# Patient Record
Sex: FEMALE | Race: WHITE | NOT HISPANIC OR LATINO | Employment: UNEMPLOYED | URBAN - METROPOLITAN AREA
[De-identification: names, ages, dates, MRNs, and addresses within clinical notes are randomized per-mention and may not be internally consistent; named-entity substitution may affect disease eponyms.]

---

## 2017-08-06 ENCOUNTER — HOSPITAL ENCOUNTER (EMERGENCY)
Facility: HOSPITAL | Age: 37
Discharge: HOME | End: 2017-08-06
Attending: EMERGENCY MEDICINE | Admitting: EMERGENCY MEDICINE
Payer: COMMERCIAL

## 2017-08-06 VITALS
RESPIRATION RATE: 16 BRPM | HEIGHT: 64 IN | BODY MASS INDEX: 26.12 KG/M2 | WEIGHT: 153 LBS | DIASTOLIC BLOOD PRESSURE: 70 MMHG | TEMPERATURE: 98 F | OXYGEN SATURATION: 99 % | HEART RATE: 64 BPM | SYSTOLIC BLOOD PRESSURE: 145 MMHG

## 2017-08-06 DIAGNOSIS — L25.9 CONTACT DERMATITIS: Primary | ICD-10-CM

## 2017-08-06 PROCEDURE — 99283 EMERGENCY DEPT VISIT LOW MDM: CPT

## 2017-08-06 RX ORDER — DIPHENHYDRAMINE HCL 25 MG
50 TABLET ORAL ONCE
Status: COMPLETED | OUTPATIENT
Start: 2017-08-06 | End: 2017-08-06

## 2017-08-06 RX ORDER — ESOMEPRAZOLE MAGNESIUM 10 MG/1
10 GRANULE, FOR SUSPENSION, EXTENDED RELEASE ORAL
COMMUNITY
End: 2017-12-11 | Stop reason: CLARIF

## 2017-08-06 RX ADMIN — DIPHENHYDRAMINE HCL 50 MG: 25 TABLET ORAL at 22:21

## 2017-12-11 ENCOUNTER — HOSPITAL ENCOUNTER (EMERGENCY)
Facility: HOSPITAL | Age: 37
Discharge: HOME | End: 2017-12-11
Attending: EMERGENCY MEDICINE
Payer: COMMERCIAL

## 2017-12-11 VITALS
TEMPERATURE: 97.7 F | RESPIRATION RATE: 18 BRPM | HEART RATE: 65 BPM | BODY MASS INDEX: 26.95 KG/M2 | SYSTOLIC BLOOD PRESSURE: 142 MMHG | DIASTOLIC BLOOD PRESSURE: 65 MMHG | WEIGHT: 157 LBS | OXYGEN SATURATION: 100 %

## 2017-12-11 DIAGNOSIS — M54.9 BACK PAIN: ICD-10-CM

## 2017-12-11 DIAGNOSIS — M79.18 BUTTOCK PAIN: Primary | ICD-10-CM

## 2017-12-11 PROCEDURE — 96372 THER/PROPH/DIAG INJ SC/IM: CPT

## 2017-12-11 PROCEDURE — 99283 EMERGENCY DEPT VISIT LOW MDM: CPT

## 2017-12-11 RX ORDER — KETOROLAC TROMETHAMINE 30 MG/ML
15 INJECTION, SOLUTION INTRAMUSCULAR; INTRAVENOUS ONCE
Status: COMPLETED | OUTPATIENT
Start: 2017-12-11 | End: 2017-12-11

## 2017-12-11 RX ORDER — METHOCARBAMOL 500 MG/1
500 TABLET, FILM COATED ORAL 2 TIMES DAILY
Qty: 20 TABLET | Refills: 0 | Status: SHIPPED | OUTPATIENT
Start: 2017-12-11 | End: 2018-05-02

## 2017-12-11 RX ORDER — LIDOCAINE 50 MG/G
1 PATCH TOPICAL ONCE
Status: DISCONTINUED | OUTPATIENT
Start: 2017-12-11 | End: 2017-12-11 | Stop reason: HOSPADM

## 2017-12-11 RX ORDER — METHOCARBAMOL 500 MG/1
500 TABLET, FILM COATED ORAL ONCE
Status: COMPLETED | OUTPATIENT
Start: 2017-12-11 | End: 2017-12-11

## 2017-12-11 RX ORDER — ACETAMINOPHEN 325 MG/1
975 TABLET ORAL ONCE
Status: COMPLETED | OUTPATIENT
Start: 2017-12-11 | End: 2017-12-11

## 2017-12-11 RX ORDER — NAPROXEN 500 MG/1
500 TABLET ORAL 2 TIMES DAILY WITH MEALS
Qty: 14 TABLET | Refills: 0 | Status: SHIPPED | OUTPATIENT
Start: 2017-12-11 | End: 2018-05-02

## 2017-12-11 RX ADMIN — METHOCARBAMOL 500 MG: 500 TABLET ORAL at 17:42

## 2017-12-11 RX ADMIN — KETOROLAC TROMETHAMINE 15 MG: 30 INJECTION, SOLUTION INTRAMUSCULAR at 17:45

## 2017-12-11 RX ADMIN — ACETAMINOPHEN 975 MG: 325 TABLET, FILM COATED ORAL at 17:42

## 2017-12-11 RX ADMIN — LIDOCAINE 1 PATCH: 50 PATCH TOPICAL at 17:45

## 2017-12-11 NOTE — ED ATTENDING ATTESTATION
Adela Dupont DO, saw and evaluated the patient  I have discussed the patient with the resident/non-physician practitioner and agree with the resident's/non-physician practitioner's findings, Plan of Care, and MDM as documented in the resident's/non-physician practitioner's note, except where noted  All available labs and Radiology studies were reviewed  At this point I agree with the current assessment done in the Emergency Department  I have conducted an independent evaluation of this patient a history and physical is as follows:    R sided buttock pain radiating down lateral aspect of R leg started 2 days ago  Pain 8/10 constant, worse with ambulation  Denies bowel/bladder sxs  Able to stand on heels and toes  No midline TTP  Imp: sciatica likely due to piriformis syndrome  Plan: robaxin, NSAIDs        Critical Care Time  CritCare Time

## 2017-12-11 NOTE — DISCHARGE INSTRUCTIONS
Please return to the Emergency Department if your symptoms fail to get better or you develop new, concerning symptoms  If you develop fever, chills, worsening pain, trouble walking, trouble going to the bathroom seek care immediately  Otherwise follow up with your primary care provider in the next 2-3 days for further care  Piriformis Syndrome   WHAT YOU NEED TO KNOW:   Piriformis syndrome is sciatic nerve pain caused by an injured or overused piriformis muscle  This is a muscle inside your buttocks that helps you move your leg  The pain is caused when this muscle pinches your sciatic nerve  You may feel the pain in your hip or down your leg  DISCHARGE INSTRUCTIONS:   Medicines: You may need any of the following:  · Prescription medicines  may be used to relax your muscles and decrease pain and swelling  · NSAIDs  help decrease swelling and pain  This medicine is available with or without a doctor's order  NSAIDs can cause stomach bleeding or kidney problems in certain people  If you take blood thinner medicine, always ask your healthcare provider if NSAIDs are safe for you  Always read the medicine label and follow directions  · Acetaminophen  decreases pain  It is available without a doctor's order  Ask how much to take and how often to take it  Follow directions  Acetaminophen can cause liver damage if not taken correctly  · Take your medicine as directed  Contact your healthcare provider if you think your medicine is not helping or if you have side effects  Tell him or her if you are allergic to any medicine  Keep a list of the medicines, vitamins, and herbs you take  Include the amounts, and when and why you take them  Bring the list or the pill bottles to follow-up visits  Carry your medicine list with you in case of an emergency  Follow up with your healthcare provider as directed: You may need to return for more tests   You may also be referred to a physical therapist  Write down your questions so you remember to ask them during your visits  Manage your symptoms of piriformis syndrome:   · Rest as directed  Avoid activities that make your pain worse  · Apply ice to the buttock on your injured side  Use an ice pack, or put crushed ice in a plastic bag  Cover it with a towel  Leave the ice on for 15 to 20 minutes every hour, or as directed  Ice helps prevent tissue damage and decreases swelling and pain  · Apply heat to the buttock on your injured side  Use heating pads for 20 to 30 minutes every 2 hours for as many days as directed  Heat helps decrease pain and muscle spasms  · Stretch as directed  Lie on your back with your knees bent  Place the ankle of your injured leg on the knee of your other leg  Gently pull your bent leg toward your chest, until you feel a stretch in the buttock of your injured leg  A physical therapist may show you other exercises to stretch and strengthen your hip muscles  Contact your healthcare provider if:   · Your pain worsens or returns, even with treatment  · You have questions or concerns about your condition or care  Return to the emergency department if:   · You cannot move your leg or foot  © 2017 [address] [facility] Information is for End User's use only and may not be sold, redistributed or otherwise used for commercial purposes  All illustrations and images included in CareNotes® are the copyrighted property of [employer]  or [address]  The above information is an  only  It is not intended as medical advice for individual conditions or treatments  Talk to your doctor, nurse or pharmacist before following any medical regimen to see if it is safe and effective for you  Muscle Spasm   WHAT YOU NEED TO KNOW:   A muscle spasm is a sudden contraction of any muscle or group of muscles  A muscle cramp is a painful muscle spasm   Muscle cramps commonly occur after intense exercise or during pregnancy  They may also be caused by certain medications, dehydration, low calcium or magnesium levels, or another medical condition  DISCHARGE INSTRUCTIONS:   Medicines: You may need the following:  · NSAIDs  help decrease swelling and pain or fever  This medicine is available with or without a doctor's order  NSAIDs can cause stomach bleeding or kidney problems in certain people  If you take blood thinner medicine, always ask your healthcare provider if NSAIDs are safe for you  Always read the medicine label and follow directions  · Take your medicine as directed  Contact your healthcare provider if you think your medicine is not helping or if you have side effects  Tell him of her if you are allergic to any medicine  Keep a list of the medicines, vitamins, and herbs you take  Include the amounts, and when and why you take them  Bring the list or the pill bottles to follow-up visits  Carry your medicine list with you in case of an emergency  Follow up with your healthcare provider as directed: You may need other tests or treatment  You may also be referred to a physical therapist or other specialist  Write down your questions so you remember to ask them during your visits  Self-care:   · Stretch  your muscle to help relieve the cramp  It may be helpful to keep your muscle in the stretched position until the cramp is gone  · Apply heat  to help decrease pain and muscle spasms  Apply heat on the area for 20 to 30 minutes every 2 hours for as many days as directed  · Apply ice  to help decrease swelling and pain  Ice may also help prevent tissue damage  Use an ice pack, or put crushed ice in a plastic bag  Cover it with a towel and place it on your muscle for 15 to 20 minutes every hour or as directed  · Drink more liquids  to help prevent muscle cramps caused by dehydration  Sports drinks may help replace electrolytes you lose through sweat during exercise   Ask your healthcare provider how much liquid to drink each day and which liquids are best for you  · Eat healthy foods , such as fruits, vegetables, whole grains, low-fat dairy products, and lean proteins (meat, beans, and fish)  If you are pregnant, ask your healthcare provider about foods that are high in magnesium and sodium  They may help to relieve cramps during pregnancy  · Massage your muscle  to help relieve the cramp  · Take frequent deep breaths  until the cramp feels better  Lie down while you take the deep breaths so you do not get dizzy or lightheaded  Contact your healthcare provider if:   · You have signs of dehydration, such as a headache, dark yellow urine, dry eyes or mouth, or a fast heartbeat  · You have questions or concerns about your condition or care  Return to the emergency department if:   · You have warmth, swelling, or redness in the cramping muscle  · You have frequent or unrelieved muscle cramps in several different muscles  · You have muscle cramps with numbness, tingling, and burning in your hands and feet  © 2017 [address] [facility] Information is for End User's use only and may not be sold, redistributed or otherwise used for commercial purposes  All illustrations and images included in CareNotes® are the copyrighted property of [employer]  or [address]  The above information is an  only  It is not intended as medical advice for individual conditions or treatments  Talk to your doctor, nurse or pharmacist before following any medical regimen to see if it is safe and effective for you

## 2017-12-11 NOTE — ED PROVIDER NOTES
History  Chief Complaint   Patient presents with    Back Pain     Pt c/o right sided back pain that radiates through buttocks  Denies loss of bowel or bladder function     This is a 24-year-old female presents for evaluation of right buttock pain for the last 2 days  States that she noted a gradual onset sharp, shooting buttock pain that radiated into her right leg along the lateral thigh into her foot with most movement  She took Tylenol in Advil without relief however has not taken any pain medications since yesterday  She has had similar symptoms in the past however they were short lived in usually resolved within a day  She denies any trauma denies any redness or swelling denies any fevers or chills denies any trouble walking denies any saddle anesthesia, urinary or bowel incontinence or retention  Otherwise she does not take any medications  She is a  and is on her feet a lot but does not recall any inciting event prior to the pain starting            Prior to Admission Medications   Prescriptions Last Dose Informant Patient Reported? Taking?   esomeprazole (NexIUM) 20 mg capsule   Yes Yes   Sig: Take 20 mg by mouth every morning before breakfast      Facility-Administered Medications: None       History reviewed  No pertinent past medical history  Past Surgical History:   Procedure Laterality Date    TUBAL LIGATION         History reviewed  No pertinent family history  I have reviewed and agree with the history as documented  Social History   Substance Use Topics    Smoking status: Never Smoker    Smokeless tobacco: Never Used    Alcohol use Yes      Comment: social        Review of Systems   Constitutional: Negative for chills and fever  HENT: Negative for rhinorrhea and sore throat  Respiratory: Negative for cough  Cardiovascular: Negative for chest pain and palpitations  Gastrointestinal: Negative for abdominal pain, nausea and vomiting     Genitourinary: Negative for dysuria, frequency and urgency  Musculoskeletal:        Right buttock pain   Neurological: Negative for weakness, light-headedness and headaches  Physical Exam  ED Triage Vitals [12/11/17 1635]   Temperature Pulse Respirations Blood Pressure SpO2   97 7 °F (36 5 °C) 65 18 142/65 100 %      Temp Source Heart Rate Source Patient Position - Orthostatic VS BP Location FiO2 (%)   Oral Monitor Lying Left arm --      Pain Score       No Pain           Orthostatic Vital Signs  Vitals:    12/11/17 1635   BP: 142/65   Pulse: 65   Patient Position - Orthostatic VS: Lying       Physical Exam   Constitutional: She is oriented to person, place, and time  She appears well-developed and well-nourished  HENT:   Head: Normocephalic and atraumatic  Cardiovascular: Normal rate and regular rhythm  Exam reveals no gallop and no friction rub  No murmur heard  Pulmonary/Chest: Effort normal  She has no wheezes  She has no rales  She exhibits no tenderness  Abdominal: Soft  She exhibits no distension and no mass  There is no rebound and no guarding  Musculoskeletal:   Tenderness to palpation over right upper buttock, no erythema or swelling, distally extremity neurovascularly intact, muscle strength 5/5 bilateral lower extremities, normal gait  Neurological: She is alert and oriented to person, place, and time  Skin: Skin is warm and dry  Psychiatric: She has a normal mood and affect  Nursing note and vitals reviewed        ED Medications  Medications   lidocaine (LIDODERM) 5 % patch 1 patch (1 patch Transdermal Medication Applied 12/11/17 1745)   ketorolac (TORADOL) injection 15 mg (15 mg Intramuscular Given 12/11/17 1745)   methocarbamol (ROBAXIN) tablet 500 mg (500 mg Oral Given 12/11/17 1742)   acetaminophen (TYLENOL) tablet 975 mg (975 mg Oral Given 12/11/17 1742)       Diagnostic Studies  Results Reviewed     None                 No orders to display         Procedures  Procedures      Phone Consults  ED Phone Contact    ED Course  ED Course                                MDM  Number of Diagnoses or Management Options  Diagnosis management comments: 80-year-old female presents for evaluation of right buttock pain, likely musculoskeletal in origin, possibly piriformis syndrome  Will provide analgesics as well as muscle relaxant, will re-evaluate and patient will follow up with PCP for further care  Will provide [facility] follow-up as patient does not have any insurance or PCP listed    CritCare Time    Disposition  Final diagnoses:   Buttock pain   Back pain     Time reflects when diagnosis was documented in both MDM as applicable and the Disposition within this note     Time User Action Codes Description Comment    12/11/2017  5:50 PM Daryl Marie Add [M79 1] Buttock pain     12/11/2017  5:50 PM Daryl Yanezer Add [M54 9] Back pain       ED Disposition     ED Disposition Condition Comment    Discharge  [facility] discharge to home/self care      Condition at discharge: Stable        Follow-up Information     Follow up With Specialties Details Why [facility] Emergency Department Emergency Medicine  If symptoms worsen [address]  [phone]  ED, [address] [place] [address]  In 2 days  [address] [place] [address] [address] [facility]         Discharge Medication List as of 12/11/2017  5:51 PM      START taking these medications    Details   methocarbamol (ROBAXIN) 500 mg tablet Take 1 tablet by mouth 2 (two) times a day, Starting Mon 12/11/2017, Print      naproxen (NAPROSYN) 500 mg tablet Take 1 tablet by mouth 2 (two) times a day with meals, Starting Mon 12/11/2017, Print         CONTINUE these medications which have NOT CHANGED    Details   esomeprazole (NexIUM) 20 mg capsule Take 20 mg by mouth every morning before breakfast, Historical Med           No discharge procedures on file  ED Provider  Attending physically available and evaluated Dejan Torres  ROSEMARY managed the patient along with the ED Attending      Electronically Signed by         Alexy Danielson MD  Resident  12/11/17 9992

## 2018-04-04 ENCOUNTER — OFFICE VISIT (OUTPATIENT)
Dept: OBGYN CLINIC | Facility: CLINIC | Age: 38
End: 2018-04-04
Payer: COMMERCIAL

## 2018-04-04 VITALS
WEIGHT: 154.3 LBS | BODY MASS INDEX: 26.34 KG/M2 | DIASTOLIC BLOOD PRESSURE: 74 MMHG | SYSTOLIC BLOOD PRESSURE: 111 MMHG | HEART RATE: 77 BPM | HEIGHT: 64 IN

## 2018-04-04 DIAGNOSIS — S37.69XA ABRASION OF CERVIX, INITIAL ENCOUNTER: ICD-10-CM

## 2018-04-04 DIAGNOSIS — T19.2XXA RETAINED TAMPON, INITIAL ENCOUNTER: Primary | ICD-10-CM

## 2018-04-04 PROCEDURE — 99202 OFFICE O/P NEW SF 15 MIN: CPT | Performed by: NURSE PRACTITIONER

## 2018-04-04 RX ORDER — METRONIDAZOLE 500 MG/1
500 TABLET ORAL EVERY 12 HOURS SCHEDULED
Qty: 14 TABLET | Refills: 0 | Status: SHIPPED | OUTPATIENT
Start: 2018-04-04 | End: 2018-04-11

## 2018-04-04 RX ORDER — LAMOTRIGINE 100 MG/1
TABLET ORAL
COMMUNITY
Start: 2015-06-08 | End: 2018-05-02

## 2018-04-04 RX ORDER — DOXYCYCLINE HYCLATE 100 MG/1
100 CAPSULE ORAL EVERY 12 HOURS SCHEDULED
Qty: 28 CAPSULE | Refills: 0 | Status: SHIPPED | OUTPATIENT
Start: 2018-04-04 | End: 2018-04-18

## 2018-04-04 NOTE — PROGRESS NOTES
Assessment/Plan:       Diagnoses and all orders for this visit:    Retained tampon, initial encounter  -     doxycycline hyclate (VIBRAMYCIN) 100 mg capsule; Take 1 capsule (100 mg total) by mouth every 12 (twelve) hours for 14 days  -     metroNIDAZOLE (FLAGYL) 500 mg tablet; Take 1 tablet (500 mg total) by mouth every 12 (twelve) hours for 7 days    Abrasion of cervix, initial encounter  -     doxycycline hyclate (VIBRAMYCIN) 100 mg capsule; Take 1 capsule (100 mg total) by mouth every 12 (twelve) hours for 14 days  -     metroNIDAZOLE (FLAGYL) 500 mg tablet; Take 1 tablet (500 mg total) by mouth every 12 (twelve) hours for 7 days    Other orders  -     lamoTRIgine (LaMICtal) 100 mg tablet; Take by mouth      Reviewed TSS symptoms and to go to ER if she develops  RTO in 1 week for follow up exam    Subjective:      Patient ID: Alex Lamb is a 40 y o  female  HPI Pt is a 39 yo , 1 SAB, 2 VIP's, last child adopted out  Patient has had pelvic pain the past couple months has been off and on and also a sour smell  Has history of bilateral salpingectomy  His history of fibroid, history of ovarian cyst, and history of acute endometritis  She denies any vaginal discharge, Same partner x2 years  She denies any fever or chills  Pain is more midline and feels "bruisy", radiates to the back  She had sciatica and was treated for with NSAIDS and muscle relaxants and resolved and then developed this pain  Pain is a 2-6 on pain scale, takes Tylenol helps a little  She denies any dysuria, urgency of unusual frequency  Pain is constant, feels more when active or touching  Has pain with sex that has been increasing  LMP 3/15/2018, usually lasts for 1 week       The following portions of the patient's history were reviewed and updated as appropriate: allergies, current medications, past family history, past medical history, past social history, past surgical history and problem list     Review of Systems Respiratory: Negative  Cardiovascular: Negative  Gastrointestinal: Negative for abdominal pain, diarrhea and nausea  Genitourinary: Positive for dyspareunia and pelvic pain  Negative for difficulty urinating, genital sores, menstrual problem, urgency, vaginal bleeding and vaginal discharge  Musculoskeletal: Positive for back pain (lower back)  Negative for arthralgias and myalgias  Neurological: Negative for dizziness, seizures, weakness, light-headedness and headaches  Objective:      /74 (BP Location: Left arm, Patient Position: Sitting, Cuff Size: Adult)   Pulse 77   Ht 5' 4" (1 626 m)   Wt 70 kg (154 lb 4 8 oz)   LMP 03/15/2018   Breastfeeding? No   BMI 26 49 kg/m²          Physical Exam   Constitutional: She appears well-nourished  Neck: No thyromegaly present  Cardiovascular: Normal rate and regular rhythm  Pulmonary/Chest: Effort normal and breath sounds normal    Abdominal: Soft  There is no tenderness  Genitourinary:         External genitalia-no lesions  Vagina-retained tampon visualized and removed, yellow-white thin discharge  Cervix-no pus, some erythema with small scattered abrasions on the right side the cervix her tampon was    Negative CMT, not friable  Uterus-ANSSC, some tenderness with exam  Adnexa-nontender, no masses

## 2018-04-04 NOTE — PATIENT INSTRUCTIONS
Return to office in 1 week for follow-up and exam   Go to the ER if you develop the symptoms  A sudden high fever, low blood pressure, vomiting or diarrhea, a rash, confusion, muscle aches, redness of her eyes, mouth, throat, seizures  Or headaches  Please take antibiotics as prescribed

## 2018-04-11 ENCOUNTER — OFFICE VISIT (OUTPATIENT)
Dept: OBGYN CLINIC | Facility: CLINIC | Age: 38
End: 2018-04-11
Payer: COMMERCIAL

## 2018-04-11 VITALS
DIASTOLIC BLOOD PRESSURE: 76 MMHG | SYSTOLIC BLOOD PRESSURE: 114 MMHG | WEIGHT: 154 LBS | HEART RATE: 73 BPM | HEIGHT: 64 IN | BODY MASS INDEX: 26.29 KG/M2

## 2018-04-11 DIAGNOSIS — T19.2XXD RETAINED TAMPON, SUBSEQUENT ENCOUNTER: Primary | ICD-10-CM

## 2018-04-11 DIAGNOSIS — Z11.3 SCREENING FOR STDS (SEXUALLY TRANSMITTED DISEASES): ICD-10-CM

## 2018-04-11 DIAGNOSIS — S37.69XD: ICD-10-CM

## 2018-04-11 LAB
BV WHIFF TEST VAG QL: ABNORMAL
CLUE CELLS SPEC QL WET PREP: NEGATIVE
SL AMB POCT WET MOUNT: ABNORMAL
T VAGINALIS VAG QL WET PREP: ABNORMAL
YEAST VAG QL WET PREP: ABNORMAL

## 2018-04-11 PROCEDURE — 99213 OFFICE O/P EST LOW 20 MIN: CPT | Performed by: NURSE PRACTITIONER

## 2018-04-11 PROCEDURE — 87491 CHLMYD TRACH DNA AMP PROBE: CPT | Performed by: NURSE PRACTITIONER

## 2018-04-11 PROCEDURE — 87210 SMEAR WET MOUNT SALINE/INK: CPT | Performed by: NURSE PRACTITIONER

## 2018-04-11 PROCEDURE — 87591 N.GONORRHOEAE DNA AMP PROB: CPT | Performed by: NURSE PRACTITIONER

## 2018-04-11 NOTE — PROGRESS NOTES
Assessment/Plan:  Reviewed wet mount KOH findings with patient, culture for Chlamydia and gonorrhea sent, patient is due for a menses soon  Review to call or go to the ER with any symptoms, please see patient's instructions  Continue medication as prescribed until finished  Recommended to use pads with next menses  Return to office 2018 for annual gyn exam          Diagnoses and all orders for this visit:    Retained tampon, subsequent encounter  -     Chlamydia/GC amplified DNA by PCR  -     POCT wet mount    Abrasion of cervix, subsequent encounter  -     Chlamydia/GC amplified DNA by PCR    Screening for STDs (sexually transmitted diseases)  -     Chlamydia/GC amplified DNA by PCR          Subjective:      Patient ID: Nabila Cabral is a 40 y o  female  HPI patient is a 55-year-old  035 here for follow-up for a retained tampon which was removed 1 week ago  Patient was started on doxycycline 100 milligrams b i d  times 14 days and metronidazole 500 milligrams b i d  x1 week  She states her pelvic pain that she was feeling has resolved  She states she will get an intermittent pinching pain that lasts for seconds at left lower quadrant but has had this pain previously  Her LMP was 03/15/2018 she is due for menses soon  She denies any T SS symptoms  She denies any fever, chills, rashes, body aches or other unusual symptoms  She denies any unusual vaginal discharge, odor  She has not had sex since she was last seen  She does have an annual gyn exam scheduled for May 2, 2018    The following portions of the patient's history were reviewed and updated as appropriate: allergies, current medications, past family history, past medical history, past social history, past surgical history and problem list     Review of Systems   Respiratory: Negative  Cardiovascular: Negative  Gastrointestinal: Negative for diarrhea  Genitourinary: Positive for pelvic pain   Negative for difficulty urinating, vaginal bleeding and vaginal discharge  Musculoskeletal: Negative for arthralgias  Objective:      /76 (BP Location: Left arm, Patient Position: Sitting, Cuff Size: Adult)   Pulse 73   Ht 5' 4" (1 626 m)   Wt 69 9 kg (154 lb)   LMP 03/15/2018   BMI 26 43 kg/m²          Physical Exam   Constitutional: She appears well-nourished  Abdominal: Soft  There is no tenderness  external genitalia- no lesions  Vagina-no lesions, small amount yellow white discharge  Cervix-abrasions healed, 1 very small area of erythema on right side of cervix, negative CMT  Uterus-ANSSC, mild tenderness movement  Adnexa-no masses nontender    Wet mount ALEXANDRA-many PMNs, negative for yeast, negative for Bv, negative for trich, negative whiff

## 2018-04-11 NOTE — PATIENT INSTRUCTIONS
Call with any symptoms, fever, chills, aches and pains, or rashes    Will follow-up at your annual on 05/02/2018

## 2018-04-13 LAB
CHLAMYDIA DNA CVX QL NAA+PROBE: NORMAL
N GONORRHOEA DNA GENITAL QL NAA+PROBE: NORMAL

## 2018-05-02 ENCOUNTER — OFFICE VISIT (OUTPATIENT)
Dept: OBGYN CLINIC | Facility: CLINIC | Age: 38
End: 2018-05-02
Payer: COMMERCIAL

## 2018-05-02 VITALS
DIASTOLIC BLOOD PRESSURE: 83 MMHG | SYSTOLIC BLOOD PRESSURE: 116 MMHG | WEIGHT: 152.6 LBS | BODY MASS INDEX: 26.05 KG/M2 | HEIGHT: 64 IN | HEART RATE: 71 BPM

## 2018-05-02 DIAGNOSIS — Z01.419 ENCOUNTER FOR GYNECOLOGICAL EXAMINATION WITHOUT ABNORMAL FINDING: Primary | ICD-10-CM

## 2018-05-02 PROCEDURE — 99395 PREV VISIT EST AGE 18-39: CPT | Performed by: NURSE PRACTITIONER

## 2018-05-02 PROCEDURE — G0145 SCR C/V CYTO,THINLAYER,RESCR: HCPCS | Performed by: PATHOLOGY

## 2018-05-02 PROCEDURE — G0124 SCREEN C/V THIN LAYER BY MD: HCPCS | Performed by: PATHOLOGY

## 2018-05-02 PROCEDURE — 87624 HPV HI-RISK TYP POOLED RSLT: CPT | Performed by: NURSE PRACTITIONER

## 2018-05-02 NOTE — PROGRESS NOTES
Diagnoses and all orders for this visit:    Encounter for gynecological examination without abnormal finding  -     Liquid-based pap, screening            ASSESSMENT & PLAN: Shannan Favre is a 40 y o  [id number] with normal gynecologic exam     1   Routine well woman exam done today  2  Pap and HPV:  The patient's last pap 10/2014 and was negative  Pap and cotesting was done today  Current [facility]P Guidelines reviewed  Due in 5 years if negative  3   The following were reviewed in today's visit: breast self exam, adequate intake of calcium and vitamin D, exercise and healthy diet  Caution  tampon use, recent retained tampon  CC:  Annual Gynecologic Examination    HPI: Shannan Favre is a 40 y o  [id number] who presents for annual gynecologic examination  Patient was recently seen in April for retained tampon which was removed  Patient finished meds that were prescribed  She denies any symptoms, pain or rashes  She had a tubal ligation that was done 2015  She denies any problems with menses  She was recently engaged  She has been with the same partner for 2 years  Health Maintenance:    She wears her seatbelt routinely  She does not perform regular monthly self breast exams  She feels safe at home  Past Medical History:   Diagnosis Date    Heartburn        Past Surgical History:   Procedure Laterality Date    TUBAL LIGATION         Past OB/Gyn History:  OB History      Para Term  AB Living    8 5 5   2 5    SAB TAB Ectopic Multiple Live Births    1       5        Pt does not have menstrual issues  History of sexually transmitted infection: No   History of abnormal pap smears: No      Patient is currently sexually active  heterosexual   The current method of family planning is tubal ligation      Family History   Problem Relation Age of Onset    Arthritis Mother     Cervical cancer Mother     Mental illness Father     Cervical cancer Sister     Heart disease Maternal Grandmother        Social History:  Social History     Social History    Marital status: Legally      Spouse name: N/A    Number of children: N/A    Years of education: N/A     Occupational History    Not on file  Social History Main Topics    Smoking status: Former Smoker    Smokeless tobacco: Never Used    Alcohol use Yes      Comment: social    Drug use: No    Sexual activity: Yes     Partners: Male     Birth control/ protection: Female Sterilization     Other Topics Concern    Not on file     Social History Narrative    No narrative on file     Presently lives with family  Patient is   No Known Allergies      Current Outpatient Prescriptions:     esomeprazole (NexIUM) 20 mg capsule, Take 20 mg by mouth every morning before breakfast, Disp: , Rfl:       Review of Systems  Constitutional :no fever, feels well, no tiredness, no recent weight gain or loss  ENT: no ear ache, no loss of hearing, no nosebleeds or nasal discharge, no sore throat or hoarseness  Cardiovascular: no complaints of slow or fast heart beat, no chest pain, no palpitations, no leg claudication or lower extremity edema  Respiratory: no complaints of shortness of shortness of breath, no MANDUJANO  Breasts:no complaints of breast pain, breast lump, or nipple discharge  Gastrointestinal: no complaints of abdominal pain, constipation, nausea, vomiting, or diarrhea or bloody stools  Genitourinary : no complaints of dysuria, incontinence, pelvic pain, no dysmenorrhea, vaginal discharge or abnormal vaginal bleeding and as noted in HPI  Musculoskeletal: no complaints of arthralgia, no myalgia, no joint swelling or stiffness, no limb pain or swelling    Integumentary: no complaints of skin rash or lesion, itching or dry skin  Neurological: no complaints of headache, no confusion, no numbness or tingling, no dizziness or fainting    Objective      /83 (BP Location: Left arm, Patient Position: Sitting, Cuff Size: Standard)   Pulse 71   Ht 5' 3 5" (1 613 m)   Wt 69 2 kg (152 lb 9 6 oz)   LMP 04/19/2018 (Approximate)   BMI 26 61 kg/m²   General:   appears stated age, cooperative, alert normal mood and affect   Neck: normal, supple,trachea midline, no masses   Heart: regular rate and rhythm, S1, S2 normal, no murmur, click, rub or gallop   Lungs: clear to auscultation bilaterally   Breasts: normal appearance, no masses or tenderness   Abdomen: soft, non-tender, without masses or organomegaly   Vulva: normal female genitalia, Bartholin's, Urethra, Pettibone normal   Vagina: normal vagina, no discharge, exudate, lesion, or erythema   Urethra: normal   Cervix: Normal, no discharge  PAP done  no lesions   Uterus: normal size, contour, position, consistency, mobility, non-tender   Adnexa: normal adnexa   Lymphatic palpation of lymph nodes in neck, axilla, groin and/or other locations: no lymphadenopathy or masses noted   Skin normal skin turgor and no rashes     Psychiatric orientation to person, place, and time: normal  mood and affect: normal

## 2018-05-04 LAB — HPV RRNA GENITAL QL NAA+PROBE: ABNORMAL

## 2018-05-08 LAB
LAB AP GYN PRIMARY INTERPRETATION: NORMAL
Lab: NORMAL
PATH INTERP SPEC-IMP: NORMAL

## 2018-05-14 ENCOUNTER — TELEPHONE (OUTPATIENT)
Dept: OBGYN CLINIC | Facility: CLINIC | Age: 38
End: 2018-05-14

## 2018-05-14 NOTE — TELEPHONE ENCOUNTER
----- Message from Karen Cedeño, [address] sent at 5/9/2018 11:00 AM EDT -----  Please call patient to schedule colpo, ECC and possible endometrial biopsy

## 2018-05-15 ENCOUNTER — HOSPITAL ENCOUNTER (EMERGENCY)
Facility: HOSPITAL | Age: 38
Discharge: HOME | End: 2018-05-16
Attending: EMERGENCY MEDICINE | Admitting: EMERGENCY MEDICINE
Payer: COMMERCIAL

## 2018-05-15 DIAGNOSIS — M54.9 CHRONIC BACK PAIN: ICD-10-CM

## 2018-05-15 DIAGNOSIS — N83.209 OVARIAN CYST: Primary | ICD-10-CM

## 2018-05-15 DIAGNOSIS — G89.29 CHRONIC BACK PAIN: ICD-10-CM

## 2018-05-15 DIAGNOSIS — M25.512 LEFT SHOULDER PAIN: ICD-10-CM

## 2018-05-15 LAB
BILIRUB UR QL STRIP: NEGATIVE
CLARITY UR: CLEAR
CLARITY, POC: CLEAR
COLOR UR: YELLOW
COLOR, POC: YELLOW
EXT PREG TEST URINE: NEGATIVE
GLUCOSE UR STRIP-MCNC: NEGATIVE MG/DL
HGB UR QL STRIP.AUTO: NEGATIVE
KETONES UR STRIP-MCNC: NEGATIVE MG/DL
LEUKOCYTE ESTERASE UR QL STRIP: ABNORMAL
NITRITE UR QL STRIP: NEGATIVE
PH UR STRIP.AUTO: 6.5 [PH] (ref 4.5–8)
PROT UR STRIP-MCNC: NEGATIVE MG/DL
SP GR UR STRIP.AUTO: 1.01 (ref 1–1.03)
UROBILINOGEN UR QL STRIP.AUTO: 0.2 E.U./DL

## 2018-05-15 PROCEDURE — 85025 COMPLETE CBC W/AUTO DIFF WBC: CPT | Performed by: STUDENT IN AN ORGANIZED HEALTH CARE EDUCATION/TRAINING PROGRAM

## 2018-05-15 PROCEDURE — 80053 COMPREHEN METABOLIC PANEL: CPT | Performed by: STUDENT IN AN ORGANIZED HEALTH CARE EDUCATION/TRAINING PROGRAM

## 2018-05-15 PROCEDURE — 83690 ASSAY OF LIPASE: CPT | Performed by: STUDENT IN AN ORGANIZED HEALTH CARE EDUCATION/TRAINING PROGRAM

## 2018-05-15 PROCEDURE — 81025 URINE PREGNANCY TEST: CPT | Performed by: STUDENT IN AN ORGANIZED HEALTH CARE EDUCATION/TRAINING PROGRAM

## 2018-05-15 PROCEDURE — 36415 COLL VENOUS BLD VENIPUNCTURE: CPT | Performed by: STUDENT IN AN ORGANIZED HEALTH CARE EDUCATION/TRAINING PROGRAM

## 2018-05-15 PROCEDURE — 81002 URINALYSIS NONAUTO W/O SCOPE: CPT | Performed by: STUDENT IN AN ORGANIZED HEALTH CARE EDUCATION/TRAINING PROGRAM

## 2018-05-15 PROCEDURE — 81001 URINALYSIS AUTO W/SCOPE: CPT

## 2018-05-16 ENCOUNTER — APPOINTMENT (EMERGENCY)
Dept: RADIOLOGY | Facility: HOSPITAL | Age: 38
End: 2018-05-16
Payer: COMMERCIAL

## 2018-05-16 VITALS
DIASTOLIC BLOOD PRESSURE: 62 MMHG | TEMPERATURE: 98 F | HEART RATE: 78 BPM | OXYGEN SATURATION: 99 % | SYSTOLIC BLOOD PRESSURE: 128 MMHG | RESPIRATION RATE: 18 BRPM

## 2018-05-16 LAB
ALBUMIN SERPL BCP-MCNC: 3.8 G/DL (ref 3.5–5)
ALP SERPL-CCNC: 77 U/L (ref 46–116)
ALT SERPL W P-5'-P-CCNC: 26 U/L (ref 12–78)
ANION GAP SERPL CALCULATED.3IONS-SCNC: 3 MMOL/L (ref 4–13)
AST SERPL W P-5'-P-CCNC: 19 U/L (ref 5–45)
BACTERIA UR QL AUTO: ABNORMAL /HPF
BASOPHILS # BLD AUTO: 0.03 THOUSANDS/ΜL (ref 0–0.1)
BASOPHILS NFR BLD AUTO: 0 % (ref 0–1)
BILIRUB SERPL-MCNC: 0.31 MG/DL (ref 0.2–1)
BUN SERPL-MCNC: 14 MG/DL (ref 5–25)
CALCIUM SERPL-MCNC: 8.9 MG/DL (ref 8.3–10.1)
CHLORIDE SERPL-SCNC: 104 MMOL/L (ref 100–108)
CO2 SERPL-SCNC: 29 MMOL/L (ref 21–32)
CREAT SERPL-MCNC: 0.84 MG/DL (ref 0.6–1.3)
EOSINOPHIL # BLD AUTO: 0.22 THOUSAND/ΜL (ref 0–0.61)
EOSINOPHIL NFR BLD AUTO: 3 % (ref 0–6)
ERYTHROCYTE [DISTWIDTH] IN BLOOD BY AUTOMATED COUNT: 13 % (ref 11.6–15.1)
GFR SERPL CREATININE-BSD FRML MDRD: 89 ML/MIN/1.73SQ M
GLUCOSE SERPL-MCNC: 84 MG/DL (ref 65–140)
HCT VFR BLD AUTO: 35.8 % (ref 34.8–46.1)
HGB BLD-MCNC: 11.4 G/DL (ref 11.5–15.4)
HYALINE CASTS #/AREA URNS LPF: ABNORMAL /LPF
IMM GRANULOCYTES # BLD AUTO: 0.02 THOUSAND/UL (ref 0–0.2)
IMM GRANULOCYTES NFR BLD AUTO: 0 % (ref 0–2)
LIPASE SERPL-CCNC: 195 U/L (ref 73–393)
LYMPHOCYTES # BLD AUTO: 2.93 THOUSANDS/ΜL (ref 0.6–4.47)
LYMPHOCYTES NFR BLD AUTO: 38 % (ref 14–44)
MCH RBC QN AUTO: 26.3 PG (ref 26.8–34.3)
MCHC RBC AUTO-ENTMCNC: 31.8 G/DL (ref 31.4–37.4)
MCV RBC AUTO: 83 FL (ref 82–98)
MONOCYTES # BLD AUTO: 0.4 THOUSAND/ΜL (ref 0.17–1.22)
MONOCYTES NFR BLD AUTO: 5 % (ref 4–12)
NEUTROPHILS # BLD AUTO: 4.12 THOUSANDS/ΜL (ref 1.85–7.62)
NEUTS SEG NFR BLD AUTO: 53 % (ref 43–75)
NON-SQ EPI CELLS URNS QL MICRO: ABNORMAL /HPF
NRBC BLD AUTO-RTO: 0 /100 WBCS
PLATELET # BLD AUTO: 250 THOUSANDS/UL (ref 149–390)
PMV BLD AUTO: 10.8 FL (ref 8.9–12.7)
POTASSIUM SERPL-SCNC: 3.5 MMOL/L (ref 3.5–5.3)
PROT SERPL-MCNC: 8 G/DL (ref 6.4–8.2)
RBC # BLD AUTO: 4.33 MILLION/UL (ref 3.81–5.12)
RBC #/AREA URNS AUTO: ABNORMAL /HPF
SODIUM SERPL-SCNC: 136 MMOL/L (ref 136–145)
WBC # BLD AUTO: 7.72 THOUSAND/UL (ref 4.31–10.16)
WBC #/AREA URNS AUTO: ABNORMAL /HPF

## 2018-05-16 PROCEDURE — 74177 CT ABD & PELVIS W/CONTRAST: CPT

## 2018-05-16 PROCEDURE — 96374 THER/PROPH/DIAG INJ IV PUSH: CPT

## 2018-05-16 PROCEDURE — 99284 EMERGENCY DEPT VISIT MOD MDM: CPT

## 2018-05-16 PROCEDURE — 76856 US EXAM PELVIC COMPLETE: CPT

## 2018-05-16 PROCEDURE — 76830 TRANSVAGINAL US NON-OB: CPT

## 2018-05-16 RX ORDER — KETOROLAC TROMETHAMINE 30 MG/ML
15 INJECTION, SOLUTION INTRAMUSCULAR; INTRAVENOUS ONCE
Status: COMPLETED | OUTPATIENT
Start: 2018-05-16 | End: 2018-05-16

## 2018-05-16 RX ADMIN — IOHEXOL 100 ML: 350 INJECTION, SOLUTION INTRAVENOUS at 00:37

## 2018-05-16 RX ADMIN — KETOROLAC TROMETHAMINE 15 MG: 30 INJECTION, SOLUTION INTRAMUSCULAR at 01:32

## 2018-05-16 NOTE — ED NOTES
Rounded on pt  States her pain is a 1/10  Denies any issues at this time  Is aware that we are waiting for results from imaging       Sil Hidalgo RN  05/16/18 [id number]

## 2018-05-16 NOTE — ED ATTENDING ATTESTATION
Ronn Calzada DO, saw and evaluated the patient  I have discussed the patient with the resident/non-physician practitioner and agree with the resident's/non-physician practitioner's findings, Plan of Care, and MDM as documented in the resident's/non-physician practitioner's note, except where noted  All available labs and Radiology studies were reviewed  At this point I agree with the current assessment done in the Emergency Department  I have conducted an independent evaluation of this patient including a focused history and a physical exam     ED Note - Contreras CHIN 40 y o  female MRN: [id number]  Unit/Bed#: ED 01 Encounter: [id number]    History of Present Illness   HPI  Shay Gee is a 40 y o  female who presents for evaluation of   Low back and left shoulder pain as well as left lower quadrant pain  Patient does work as a  and does carry a tray with her left arm  Pain with movement of the left arm at the shoulder  Patient also states that lower back pain is exacerbated by flexion  Denies any saddle anesthesia or focal neurological deficits  No bowel or bladder abnormality ( incontinence or retention )  No fever chills  Patient also complaining of non-radiating  Sharp left lower quadrant pain rated 3 to 6/10 in severity which onset approximately 2 days ago has been intermittent in nature with out obvious alleviating or exacerbating factors  No urinary symptoms  No flank pain  No vaginal bleeding or abnormal discharge  Surgical history of bilateral tubal ligation  Tolerating PO intake  REVIEW OF SYSTEMS  See HPI for further details  12 systems reviewed and otherwise negative except as noted     Historical Information     PAST MEDICAL HISTORY  Past Medical History:   Diagnosis Date    Heartburn        FAMILY HISTORY  Family History   Problem Relation Age of Onset    Arthritis Mother     Cervical cancer Mother     Mental illness Father     Cervical cancer Sister     Heart disease Maternal Grandmother        SOCIAL HISTORY  Social History     Social History    Marital status: Legally      Spouse name: N/A    Number of children: N/A    Years of education: N/A     Social History Main Topics    Smoking status: Former Smoker    Smokeless tobacco: Never Used    Alcohol use Yes      Comment: social    Drug use: No    Sexual activity: Yes     Partners: Male     Birth control/ protection: Female Sterilization     Other Topics Concern    None     Social History Narrative    None       SURGICAL HISTORY  Past Surgical History:   Procedure Laterality Date    TUBAL LIGATION       Meds/Allergies     CURRENT MEDICATIONS    Current Facility-Administered Medications:     ketorolac (TORADOL) injection 15 mg, 15 mg, Intravenous, Once, Domitila Montejo MD    Current Outpatient Prescriptions:     esomeprazole (NexIUM) 20 mg capsule, Take 20 mg by mouth every morning before breakfast, Disp: , Rfl:     (Not in a hospital admission)    ALLERGIES  No Known Allergies  Objective     PHYSICAL EXAM    VITAL SIGNS: Blood pressure 126/77, pulse 64, temperature 98 °F (36 7 °C), temperature source Oral, resp  rate 18, last menstrual period 04/19/2018, SpO2 99 %, not currently breastfeeding  Constitutional:  Appears well developed and well nourished, no acute distress, non-toxic appearance   Eyes:  PERRL, EOMI, conjunctivae pink   HENT:  Normocephalic/Atraumatic, no rhinorrhea, mucous membranes moist  Neck: normal range of motion, no tenderness, supple   Respiratory:  No respiratory distress, normal breath sounds  Cardiovascular:  Normal rate, normal rhythm   GI:  Soft, non-tender, non-distended  :  No CVAT, no flank ecchymosis   Musculoskeletal:    Bilateral lumbar paraspinal muscular tenderness  No midline spinal tenderness  No SI joint tenderness  No sciatic notch tenderness  Left shoulder with tenderness over the acromion and bicipital groove  Neurovascular intact    Integument: Pink, warm, dry, Well hydrated, no rash, no erythema, no bullae   Lymphatic:  No cervical/ tonsillar/ submandibular lymphadenopathy noted   Neurologic:  Awake, Alert & oriented x 3, CN 2-12 intact, no focal neurological deficits  Psychiatric:  Speech and behavior appropriate       ED COURSE and MDM:    Assessment/Plan   Assessment:  Abisai Mak is a 40 y o  female presents for evaluation of LLQ pain and low back and left shoulder pain  Plan:  Labs, symptom management, CT a/p, Formal US, disposition as appropriate  Portions of the record may have been created with voice recognition software  Occasional wrong word or "sound a like" substitutions may have occurred due to the inherent limitations of voice recognition software       ED Provider  Electronically Signed by

## 2018-05-16 NOTE — DISCHARGE INSTRUCTIONS
Ovarian Cyst   WHAT YOU NEED TO KNOW:   An ovarian cyst is a sac that grows on an ovary  This sac usually contains fluid, but may sometimes have blood or tissue in it  Most ovarian cysts are harmless and go away without treatment in a few months  Some cysts can grow large, cause pain, or break open  DISCHARGE INSTRUCTIONS:   Call 911 for any of the following:   · You are too weak or dizzy to stand up  Return to the emergency department if:   · You have severe abdominal pain  The pain may be sharp and sudden  · You have a fever  Contact your healthcare provider if:   · Your periods are early, late, or more painful than usual     · You have bleeding from your vagina that is not your period  · You have abdominal pain all the time  · Your abdomen is swollen  · You have feelings of fullness, pressure, or discomfort in your abdomen  · You have trouble urinating or emptying your bladder completely  · You have pain during sex  · You are losing weight without trying  · You have questions or concerns about your condition or care  Medicines: You may need any of the following:  · NSAIDs , such as ibuprofen, help decrease swelling, pain, and fever  This medicine is available with or without a doctor's order  NSAIDs can cause stomach bleeding or kidney problems in certain people  If you take blood thinner medicine, always ask if NSAIDs are safe for you  Always read the medicine label and follow directions  Do not give these medicines to children under 10months of age without direction from your child's healthcare provider  · Birth control pills  may help to control your periods, prevent cysts, or cause them to shrink  · Take your medicine as directed  Contact your healthcare provider if you think your medicine is not helping or if you have side effects  Tell him or her if you are allergic to any medicine  Keep a list of the medicines, vitamins, and herbs you take   Include the amounts, and when and why you take them  Bring the list or the pill bottles to follow-up visits  Carry your medicine list with you in case of an emergency  Follow up with your healthcare provider as directed:  Write down your questions so you remember to ask them during your visits  Apply heat to decrease pain and cramping:  Sit in a warm bath, or place a heating pad (turned on low) or a hot water bottle on your abdomen  Do this for 15 to 20 minutes every hour for as many days as directed  © 2017 [employer] [employer] Information is for End User's use only and may not be sold, redistributed or otherwise used for commercial purposes  All illustrations and images included in CareNotes® are the copyrighted property of A D [employer] M [employer]  or Aditya Ayala  The above information is an  only  It is not intended as medical advice for individual conditions or treatments  Talk to your doctor, nurse or pharmacist before following any medical regimen to see if it is safe and effective for you

## 2018-05-16 NOTE — ED PROVIDER NOTES
History  Chief Complaint   Patient presents with    Back Pain     Pt c/o lower back pain, left sided flank pain, and left shoulder pain for several months that has progressively been getting worse  Pt states her back is stiff  This is a 60-year-old female with a past medical history of GERD who presents to the emergency department this evening with left shoulder pain, low back pain, and left lower quadrant abdominal pain  Patient states that the shoulder pain and back pain have been going on for months but recently pain has increased in severity and frequency over the past few days  Patient states that her shoulder and back are typically around a 3/10 but can shoot up to six or 7/10 when they are at their worst   She describes both pains as aching and Tylenol/NSAIDs do not help  Patient has not seen her primary care physician for this because she was mistakenly given a pediatrician as a primary care physician  Patient works as a  and hold the tray using her left arm  Patient states that the pain in her left arm begins when she raises her arm to approximately 90 degrees of abduction  Patient's lower back pain is made better with stretching but she states that the pain will eventually come back  Patient states that both her shoulder and her back are worse in the morning but improved throughout the day  She denies any caudal or radicular symptoms associated with the back pain  Patient's abdominal pain is located in left lower quadrant  Patient is unclear exactly when the pain started but believes it occurred 24-48 hours ago  The pain is sharp in nature, comes on acutely, and last for about 1-2 minutes before disappearing  Patient states that her last menstrual   Was 3-4 weeks ago  She denies any dysuria, hematuria, vaginal discharge, vaginal bleeding, diarrhea, or constipation  Patient has had a bilateral salpingectomy but denies any other abdominal surgeries    She denies any rash in the area of the left lower quadrant pain  Prior to Admission Medications   Prescriptions Last Dose Informant Patient Reported? Taking?   esomeprazole (NexIUM) 20 mg capsule 5/15/2018 at Unknown time  Yes Yes   Sig: Take 20 mg by mouth every morning before breakfast      Facility-Administered Medications: None       Past Medical History:   Diagnosis Date    Heartburn        Past Surgical History:   Procedure Laterality Date    TUBAL LIGATION         Family History   Problem Relation Age of Onset    Arthritis Mother     Cervical cancer Mother     Mental illness Father     Cervical cancer Sister     Heart disease Maternal Grandmother      I have reviewed and agree with the history as documented  Social History   Substance Use Topics    Smoking status: Former Smoker    Smokeless tobacco: Never Used    Alcohol use Yes      Comment: social        Review of Systems   Constitutional: Negative for chills, fatigue and fever  HENT: Negative for congestion, rhinorrhea, sinus pressure and sore throat  Eyes: Negative for visual disturbance  Respiratory: Negative for cough and shortness of breath  Cardiovascular: Negative for chest pain  Gastrointestinal: Positive for abdominal pain  Negative for constipation, diarrhea, nausea and vomiting  Genitourinary: Negative for difficulty urinating, dysuria, frequency, genital sores, hematuria, menstrual problem, urgency, vaginal bleeding and vaginal discharge  Musculoskeletal: Positive for arthralgias and back pain  Negative for myalgias  Skin: Negative for color change and rash  Neurological: Negative for dizziness, light-headedness and numbness         Physical Exam  ED Triage Vitals   Temperature Pulse Respirations Blood Pressure SpO2   05/15/18 2219 05/15/18 2219 05/15/18 2219 05/15/18 2219 05/15/18 2219   98 °F (36 7 °C) 62 18 148/65 99 %      Temp Source Heart Rate Source Patient Position - Orthostatic VS BP Location FiO2 (%)   05/15/18 2219 05/15/18 2219 05/15/18 2219 05/15/18 2219 --   Oral Monitor Sitting Left arm       Pain Score       05/15/18 2244       6           Orthostatic Vital Signs  Vitals:    05/16/18 0042 05/16/18 0135 05/16/18 0256 05/16/18 0345   BP: 126/77 125/55 132/61 128/62   Pulse: 64 56 60 78   Patient Position - Orthostatic VS: Sitting Lying Lying        Physical Exam   Constitutional: She is oriented to person, place, and time  She appears well-developed and well-nourished  No distress  HENT:   Head: Normocephalic and atraumatic  Eyes: Conjunctivae are normal  Pupils are equal, round, and reactive to light  Right eye exhibits no discharge  Left eye exhibits no discharge  Neck: Normal range of motion  Neck supple  Cardiovascular: Normal rate, regular rhythm and normal heart sounds  Exam reveals no gallop and no friction rub  No murmur heard  Pulmonary/Chest: Effort normal and breath sounds normal  No stridor  No respiratory distress  She has no wheezes  She has no rales  Abdominal: Soft  Bowel sounds are normal  She exhibits no distension  There is tenderness (Left lower quadrant only)  There is no guarding  Musculoskeletal: Normal range of motion  She exhibits no edema or deformity  Arms:  Neurological: She is alert and oriented to person, place, and time  Skin: Skin is warm and dry  She is not diaphoretic  Psychiatric: She has a normal mood and affect  Her behavior is normal    Nursing note and vitals reviewed        ED Medications  Medications   iohexol (OMNIPAQUE) 350 MG/ML injection (MULTI-DOSE) 100 mL (100 mL Intravenous Given 5/16/18 0037)   ketorolac (TORADOL) injection 15 mg (15 mg Intravenous Given 5/16/18 0132)       Diagnostic Studies  Results Reviewed     Procedure Component Value Units Date/Time    Comprehensive metabolic panel [[id number]]  (Abnormal) Collected:  05/15/18 0109    Lab Status:  Final result Specimen:  Blood from Arm, Left Updated:  05/16/18 0016     Sodium 136 mmol/L Potassium 3 5 mmol/L      Chloride 104 mmol/L      CO2 29 mmol/L      Anion Gap 3 (L) mmol/L      BUN 14 mg/dL      Creatinine 0 84 mg/dL      Glucose 84 mg/dL      Calcium 8 9 mg/dL      AST 19 U/L      ALT 26 U/L      Alkaline Phosphatase 77 U/L      Total Protein 8 0 g/dL      Albumin 3 8 g/dL      Total Bilirubin 0 31 mg/dL      eGFR 89 ml/min/1 73sq m     Narrative:         National Kidney Disease Education Program recommendations are as follows:  GFR calculation is accurate only with a steady state creatinine  Chronic Kidney disease less than 60 ml/min/1 73 sq  meters  Kidney failure less than 15 ml/min/1 73 sq  meters      Lipase [[id number]]  (Normal) Collected:  05/15/18 2349    Lab Status:  Final result Specimen:  Blood from Arm, Left Updated:  05/16/18 0016     Lipase 195 u/L     CBC and differential [[id number]]  (Abnormal) Collected:  05/15/18 2349    Lab Status:  Final result Specimen:  Blood from Arm, Left Updated:  05/16/18 0007     WBC 7 72 Thousand/uL      RBC 4 33 Million/uL      Hemoglobin 11 4 (L) g/dL      Hematocrit 35 8 %      MCV 83 fL      MCH 26 3 (L) pg      MCHC 31 8 g/dL      RDW 13 0 %      MPV 10 8 fL      Platelets 436 Thousands/uL      nRBC 0 /100 WBCs      Neutrophils Relative 53 %      Immat GRANS % 0 %      Lymphocytes Relative 38 %      Monocytes Relative 5 %      Eosinophils Relative 3 %      Basophils Relative 0 %      Neutrophils Absolute 4 12 Thousands/µL      Immature Grans Absolute 0 02 Thousand/uL      Lymphocytes Absolute 2 93 Thousands/µL      Monocytes Absolute 0 40 Thousand/µL      Eosinophils Absolute 0 22 Thousand/µL      Basophils Absolute 0 03 Thousands/µL     Urine Microscopic [[id number]]  (Abnormal) Collected:  05/15/18 2359    Lab Status:  Final result Specimen:  Urine from Urine, Clean Catch Updated:  05/16/18 0006     RBC, UA None Seen /hpf      WBC, UA 2-4 (A) /hpf      Epithelial Cells None Seen /hpf      Bacteria, UA None Seen /hpf      Hyaline Casts, UA None Seen /lpf     POCT urinalysis dipstick [[id number]]  (Normal) Resulted:  05/15/18 2355    Lab Status:  Final result Specimen:  Urine Updated:  05/15/18 2355     Color, UA yellow     Clarity, UA clear    POCT pregnancy, urine [[id number]]  (Normal) Resulted:  05/15/18 2355    Lab Status:  Final result Updated:  05/15/18 2355     EXT PREG TEST UR (Ref: Negative) negative    ED Urine Macroscopic [[id number]]  (Abnormal) Collected:  05/15/18 2359    Lab Status:  Final result Specimen:  Urine Updated:  05/15/18 2355     Color, UA Yellow     Clarity, UA Clear     pH, UA 6 5     Leukocytes, UA Moderate (A)     Nitrite, UA Negative     Protein, UA Negative mg/dl      Glucose, UA Negative mg/dl      Ketones, UA Negative mg/dl      Urobilinogen, UA 0 2 E U /dl      Bilirubin, UA Negative     Blood, UA Negative     Specific Gravity, UA 1 010    Narrative:       CLINITEK RESULT                 US pelvis complete w transvaginal   ED Interpretation by Benigno Calderon DO (05/16 0315)   PELVIC ULTRASOUND, COMPLETE       INDICATION:  The patient is 40years old  llq pain   LLQ pain, r/o torsion        COMPARISON: None       TECHNIQUE:   Transabdominal pelvic ultrasound was performed in sagittal and transverse planes with a curvilinear transducer  Additional transvaginal imaging was performed to better evaluate the endometrium and ovaries  Imaging included volumetric    sweeps as well as traditional still imaging technique        FINDINGS:       UTERUS:   The uterus is anteverted in position, measuring 12 x 5 4 x 8 6 cm  Contour and echotexture appear normal   There is a mid posterior uterine fibroid measuring 2 1 x 1 8 x 2 cm  Nabothian cysts are seen within the cervix        ENDOMETRIUM:     Normal caliber of 13 5 mm  Homogenous and normal in appearance        OVARIES/ADNEXA:   Right ovary:  3 1 x 1 6 x 1 8 cm  No suspicious right ovarian abnormality  Doppler flow within normal limits        Left ovary:  4 3 x 3 3 x 4 6 cm  There is a complex heterogeneous lesion in the left ovary measuring 4 1 x 3 5 x 4 1 cm  Doppler flow within normal limits        No suspicious adnexal mass or loculated collections  There is no free fluid        IMPRESSION:       Complex heterogeneous lesion in the left ovary which may represent a complex cyst   Recommend follow-up ultrasound in 6-8 weeks        Fibroid uterus      Final Result by Catarino Yanez DO (05/16 0564)       Complex heterogeneous lesion in the left ovary which may represent a complex cyst   Recommend follow-up ultrasound in 6-8 weeks  Fibroid uterus          The study was marked in EPIC for significant notification  Workstation performed: [id number]         CT abdomen pelvis with contrast   Final Result by Karly Morris MD (05/16 0043)         1  Left adnexal 3 7 cm cyst located in the upper pelvis  No surrounding inflammation or fluid  Small leiomyoma in the uterus measuring up to 2 cm  2   No evidence of bowel obstruction, colitis or diverticulitis  3   No pyelonephritis or obstructive uropathy  Workstation performed: [id number]               Procedures  Procedures      Phone Consults  ED Phone Contact    ED Course  ED Course as of May 16 1610   Wed May 16, 2018   0018 Leukocytes, UA: (!) Moderate   0018 WBC, UA: (!) 2-4                               MDM  Number of Diagnoses or Management Options  Chronic back pain:   Left shoulder pain:   Ovarian cyst:   Diagnosis management comments: Patient's CT scan showed a 3 7cm cyst on the patient's L ovary  A formal pelvic ultrasound further characterized the cyst as complex and they recommend a repeat ultrasound in 6-8 weeks  No evidence of ovarian torsion  Patient's shoulder and back pain likely chronic muscle strain and recommend follow up with her PCP for potential PT  Patient discharged with instructions for follow up and given return precautions should she develop any new or concerning symptoms  CritCare Time    Disposition  Final diagnoses:   Ovarian cyst   Left shoulder pain   Chronic back pain     Time reflects when diagnosis was documented in both MDM as applicable and the Disposition within this note     Time User Action Codes Description Comment    5/16/2018  3:18 AM Rashid Alvarado A Add [T92 177] Ovarian cyst     5/16/2018  3:39 AM Rashid Alvarado A Add [M25 512] Left shoulder pain     5/16/2018  3:39 AM Rashid Alvarado A Add [M54 9,  G89 29] Chronic back pain       ED Disposition     ED Disposition Condition Comment    Discharge  [address] [facility] discharge to home/self care  Condition at discharge: Stable        Follow-up Information     Follow up With Specialties Details Why [address]    [facility] [address] [address] [phone]          Please follow up with your OB/GYN  Based on your ultrasound today, it is recommended that you get repeat ultrasound in 6-8 weeks  Return to ED if you have any new/worsening symptoms        Discharge Medication List as of 5/16/2018  3:40 AM      CONTINUE these medications which have NOT CHANGED    Details   esomeprazole (NexIUM) 20 mg capsule Take 20 mg by mouth every morning before breakfast, Historical Med           No discharge procedures on file  ED Provider  Attending physically available and evaluated [facility]  I managed the patient along with the ED Attending      Electronically Signed by         Leeanna Lloyd MD  05/16/18 4556

## 2018-06-21 ENCOUNTER — PROCEDURE VISIT (OUTPATIENT)
Dept: OBGYN CLINIC | Facility: CLINIC | Age: 38
End: 2018-06-21
Payer: COMMERCIAL

## 2018-06-21 VITALS
WEIGHT: 154.6 LBS | HEART RATE: 69 BPM | BODY MASS INDEX: 26.4 KG/M2 | HEIGHT: 64 IN | SYSTOLIC BLOOD PRESSURE: 121 MMHG | DIASTOLIC BLOOD PRESSURE: 80 MMHG

## 2018-06-21 DIAGNOSIS — R87.610 ATYPICAL SQUAMOUS CELLS OF UNDETERMINED SIGNIFICANCE ON CYTOLOGIC SMEAR OF CERVIX (ASC-US): Primary | ICD-10-CM

## 2018-06-21 DIAGNOSIS — N83.209 CYST OF OVARY, UNSPECIFIED LATERALITY: ICD-10-CM

## 2018-06-21 DIAGNOSIS — R87.610 ASCUS WITH POSITIVE HIGH RISK HPV CERVICAL: ICD-10-CM

## 2018-06-21 DIAGNOSIS — R87.619 ATYPICAL GLANDULAR CELLS OF UNDETERMINED SIGNIFICANCE (AGUS) ON CERVICAL PAP SMEAR: ICD-10-CM

## 2018-06-21 DIAGNOSIS — R87.810 ASCUS WITH POSITIVE HIGH RISK HPV CERVICAL: ICD-10-CM

## 2018-06-21 LAB — SL AMB POCT URINE HCG: NORMAL

## 2018-06-21 PROCEDURE — 57454 BX/CURETT OF CERVIX W/SCOPE: CPT | Performed by: OBSTETRICS & GYNECOLOGY

## 2018-06-21 PROCEDURE — 88305 TISSUE EXAM BY PATHOLOGIST: CPT | Performed by: PATHOLOGY

## 2018-06-21 PROCEDURE — 81025 URINE PREGNANCY TEST: CPT | Performed by: OBSTETRICS & GYNECOLOGY

## 2018-06-21 NOTE — PROGRESS NOTES
Colposcopy  Date/Time: 6/21/2018 10:29 AM  Performed by: Jeannine Toney  Authorized by: Jeannine VALERIO     Consent:     Consent obtained:  Written    Consent given by:  Patient    Procedural risks discussed:  Bleeding, failure rate, infection, possible continued pain and repeat procedure    Patient questions answered: yes      Patient agrees, verbalizes understanding, and wants to proceed: yes      Instructions and paperwork completed: yes    Pre-procedure:     Prepped with: acetic acid    Indication:     Indications: ASCUS/AGC  Procedure:     Procedure: Colposcopy w/ endometrial biopsy      Under satisfactory analgesia the patient was prepped and draped in the dorsal lithotomy position: yes      Telferner speculum was placed in the vagina: yes      Under colposcopic examination the transition zone was seen in entirety: yes      Monsel's solution was applied: yes      Biopsy(s): yes      Location:  12 o'clock cervix, ECC, EMB    Specimen to pathology: yes    Post-procedure:     Impression: Low grade cervical dysplasia      Patient tolerance of procedure: Tolerated well, no immediate complications  Comments:      ASCUS/AGC, HPV positive pap  EMB, ECC, colpo with cervical biopsy performed today  Satisfactory colposcopy  Uterus sounded to 9 cm  12 o'clock cervical biopsy (likely nabothian cyst)      -Recently seen in ER for pain    Has ovarian cyst which needs follow up in 6-12 weeks

## 2018-07-05 ENCOUNTER — OFFICE VISIT (OUTPATIENT)
Dept: OBGYN CLINIC | Facility: CLINIC | Age: 38
End: 2018-07-05
Payer: COMMERCIAL

## 2018-07-05 VITALS
WEIGHT: 151.4 LBS | HEART RATE: 55 BPM | DIASTOLIC BLOOD PRESSURE: 84 MMHG | HEIGHT: 64 IN | SYSTOLIC BLOOD PRESSURE: 126 MMHG | BODY MASS INDEX: 25.85 KG/M2

## 2018-07-05 DIAGNOSIS — R87.619 ATYPICAL GLANDULAR CELLS OF UNDETERMINED SIGNIFICANCE (AGUS) ON CERVICAL PAP SMEAR: Primary | ICD-10-CM

## 2018-07-05 PROCEDURE — 99213 OFFICE O/P EST LOW 20 MIN: CPT | Performed by: OBSTETRICS & GYNECOLOGY

## 2018-07-05 NOTE — PROGRESS NOTES
Atypical glandular cells of undetermined significance (TERRI) on cervical Pap smear  - discussed Colposcopy's result with the patient  No malignancy  Will follow up annually for GYN exam       41 yo [id number] here to discuss colposcopy's result  Denies VB, discharge, abd pain, pelvic pressure  Denies constitutional symptoms and signs

## 2018-07-05 NOTE — ASSESSMENT & PLAN NOTE
- discussed Colposcopy's result with the patient  No malignancy    Will follow up annually for GYN exam

## 2018-07-06 ENCOUNTER — HOSPITAL ENCOUNTER (OUTPATIENT)
Dept: ULTRASOUND IMAGING | Facility: HOSPITAL | Age: 38
Discharge: HOME | End: 2018-07-06
Payer: COMMERCIAL

## 2018-07-06 DIAGNOSIS — N83.209 CYST OF OVARY, UNSPECIFIED LATERALITY: ICD-10-CM

## 2018-07-06 PROCEDURE — 76830 TRANSVAGINAL US NON-OB: CPT

## 2018-07-06 PROCEDURE — 76856 US EXAM PELVIC COMPLETE: CPT

## 2018-09-30 ENCOUNTER — HOSPITAL ENCOUNTER (EMERGENCY)
Facility: HOSPITAL | Age: 38
Discharge: HOME | End: 2018-09-30
Attending: EMERGENCY MEDICINE
Payer: COMMERCIAL

## 2018-09-30 VITALS
OXYGEN SATURATION: 100 % | SYSTOLIC BLOOD PRESSURE: 146 MMHG | RESPIRATION RATE: 18 BRPM | BODY MASS INDEX: 26.12 KG/M2 | DIASTOLIC BLOOD PRESSURE: 77 MMHG | HEIGHT: 64 IN | TEMPERATURE: 97.9 F | WEIGHT: 153 LBS | HEART RATE: 88 BPM

## 2018-09-30 DIAGNOSIS — H69.80 EUSTACHIAN TUBE DYSFUNCTION: Primary | ICD-10-CM

## 2018-09-30 PROCEDURE — 99282 EMERGENCY DEPT VISIT SF MDM: CPT

## 2018-09-30 RX ORDER — PSEUDOEPHEDRINE HCL 60 MG/1
60 TABLET ORAL ONCE
Status: COMPLETED | OUTPATIENT
Start: 2018-09-30 | End: 2018-09-30

## 2018-09-30 RX ORDER — PSEUDOEPHEDRINE HCL 60 MG/1
60 TABLET ORAL EVERY 6 HOURS PRN
Qty: 20 TABLET | Refills: 0 | Status: SHIPPED | OUTPATIENT
Start: 2018-09-30 | End: 2019-12-20

## 2018-09-30 RX ADMIN — PSEUDOEPHEDRINE HYDROCHLORIDE 60 MG: 60 TABLET, FILM COATED ORAL at 21:06

## 2018-10-01 NOTE — DISCHARGE INSTRUCTIONS
Eustachian Tube Dysfunction   WHAT YOU NEED TO KNOW:   What is eustachian tube dysfunction? Eustachian tube dysfunction (ETD) is a condition that prevents your eustachian tubes from opening properly  It can also cause them to become blocked  Eustachian tubes connect your middle ear to the back of your nose and throat  These tubes open and allow air to flow in and out when you sneeze, swallow, or yawn  What causes or increases my risk of ETD? ETD may be caused by swelling or buildup of mucus in your eustachian tubes  Allergies, a cold, or sinus infection can increase your risk for ETD  Smoking also increases your risk for ETD  What are the signs and symptoms of ETD? · Fullness or pressure in your ears    · Muffled hearing    · Pain in one or both ears    · Ringing in your ears    · Popping or clicking feeling in your ears    · Trouble keeping your balance  How is ETD diagnosed? Your healthcare provider will ask about your symptoms  He will examine your ears, your nose, and the back of your throat  He may also do a hearing test    How is ETD treated? Your ETD may get better on its own without any treatment  You may need any of the following:  · Exercises  such as swallowing, yawning, or chewing gum may help to open your eustachian tubes  Your healthcare provider may also recommend that you take a deep breath and then blow with your mouth shut and your nostrils pinched closed  · Air pressure devices  push air into your nose and eustachian tubes to help relieve air pressure in your ear  · Treatment for allergies  such as decongestants, antihistamines, and nasal steroids may improve ETD  They may help decrease swelling of the eustachian tubes  · Ear tubes  may help to keep your middle ear open  During this procedure, your healthcare provider will cut a small hole in your eardrum  · A myringotomy  is procedure to make a small cut in your eardrum and suction out fluid from your middle ear  · Tuboplasty  is a procedure to widen your eustachian tubes  When should I contact my healthcare provider? · Your symptoms do not improve or get worse  · You have a fever  · You have any hearing loss  · You have questions or concerns about your condition or care  CARE AGREEMENT:   You have the right to help plan your care  Learn about your health condition and how it may be treated  Discuss treatment options with your caregivers to decide what care you want to receive  You always have the right to refuse treatment  The above information is an  only  It is not intended as medical advice for individual conditions or treatments  Talk to your doctor, nurse or pharmacist before following any medical regimen to see if it is safe and effective for you  © 2017 [employer]  Information is for End User's use only and may not be sold, redistributed or otherwise used for commercial purposes  All illustrations and images included in CareNotes® are the copyrighted property of [employer]  or [employer]ity[employer] Ayala

## 2018-10-01 NOTE — ED PROVIDER NOTES
History  Chief Complaint   Patient presents with    Earache     earache, bilat, for the past 3 days     Bilateral ear discomfort x 3 days  Feels like they are constantly popping  No recent plane rides; not recently swimming  No discharge coming from the ears  No rhinorrhea; no cough; states today felt tingling in her throat no other symptoms  Full range of motion of the neck; no lymphadenopathy  No tenderness on the external portion of the ear  Has not taken anything for the symptoms  Vital signs stable  Prior to Admission Medications   Prescriptions Last Dose Informant Patient Reported? Taking?   esomeprazole (NexIUM) 20 mg capsule  Self Yes Yes   Sig: Take 20 mg by mouth daily at bedtime        Facility-Administered Medications: None       Past Medical History:   Diagnosis Date    Heartburn        Past Surgical History:   Procedure Laterality Date    TUBAL LIGATION         Family History   Problem Relation Age of Onset    Arthritis Mother     Cervical cancer Mother     Mental illness Father     Cervical cancer Sister     Heart disease Maternal Grandmother      I have reviewed and agree with the history as documented  Social History   Substance Use Topics    Smoking status: Former Smoker    Smokeless tobacco: Never Used    Alcohol use Yes      Comment: social        Review of Systems   Constitutional: Negative for activity change and appetite change  HENT: Positive for ear pain and sore throat  Negative for congestion, dental problem, ear discharge, postnasal drip, rhinorrhea and trouble swallowing  Eyes: Negative for discharge and redness  Respiratory: Negative for shortness of breath  Cardiovascular: Negative for chest pain  Gastrointestinal: Negative for abdominal pain, diarrhea, nausea and vomiting  Skin: Negative for color change, pallor, rash and wound  Neurological: Negative for weakness and headaches         Physical Exam  ED Triage Vitals [09/30/18 2040] Temperature Pulse Respirations Blood Pressure SpO2   97 9 °F (36 6 °C) 88 18 146/77 100 %      Temp Source Heart Rate Source Patient Position - Orthostatic VS BP Location FiO2 (%)   Oral Monitor Sitting Left arm --      Pain Score       4           Orthostatic Vital Signs  Vitals:    09/30/18 2040   BP: 146/77   Pulse: 88   Patient Position - Orthostatic VS: Sitting       Physical Exam   Constitutional: She appears well-developed and well-nourished  No distress  HENT:   Head: Normocephalic and atraumatic  Bilateral cone of lytes and tympanic membranes  No lymphadenopathy  No external ear tenderness  No erythema in bilateral canals   Neck: Normal range of motion  No tracheal deviation present  No thyromegaly present  No lymphadenopathy   Skin: She is not diaphoretic  ED Medications  Medications   pseudoephedrine (SUDAFED) tablet 60 mg (60 mg Oral Given 9/30/18 2106)       Diagnostic Studies  Results Reviewed     None                 No orders to display         Procedures  Procedures      Phone Consults  ED Phone Contact    ED Course                               MDM  Number of Diagnoses or Management Options  Eustachian tube dysfunction:   Diagnosis management comments: Bilateral tympanic membranes and canals appear unremarkable  Developed sore throat today may be starting viral syndrome  Suspect eustachian tube dysfunction  Trial of Sudafed  Given information for ear nose and throat if continues  CritCare Time    Disposition  Final diagnoses:   Eustachian tube dysfunction     Time reflects when diagnosis was documented in both MDM as applicable and the Disposition within this note     Time User Action Codes Description Comment    9/30/2018  8:59 PM Roya Adam Add [T77 55] Eustachian tube dysfunction       ED Disposition     ED Disposition Condition Comment    Discharge  [address] discharge to home/self care      Condition at discharge: Good        Follow-up Information     Follow up With Specialties Details Why Contact Raimundo Lim MD Otolaryngology Schedule an appointment as soon as possible for a visit As needed [address] [facility]  [phone]            Discharge Medication List as of 9/30/2018  9:05 PM      START taking these medications    Details   pseudoephedrine (SUDAFED) 60 mg tablet Take 1 tablet (60 mg total) by mouth every 6 (six) hours as needed for congestion for up to 5 days, Starting Sun 9/30/2018, Until Fri 10/5/2018, Print         CONTINUE these medications which have NOT CHANGED    Details   esomeprazole (NexIUM) 20 mg capsule Take 20 mg by mouth daily at bedtime  , Historical Med           No discharge procedures on file  ED Provider  Attending physically available and evaluated Georgina Obrien I managed the patient along with the ED Attending      Electronically Signed by         Harris Briggs DO  09/30/18 5302

## 2018-10-01 NOTE — ED ATTENDING ATTESTATION
Sean Cabot, MD, saw and evaluated the patient  I have discussed the patient with the resident/non-physician practitioner and agree with the resident's/non-physician practitioner's findings, Plan of Care, and MDM as documented in the resident's/non-physician practitioner's note, except where noted  All available labs and Radiology studies were reviewed  At this point I agree with the current assessment done in the Emergency Department  I have conducted an independent evaluation of this patient including a focused history and a physical exam     22-year-old female presenting to the emergency department for evaluation of a 3 day history of bilateral ear pressure and feeling like she has decreased hearing from the ears  Patient has attempted to pop her ears but has been unsuccessful in clearing the feeling  No reported fever  On examination normal external ear exam   Normal canal bilaterally  Normal TMs bilaterally  Likely eustachian tube dysfunction  Decongestant and follow up with ENT as needed

## 2019-04-22 ENCOUNTER — HOSPITAL ENCOUNTER (EMERGENCY)
Facility: HOSPITAL | Age: 39
Discharge: HOME | End: 2019-04-22
Attending: EMERGENCY MEDICINE

## 2019-04-22 VITALS
RESPIRATION RATE: 16 BRPM | SYSTOLIC BLOOD PRESSURE: 155 MMHG | WEIGHT: 153 LBS | DIASTOLIC BLOOD PRESSURE: 68 MMHG | BODY MASS INDEX: 26.26 KG/M2 | TEMPERATURE: 97.9 F | OXYGEN SATURATION: 100 % | HEART RATE: 54 BPM

## 2019-04-22 DIAGNOSIS — K29.70 GASTRITIS: Primary | ICD-10-CM

## 2019-04-22 DIAGNOSIS — R10.9 ABDOMINAL PAIN: ICD-10-CM

## 2019-04-22 LAB
ALBUMIN SERPL BCP-MCNC: 3.6 G/DL (ref 3.5–5)
ALP SERPL-CCNC: 74 U/L (ref 46–116)
ALT SERPL W P-5'-P-CCNC: 23 U/L (ref 12–78)
ANION GAP SERPL CALCULATED.3IONS-SCNC: 4 MMOL/L (ref 4–13)
AST SERPL W P-5'-P-CCNC: 19 U/L (ref 5–45)
BASOPHILS # BLD AUTO: 0.03 THOUSANDS/ΜL (ref 0–0.1)
BASOPHILS NFR BLD AUTO: 1 % (ref 0–1)
BILIRUB SERPL-MCNC: 0.2 MG/DL (ref 0.2–1)
BILIRUB UR QL STRIP: NEGATIVE
BUN SERPL-MCNC: 24 MG/DL (ref 5–25)
CALCIUM SERPL-MCNC: 8.4 MG/DL (ref 8.3–10.1)
CHLORIDE SERPL-SCNC: 105 MMOL/L (ref 100–108)
CLARITY UR: CLEAR
CO2 SERPL-SCNC: 27 MMOL/L (ref 21–32)
COLOR UR: YELLOW
COLOR, POC: YELLOW
CREAT SERPL-MCNC: 1.06 MG/DL (ref 0.6–1.3)
EOSINOPHIL # BLD AUTO: 0.34 THOUSAND/ΜL (ref 0–0.61)
EOSINOPHIL NFR BLD AUTO: 6 % (ref 0–6)
ERYTHROCYTE [DISTWIDTH] IN BLOOD BY AUTOMATED COUNT: 13.9 % (ref 11.6–15.1)
EXT PREG TEST URINE: NEGATIVE
GFR SERPL CREATININE-BSD FRML MDRD: 67 ML/MIN/1.73SQ M
GLUCOSE SERPL-MCNC: 86 MG/DL (ref 65–140)
GLUCOSE UR STRIP-MCNC: NEGATIVE MG/DL
HCT VFR BLD AUTO: 32.5 % (ref 34.8–46.1)
HGB BLD-MCNC: 9.9 G/DL (ref 11.5–15.4)
HGB UR QL STRIP.AUTO: NEGATIVE
IMM GRANULOCYTES # BLD AUTO: 0.01 THOUSAND/UL (ref 0–0.2)
IMM GRANULOCYTES NFR BLD AUTO: 0 % (ref 0–2)
KETONES UR STRIP-MCNC: NEGATIVE MG/DL
LEUKOCYTE ESTERASE UR QL STRIP: NEGATIVE
LIPASE SERPL-CCNC: 163 U/L (ref 73–393)
LYMPHOCYTES # BLD AUTO: 2.29 THOUSANDS/ΜL (ref 0.6–4.47)
LYMPHOCYTES NFR BLD AUTO: 41 % (ref 14–44)
MCH RBC QN AUTO: 24.1 PG (ref 26.8–34.3)
MCHC RBC AUTO-ENTMCNC: 30.5 G/DL (ref 31.4–37.4)
MCV RBC AUTO: 79 FL (ref 82–98)
MONOCYTES # BLD AUTO: 0.36 THOUSAND/ΜL (ref 0.17–1.22)
MONOCYTES NFR BLD AUTO: 7 % (ref 4–12)
NEUTROPHILS # BLD AUTO: 2.5 THOUSANDS/ΜL (ref 1.85–7.62)
NEUTS SEG NFR BLD AUTO: 45 % (ref 43–75)
NITRITE UR QL STRIP: NEGATIVE
NRBC BLD AUTO-RTO: 0 /100 WBCS
PH UR STRIP.AUTO: 6 [PH] (ref 4.5–8)
PLATELET # BLD AUTO: 255 THOUSANDS/UL (ref 149–390)
PMV BLD AUTO: 11.2 FL (ref 8.9–12.7)
POTASSIUM SERPL-SCNC: 3.7 MMOL/L (ref 3.5–5.3)
PROT SERPL-MCNC: 7.5 G/DL (ref 6.4–8.2)
PROT UR STRIP-MCNC: NEGATIVE MG/DL
RBC # BLD AUTO: 4.11 MILLION/UL (ref 3.81–5.12)
SODIUM SERPL-SCNC: 136 MMOL/L (ref 136–145)
SP GR UR STRIP.AUTO: 1.01 (ref 1–1.03)
UROBILINOGEN UR QL STRIP.AUTO: 0.2 E.U./DL
WBC # BLD AUTO: 5.53 THOUSAND/UL (ref 4.31–10.16)

## 2019-04-22 PROCEDURE — 99283 EMERGENCY DEPT VISIT LOW MDM: CPT | Performed by: EMERGENCY MEDICINE

## 2019-04-22 PROCEDURE — 80053 COMPREHEN METABOLIC PANEL: CPT | Performed by: EMERGENCY MEDICINE

## 2019-04-22 PROCEDURE — 83690 ASSAY OF LIPASE: CPT | Performed by: EMERGENCY MEDICINE

## 2019-04-22 PROCEDURE — 81003 URINALYSIS AUTO W/O SCOPE: CPT

## 2019-04-22 PROCEDURE — 85025 COMPLETE CBC W/AUTO DIFF WBC: CPT | Performed by: EMERGENCY MEDICINE

## 2019-04-22 PROCEDURE — 81025 URINE PREGNANCY TEST: CPT | Performed by: EMERGENCY MEDICINE

## 2019-04-22 PROCEDURE — 99284 EMERGENCY DEPT VISIT MOD MDM: CPT

## 2019-04-22 PROCEDURE — 36415 COLL VENOUS BLD VENIPUNCTURE: CPT | Performed by: EMERGENCY MEDICINE

## 2019-04-22 RX ORDER — DICYCLOMINE HCL 20 MG
20 TABLET ORAL 2 TIMES DAILY
Qty: 20 TABLET | Refills: 0 | Status: SHIPPED | OUTPATIENT
Start: 2019-04-22 | End: 2019-12-20

## 2019-04-22 RX ORDER — DICYCLOMINE HCL 20 MG
20 TABLET ORAL ONCE
Status: COMPLETED | OUTPATIENT
Start: 2019-04-22 | End: 2019-04-22

## 2019-04-22 RX ORDER — MAGNESIUM HYDROXIDE/ALUMINUM HYDROXICE/SIMETHICONE 120; 1200; 1200 MG/30ML; MG/30ML; MG/30ML
30 SUSPENSION ORAL ONCE
Status: COMPLETED | OUTPATIENT
Start: 2019-04-22 | End: 2019-04-22

## 2019-04-22 RX ORDER — RANITIDINE 150 MG/1
150 CAPSULE ORAL DAILY
Qty: 30 CAPSULE | Refills: 0 | Status: SHIPPED | OUTPATIENT
Start: 2019-04-22 | End: 2019-12-20

## 2019-04-22 RX ADMIN — DICYCLOMINE HYDROCHLORIDE 20 MG: 20 TABLET ORAL at 21:26

## 2019-04-22 RX ADMIN — ALUMINUM HYDROXIDE, MAGNESIUM HYDROXIDE, AND SIMETHICONE 30 ML: 200; 200; 20 SUSPENSION ORAL at 21:08

## 2019-04-22 RX ADMIN — LIDOCAINE HYDROCHLORIDE 10 ML: 20 SOLUTION ORAL; TOPICAL at 21:08

## 2019-07-15 ENCOUNTER — APPOINTMENT (OUTPATIENT)
Dept: LAB | Facility: MEDICAL CENTER | Age: 39
End: 2019-07-15
Attending: INTERNAL MEDICINE

## 2019-07-15 ENCOUNTER — OFFICE VISIT (OUTPATIENT)
Dept: GASTROENTEROLOGY | Facility: MEDICAL CENTER | Age: 39
End: 2019-07-15

## 2019-07-15 VITALS
BODY MASS INDEX: 25.27 KG/M2 | TEMPERATURE: 96.6 F | SYSTOLIC BLOOD PRESSURE: 114 MMHG | HEIGHT: 64 IN | HEART RATE: 61 BPM | DIASTOLIC BLOOD PRESSURE: 76 MMHG | WEIGHT: 148 LBS

## 2019-07-15 DIAGNOSIS — R10.11 RIGHT UPPER QUADRANT ABDOMINAL PAIN: ICD-10-CM

## 2019-07-15 DIAGNOSIS — D50.9 MICROCYTIC ANEMIA: ICD-10-CM

## 2019-07-15 DIAGNOSIS — K21.9 GASTROESOPHAGEAL REFLUX DISEASE WITHOUT ESOPHAGITIS: Primary | ICD-10-CM

## 2019-07-15 LAB
BASOPHILS # BLD AUTO: 0.04 THOUSANDS/ΜL (ref 0–0.1)
BASOPHILS NFR BLD AUTO: 1 % (ref 0–1)
EOSINOPHIL # BLD AUTO: 0.33 THOUSAND/ΜL (ref 0–0.61)
EOSINOPHIL NFR BLD AUTO: 8 % (ref 0–6)
ERYTHROCYTE [DISTWIDTH] IN BLOOD BY AUTOMATED COUNT: 14.4 % (ref 11.6–15.1)
FERRITIN SERPL-MCNC: 6 NG/ML (ref 8–388)
HCT VFR BLD AUTO: 35.8 % (ref 34.8–46.1)
HGB BLD-MCNC: 10.9 G/DL (ref 11.5–15.4)
IMM GRANULOCYTES # BLD AUTO: 0.01 THOUSAND/UL (ref 0–0.2)
IMM GRANULOCYTES NFR BLD AUTO: 0 % (ref 0–2)
IRON SATN MFR SERPL: 4 %
IRON SERPL-MCNC: 19 UG/DL (ref 50–170)
LYMPHOCYTES # BLD AUTO: 1.66 THOUSANDS/ΜL (ref 0.6–4.47)
LYMPHOCYTES NFR BLD AUTO: 40 % (ref 14–44)
MCH RBC QN AUTO: 24.2 PG (ref 26.8–34.3)
MCHC RBC AUTO-ENTMCNC: 30.4 G/DL (ref 31.4–37.4)
MCV RBC AUTO: 79 FL (ref 82–98)
MONOCYTES # BLD AUTO: 0.35 THOUSAND/ΜL (ref 0.17–1.22)
MONOCYTES NFR BLD AUTO: 8 % (ref 4–12)
NEUTROPHILS # BLD AUTO: 1.81 THOUSANDS/ΜL (ref 1.85–7.62)
NEUTS SEG NFR BLD AUTO: 43 % (ref 43–75)
NRBC BLD AUTO-RTO: 0 /100 WBCS
PLATELET # BLD AUTO: 286 THOUSANDS/UL (ref 149–390)
PMV BLD AUTO: 11.3 FL (ref 8.9–12.7)
RBC # BLD AUTO: 4.51 MILLION/UL (ref 3.81–5.12)
TIBC SERPL-MCNC: 470 UG/DL (ref 250–450)
TSH SERPL DL<=0.05 MIU/L-ACNC: 1.34 UIU/ML (ref 0.36–3.74)
WBC # BLD AUTO: 4.2 THOUSAND/UL (ref 4.31–10.16)

## 2019-07-15 PROCEDURE — 82784 ASSAY IGA/IGD/IGG/IGM EACH: CPT

## 2019-07-15 PROCEDURE — 84443 ASSAY THYROID STIM HORMONE: CPT

## 2019-07-15 PROCEDURE — 86255 FLUORESCENT ANTIBODY SCREEN: CPT

## 2019-07-15 PROCEDURE — 85025 COMPLETE CBC W/AUTO DIFF WBC: CPT

## 2019-07-15 PROCEDURE — 36415 COLL VENOUS BLD VENIPUNCTURE: CPT

## 2019-07-15 PROCEDURE — 83550 IRON BINDING TEST: CPT

## 2019-07-15 PROCEDURE — 99244 OFF/OP CNSLTJ NEW/EST MOD 40: CPT | Performed by: INTERNAL MEDICINE

## 2019-07-15 PROCEDURE — 83516 IMMUNOASSAY NONANTIBODY: CPT

## 2019-07-15 PROCEDURE — 82728 ASSAY OF FERRITIN: CPT

## 2019-07-15 PROCEDURE — 83540 ASSAY OF IRON: CPT

## 2019-07-15 RX ORDER — OMEPRAZOLE 40 MG/1
40 CAPSULE, DELAYED RELEASE ORAL DAILY
Qty: 30 CAPSULE | Refills: 3 | Status: SHIPPED | OUTPATIENT
Start: 2019-07-15 | End: 2020-08-14 | Stop reason: SDUPTHER

## 2019-07-15 RX ORDER — ZINC OXIDE 13 %
1 CREAM (GRAM) TOPICAL DAILY
Qty: 30 CAPSULE | Refills: 0 | Status: SHIPPED | OUTPATIENT
Start: 2019-07-15 | End: 2019-08-14

## 2019-07-15 NOTE — PATIENT INSTRUCTIONS
1  Omeprazole 40 mg once in the morning  2  Zantac at night before bed and stop after 2 weeks  3  Let me know if your symptoms worsen  4  If you do get insurance then let us know to schedule your endoscopy  5  Reflux diet  6  Wedge pillow  7  Blood work  8  Ultrasound  9  Bentyl as needed but can take up to 4 times daily (every 6 hours)  10  Probiotic  Gastroesophageal Reflux Disease   AMBULATORY CARE:   Gastroesophageal reflux  reflux occurs when acid and food in the stomach back up into the esophagus  Gastroesophageal reflux disease (GERD) is reflux that occurs more than twice a week for a few weeks  It usually causes heartburn and other symptoms  GERD can cause other health problems over time if it is not treated  Common symptoms include:  Heartburn is the most common symptom of GERD  You may feel burning pain in your chest or below the breast bone  This usually occurs after meals and spreads to your neck, jaw, or shoulder  The pain gets better when you change positions  You may also have any of the following:  · Bitter or acid taste in your mouth    · Dry cough    · Trouble swallowing or pain with swallowing    · Hoarseness or sore throat    · Frequent burping or hiccups    · Feeling of fullness soon after you start eating  Seek care immediately if:  · You feel full and cannot burp or vomit  · You have severe chest pain and sudden trouble breathing  · Your bowel movements are black, bloody, or tarry-looking  · Your vomit looks like coffee grounds or has blood in it  Contact your healthcare provider if:   · You vomit large amounts, or you vomit often  · You have trouble breathing after you vomit  · You have trouble swallowing, or pain with swallowing  · You are losing weight without trying  · Your symptoms get worse or do not improve with treatment  · You have questions or concerns about your condition or care    Treatment for GERD:  Your healthcare provider may prescribe medicine to decrease stomach acid  He may also prescribe medicine that help your esophagus and stomach move food and liquid to your intestines  Surgery may be done if other treatments do not work  You may need surgery to wrap the upper part of the stomach around the esophageal sphincter  This will strengthen the sphincter and prevent reflux  Manage GERD:   · Do not have foods or drinks that may increase heartburn  These include chocolate, peppermint, fried or fatty foods, drinks that contain caffeine, or carbonated drinks (soda)  Other foods include spicy foods, onions, tomatoes, and tomato-based foods  Do not have foods or drinks that can irritate your esophagus, such as citrus fruits, juices, and alcohol  · Do not eat large meals  When you eat a lot of food at one time, your stomach needs more acid to digest it  Eat 6 small meals each day instead of 3 large ones, and eat slowly  Do not eat meals 2 to 3 hours before bedtime  · Elevate the head of your bed  Place 6-inch blocks under the head of your bed frame  You may also use more than one pillow under your head and shoulders while you sleep  · Maintain a healthy weight  If you are overweight, weight loss may help relieve symptoms of GERD  · Do not smoke  Smoking weakens the lower esophageal sphincter and increases the risk of GERD  Ask your healthcare provider for information if you currently smoke and need help to quit  E-cigarettes or smokeless tobacco still contain nicotine  Talk to your healthcare provider before you use these products  · Do not wear clothing that is tight around your waist   Tight clothing can put pressure on your stomach and cause or worsen GERD symptoms  Follow up with your healthcare provider as directed:  Write down your questions so you remember to ask them during your visits    © 2017 [employer] Information is for End User's use only and may not be sold, redistributed or otherwise used for commercial purposes  All illustrations and images included in CareNotes® are the copyrighted property of A D A M , Inc  or Aditya Ayala  The above information is an  only  It is not intended as medical advice for individual conditions or treatments  Talk to your doctor, nurse or pharmacist before following any medical regimen to see if it is safe and effective for you

## 2019-07-15 NOTE — PROGRESS NOTES
Outpatient Consultation  KAELYN VALERIO  Ph : [phone]  Fax : [phone]  Mobile : [phone]  Email : [email or web address]  Also available on [employer]      Tana Curiel 45 y o  female MRN: [id number]    PCP: No primary care provider on file  Referring: Referral Self  [facility]  [address] [facility]    Tana Curiel was seen in consultation  My recommendations are included  Please do not hesitate to contact me with any questions you may have  ASSESSMENT AND PLAN:      Gastroesophageal reflux disease without esophagitis  Symptoms suggestive of gastroesophageal reflux disease  Discussed the following :  1  Omeprazole 40 mg once in the morning  2  Zantac at night before bed and stop after 2 weeks  3  Let me know if your symptoms worsen  4  If you do get insurance then let us know to schedule your endoscopy  5  Reflux diet  6  Wedge pillow  7  Blood work  8  Ultrasound  9  Bentyl as needed but can take up to 4 times daily (every 6 hours)  10  Probiotic  Microcytic anemia  Check for celiac disease  Right upper quadrant abdominal pain  Recommend ultrasound of the abdomen  Consider Bentyl  We will prescribe this and she will let us know if this helps  Diagnoses and all orders for this visit:    Gastroesophageal reflux disease without esophagitis  -     omeprazole (PriLOSEC) 40 MG capsule; Take 1 capsule (40 mg total) by mouth daily for 30 days  -     US right upper quadrant; Future    Microcytic anemia  -     Celiac Disease Antibody Profile; Future  -     Iron Saturation %; Future  -     Ferritin; Future  -     TSH, 3rd generation; Future  -     CBC and differential; Future    Right upper quadrant abdominal pain  -     Probiotic Product (PROBIOTIC DAILY) CAPS;  Take 1 capsule by mouth daily for 30 days May use any over the counter brand - align, equate ([employer]), culturelle, whitt or other store brands  ______________________________________________________________________    HPI:  26-year-old lady presents for to us for evaluation of abdominal pain and symptoms of reflux  16 - 17 years ago had abdominal pain and received something from the hospital  Worked for a bit and took tums as well  Started 2 years with worsening pain  Epigastric pain and heartburn  Started Nexium and worked for 2 years  Recently had abdominal pain with cramping  Pain was located in the right upper quadrant and epigastric region  The pain was moderate in nature  Only relieving factors were using PPI  Took zantac and nexium for a month and helped  Once she stopped these medications her symptoms came back  No weight loss or weight gain  Her appetite is good  Has not seen GI  No diarrhea  She has had h/o constipation  Cough every morning  No dysphagia  She has anemia  No melena or hematochezia  PRIOR ENDOSCOPIC EVALUATION :     Endoscopy : no    Colonoscopy : no      REVIEW OF SYSTEMS:    CONSTITUTIONAL: Denies any fever, chills, rigors, and weight loss  HEENT: No earache or tinnitus  Denies hearing loss or visual disturbances  CARDIOVASCULAR: No chest pain or palpitations  RESPIRATORY: Denies any cough, hemoptysis, shortness of breath or dyspnea on exertion  GASTROINTESTINAL: As noted in the History of Present Illness  GENITOURINARY: No problems with urination  Denies any hematuria or dysuria  NEUROLOGIC: No dizziness or vertigo, denies headaches  MUSCULOSKELETAL: Denies any muscle or joint pain  SKIN: Denies skin rashes or itching  ENDOCRINE: Denies excessive thirst  Denies intolerance to heat or cold  PSYCHOSOCIAL: Denies depression or anxiety  Denies any recent memory loss         Historical Information   Past Medical History:   Diagnosis Date    Heartburn      Past Surgical History:   Procedure Laterality Date    TUBAL LIGATION Social History   Social History     Substance and Sexual Activity   Alcohol Use Yes    Comment: social     Social History     Substance and Sexual Activity   Drug Use No     Social History     Tobacco Use   Smoking Status Former Smoker   Smokeless Tobacco Never Used     Family History   Problem Relation Age of Onset    Arthritis Mother     Cervical cancer Mother     Mental illness Father     Cervical cancer Sister     Heart disease Maternal Grandmother        Meds/Allergies       Current Outpatient Medications:     ranitidine (ZANTAC) 150 MG capsule    dicyclomine (BENTYL) 20 mg tablet    omeprazole (PriLOSEC) 40 MG capsule    Probiotic Product (PROBIOTIC DAILY) CAPS    pseudoephedrine (SUDAFED) 60 mg tablet    No Known Allergies        Objective     Blood pressure 114/76, pulse 61, temperature (!) 96 6 °F (35 9 °C), temperature source Tympanic, height 5' 4" (1 626 m), weight 67 1 kg (148 lb), not currently breastfeeding  Body mass index is 25 4 kg/m²  PHYSICAL EXAM:      Physical Exam        Lab Results:     Lab Results   Component Value Date    WBC 4 20 (L) 07/15/2019    HGB 10 9 (L) 07/15/2019    HCT 35 8 07/15/2019    MCV 79 (L) 07/15/2019     07/15/2019       Lab Results   Component Value Date    K 3 7 04/22/2019     04/22/2019    CO2 27 04/22/2019    BUN 24 04/22/2019    CREATININE 1 06 04/22/2019    CALCIUM 8 4 04/22/2019    AST 19 04/22/2019    ALT 23 04/22/2019    ALKPHOS 74 04/22/2019    EGFR 67 04/22/2019       No results found for: INR, PROTIME      Radiology Results:   No results found

## 2019-07-16 LAB
ENDOMYSIUM IGA SER QL: NEGATIVE
GLIADIN PEPTIDE IGA SER-ACNC: 6 UNITS (ref 0–19)
GLIADIN PEPTIDE IGG SER-ACNC: 7 UNITS (ref 0–19)
IGA SERPL-MCNC: 309 MG/DL (ref 87–352)
TTG IGA SER-ACNC: <2 U/ML (ref 0–3)
TTG IGG SER-ACNC: 20 U/ML (ref 0–5)

## 2019-07-19 ENCOUNTER — TELEPHONE (OUTPATIENT)
Dept: GASTROENTEROLOGY | Facility: AMBULARY SURGERY CENTER | Age: 39
End: 2019-07-19

## 2019-07-19 NOTE — TELEPHONE ENCOUNTER
Hi Dr Natalio Garay - I'm happy to call pt and let her know lab results (iron levels are low, microcytic anemia appears per her recent baseline, TSH normal, celiac panel normal, and start her on oral iron therapy)  I just wanted to check in with you first before I call to get the clinical context  Thank you!

## 2019-07-22 ENCOUNTER — HOSPITAL ENCOUNTER (OUTPATIENT)
Dept: ULTRASOUND IMAGING | Facility: HOSPITAL | Age: 39
Discharge: HOME | End: 2019-07-22
Attending: INTERNAL MEDICINE

## 2019-07-22 DIAGNOSIS — K21.9 GASTROESOPHAGEAL REFLUX DISEASE WITHOUT ESOPHAGITIS: ICD-10-CM

## 2019-07-22 PROCEDURE — 76705 ECHO EXAM OF ABDOMEN: CPT

## 2019-07-22 NOTE — ASSESSMENT & PLAN NOTE
Symptoms suggestive of gastroesophageal reflux disease  Discussed the following :  1  Omeprazole 40 mg once in the morning  2  Zantac at night before bed and stop after 2 weeks  3  Let me know if your symptoms worsen  4  If you do get insurance then let us know to schedule your endoscopy  5  Reflux diet  6  Wedge pillow  7  Blood work  8  Ultrasound  9  Bentyl as needed but can take up to 4 times daily (every 6 hours)  10  Probiotic

## 2019-07-22 NOTE — ASSESSMENT & PLAN NOTE
Recommend ultrasound of the abdomen  Consider Bentyl  We will prescribe this and she will let us know if this helps

## 2019-07-24 ENCOUNTER — PREP FOR PROCEDURE (OUTPATIENT)
Dept: GASTROENTEROLOGY | Facility: MEDICAL CENTER | Age: 39
End: 2019-07-24

## 2019-07-24 DIAGNOSIS — D50.9 MICROCYTIC ANEMIA: Primary | ICD-10-CM

## 2019-08-02 ENCOUNTER — TELEPHONE (OUTPATIENT)
Dept: GASTROENTEROLOGY | Facility: CLINIC | Age: 39
End: 2019-08-02

## 2019-08-02 NOTE — TELEPHONE ENCOUNTER
Called and spoke to patient, she states that she did not receive a phone call to schedule her EGD yet  I informed her that I would send a message to our surgery coordinators and that they would give her a call as soon as they are able  She demonstrated understanding

## 2019-08-02 NOTE — TELEPHONE ENCOUNTER
----- Message from Saint Clair Ciriaco sent at 8/2/2019 12:50 PM EDT -----  Regarding: RE: Your Recent Visit  Contact: [phone]  Hi  I have not received a phone call with an appointment for the stomach test  I wanted to let you know that the only day of the week I can do that test is a Monday     ----- Message -----  From: Danya Ramey MD  Sent: 7/23/19 1:22 AM  To: Ray Matute  Subject: Your Recent Visit    Dax Roy,    Thank you for letting us care for you during your recent visit on 7/22/2019  Don't forget that you can review your After Visit Summary at any time by navigating to the [email or web address] [email or web address]] page  Please take a few minutes to fill out the attached questionnaire  If you have any questions about the care you received, please don't hesitate to reply to this message

## 2019-08-06 NOTE — TELEPHONE ENCOUNTER
EGD scheduled with Dr Bonilla at [facility] on 10/15/2019  I gave pt verbal instructions/mailed

## 2019-08-16 ENCOUNTER — DOCUMENTATION (OUTPATIENT)
Dept: GASTROENTEROLOGY | Facility: MEDICAL CENTER | Age: 39
End: 2019-08-16

## 2019-08-16 DIAGNOSIS — D50.9 MICROCYTIC ANEMIA: Primary | ICD-10-CM

## 2019-08-26 ENCOUNTER — TRANSCRIBE ORDERS (OUTPATIENT)
Dept: GASTROENTEROLOGY | Facility: MEDICAL CENTER | Age: 39
End: 2019-08-26

## 2019-10-14 RX ORDER — SODIUM CHLORIDE 9 MG/ML
125 INJECTION, SOLUTION INTRAVENOUS CONTINUOUS
Status: CANCELLED | OUTPATIENT
Start: 2019-10-14

## 2019-10-15 ENCOUNTER — HOSPITAL ENCOUNTER (OUTPATIENT)
Dept: GASTROENTEROLOGY | Facility: MEDICAL CENTER | Age: 39
Setting detail: OUTPATIENT SURGERY
Discharge: HOME | End: 2019-10-15
Attending: INTERNAL MEDICINE

## 2019-12-09 ENCOUNTER — OFFICE VISIT (OUTPATIENT)
Dept: OBGYN CLINIC | Facility: CLINIC | Age: 39
End: 2019-12-09

## 2019-12-09 VITALS
HEART RATE: 69 BPM | BODY MASS INDEX: 25.78 KG/M2 | SYSTOLIC BLOOD PRESSURE: 134 MMHG | HEIGHT: 64 IN | DIASTOLIC BLOOD PRESSURE: 83 MMHG | WEIGHT: 151 LBS

## 2019-12-09 DIAGNOSIS — N89.8 VAGINAL IRRITATION: ICD-10-CM

## 2019-12-09 DIAGNOSIS — N90.89 LABIAL LESION: Primary | ICD-10-CM

## 2019-12-09 LAB
BV WHIFF TEST VAG QL: ABNORMAL
CLUE CELLS SPEC QL WET PREP: NEGATIVE
PH SMN: 4.5 [PH]
SL AMB POCT WET MOUNT: ABNORMAL
T VAGINALIS VAG QL WET PREP: ABNORMAL
YEAST VAG QL WET PREP: ABNORMAL

## 2019-12-09 PROCEDURE — 87255 GENET VIRUS ISOLATE HSV: CPT | Performed by: NURSE PRACTITIONER

## 2019-12-09 PROCEDURE — 87210 SMEAR WET MOUNT SALINE/INK: CPT | Performed by: NURSE PRACTITIONER

## 2019-12-09 PROCEDURE — 99213 OFFICE O/P EST LOW 20 MIN: CPT | Performed by: NURSE PRACTITIONER

## 2019-12-09 RX ORDER — FLUCONAZOLE 150 MG/1
150 TABLET ORAL ONCE
Qty: 1 TABLET | Refills: 1 | Status: SHIPPED | OUTPATIENT
Start: 2019-12-09 | End: 2019-12-09

## 2019-12-09 NOTE — PROGRESS NOTES
Assessment/Plan:     patient needs annual gyn exam, Pap and Co test     Diagnoses and all orders for this visit:    Labial lesion  -     Herpes simplex virus culture   will call results to patient  Vaginal irritation  -     fluconazole (DIFLUCAN) 150 mg tablet; Take 1 tablet (150 mg total) by mouth once for 1 dose and repeat in 3 days  -     POCT wet mount   wet mount- decreased cells on slide for both,   small amount yeast buds present,  Negative BV negative trich  Subjective:      Patient ID: Dontrell Tovar is a 44 y o  female  HPI  70-year-old  025 here with complaints of vaginal itching and soreness  She has had symptoms for 1 month, she used Monistat,  Which helped decrease discharge but still has some itchiness  She has open sores for 2 weeks,  And they are painful  LMP was today  And declines testing for chlamydia gonorrhea  Monogamous relationship, she is   She denies history of herpes and either herself or her partner  She denies fever chills or prodrome  Recently diagnosed with celiac disease,  Has been avoiding gluten  has history of tubal ligation  Has the same partner x4 years     patient with history of colposcopy done 2018  Her colpo showed cervical biopsy no dysplasia, suggestive of HPV, ECC was benign endocervical glands  Her endometrial biopsy showed early secretory, no hyperplasia or malignancy Her last Pap was 2018 and was ASCUS, TERRI-  Endocervical in origin  Her HPV was positive, HPV 16 18 with negative  last annual gyn exam 2018- patient is due for Pap and Co test    The following portions of the patient's history were reviewed and updated as appropriate: allergies, current medications, past family history, past medical history, past social history, past surgical history and problem list     Review of Systems   Constitutional: Negative for chills and fever  Respiratory: Negative  Cardiovascular: Negative      Genitourinary: Positive for genital sores  Negative for dysuria, frequency, pelvic pain, urgency and vaginal discharge  Positive vaginal itching and irritation   Neurological: Negative  Objective:      /83 (BP Location: Left arm, Patient Position: Sitting, Cuff Size: Adult)   Pulse 69   Ht 5' 4" (1 626 m)   Wt 68 5 kg (151 lb)   LMP 12/09/2019   BMI 25 92 kg/m²          Physical Exam   Constitutional: She appears well-nourished  Cardiovascular: Normal rate, regular rhythm and normal heart sounds  Pulmonary/Chest: Effort normal and breath sounds normal    Abdominal: Soft  There is no tenderness  Skin: Skin is warm and dry  Psychiatric: She has a normal mood and affect   Her behavior is normal       External genitalia- the positive erythema of labia with lesions that are very superficial,  appears to be a yeast type infection, No exudate noted on lesions   vagina- small amount of blood from menses,  No white discharge   cervix- negative CMT no lesions   uterus- anteverted,  Some tenderness with exam   adnexa- no masses,   Some tenderness with exam     wet mount KOH- some yeast buds,  Negative hyphae,  Negative clue,  negative trich,  Negative OS

## 2019-12-12 ENCOUNTER — TELEPHONE (OUTPATIENT)
Dept: OBGYN CLINIC | Facility: CLINIC | Age: 39
End: 2019-12-12

## 2019-12-12 NOTE — TELEPHONE ENCOUNTER
Phone call to pt, she reports that her symptoms are greatly improved with Fluconazole  Reviewed that HSV culture not completed yet but will call result

## 2019-12-13 LAB — HSV SPEC CULT: NORMAL

## 2019-12-18 ENCOUNTER — TELEPHONE (OUTPATIENT)
Dept: OBGYN CLINIC | Facility: CLINIC | Age: 39
End: 2019-12-18

## 2019-12-18 NOTE — TELEPHONE ENCOUNTER
----- Message from Dorinda Ybarra, [address] sent at 12/18/2019  7:37 AM EST -----  Please call patient to inform of negative herpes culture     thanks

## 2019-12-18 NOTE — TELEPHONE ENCOUNTER
----- Message from Wade Qureshi sent at 12/18/2019  7:37 AM EST -----  Please call patient to inform of negative herpes culture     thanks

## 2019-12-20 ENCOUNTER — ANNUAL EXAM (OUTPATIENT)
Dept: OBGYN CLINIC | Facility: CLINIC | Age: 39
End: 2019-12-20

## 2019-12-20 VITALS
HEART RATE: 63 BPM | DIASTOLIC BLOOD PRESSURE: 88 MMHG | SYSTOLIC BLOOD PRESSURE: 124 MMHG | HEIGHT: 64 IN | WEIGHT: 151 LBS | BODY MASS INDEX: 25.78 KG/M2

## 2019-12-20 DIAGNOSIS — R87.610 ASCUS WITH POSITIVE HIGH RISK HPV CERVICAL: ICD-10-CM

## 2019-12-20 DIAGNOSIS — R87.619 ATYPICAL GLANDULAR CELLS OF UNDETERMINED SIGNIFICANCE (AGUS) ON CERVICAL PAP SMEAR: ICD-10-CM

## 2019-12-20 DIAGNOSIS — Z12.31 ENCOUNTER FOR SCREENING MAMMOGRAM FOR MALIGNANT NEOPLASM OF BREAST: ICD-10-CM

## 2019-12-20 DIAGNOSIS — Z01.419 ENCOUNTER FOR ANNUAL ROUTINE GYNECOLOGICAL EXAMINATION: Primary | ICD-10-CM

## 2019-12-20 DIAGNOSIS — R87.810 ASCUS WITH POSITIVE HIGH RISK HPV CERVICAL: ICD-10-CM

## 2019-12-20 PROCEDURE — 99395 PREV VISIT EST AGE 18-39: CPT | Performed by: NURSE PRACTITIONER

## 2019-12-20 PROCEDURE — G0145 SCR C/V CYTO,THINLAYER,RESCR: HCPCS | Performed by: NURSE PRACTITIONER

## 2019-12-20 PROCEDURE — 87624 HPV HI-RISK TYP POOLED RSLT: CPT | Performed by: NURSE PRACTITIONER

## 2019-12-23 LAB
HPV HR 12 DNA CVX QL NAA+PROBE: NEGATIVE
HPV16 DNA CVX QL NAA+PROBE: NEGATIVE
HPV18 DNA CVX QL NAA+PROBE: NEGATIVE

## 2019-12-31 LAB
LAB AP GYN PRIMARY INTERPRETATION: NORMAL
Lab: NORMAL

## 2020-01-02 ENCOUNTER — TELEPHONE (OUTPATIENT)
Dept: OBGYN CLINIC | Facility: CLINIC | Age: 40
End: 2020-01-02

## 2020-01-23 ENCOUNTER — TELEPHONE (OUTPATIENT)
Dept: GASTROENTEROLOGY | Facility: MEDICAL CENTER | Age: 40
End: 2020-01-23

## 2020-02-03 ENCOUNTER — OFFICE VISIT (OUTPATIENT)
Dept: GASTROENTEROLOGY | Facility: MEDICAL CENTER | Age: 40
End: 2020-02-03
Payer: COMMERCIAL

## 2020-02-03 ENCOUNTER — APPOINTMENT (OUTPATIENT)
Dept: LAB | Facility: MEDICAL CENTER | Age: 40
End: 2020-02-03
Payer: COMMERCIAL

## 2020-02-03 VITALS
HEART RATE: 80 BPM | HEIGHT: 64 IN | BODY MASS INDEX: 26.12 KG/M2 | TEMPERATURE: 98.1 F | SYSTOLIC BLOOD PRESSURE: 118 MMHG | DIASTOLIC BLOOD PRESSURE: 72 MMHG | WEIGHT: 153 LBS

## 2020-02-03 DIAGNOSIS — K21.9 GASTROESOPHAGEAL REFLUX DISEASE WITHOUT ESOPHAGITIS: Primary | ICD-10-CM

## 2020-02-03 DIAGNOSIS — M25.50 ARTHRALGIA, UNSPECIFIED JOINT: ICD-10-CM

## 2020-02-03 DIAGNOSIS — D50.9 MICROCYTIC ANEMIA: ICD-10-CM

## 2020-02-03 DIAGNOSIS — R10.10 PAIN OF UPPER ABDOMEN: ICD-10-CM

## 2020-02-03 LAB
BASOPHILS # BLD AUTO: 0.05 THOUSANDS/ΜL (ref 0–0.1)
BASOPHILS NFR BLD AUTO: 1 % (ref 0–1)
EOSINOPHIL # BLD AUTO: 0.26 THOUSAND/ΜL (ref 0–0.61)
EOSINOPHIL NFR BLD AUTO: 6 % (ref 0–6)
ERYTHROCYTE [DISTWIDTH] IN BLOOD BY AUTOMATED COUNT: 18.6 % (ref 11.6–15.1)
FERRITIN SERPL-MCNC: 11 NG/ML (ref 8–388)
HCT VFR BLD AUTO: 35.9 % (ref 34.8–46.1)
HGB BLD-MCNC: 10.5 G/DL (ref 11.5–15.4)
IMM GRANULOCYTES # BLD AUTO: 0.01 THOUSAND/UL (ref 0–0.2)
IMM GRANULOCYTES NFR BLD AUTO: 0 % (ref 0–2)
IRON SATN MFR SERPL: 10 %
IRON SERPL-MCNC: 50 UG/DL (ref 50–170)
LYMPHOCYTES # BLD AUTO: 1.54 THOUSANDS/ΜL (ref 0.6–4.47)
LYMPHOCYTES NFR BLD AUTO: 36 % (ref 14–44)
MCH RBC QN AUTO: 22.1 PG (ref 26.8–34.3)
MCHC RBC AUTO-ENTMCNC: 29.2 G/DL (ref 31.4–37.4)
MCV RBC AUTO: 75 FL (ref 82–98)
MONOCYTES # BLD AUTO: 0.38 THOUSAND/ΜL (ref 0.17–1.22)
MONOCYTES NFR BLD AUTO: 9 % (ref 4–12)
NEUTROPHILS # BLD AUTO: 2.07 THOUSANDS/ΜL (ref 1.85–7.62)
NEUTS SEG NFR BLD AUTO: 48 % (ref 43–75)
NRBC BLD AUTO-RTO: 0 /100 WBCS
PLATELET # BLD AUTO: 291 THOUSANDS/UL (ref 149–390)
PMV BLD AUTO: 10.9 FL (ref 8.9–12.7)
RBC # BLD AUTO: 4.76 MILLION/UL (ref 3.81–5.12)
TIBC SERPL-MCNC: 481 UG/DL (ref 250–450)
WBC # BLD AUTO: 4.31 THOUSAND/UL (ref 4.31–10.16)

## 2020-02-03 PROCEDURE — 83540 ASSAY OF IRON: CPT

## 2020-02-03 PROCEDURE — 85025 COMPLETE CBC W/AUTO DIFF WBC: CPT

## 2020-02-03 PROCEDURE — 82728 ASSAY OF FERRITIN: CPT

## 2020-02-03 PROCEDURE — 83550 IRON BINDING TEST: CPT

## 2020-02-03 PROCEDURE — 99214 OFFICE O/P EST MOD 30 MIN: CPT | Performed by: PHYSICIAN ASSISTANT

## 2020-02-03 PROCEDURE — 36415 COLL VENOUS BLD VENIPUNCTURE: CPT

## 2020-02-03 NOTE — PROGRESS NOTES
Assessment/Plan:     Diagnoses and all orders for this visit:    Gastroesophageal reflux disease without esophagitis  Pain of upper abdomen  Patient was previously seen for GERD as well as upper abdominal pain that seem to improve with omeprazole  She also was checked for celiac and was told that this was positive however I do not believe that it is as her IgG is elevated and not the IgA  She likely has more of a component of irritable bowel syndrome as she has states that pressure/bloating sensation improved after stopping gluten  She recently noticed a relapse in her symptoms and his since cut out lactose with good improvement  I did tell her that she can continue to be gluten free if it helps with symptoms however she does not have to be  Microcytic anemia  Patient was found to have iron deficiency anemia, last checked in July  Hemoglobin at that time was 10 9, iron was decreased as well  She is not on any supplementation  This is likely secondary to her menstruation  Unfortunately she is unable to undergo endoscopic evaluation as she has no insurance  Would recommend checking CBC and iron panel and potentially starting on iron if she continues to be deficient  -     CBC and differential; Future  -     Iron Panel (Includes Ferritin, Iron Sat%, Iron, and TIBC); Future    Arthralgia, unspecified joint  She is complaining of multiple joint pains and states she was previously told this could be associated with celiac  Again as mentioned above I do not believe she has celiac however would recommend referral to rheumatology to rule out other potential causes and for treatment recommendations  -     Ambulatory referral to Rheumatology; Future    I recommended she sees back in the clinic until she is able to obtain noted sure it is  Subjective:      Patient ID: Yvon Hurd is a 44 y o  female  HPI     This is a follow-up for abdominal pain, GERD and anemia     She describes a burning and pressure sensation in her epigastrium  She was started on omeprazole and states this has resolved  She had a ultrasound that was within normal limits  She was checked for celiac and her tissue transglutaminase IgG was elevated and she states she was told that she had celiac disease and has been gluten free since  She states this completely took away her abdominal pain  However over the last few weeks she has noticed that the pain returned  She states that seems that this was associated with lactose which she has since discontinued as well  She was found to have a decreased hemoglobin, at its lowest 9 9 which was in April  This was rechecked in July and was 10 9  Her iron levels were also low  She states she is not taking any iron supplements  She does continue to menstruate and states she has a heavy day 1 or 2 days of her cycle  She is also complaining of multiple arthralgias including her knees, wrists and back  She does not have any insurance and has never had an endoscopy or colonoscopy  Patient Active Problem List   Diagnosis    ASCUS with positive high risk HPV cervical    Atypical glandular cells of undetermined significance (TERRI) on cervical Pap smear    Gastroesophageal reflux disease without esophagitis    Microcytic anemia    Pain of upper abdomen    Encounter for screening mammogram for malignant neoplasm of breast    Joint pain     Allergies   Allergen Reactions    Gluten Meal Abdominal Pain     Current Outpatient Medications on File Prior to Visit   Medication Sig    omeprazole (PriLOSEC) 40 MG capsule Take 1 capsule (40 mg total) by mouth daily for 30 days     No current facility-administered medications on file prior to visit        Family History   Problem Relation Age of Onset    Arthritis Mother     Cervical cancer Mother     Fibromyalgia Mother     Mental illness Father     Stroke Father     Heart disease Father     Cervical cancer Sister     Heart disease Maternal Grandmother      Past Medical History:   Diagnosis Date    Abnormal Pap smear of cervix     Celiac disease     Heartburn      Social History     Socioeconomic History    Marital status: /Civil Union     Spouse name: None    Number of children: None    Years of education: None    Highest education level: None   Occupational History    None   Social Needs    Financial resource strain: None    Food insecurity:     Worry: None     Inability: None    Transportation needs:     Medical: None     Non-medical: None   Tobacco Use    Smoking status: Former Smoker    Smokeless tobacco: Never Used   Substance and Sexual Activity    Alcohol use: Yes     Comment: social    Drug use: No    Sexual activity: Yes     Partners: Male     Birth control/protection: Female Sterilization   Lifestyle    Physical activity:     Days per week: None     Minutes per session: None    Stress: None   Relationships    Social connections:     Talks on phone: None     Gets together: None     Attends Synagogue service: None     Active member of club or organization: None     Attends meetings of clubs or organizations: None     Relationship status: None    Intimate partner violence:     Fear of current or ex partner: None     Emotionally abused: None     Physically abused: None     Forced sexual activity: None   Other Topics Concern    None   Social History Narrative    None     Past Surgical History:   Procedure Laterality Date    TUBAL LIGATION           Review of Systems   All other systems reviewed and are negative  Objective:      /72   Pulse 80   Temp 98 1 °F (36 7 °C) (Tympanic)   Ht 5' 4" (1 626 m)   Wt 69 4 kg (153 lb)   BMI 26 26 kg/m²          Physical Exam   Constitutional: She is oriented to person, place, and time  She appears well-developed and well-nourished  HENT:   Head: Normocephalic and atraumatic  Eyes: Conjunctivae and EOM are normal    Neck: Normal range of motion  Cardiovascular: Normal rate and regular rhythm  Pulmonary/Chest: Effort normal and breath sounds normal    Abdominal: Soft  Bowel sounds are normal  She exhibits no distension  There is no tenderness  Musculoskeletal: Normal range of motion  Neurological: She is alert and oriented to person, place, and time  Skin: Skin is warm and dry  Psychiatric: She has a normal mood and affect   Her behavior is normal

## 2020-03-06 ENCOUNTER — TELEPHONE (OUTPATIENT)
Dept: RHEUMATOLOGY | Facility: CLINIC | Age: 40
End: 2020-03-06

## 2020-03-06 NOTE — TELEPHONE ENCOUNTER
Patient spoke to [facility] who stated she does not have coverage and SL can call them to confirm even though it is coming up e-verified  She would like her March appt back    Please call her [phone]  Thank you

## 2020-03-06 NOTE — TELEPHONE ENCOUNTER
Called patient to let her know she cannot be seen in [place], we will be happy to see her at the [address] [place] [address] when she calls back we can schedule her

## 2020-06-02 ENCOUNTER — TELEPHONE (OUTPATIENT)
Dept: GASTROENTEROLOGY | Facility: MEDICAL CENTER | Age: 40
End: 2020-06-02

## 2020-06-02 ENCOUNTER — PREP FOR PROCEDURE (OUTPATIENT)
Dept: GASTROENTEROLOGY | Facility: MEDICAL CENTER | Age: 40
End: 2020-06-02

## 2020-06-02 DIAGNOSIS — Z20.822 ENCOUNTER FOR LABORATORY TESTING FOR COVID-19 VIRUS: ICD-10-CM

## 2020-06-02 DIAGNOSIS — R10.10 PAIN OF UPPER ABDOMEN: ICD-10-CM

## 2020-06-02 DIAGNOSIS — K21.9 GASTROESOPHAGEAL REFLUX DISEASE WITHOUT ESOPHAGITIS: ICD-10-CM

## 2020-06-02 DIAGNOSIS — D50.9 MICROCYTIC ANEMIA: Primary | ICD-10-CM

## 2020-06-15 ENCOUNTER — TELEPHONE (OUTPATIENT)
Dept: RHEUMATOLOGY | Facility: CLINIC | Age: 40
End: 2020-06-15

## 2020-07-17 ENCOUNTER — APPOINTMENT (OUTPATIENT)
Dept: LAB | Facility: CLINIC | Age: 40
End: 2020-07-17
Payer: COMMERCIAL

## 2020-07-17 ENCOUNTER — TRANSCRIBE ORDERS (OUTPATIENT)
Dept: LAB | Facility: CLINIC | Age: 40
End: 2020-07-17

## 2020-07-17 DIAGNOSIS — Z11.59 NEED FOR HEPATITIS B SCREENING TEST: ICD-10-CM

## 2020-07-17 DIAGNOSIS — Z11.59 NEED FOR HEPATITIS C SCREENING TEST: ICD-10-CM

## 2020-07-17 DIAGNOSIS — E55.9 VITAMIN D DEFICIENCY: ICD-10-CM

## 2020-07-17 DIAGNOSIS — M25.50 POLYARTHRALGIA: ICD-10-CM

## 2020-07-17 DIAGNOSIS — M25.50 POLYARTHRALGIA: Primary | ICD-10-CM

## 2020-07-17 LAB
25(OH)D3 SERPL-MCNC: 28.1 NG/ML (ref 30–100)
ALBUMIN SERPL BCP-MCNC: 4 G/DL (ref 3.5–5)
ALP SERPL-CCNC: 67 U/L (ref 46–116)
ALT SERPL W P-5'-P-CCNC: 21 U/L (ref 12–78)
ANION GAP SERPL CALCULATED.3IONS-SCNC: 6 MMOL/L (ref 4–13)
AST SERPL W P-5'-P-CCNC: 15 U/L (ref 5–45)
BACTERIA UR QL AUTO: ABNORMAL /HPF
BASOPHILS # BLD AUTO: 0.03 THOUSANDS/ΜL (ref 0–0.1)
BASOPHILS NFR BLD AUTO: 1 % (ref 0–1)
BILIRUB SERPL-MCNC: 0.4 MG/DL (ref 0.2–1)
BILIRUB UR QL STRIP: NEGATIVE
BUN SERPL-MCNC: 15 MG/DL (ref 5–25)
CALCIUM SERPL-MCNC: 8.6 MG/DL (ref 8.3–10.1)
CHLORIDE SERPL-SCNC: 101 MMOL/L (ref 100–108)
CLARITY UR: CLEAR
CO2 SERPL-SCNC: 28 MMOL/L (ref 21–32)
COLOR UR: ABNORMAL
CREAT SERPL-MCNC: 0.94 MG/DL (ref 0.6–1.3)
CRP SERPL QL: <3 MG/L
EOSINOPHIL # BLD AUTO: 0.29 THOUSAND/ΜL (ref 0–0.61)
EOSINOPHIL NFR BLD AUTO: 5 % (ref 0–6)
ERYTHROCYTE [DISTWIDTH] IN BLOOD BY AUTOMATED COUNT: 11.8 % (ref 11.6–15.1)
ERYTHROCYTE [SEDIMENTATION RATE] IN BLOOD: 12 MM/HOUR (ref 0–20)
GFR SERPL CREATININE-BSD FRML MDRD: 77 ML/MIN/1.73SQ M
GLUCOSE SERPL-MCNC: 91 MG/DL (ref 65–140)
GLUCOSE UR STRIP-MCNC: NEGATIVE MG/DL
HBV CORE AB SER QL: NORMAL
HBV SURFACE AB SER-ACNC: <3.1 MIU/ML
HBV SURFACE AG SER QL: NORMAL
HCT VFR BLD AUTO: 41.6 % (ref 34.8–46.1)
HCV AB SER QL: NORMAL
HGB BLD-MCNC: 13.7 G/DL (ref 11.5–15.4)
HGB UR QL STRIP.AUTO: NEGATIVE
IMM GRANULOCYTES # BLD AUTO: 0.01 THOUSAND/UL (ref 0–0.2)
IMM GRANULOCYTES NFR BLD AUTO: 0 % (ref 0–2)
KETONES UR STRIP-MCNC: NEGATIVE MG/DL
LEUKOCYTE ESTERASE UR QL STRIP: NEGATIVE
LYMPHOCYTES # BLD AUTO: 1.77 THOUSANDS/ΜL (ref 0.6–4.47)
LYMPHOCYTES NFR BLD AUTO: 32 % (ref 14–44)
MCH RBC QN AUTO: 30.2 PG (ref 26.8–34.3)
MCHC RBC AUTO-ENTMCNC: 32.9 G/DL (ref 31.4–37.4)
MCV RBC AUTO: 92 FL (ref 82–98)
MONOCYTES # BLD AUTO: 0.32 THOUSAND/ΜL (ref 0.17–1.22)
MONOCYTES NFR BLD AUTO: 6 % (ref 4–12)
NEUTROPHILS # BLD AUTO: 3.16 THOUSANDS/ΜL (ref 1.85–7.62)
NEUTS SEG NFR BLD AUTO: 56 % (ref 43–75)
NITRITE UR QL STRIP: NEGATIVE
NON-SQ EPI CELLS URNS QL MICRO: ABNORMAL /HPF
NRBC BLD AUTO-RTO: 0 /100 WBCS
PH UR STRIP.AUTO: 5.5 [PH]
PLATELET # BLD AUTO: 259 THOUSANDS/UL (ref 149–390)
PMV BLD AUTO: 10.4 FL (ref 8.9–12.7)
POTASSIUM SERPL-SCNC: 4.4 MMOL/L (ref 3.5–5.3)
PROT SERPL-MCNC: 8 G/DL (ref 6.4–8.2)
PROT UR STRIP-MCNC: NEGATIVE MG/DL
RBC # BLD AUTO: 4.54 MILLION/UL (ref 3.81–5.12)
RBC #/AREA URNS AUTO: ABNORMAL /HPF
RHEUMATOID FACT SER QL LA: NEGATIVE
SODIUM SERPL-SCNC: 135 MMOL/L (ref 136–145)
SP GR UR STRIP.AUTO: 1.01 (ref 1–1.03)
TSH SERPL DL<=0.05 MIU/L-ACNC: 1.4 UIU/ML (ref 0.36–3.74)
UROBILINOGEN UR QL STRIP.AUTO: 0.2 E.U./DL
WBC # BLD AUTO: 5.58 THOUSAND/UL (ref 4.31–10.16)
WBC #/AREA URNS AUTO: ABNORMAL /HPF

## 2020-07-17 PROCEDURE — 86200 CCP ANTIBODY: CPT

## 2020-07-17 PROCEDURE — 86704 HEP B CORE ANTIBODY TOTAL: CPT

## 2020-07-17 PROCEDURE — 86706 HEP B SURFACE ANTIBODY: CPT

## 2020-07-17 PROCEDURE — 81001 URINALYSIS AUTO W/SCOPE: CPT | Performed by: INTERNAL MEDICINE

## 2020-07-17 PROCEDURE — 84443 ASSAY THYROID STIM HORMONE: CPT

## 2020-07-17 PROCEDURE — 85652 RBC SED RATE AUTOMATED: CPT

## 2020-07-17 PROCEDURE — 80053 COMPREHEN METABOLIC PANEL: CPT

## 2020-07-17 PROCEDURE — 86430 RHEUMATOID FACTOR TEST QUAL: CPT

## 2020-07-17 PROCEDURE — 82306 VITAMIN D 25 HYDROXY: CPT

## 2020-07-17 PROCEDURE — 36415 COLL VENOUS BLD VENIPUNCTURE: CPT

## 2020-07-17 PROCEDURE — 86140 C-REACTIVE PROTEIN: CPT

## 2020-07-17 PROCEDURE — 87340 HEPATITIS B SURFACE AG IA: CPT

## 2020-07-17 PROCEDURE — 86038 ANTINUCLEAR ANTIBODIES: CPT

## 2020-07-17 PROCEDURE — 85025 COMPLETE CBC W/AUTO DIFF WBC: CPT

## 2020-07-17 PROCEDURE — 86803 HEPATITIS C AB TEST: CPT

## 2020-07-19 LAB — CCP IGA+IGG SERPL IA-ACNC: 5 UNITS (ref 0–19)

## 2020-07-20 LAB — RYE IGE QN: NEGATIVE

## 2020-07-31 ENCOUNTER — HOSPITAL ENCOUNTER (OUTPATIENT)
Dept: MAMMOGRAPHY | Facility: CLINIC | Age: 40
Discharge: HOME | End: 2020-07-31
Payer: COMMERCIAL

## 2020-07-31 VITALS — WEIGHT: 153 LBS | HEIGHT: 64 IN | BODY MASS INDEX: 26.12 KG/M2

## 2020-07-31 DIAGNOSIS — Z12.31 ENCOUNTER FOR SCREENING MAMMOGRAM FOR MALIGNANT NEOPLASM OF BREAST: ICD-10-CM

## 2020-07-31 PROCEDURE — 77067 SCR MAMMO BI INCL CAD: CPT

## 2020-07-31 PROCEDURE — 77063 BREAST TOMOSYNTHESIS BI: CPT

## 2020-08-04 ENCOUNTER — TELEPHONE (OUTPATIENT)
Dept: OBGYN CLINIC | Facility: CLINIC | Age: 40
End: 2020-08-04

## 2020-08-04 NOTE — TELEPHONE ENCOUNTER
----- Message from Saira Patel, [address] sent at 8/4/2020  4:10 PM EDT -----  Please call pt to inform that her MMG was negative

## 2020-08-05 ENCOUNTER — ANESTHESIA EVENT (OUTPATIENT)
Dept: GASTROENTEROLOGY | Facility: MEDICAL CENTER | Age: 40
End: 2020-08-05

## 2020-08-05 ENCOUNTER — APPOINTMENT (EMERGENCY)
Dept: RADIOLOGY | Facility: HOSPITAL | Age: 40
End: 2020-08-05
Payer: COMMERCIAL

## 2020-08-05 ENCOUNTER — HOSPITAL ENCOUNTER (EMERGENCY)
Facility: HOSPITAL | Age: 40
Discharge: HOME | End: 2020-08-05
Attending: EMERGENCY MEDICINE | Admitting: EMERGENCY MEDICINE
Payer: COMMERCIAL

## 2020-08-05 VITALS
SYSTOLIC BLOOD PRESSURE: 145 MMHG | DIASTOLIC BLOOD PRESSURE: 75 MMHG | OXYGEN SATURATION: 98 % | TEMPERATURE: 98.1 F | HEART RATE: 94 BPM | BODY MASS INDEX: 26.78 KG/M2 | RESPIRATION RATE: 18 BRPM | WEIGHT: 156 LBS

## 2020-08-05 DIAGNOSIS — M54.2 NECK PAIN: ICD-10-CM
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
## 2020-08-05 DIAGNOSIS — M62.838 MUSCLE SPASMS OF NECK: ICD-10-CM

## 2020-08-05 DIAGNOSIS — M62.830 SPASM OF BACK MUSCLES: ICD-10-CM

## 2020-08-05 PROCEDURE — G1004 CDSM NDSC: HCPCS

## 2020-08-05 PROCEDURE — 99284 EMERGENCY DEPT VISIT MOD MDM: CPT | Performed by: PHYSICIAN ASSISTANT

## 2020-08-05 PROCEDURE — 96372 THER/PROPH/DIAG INJ SC/IM: CPT

## 2020-08-05 PROCEDURE — 99284 EMERGENCY DEPT VISIT MOD MDM: CPT

## 2020-08-05 PROCEDURE — 72125 CT NECK SPINE W/O DYE: CPT

## 2020-08-05 RX ORDER — METHOCARBAMOL 500 MG/1
500 TABLET, FILM COATED ORAL ONCE
Status: COMPLETED | OUTPATIENT
Start: 2020-08-05 | End: 2020-08-05

## 2020-08-05 RX ORDER — LIDOCAINE 50 MG/G
3 PATCH TOPICAL DAILY
Qty: 3 PATCH | Refills: 0 | Status: SHIPPED | OUTPATIENT
Start: 2020-08-05 | End: 2020-08-14 | Stop reason: SDUPTHER

## 2020-08-05 RX ORDER — NAPROXEN 500 MG/1
500 TABLET ORAL 2 TIMES DAILY WITH MEALS
Qty: 30 TABLET | Refills: 0 | Status: SHIPPED | OUTPATIENT
Start: 2020-08-05 | End: 2020-08-14 | Stop reason: SDUPTHER

## 2020-08-05 RX ORDER — KETOROLAC TROMETHAMINE 30 MG/ML
15 INJECTION, SOLUTION INTRAMUSCULAR; INTRAVENOUS ONCE
Status: COMPLETED | OUTPATIENT
Start: 2020-08-05 | End: 2020-08-05

## 2020-08-05 RX ORDER — METHOCARBAMOL 500 MG/1
500 TABLET, FILM COATED ORAL 4 TIMES DAILY PRN
Qty: 20 TABLET | Refills: 0 | Status: SHIPPED | OUTPATIENT
Start: 2020-08-05 | End: 2020-08-14 | Stop reason: SDUPTHER

## 2020-08-05 RX ORDER — LIDOCAINE 50 MG/G
2 PATCH TOPICAL ONCE
Status: DISCONTINUED | OUTPATIENT
Start: 2020-08-05 | End: 2020-08-05 | Stop reason: HOSPADM

## 2020-08-05 RX ADMIN — METHOCARBAMOL 500 MG: 500 TABLET, FILM COATED ORAL at 10:27

## 2020-08-05 RX ADMIN — KETOROLAC TROMETHAMINE 15 MG: 30 INJECTION, SOLUTION INTRAMUSCULAR at 10:27

## 2020-08-05 RX ADMIN — LIDOCAINE 2 PATCH: 50 PATCH CUTANEOUS at 10:27
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
## 2020-08-05 NOTE — Clinical Note
Kandy Singletary accompanied Fidel Hopkins to the emergency department on 8/5/2020  They may return to work on 08/06/2020  If you have any questions or concerns, please don't hesitate to call        Moiz Yeung PA-C

## 2020-08-05 NOTE — DISCHARGE INSTRUCTIONS
CT cervical spine: No cervical spine fracture or traumatic malalignment  Take naproxen twice daily as needed for pain  Take methocarbamol up to 4 times daily for muscle spasms  Do not drive while taking methocarbamol  Apply Lidoderm patch as needed for pain  Remove after 12 hours  Follow-up with PCP in 1 week  Return to the emergency department if you develop worsening symptoms such as vision changes, severe headache, numbness, shortness of breath or chest pain

## 2020-08-07 RX ORDER — ONDANSETRON 2 MG/ML
4 INJECTION INTRAMUSCULAR; INTRAVENOUS EVERY 6 HOURS PRN
Status: CANCELLED | OUTPATIENT
Start: 2020-08-07

## 2020-08-10 ENCOUNTER — HOSPITAL ENCOUNTER (OUTPATIENT)
Dept: GASTROENTEROLOGY | Facility: MEDICAL CENTER | Age: 40
Setting detail: OUTPATIENT SURGERY
Discharge: HOME | End: 2020-08-10
Attending: INTERNAL MEDICINE
Payer: COMMERCIAL

## 2020-08-10 ENCOUNTER — ANESTHESIA (OUTPATIENT)
Dept: GASTROENTEROLOGY | Facility: MEDICAL CENTER | Age: 40
End: 2020-08-10

## 2020-08-10 VITALS
RESPIRATION RATE: 16 BRPM | BODY MASS INDEX: 26.29 KG/M2 | HEART RATE: 51 BPM | SYSTOLIC BLOOD PRESSURE: 139 MMHG | HEIGHT: 64 IN | DIASTOLIC BLOOD PRESSURE: 72 MMHG | TEMPERATURE: 97.7 F | OXYGEN SATURATION: 96 % | WEIGHT: 154 LBS

## 2020-08-10 DIAGNOSIS — D50.9 MICROCYTIC ANEMIA: ICD-10-CM

## 2020-08-10 DIAGNOSIS — K21.9 GASTROESOPHAGEAL REFLUX DISEASE WITHOUT ESOPHAGITIS: ICD-10-CM

## 2020-08-10 DIAGNOSIS — K44.9 HIATAL HERNIA: Primary | ICD-10-CM

## 2020-08-10 DIAGNOSIS — R10.10 PAIN OF UPPER ABDOMEN: ICD-10-CM

## 2020-08-10 LAB
EXT PREGNANCY TEST URINE: NEGATIVE
EXT. CONTROL: NORMAL

## 2020-08-10 PROCEDURE — 43239 EGD BIOPSY SINGLE/MULTIPLE: CPT | Performed by: INTERNAL MEDICINE

## 2020-08-10 PROCEDURE — 88305 TISSUE EXAM BY PATHOLOGIST: CPT | Performed by: PATHOLOGY

## 2020-08-10 PROCEDURE — 81025 URINE PREGNANCY TEST: CPT | Performed by: ANESTHESIOLOGY

## 2020-08-10 RX ORDER — SODIUM CHLORIDE 9 MG/ML
125 INJECTION, SOLUTION INTRAVENOUS CONTINUOUS
Status: DISCONTINUED | OUTPATIENT
Start: 2020-08-10 | End: 2020-08-14 | Stop reason: HOSPADM

## 2020-08-10 RX ORDER — PROPOFOL 10 MG/ML
INJECTION, EMULSION INTRAVENOUS AS NEEDED
Status: DISCONTINUED | OUTPATIENT
Start: 2020-08-10 | End: 2020-08-10

## 2020-08-10 RX ADMIN — PROPOFOL 120 MG: 10 INJECTION, EMULSION INTRAVENOUS at 14:04

## 2020-08-10 RX ADMIN — SODIUM CHLORIDE 125 ML/HR: 0.9 INJECTION, SOLUTION INTRAVENOUS at 13:20

## 2020-08-10 NOTE — DISCHARGE INSTRUCTIONS
Upper Endoscopy   WHAT YOU NEED TO KNOW:   An upper endoscopy is also called an upper gastrointestinal (GI) endoscopy, or an esophagogastroduodenoscopy (EGD)  You may feel bloated, gassy, or have some abdominal discomfort after your procedure  Your throat may be sore for 24 to 36 hours  You may burp or pass gas from air that is still inside your body  DISCHARGE INSTRUCTIONS:   Call 911 if:   · You have sudden chest pain or trouble breathing  Seek care immediately if:   · You feel dizzy or faint  · You have trouble swallowing  · You have severe throat pain  · Your bowel movements are very dark or black  · Your abdomen is hard and firm and you have severe pain  · You vomit blood  Contact your healthcare provider if:   · You feel full or bloated and cannot burp or pass gas  · You have not had a bowel movement for 3 days after your procedure  · You have neck pain  · You have a fever or chills  · You have nausea or are vomiting  · You have a rash or hives  · You have questions or concerns about your endoscopy  Relieve a sore throat:  Suck on throat lozenges or crushed ice  Gargle with a small amount of warm salt water  Mix 1 teaspoon of salt and 1 cup of warm water to make salt water  Relieve gas and discomfort from bloating:  Lie on your right side with a heating pad on your abdomen  Take short walks to help pass gas  Eat small meals until bloating is relieved  Rest after your procedure:  Do not drive or make important decisions until the day after your procedure  Return to your normal activity as directed  You can usually return to work the day after your procedure  Follow up with your healthcare provider as directed:  Write down your questions so you remember to ask them during your visits  © 2017 [employer]  Information is for End User's use only and may not be sold, redistributed or otherwise used for commercial purposes   All illustrations and images included in [employer] are the copyrighted property of [employer] [place] [employer] , [employer]  or Aditya Ayala  The above information is an  only  It is not intended as medical advice for individual conditions or treatments  Talk to your doctor, nurse or pharmacist before following any medical regimen to see if it is safe and effective for you

## 2020-08-10 NOTE — ANESTHESIA POSTPROCEDURE EVALUATION
Post-Op Assessment Note    CV Status:  Stable    Pain management: adequate     Mental Status:  Alert and awake   Hydration Status:  Euvolemic   PONV Controlled:  Controlled   Airway Patency:  Patent      Post Op Vitals Reviewed: Yes            No complications documented      BP      Temp      Pulse     Resp      SpO2

## 2020-08-10 NOTE — H&P
History and Physical -  [facility]  Katerina Mitchell 36 y o  female MRN: [id number]                  HPI: Katerina Mitchell is a 36y o  year old female who presents for EGD for evaluation of GERD and abdominal pain  She denies any dysphagia  Her symptoms have significantly improved with PPI and minimizing gluten in her diet  She had a tTG IgG level that was elevated  She continues to be on a PPI daily and avoiding gluten  REVIEW OF SYSTEMS: Per the HPI, and otherwise unremarkable  Historical Information   Past Medical History:   Diagnosis Date    Abnormal Pap smear of cervix     Celiac disease     Heartburn      Past Surgical History:   Procedure Laterality Date    TUBAL LIGATION       Social History   Social History     Substance and Sexual Activity   Alcohol Use Yes    Comment: social     Social History     Substance and Sexual Activity   Drug Use No     Social History     Tobacco Use   Smoking Status Former Smoker   Smokeless Tobacco Never Used     Family History   Problem Relation Age of Onset    Arthritis Mother     Cervical cancer Mother     Fibromyalgia Mother     Mental illness Father     Stroke Father     Heart disease Father     Cervical cancer Sister     Heart disease Maternal Grandmother     No Known Problems Daughter     No Known Problems Maternal Grandfather     No Known Problems Paternal Grandmother     No Known Problems Paternal Grandfather        Meds/Allergies     (Not in a hospital admission)      Allergies   Allergen Reactions    Gluten Meal Abdominal Pain       Objective     Blood pressure 160/74, pulse 63, temperature 97 7 °F (36 5 °C), resp  rate 18, height 5' 4" (1 626 m), weight 69 9 kg (154 lb), SpO2 99 %, not currently breastfeeding        PHYSICAL EXAM    Gen: NAD  CV: RRR  CHEST: Clear  ABD: soft, NT/ND  EXT: no edema      ASSESSMENT/PLAN:  This is a 36y o  year old female here for EGD for evaluation of GERD symptoms and abdominal pain with possible celiac disease  Patient is stable and optimized for the procedure      PLAN:   Procedure:  EGD

## 2020-08-10 NOTE — ANESTHESIA PREPROCEDURE EVALUATION
Procedure:  EGD    Relevant Problems   GI/HEPATIC   (+) Gastroesophageal reflux disease without esophagitis      HEMATOLOGY   (+) Microcytic anemia        Physical Exam    Airway    Mallampati score: II  TM Distance: >3 FB  Neck ROM: full     Dental   No notable dental hx     Cardiovascular  Cardiovascular exam normal    Pulmonary  Pulmonary exam normal     Other Findings        Anesthesia Plan  ASA Score- 2     Anesthesia Type- IV sedation with anesthesia with ASA Monitors  Additional Monitors:   Airway Plan:           Plan Factors-Exercise tolerance (METS): >4 METS  Chart reviewed  Induction-     Postoperative Plan-     Informed Consent- Anesthetic plan and risks discussed with patient

## 2020-08-14 ENCOUNTER — OFFICE VISIT (OUTPATIENT)
Dept: FAMILY MEDICINE CLINIC | Facility: CLINIC | Age: 40
End: 2020-08-14
Payer: COMMERCIAL

## 2020-08-14 VITALS
BODY MASS INDEX: 26.63 KG/M2 | DIASTOLIC BLOOD PRESSURE: 80 MMHG | HEART RATE: 60 BPM | HEIGHT: 64 IN | TEMPERATURE: 97.5 F | SYSTOLIC BLOOD PRESSURE: 122 MMHG | RESPIRATION RATE: 16 BRPM | WEIGHT: 156 LBS

## 2020-08-14 DIAGNOSIS — K21.9 GASTROESOPHAGEAL REFLUX DISEASE WITHOUT ESOPHAGITIS: ICD-10-CM

## 2020-08-14 DIAGNOSIS — M62.838 MUSCLE SPASMS OF NECK: ICD-10-CM

## 2020-08-14 DIAGNOSIS — M62.830 SPASM OF BACK MUSCLES: Primary | ICD-10-CM

## 2020-08-14 PROBLEM — M54.2 NECK PAIN: Status: RESOLVED | Noted: 2020-08-14 | Resolved: 2020-08-14

## 2020-08-14 PROBLEM — M54.2 NECK PAIN: Status: ACTIVE | Noted: 2020-08-14

## 2020-08-14 PROCEDURE — 99213 OFFICE O/P EST LOW 20 MIN: CPT | Performed by: FAMILY MEDICINE

## 2020-08-14 PROCEDURE — 3008F BODY MASS INDEX DOCD: CPT | Performed by: FAMILY MEDICINE

## 2020-08-14 PROCEDURE — 1036F TOBACCO NON-USER: CPT | Performed by: FAMILY MEDICINE

## 2020-08-14 RX ORDER — METHOCARBAMOL 500 MG/1
500 TABLET, FILM COATED ORAL 3 TIMES DAILY
Qty: 42 TABLET | Refills: 0 | Status: SHIPPED | OUTPATIENT
Start: 2020-08-14 | End: 2020-08-28 | Stop reason: SDUPTHER

## 2020-08-14 RX ORDER — OMEPRAZOLE 40 MG/1
40 CAPSULE, DELAYED RELEASE ORAL DAILY
Qty: 30 CAPSULE | Refills: 0 | Status: SHIPPED | OUTPATIENT
Start: 2020-08-14 | End: 2020-08-14

## 2020-08-14 RX ORDER — LIDOCAINE 50 MG/G
3 PATCH TOPICAL DAILY
Qty: 3 PATCH | Refills: 0 | Status: SHIPPED | OUTPATIENT
Start: 2020-08-14 | End: 2020-08-28 | Stop reason: SDUPTHER

## 2020-08-14 RX ORDER — NAPROXEN 500 MG/1
500 TABLET ORAL 2 TIMES DAILY WITH MEALS
Qty: 30 TABLET | Refills: 0 | Status: SHIPPED | OUTPATIENT
Start: 2020-08-14 | End: 2020-09-01

## 2020-08-14 RX ORDER — OMEPRAZOLE 40 MG/1
40 CAPSULE, DELAYED RELEASE ORAL DAILY
Qty: 30 CAPSULE | Refills: 0 | Status: SHIPPED | OUTPATIENT
Start: 2020-08-14 | End: 2020-09-09 | Stop reason: SDUPTHER

## 2020-08-14 NOTE — ASSESSMENT & PLAN NOTE
History of GERD with known small hiatal hernia       - Refill Prilosec  - Patient to follow up with GI for surgical evaluation    RTC in 2 weeks for OMT and Follow up
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
## 2020-08-27 DIAGNOSIS — M62.830 SPASM OF BACK MUSCLES: ICD-10-CM

## 2020-08-27 DIAGNOSIS — M62.838 MUSCLE SPASMS OF NECK: ICD-10-CM

## 2020-08-28 ENCOUNTER — OFFICE VISIT (OUTPATIENT)
Dept: FAMILY MEDICINE CLINIC | Facility: CLINIC | Age: 40
End: 2020-08-28
Payer: COMMERCIAL

## 2020-08-28 VITALS
HEIGHT: 64 IN | WEIGHT: 161 LBS | OXYGEN SATURATION: 99 % | TEMPERATURE: 98.3 F | DIASTOLIC BLOOD PRESSURE: 84 MMHG | HEART RATE: 66 BPM | BODY MASS INDEX: 27.49 KG/M2 | SYSTOLIC BLOOD PRESSURE: 122 MMHG

## 2020-08-28 DIAGNOSIS — M62.830 SPASM OF BACK MUSCLES: Primary | ICD-10-CM

## 2020-08-28 DIAGNOSIS — M62.838 MUSCLE SPASMS OF NECK: ICD-10-CM

## 2020-08-28 PROCEDURE — 3008F BODY MASS INDEX DOCD: CPT | Performed by: FAMILY MEDICINE

## 2020-08-28 PROCEDURE — 1036F TOBACCO NON-USER: CPT | Performed by: FAMILY MEDICINE

## 2020-08-28 PROCEDURE — 99213 OFFICE O/P EST LOW 20 MIN: CPT | Performed by: FAMILY MEDICINE

## 2020-08-28 RX ORDER — METHOCARBAMOL 500 MG/1
500 TABLET, FILM COATED ORAL 3 TIMES DAILY
Qty: 9 TABLET | Refills: 0 | Status: SHIPPED | OUTPATIENT
Start: 2020-08-28 | End: 2021-07-08

## 2020-08-28 RX ORDER — LIDOCAINE 50 MG/G
3 PATCH TOPICAL DAILY
Qty: 3 PATCH | Refills: 0 | Status: SHIPPED | OUTPATIENT
Start: 2020-08-28 | End: 2021-08-23 | Stop reason: ALTCHOICE

## 2020-08-28 NOTE — ASSESSMENT & PLAN NOTE
Pain in right hip recently began  Notes pain when laying on her right side      - Continue current therapy as above    RTC in 3 months or PRN
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
## 2020-09-01 RX ORDER — NAPROXEN 500 MG/1
TABLET ORAL
Qty: 30 TABLET | Refills: 0 | Status: SHIPPED | OUTPATIENT
Start: 2020-09-01 | End: 2021-07-08

## 2020-09-09 DIAGNOSIS — K21.9 GASTROESOPHAGEAL REFLUX DISEASE WITHOUT ESOPHAGITIS: ICD-10-CM

## 2020-09-09 RX ORDER — OMEPRAZOLE 40 MG/1
40 CAPSULE, DELAYED RELEASE ORAL DAILY
Qty: 30 CAPSULE | Refills: 1 | Status: SHIPPED | OUTPATIENT
Start: 2020-09-09 | End: 2020-11-10

## 2020-09-10 DIAGNOSIS — K21.9 GASTROESOPHAGEAL REFLUX DISEASE WITHOUT ESOPHAGITIS: ICD-10-CM

## 2020-09-10 RX ORDER — OMEPRAZOLE 40 MG/1
CAPSULE, DELAYED RELEASE ORAL
Qty: 30 CAPSULE | Refills: 1 | OUTPATIENT
Start: 2020-09-10

## 2020-09-15 DIAGNOSIS — M62.838 MUSCLE SPASMS OF NECK: ICD-10-CM

## 2020-09-15 DIAGNOSIS — M62.830 SPASM OF BACK MUSCLES: ICD-10-CM

## 2020-09-18 DIAGNOSIS — M62.830 SPASM OF BACK MUSCLES: ICD-10-CM

## 2020-09-18 DIAGNOSIS — M62.838 MUSCLE SPASMS OF NECK: ICD-10-CM

## 2020-09-18 RX ORDER — NAPROXEN 500 MG/1
500 TABLET ORAL 2 TIMES DAILY WITH MEALS
Qty: 30 TABLET | Refills: 0 | Status: CANCELLED | OUTPATIENT
Start: 2020-09-18

## 2020-09-21 RX ORDER — NAPROXEN 500 MG/1
TABLET ORAL
Qty: 30 TABLET | Refills: 0 | OUTPATIENT
Start: 2020-09-21

## 2020-10-14 ENCOUNTER — TRANSCRIBE ORDERS (OUTPATIENT)
Dept: ADMINISTRATIVE | Facility: HOSPITAL | Age: 40
End: 2020-10-14

## 2020-10-14 DIAGNOSIS — S33.8XXA SPRAIN OF OTHER PARTS OF LUMBAR SPINE AND PELVIS, INITIAL ENCOUNTER: Primary | ICD-10-CM

## 2020-10-14 DIAGNOSIS — S33.5XXA SPRAIN OF LIGAMENTS OF LUMBAR SPINE, INITIAL ENCOUNTER: ICD-10-CM

## 2020-10-26 ENCOUNTER — HOSPITAL ENCOUNTER (OUTPATIENT)
Dept: RADIOLOGY | Age: 40
Discharge: HOME | End: 2020-10-26
Payer: COMMERCIAL

## 2020-10-26 DIAGNOSIS — S33.8XXA SPRAIN OF OTHER PARTS OF LUMBAR SPINE AND PELVIS, INITIAL ENCOUNTER: ICD-10-CM

## 2020-10-26 DIAGNOSIS — S33.5XXA SPRAIN OF LIGAMENTS OF LUMBAR SPINE, INITIAL ENCOUNTER: ICD-10-CM

## 2020-10-26 PROCEDURE — 72148 MRI LUMBAR SPINE W/O DYE: CPT

## 2020-10-26 PROCEDURE — G1004 CDSM NDSC: HCPCS

## 2020-11-06 DIAGNOSIS — K21.9 GASTROESOPHAGEAL REFLUX DISEASE WITHOUT ESOPHAGITIS: ICD-10-CM

## 2020-11-10 RX ORDER — OMEPRAZOLE 40 MG/1
CAPSULE, DELAYED RELEASE ORAL
Qty: 30 CAPSULE | Refills: 1 | Status: SHIPPED | OUTPATIENT
Start: 2020-11-10 | End: 2021-01-06 | Stop reason: SDUPTHER

## 2020-11-17 ENCOUNTER — TELEPHONE (OUTPATIENT)
Dept: CARDIAC SURGERY | Facility: CLINIC | Age: 40
End: 2020-11-17

## 2020-11-17 ENCOUNTER — TELEPHONE (OUTPATIENT)
Dept: SURGICAL ONCOLOGY | Facility: CLINIC | Age: 40
End: 2020-11-17

## 2020-11-24 ENCOUNTER — TRANSCRIBE ORDERS (OUTPATIENT)
Dept: ADMINISTRATIVE | Facility: HOSPITAL | Age: 40
End: 2020-11-24

## 2020-11-24 DIAGNOSIS — M54.9 MID BACK PAIN: Primary | ICD-10-CM

## 2020-12-07 ENCOUNTER — HOSPITAL ENCOUNTER (OUTPATIENT)
Dept: RADIOLOGY | Facility: HOSPITAL | Age: 40
Discharge: HOME | End: 2020-12-07
Payer: COMMERCIAL

## 2020-12-07 DIAGNOSIS — M54.9 MID BACK PAIN: ICD-10-CM

## 2020-12-07 PROCEDURE — 72146 MRI CHEST SPINE W/O DYE: CPT

## 2020-12-07 PROCEDURE — G1004 CDSM NDSC: HCPCS

## 2020-12-08 ENCOUNTER — OFFICE VISIT (OUTPATIENT)
Dept: FAMILY MEDICINE CLINIC | Facility: CLINIC | Age: 40
End: 2020-12-08
Payer: COMMERCIAL

## 2020-12-08 VITALS
HEIGHT: 64 IN | BODY MASS INDEX: 28.41 KG/M2 | TEMPERATURE: 97.6 F | OXYGEN SATURATION: 100 % | DIASTOLIC BLOOD PRESSURE: 84 MMHG | WEIGHT: 166.38 LBS | RESPIRATION RATE: 16 BRPM | HEART RATE: 80 BPM | SYSTOLIC BLOOD PRESSURE: 134 MMHG

## 2020-12-08 DIAGNOSIS — M62.830 SPASM OF BACK MUSCLES: Primary | ICD-10-CM

## 2020-12-08 PROCEDURE — 99213 OFFICE O/P EST LOW 20 MIN: CPT | Performed by: FAMILY MEDICINE

## 2020-12-08 RX ORDER — CELECOXIB 200 MG/1
CAPSULE ORAL
COMMUNITY
Start: 2020-10-13 | End: 2021-10-19 | Stop reason: ALTCHOICE

## 2021-01-06 DIAGNOSIS — K21.9 GASTROESOPHAGEAL REFLUX DISEASE WITHOUT ESOPHAGITIS: ICD-10-CM

## 2021-01-06 RX ORDER — OMEPRAZOLE 40 MG/1
40 CAPSULE, DELAYED RELEASE ORAL DAILY
Qty: 30 CAPSULE | Refills: 1 | Status: SHIPPED | OUTPATIENT
Start: 2021-01-06 | End: 2021-03-08 | Stop reason: SDUPTHER

## 2021-01-23 ENCOUNTER — ANESTHESIA EVENT (OUTPATIENT)
Dept: PERIOP | Facility: HOSPITAL | Age: 41
End: 2021-01-23
Payer: COMMERCIAL

## 2021-01-23 ENCOUNTER — HOSPITAL ENCOUNTER (OUTPATIENT)
Facility: HOSPITAL | Age: 41
Setting detail: OBSERVATION
Discharge: HOME | End: 2021-01-24
Attending: EMERGENCY MEDICINE | Admitting: SURGERY
Payer: COMMERCIAL

## 2021-01-23 ENCOUNTER — APPOINTMENT (EMERGENCY)
Dept: RADIOLOGY | Facility: HOSPITAL | Age: 41
End: 2021-01-23
Payer: COMMERCIAL

## 2021-01-23 ENCOUNTER — ANESTHESIA (OUTPATIENT)
Dept: PERIOP | Facility: HOSPITAL | Age: 41
End: 2021-01-23
Payer: COMMERCIAL

## 2021-01-23 VITALS — HEART RATE: 109 BPM

## 2021-01-23 DIAGNOSIS — K35.80 ACUTE APPENDICITIS: Primary | ICD-10-CM

## 2021-01-23 LAB
ALBUMIN SERPL BCP-MCNC: 4.1 G/DL (ref 3.5–5)
ALP SERPL-CCNC: 75 U/L (ref 46–116)
ALT SERPL W P-5'-P-CCNC: 26 U/L (ref 12–78)
ANION GAP SERPL CALCULATED.3IONS-SCNC: 7 MMOL/L (ref 4–13)
AST SERPL W P-5'-P-CCNC: 15 U/L (ref 5–45)
BASOPHILS # BLD AUTO: 0.04 THOUSANDS/ΜL (ref 0–0.1)
BASOPHILS NFR BLD AUTO: 0 % (ref 0–1)
BILIRUB SERPL-MCNC: 0.69 MG/DL (ref 0.2–1)
BILIRUB UR QL STRIP: NEGATIVE
BILIRUB UR QL STRIP: NEGATIVE
BUN SERPL-MCNC: 15 MG/DL (ref 5–25)
CALCIUM SERPL-MCNC: 9.1 MG/DL (ref 8.3–10.1)
CHLORIDE SERPL-SCNC: 105 MMOL/L (ref 100–108)
CLARITY UR: CLEAR
CLARITY UR: CLEAR
CO2 SERPL-SCNC: 24 MMOL/L (ref 21–32)
COLOR UR: YELLOW
COLOR UR: YELLOW
CREAT SERPL-MCNC: 0.95 MG/DL (ref 0.6–1.3)
EOSINOPHIL # BLD AUTO: 0.03 THOUSAND/ΜL (ref 0–0.61)
EOSINOPHIL NFR BLD AUTO: 0 % (ref 0–6)
ERYTHROCYTE [DISTWIDTH] IN BLOOD BY AUTOMATED COUNT: 11.8 % (ref 11.6–15.1)
EXT PREG TEST URINE: NEGATIVE
EXT. CONTROL ED NAV: NORMAL
GFR SERPL CREATININE-BSD FRML MDRD: 75 ML/MIN/1.73SQ M
GLUCOSE SERPL-MCNC: 116 MG/DL (ref 65–140)
GLUCOSE UR STRIP-MCNC: NEGATIVE MG/DL
GLUCOSE UR STRIP-MCNC: NEGATIVE MG/DL
HCT VFR BLD AUTO: 42.3 % (ref 34.8–46.1)
HGB BLD-MCNC: 14.3 G/DL (ref 11.5–15.4)
HGB UR QL STRIP.AUTO: NEGATIVE
HGB UR QL STRIP.AUTO: NEGATIVE
IMM GRANULOCYTES # BLD AUTO: 0.08 THOUSAND/UL (ref 0–0.2)
IMM GRANULOCYTES NFR BLD AUTO: 1 % (ref 0–2)
KETONES UR STRIP-MCNC: ABNORMAL MG/DL
KETONES UR STRIP-MCNC: ABNORMAL MG/DL
LEUKOCYTE ESTERASE UR QL STRIP: NEGATIVE
LEUKOCYTE ESTERASE UR QL STRIP: NEGATIVE
LIPASE SERPL-CCNC: 120 U/L (ref 73–393)
LYMPHOCYTES # BLD AUTO: 1.06 THOUSANDS/ΜL (ref 0.6–4.47)
LYMPHOCYTES NFR BLD AUTO: 7 % (ref 14–44)
MCH RBC QN AUTO: 29.1 PG (ref 26.8–34.3)
MCHC RBC AUTO-ENTMCNC: 33.8 G/DL (ref 31.4–37.4)
MCV RBC AUTO: 86 FL (ref 82–98)
MONOCYTES # BLD AUTO: 0.7 THOUSAND/ΜL (ref 0.17–1.22)
MONOCYTES NFR BLD AUTO: 5 % (ref 4–12)
NEUTROPHILS # BLD AUTO: 13.29 THOUSANDS/ΜL (ref 1.85–7.62)
NEUTS SEG NFR BLD AUTO: 87 % (ref 43–75)
NITRITE UR QL STRIP: NEGATIVE
NITRITE UR QL STRIP: NEGATIVE
NRBC BLD AUTO-RTO: 0 /100 WBCS
PH UR STRIP.AUTO: 8 [PH]
PH UR STRIP.AUTO: 8 [PH] (ref 4.5–8)
PLATELET # BLD AUTO: 244 THOUSANDS/UL (ref 149–390)
PMV BLD AUTO: 10.4 FL (ref 8.9–12.7)
POTASSIUM SERPL-SCNC: 3.9 MMOL/L (ref 3.5–5.3)
PROT SERPL-MCNC: 8.4 G/DL (ref 6.4–8.2)
PROT UR STRIP-MCNC: NEGATIVE MG/DL
PROT UR STRIP-MCNC: NEGATIVE MG/DL
RBC # BLD AUTO: 4.92 MILLION/UL (ref 3.81–5.12)
SODIUM SERPL-SCNC: 136 MMOL/L (ref 136–145)
SP GR UR STRIP.AUTO: 1.02 (ref 1–1.03)
SP GR UR STRIP.AUTO: 1.02 (ref 1–1.03)
UROBILINOGEN UR QL STRIP.AUTO: 0.2 E.U./DL
UROBILINOGEN UR QL STRIP.AUTO: 0.2 E.U./DL
WBC # BLD AUTO: 15.2 THOUSAND/UL (ref 4.31–10.16)

## 2021-01-23 PROCEDURE — G1004 CDSM NDSC: HCPCS

## 2021-01-23 PROCEDURE — 44970 LAPAROSCOPY APPENDECTOMY: CPT | Performed by: SURGERY

## 2021-01-23 PROCEDURE — 96375 TX/PRO/DX INJ NEW DRUG ADDON: CPT

## 2021-01-23 PROCEDURE — 99285 EMERGENCY DEPT VISIT HI MDM: CPT | Performed by: EMERGENCY MEDICINE

## 2021-01-23 PROCEDURE — 36415 COLL VENOUS BLD VENIPUNCTURE: CPT | Performed by: EMERGENCY MEDICINE

## 2021-01-23 PROCEDURE — 81003 URINALYSIS AUTO W/O SCOPE: CPT | Performed by: EMERGENCY MEDICINE

## 2021-01-23 PROCEDURE — 88304 TISSUE EXAM BY PATHOLOGIST: CPT | Performed by: PATHOLOGY

## 2021-01-23 PROCEDURE — 85025 COMPLETE CBC W/AUTO DIFF WBC: CPT | Performed by: EMERGENCY MEDICINE

## 2021-01-23 PROCEDURE — 96374 THER/PROPH/DIAG INJ IV PUSH: CPT

## 2021-01-23 PROCEDURE — 81003 URINALYSIS AUTO W/O SCOPE: CPT

## 2021-01-23 PROCEDURE — 83690 ASSAY OF LIPASE: CPT | Performed by: EMERGENCY MEDICINE

## 2021-01-23 PROCEDURE — 99285 EMERGENCY DEPT VISIT HI MDM: CPT

## 2021-01-23 PROCEDURE — 74177 CT ABD & PELVIS W/CONTRAST: CPT

## 2021-01-23 PROCEDURE — 81025 URINE PREGNANCY TEST: CPT | Performed by: EMERGENCY MEDICINE

## 2021-01-23 PROCEDURE — 96361 HYDRATE IV INFUSION ADD-ON: CPT

## 2021-01-23 PROCEDURE — 80053 COMPREHEN METABOLIC PANEL: CPT | Performed by: EMERGENCY MEDICINE

## 2021-01-23 PROCEDURE — 99220 PR INITIAL OBSERVATION CARE/DAY 70 MINUTES: CPT | Performed by: SURGERY

## 2021-01-23 RX ORDER — HEPARIN SODIUM 5000 [USP'U]/ML
5000 INJECTION, SOLUTION INTRAVENOUS; SUBCUTANEOUS EVERY 8 HOURS SCHEDULED
Status: DISCONTINUED | OUTPATIENT
Start: 2021-01-23 | End: 2021-01-23

## 2021-01-23 RX ORDER — ONDANSETRON 2 MG/ML
4 INJECTION INTRAMUSCULAR; INTRAVENOUS EVERY 6 HOURS PRN
Status: DISCONTINUED | OUTPATIENT
Start: 2021-01-23 | End: 2021-01-23

## 2021-01-23 RX ORDER — ONDANSETRON 2 MG/ML
4 INJECTION INTRAMUSCULAR; INTRAVENOUS ONCE AS NEEDED
Status: DISCONTINUED | OUTPATIENT
Start: 2021-01-23 | End: 2021-01-23 | Stop reason: HOSPADM

## 2021-01-23 RX ORDER — NEOSTIGMINE METHYLSULFATE 1 MG/ML
INJECTION INTRAVENOUS AS NEEDED
Status: DISCONTINUED | OUTPATIENT
Start: 2021-01-23 | End: 2021-01-23

## 2021-01-23 RX ORDER — HYDROMORPHONE HCL/PF 1 MG/ML
1 SYRINGE (ML) INJECTION ONCE
Status: COMPLETED | OUTPATIENT
Start: 2021-01-23 | End: 2021-01-23

## 2021-01-23 RX ORDER — PROPOFOL 10 MG/ML
INJECTION, EMULSION INTRAVENOUS AS NEEDED
Status: DISCONTINUED | OUTPATIENT
Start: 2021-01-23 | End: 2021-01-23

## 2021-01-23 RX ORDER — ACETAMINOPHEN 325 MG/1
650 TABLET ORAL EVERY 6 HOURS PRN
Status: DISCONTINUED | OUTPATIENT
Start: 2021-01-23 | End: 2021-01-24 | Stop reason: HOSPADM

## 2021-01-23 RX ORDER — FENTANYL CITRATE 50 UG/ML
INJECTION, SOLUTION INTRAMUSCULAR; INTRAVENOUS AS NEEDED
Status: DISCONTINUED | OUTPATIENT
Start: 2021-01-23 | End: 2021-01-23

## 2021-01-23 RX ORDER — SUCCINYLCHOLINE/SOD CL,ISO/PF 100 MG/5ML
SYRINGE (ML) INTRAVENOUS AS NEEDED
Status: DISCONTINUED | OUTPATIENT
Start: 2021-01-23 | End: 2021-01-23

## 2021-01-23 RX ORDER — GLYCOPYRROLATE 0.2 MG/ML
INJECTION INTRAMUSCULAR; INTRAVENOUS AS NEEDED
Status: DISCONTINUED | OUTPATIENT
Start: 2021-01-23 | End: 2021-01-23

## 2021-01-23 RX ORDER — ONDANSETRON 2 MG/ML
4 INJECTION INTRAMUSCULAR; INTRAVENOUS EVERY 4 HOURS PRN
Status: DISCONTINUED | OUTPATIENT
Start: 2021-01-23 | End: 2021-01-24 | Stop reason: HOSPADM

## 2021-01-23 RX ORDER — BUPIVACAINE HYDROCHLORIDE 5 MG/ML
INJECTION, SOLUTION PERINEURAL AS NEEDED
Status: DISCONTINUED | OUTPATIENT
Start: 2021-01-23 | End: 2021-01-23 | Stop reason: HOSPADM

## 2021-01-23 RX ORDER — METHOCARBAMOL 500 MG/1
500 TABLET, FILM COATED ORAL 3 TIMES DAILY
Status: DISCONTINUED | OUTPATIENT
Start: 2021-01-23 | End: 2021-01-24 | Stop reason: HOSPADM

## 2021-01-23 RX ORDER — HEPARIN SODIUM 5000 [USP'U]/ML
5000 INJECTION, SOLUTION INTRAVENOUS; SUBCUTANEOUS EVERY 8 HOURS SCHEDULED
Status: DISCONTINUED | OUTPATIENT
Start: 2021-01-23 | End: 2021-01-24 | Stop reason: HOSPADM

## 2021-01-23 RX ORDER — KETOROLAC TROMETHAMINE 30 MG/ML
15 INJECTION, SOLUTION INTRAMUSCULAR; INTRAVENOUS ONCE
Status: COMPLETED | OUTPATIENT
Start: 2021-01-23 | End: 2021-01-23

## 2021-01-23 RX ORDER — HYDROMORPHONE HCL/PF 1 MG/ML
SYRINGE (ML) INJECTION AS NEEDED
Status: DISCONTINUED | OUTPATIENT
Start: 2021-01-23 | End: 2021-01-23

## 2021-01-23 RX ORDER — MAGNESIUM HYDROXIDE 1200 MG/15ML
LIQUID ORAL AS NEEDED
Status: DISCONTINUED | OUTPATIENT
Start: 2021-01-23 | End: 2021-01-23 | Stop reason: HOSPADM

## 2021-01-23 RX ORDER — LIDOCAINE HYDROCHLORIDE 10 MG/ML
INJECTION, SOLUTION EPIDURAL; INFILTRATION; INTRACAUDAL; PERINEURAL AS NEEDED
Status: DISCONTINUED | OUTPATIENT
Start: 2021-01-23 | End: 2021-01-23

## 2021-01-23 RX ORDER — DEXAMETHASONE SODIUM PHOSPHATE 10 MG/ML
INJECTION, SOLUTION INTRAMUSCULAR; INTRAVENOUS AS NEEDED
Status: DISCONTINUED | OUTPATIENT
Start: 2021-01-23 | End: 2021-01-23

## 2021-01-23 RX ORDER — OXYCODONE HYDROCHLORIDE 5 MG/1
5 TABLET ORAL EVERY 6 HOURS PRN
Status: DISCONTINUED | OUTPATIENT
Start: 2021-01-23 | End: 2021-01-24 | Stop reason: HOSPADM

## 2021-01-23 RX ORDER — HYDROMORPHONE HCL/PF 1 MG/ML
0.2 SYRINGE (ML) INJECTION
Status: DISCONTINUED | OUTPATIENT
Start: 2021-01-23 | End: 2021-01-23 | Stop reason: HOSPADM

## 2021-01-23 RX ORDER — ONDANSETRON 2 MG/ML
INJECTION INTRAMUSCULAR; INTRAVENOUS AS NEEDED
Status: DISCONTINUED | OUTPATIENT
Start: 2021-01-23 | End: 2021-01-23

## 2021-01-23 RX ORDER — FENTANYL CITRATE/PF 50 MCG/ML
25 SYRINGE (ML) INJECTION
Status: DISCONTINUED | OUTPATIENT
Start: 2021-01-23 | End: 2021-01-23 | Stop reason: HOSPADM

## 2021-01-23 RX ORDER — MIDAZOLAM HYDROCHLORIDE 2 MG/2ML
INJECTION, SOLUTION INTRAMUSCULAR; INTRAVENOUS AS NEEDED
Status: DISCONTINUED | OUTPATIENT
Start: 2021-01-23 | End: 2021-01-23

## 2021-01-23 RX ORDER — PANTOPRAZOLE SODIUM 40 MG/1
40 TABLET, DELAYED RELEASE ORAL
Status: DISCONTINUED | OUTPATIENT
Start: 2021-01-24 | End: 2021-01-24 | Stop reason: HOSPADM

## 2021-01-23 RX ORDER — ROCURONIUM BROMIDE 10 MG/ML
INJECTION, SOLUTION INTRAVENOUS AS NEEDED
Status: DISCONTINUED | OUTPATIENT
Start: 2021-01-23 | End: 2021-01-23

## 2021-01-23 RX ORDER — SODIUM CHLORIDE, SODIUM LACTATE, POTASSIUM CHLORIDE, CALCIUM CHLORIDE 600; 310; 30; 20 MG/100ML; MG/100ML; MG/100ML; MG/100ML
125 INJECTION, SOLUTION INTRAVENOUS CONTINUOUS
Status: DISCONTINUED | OUTPATIENT
Start: 2021-01-23 | End: 2021-01-23

## 2021-01-23 RX ORDER — ONDANSETRON 2 MG/ML
4 INJECTION INTRAMUSCULAR; INTRAVENOUS ONCE
Status: COMPLETED | OUTPATIENT
Start: 2021-01-23 | End: 2021-01-23

## 2021-01-23 RX ORDER — CEFAZOLIN SODIUM 2 G/50ML
2000 SOLUTION INTRAVENOUS
Status: COMPLETED | OUTPATIENT
Start: 2021-01-23 | End: 2021-01-23

## 2021-01-23 RX ADMIN — ONDANSETRON 4 MG: 2 INJECTION INTRAMUSCULAR; INTRAVENOUS at 16:38

## 2021-01-23 RX ADMIN — LIDOCAINE HYDROCHLORIDE 50 MG: 10 INJECTION, SOLUTION EPIDURAL; INFILTRATION; INTRACAUDAL; PERINEURAL at 16:38

## 2021-01-23 RX ADMIN — ROCURONIUM BROMIDE 20 MG: 50 INJECTION, SOLUTION INTRAVENOUS at 17:14

## 2021-01-23 RX ADMIN — SODIUM CHLORIDE, SODIUM LACTATE, POTASSIUM CHLORIDE, AND CALCIUM CHLORIDE: .6; .31; .03; .02 INJECTION, SOLUTION INTRAVENOUS at 16:35

## 2021-01-23 RX ADMIN — HYDROMORPHONE HYDROCHLORIDE 1 MG: 1 INJECTION, SOLUTION INTRAMUSCULAR; INTRAVENOUS; SUBCUTANEOUS at 13:32

## 2021-01-23 RX ADMIN — ONDANSETRON 4 MG: 2 INJECTION INTRAMUSCULAR; INTRAVENOUS at 11:23

## 2021-01-23 RX ADMIN — GLYCOPYRROLATE 0.4 MG: 0.2 INJECTION, SOLUTION INTRAMUSCULAR; INTRAVENOUS at 17:56

## 2021-01-23 RX ADMIN — HEPARIN SODIUM 5000 UNITS: 5000 INJECTION INTRAVENOUS; SUBCUTANEOUS at 21:11

## 2021-01-23 RX ADMIN — NEOSTIGMINE METHYLSULFATE 4 MG: 1 INJECTION INTRAVENOUS at 17:56

## 2021-01-23 RX ADMIN — ROCURONIUM BROMIDE 30 MG: 50 INJECTION, SOLUTION INTRAVENOUS at 16:44

## 2021-01-23 RX ADMIN — METRONIDAZOLE 500 MG: 500 INJECTION, SOLUTION INTRAVENOUS at 16:43

## 2021-01-23 RX ADMIN — OXYCODONE HYDROCHLORIDE 5 MG: 5 TABLET ORAL at 19:39

## 2021-01-23 RX ADMIN — DEXAMETHASONE SODIUM PHOSPHATE 10 MG: 10 INJECTION, SOLUTION INTRAMUSCULAR; INTRAVENOUS at 16:42

## 2021-01-23 RX ADMIN — KETOROLAC TROMETHAMINE 15 MG: 30 INJECTION, SOLUTION INTRAMUSCULAR; INTRAVENOUS at 11:23

## 2021-01-23 RX ADMIN — ONDANSETRON 4 MG: 2 INJECTION INTRAMUSCULAR; INTRAVENOUS at 17:57

## 2021-01-23 RX ADMIN — METHOCARBAMOL 500 MG: 500 TABLET, FILM COATED ORAL at 21:10

## 2021-01-23 RX ADMIN — PROPOFOL 200 MG: 10 INJECTION, EMULSION INTRAVENOUS at 16:38

## 2021-01-23 RX ADMIN — SODIUM CHLORIDE, SODIUM LACTATE, POTASSIUM CHLORIDE, AND CALCIUM CHLORIDE: .6; .31; .03; .02 INJECTION, SOLUTION INTRAVENOUS at 16:24

## 2021-01-23 RX ADMIN — Medication 25 MCG: at 18:25

## 2021-01-23 RX ADMIN — IOHEXOL 100 ML: 350 INJECTION, SOLUTION INTRAVENOUS at 12:37

## 2021-01-23 RX ADMIN — ONDANSETRON 4 MG: 2 INJECTION INTRAMUSCULAR; INTRAVENOUS at 19:39

## 2021-01-23 RX ADMIN — SODIUM CHLORIDE 1000 ML: 0.9 INJECTION, SOLUTION INTRAVENOUS at 11:24

## 2021-01-23 RX ADMIN — HYDROMORPHONE HYDROCHLORIDE 0.25 MG: 1 INJECTION, SOLUTION INTRAMUSCULAR; INTRAVENOUS; SUBCUTANEOUS at 17:21

## 2021-01-23 RX ADMIN — FENTANYL CITRATE 25 MCG: 50 INJECTION INTRAMUSCULAR; INTRAVENOUS at 16:38

## 2021-01-23 RX ADMIN — HYDROMORPHONE HYDROCHLORIDE 0.25 MG: 1 INJECTION, SOLUTION INTRAMUSCULAR; INTRAVENOUS; SUBCUTANEOUS at 17:23

## 2021-01-23 RX ADMIN — Medication 100 MG: at 16:38

## 2021-01-23 RX ADMIN — CEFAZOLIN SODIUM 2000 MG: 2 SOLUTION INTRAVENOUS at 16:41

## 2021-01-23 RX ADMIN — MIDAZOLAM 2 MG: 1 INJECTION INTRAMUSCULAR; INTRAVENOUS at 16:36

## 2021-01-23 RX ADMIN — FENTANYL CITRATE 25 MCG: 50 INJECTION INTRAMUSCULAR; INTRAVENOUS at 16:48

## 2021-01-23 RX ADMIN — Medication 25 MCG: at 18:33

## 2021-01-23 RX ADMIN — FENTANYL CITRATE 50 MCG: 50 INJECTION INTRAMUSCULAR; INTRAVENOUS at 16:53

## 2021-01-23 NOTE — LETTER
[address] [address]  Dept: [phone]    January 24, 2021     Patient: Avery Torres   YOB: 1980   Date of Visit: 1/23/2021       To Whom it May Concern:    Lashanda Dupree is under my professional care  She was seen in the hospital from 1/23/2021   to 01/24/21  Her  accompanied her to the hospital on 1/23/2021, and had to pick her up for discharge on 1/24/2021  Please excuse him for his absence at work those two days  If you have any questions or concerns, please don't hesitate to call           Sincerely,          Azam Herrera, DO

## 2021-01-23 NOTE — ANESTHESIA POSTPROCEDURE EVALUATION
Post-Op Assessment Note    CV Status:  Stable  Pain Score: 0    Pain management: adequate     Mental Status:  Alert and awake   Hydration Status:  Euvolemic   PONV Controlled:  Controlled   Airway Patency:  Patent      Post Op Vitals Reviewed: Yes      Staff: CRNA   Comments: arousable, following commands and verbally responsive  Denies any discomfort at this time  RRR, Nonlabored, VSS  No complications documented      BP   140/77   Temp      Pulse 110   Resp   18   SpO2   100

## 2021-01-23 NOTE — ANESTHESIA PREPROCEDURE EVALUATION
Procedure:  APPENDECTOMY LAPAROSCOPIC (N/A Abdomen)    Relevant Problems   ANESTHESIA (within normal limits)      GI/HEPATIC  nausea this am, resolved in hospital   (+) Gastroesophageal reflux disease without esophagitis      GYN   (-) Currently pregnant      PULMONARY  acute appendicitis      Other   (+) Celiac disease      Physical Exam    Airway  Comment: Small mouth opening  Mallampati score: II  TM Distance: >3 FB  Neck ROM: full     Dental   No notable dental hx     Cardiovascular      Pulmonary      Other Findings       Lab Results   Component Value Date    WBC 15 20 (H) 01/23/2021    HGB 14 3 01/23/2021     01/23/2021     Lab Results   Component Value Date    SODIUM 136 01/23/2021    K 3 9 01/23/2021    BUN 15 01/23/2021    CREATININE 0 95 01/23/2021    EGFR 75 01/23/2021     CT A/P IMPRESSION:     Findings compatible with mild acute appendicitis without perforation or abscess      Nonspecific finding of fatty infiltration in the wall of most of the colon and a portion of the small bowel  This can be seen in patients with chronic inflammatory bowel disease although this should be correlated with patient history  Anesthesia Plan  ASA Score- 1 Emergent    Anesthesia Type- general with ASA Monitors  Additional Monitors:   Airway Plan: ETT  Plan Factors-Exercise tolerance (METS): >4 METS  Chart reviewed  Existing labs reviewed  Patient summary reviewed  Patient is not a current smoker  Induction- intravenous  Postoperative Plan-     Informed Consent- Anesthetic plan and risks discussed with patient and spouse  I personally reviewed this patient with the CRNA  Discussed and agreed on the Anesthesia Plan with the NAREN Gunter Console

## 2021-01-23 NOTE — OP NOTE
OPERATIVE REPORT  PATIENT NAME: Sherin Danielle    :  1980  MRN: [id number]  Pt Location:  OR ROOM 07    SURGERY DATE: 2021    Surgeon(s) and Role:     * Meghan Renee DO - Primary     * Galilea Vann MD - Assisting    Preop Diagnosis:  Acute appendicitis [K35 80]    Post-Op Diagnosis Codes:     * Acute appendicitis [K35 80]    Procedure(s) (LRB):  APPENDECTOMY LAPAROSCOPIC (N/A)    Specimen(s):  ID Type Source Tests Collected by Time Destination   1 : Appendix Tissue Appendix TISSUE EXAM Meghan YoanapapaDO 2021 [id number]        Estimated Blood Loss:   Minimal    Drains:  [REMOVED] Urethral Catheter Latex 16 Fr  (Removed)   Number of days: 0       Anesthesia Type:   General    Operative Indications:  Acute appendicitis [K35 80]      Operative Findings:  Acute a situs without perforation, abscess, or gangrene    Complications:   None    Procedure and Technique:  The patient was identified in the preoperative holding area, consent for procedure was reviewed  The patient was taken to the operating room and positioned supine on the operating table, and then placed under general anesthesia  The abdomen was prepped and draped in the usual sterile fashion  A time-out checklist was satisfactorily completed prior to the onset of the procedure  The abdomen was entered via an infraumbilical Coles cutdown, and a 10mm port was inserted  Pneumoperitoneum was established, and the abdomen was investigated laparoscopically  Two additional 5 mm working ports were placed respectively in the suprapubic area and the left lower quadrant under direct visualization  The appendix was identified, grasped and swept free of the surrounding omentum  A window between the appendix and the mesoappendix was created with blunt dissection using Maryland forceps  The mesoappendix was taken down using the Harmonic  The base of the appendix was ligated with Endoloops of PDS    The appendix was then divided above the ligation point with the Harmonic and placed in a specimen bag  The abdomen was inspected, and fluid was dryly  aspirated from the right lower quadrant and pelvis  Ports were removed under direct visualization, and the specimen bag was removed intact  The infraumbilical incision was closed at the level of the fascia with a figure-of-eight suture of 2-0 Vicryl  Skin at all port sites was closed using 4-0 Monocryl  Skin sites were sealed with Exofin  The patient was extubated and awakened on the operating table without immediate complication, and was transferred to the PACU in stable condition  Dr Gretchen Frances was present for the entire procedure      Patient Disposition:  PACU , hemodynamically stable and extubated and stable    SIGNATURE: Gopal Perrin MD  DATE: January 23, 2021  TIME: 6:14 PM

## 2021-01-23 NOTE — H&P
H&P Exam - General Surgery   Belinda Mendenhall 36 y o  female MRN: [id number]  Unit/Bed#: [facility] Encounter: [id number]    Assessment/Plan     Assessment:  36 F p/w RLQ abdominal pain of less than 24 hours duration; physical examination and laboratory/radiographic findings consistent with acute appendicitis  VSS, afebrile    Right lower quadrant tenderness, no peritonitis, negative Rovsing sign, positive psoas sign, negative obturator sign    WBC [id number]    CT scan showing evidence of dilated and inflamed appendix, no perforation or abscess, appendicular of present  Plan:  -NPO  -to operating room for laparoscopic appendectomy  -consent for case obtain  -antibiotics on-call to the OR, Ancef/Flagyl  -case request placed  -postoperative care to be dictated based upon operative findings    History of Present Illness   History, ROS and PFSH unobtainable from any source due to none  HPI:  Belinda Mendenhall is a 36 y o  female who presents with sharp lower right-sided abdominal pain which began at 3:00 a m  This morning  She last had a normal meal at dinner last night, and since onset of her pain is had no appetite, has had nausea, has not vomited, and has a multiple episodes of watery diarrhea  She has a past medical history significant for GERD and celiac disease, as well as a surgical history of laparoscopic tubal ligation done 6 years ago  She denies fever, chills, chest pain and shortness of breath  Her abdominal pain is modified by medication but is otherwise persistent  She denies any drug allergies, and takes Protonix and vitamins at home  Presently she complains only of abdominal pain which she localizes to the right lower quadrant  Review of Systems   Constitutional: Positive for activity change  Negative for chills and fever  Gastrointestinal: Positive for abdominal pain, diarrhea and nausea  Negative for vomiting  All other systems reviewed and are negative        Historical Information   Past Medical History:   Diagnosis Date    Abnormal Pap smear of cervix     Celiac disease     GERD without esophagitis     Heartburn     Hiatal hernia     Vitamin D deficiency      Past Surgical History:   Procedure Laterality Date    TUBAL LIGATION       Social History   Social History     Substance and Sexual Activity   Alcohol Use Yes    Frequency: 2-4 times a month    Comment: social     Social History     Substance and Sexual Activity   Drug Use No     Social History     Tobacco Use   Smoking Status Former Smoker   Smokeless Tobacco Never Used     E-Cigarette/Vaping    E-Cigarette Use Never User      E-Cigarette/Vaping Substances    Nicotine No     THC No     CBD No     Flavoring No     Other No     Unknown No      Family History:   Family History   Problem Relation Age of Onset    Arthritis Mother     Cervical cancer Mother     Fibromyalgia Mother     Mental illness Father     Stroke Father     Heart disease Father     Cervical cancer Sister     Heart disease Maternal Grandmother     No Known Problems Daughter     No Known Problems Maternal Grandfather     No Known Problems Paternal Grandmother     No Known Problems Paternal Grandfather        Meds/Allergies   PTA meds:   Prior to Admission Medications   Prescriptions Last Dose Informant Patient Reported? Taking?    Nutritional Supplements (VITAMIN D BOOSTER PO)   Yes Yes   Sig: Take by mouth   celecoxib (CeleBREX) 200 mg capsule 1/22/2021 at Unknown time  Yes Yes   cyanocobalamin (VITAMIN B-12) 500 MCG tablet   Yes Yes   Sig: Take 500 mcg by mouth daily   lidocaine (LIDODERM) 5 % Not Taking at Unknown time  No No   Sig: Apply 3 patches topically daily Remove & Discard patch within 12 hours or as directed by MD   Patient not taking: Reported on 12/8/2020   methocarbamol (ROBAXIN) 500 mg tablet   No No   Sig: Take 1 tablet (500 mg total) by mouth 3 (three) times a day for 3 days   naproxen (NAPROSYN) 500 mg tablet Not Taking at Unknown time No No   Sig: TAKE 1 TABLET BY MOUTH TWICE DAILY WITH MEALS   Patient not taking: Reported on 12/8/2020   omeprazole (PriLOSEC) 40 MG capsule   No Yes   Sig: Take 1 capsule (40 mg total) by mouth daily      Facility-Administered Medications: None     Allergies   Allergen Reactions    Gluten Meal Abdominal Pain       Objective   First Vitals:   Blood Pressure: 151/78 (01/23/21 0904)  Pulse: 67 (01/23/21 0904)  Temperature: 98 °F (36 7 °C) (01/23/21 0904)  Temp Source: Oral (01/23/21 0904)  Respirations: 18 (01/23/21 0904)  Height: 5' 4" (162 6 cm) (01/23/21 0904)  Weight - Scale: 74 8 kg (165 lb) (01/23/21 0904)  SpO2: 99 % (01/23/21 0904)    Current Vitals:   Blood Pressure: 141/80 (01/23/21 1334)  Pulse: 76 (01/23/21 1334)  Temperature: 98 °F (36 7 °C) (01/23/21 0904)  Temp Source: Oral (01/23/21 0904)  Respirations: 20 (01/23/21 1334)  Height: 5' 4" (162 6 cm) (01/23/21 1050)  Weight - Scale: 74 4 kg (164 lb) (01/23/21 1050)  SpO2: 96 % (01/23/21 1334)    No intake or output data in the 24 hours ending 01/23/21 1443    Invasive Devices     Peripheral Intravenous Line            Peripheral IV 01/23/21 Left Antecubital less than 1 day                Physical Exam  Vitals signs and nursing note reviewed  Constitutional:       General: She is not in acute distress  HENT:      Head: Normocephalic and atraumatic  Mouth/Throat:      Mouth: Mucous membranes are moist    Eyes:      General: No scleral icterus  Pupils: Pupils are equal, round, and reactive to light  Neck:      Musculoskeletal: Normal range of motion  No neck rigidity or muscular tenderness  Cardiovascular:      Rate and Rhythm: Normal rate and regular rhythm  Pulses: Normal pulses  Heart sounds: No murmur  Pulmonary:      Effort: Pulmonary effort is normal  No respiratory distress  Abdominal:      General: There is no distension  Palpations: Abdomen is soft  Tenderness:  There is abdominal tenderness in the right lower quadrant  There is no guarding or rebound  Positive signs include McBurney's sign and psoas sign  Negative signs include Rovsing's sign and obturator sign  Musculoskeletal:         General: No swelling, tenderness or deformity  Skin:     Capillary Refill: Capillary refill takes less than 2 seconds  Neurological:      General: No focal deficit present  Mental Status: She is alert and oriented to person, place, and time  Mental status is at baseline  Psychiatric:         Mood and Affect: Mood normal          Behavior: Behavior normal           Lab Results:   I have personally reviewed pertinent lab results  , CBC:   Lab Results   Component Value Date    WBC 15 20 (H) 01/23/2021    HGB 14 3 01/23/2021    HCT 42 3 01/23/2021    MCV 86 01/23/2021     01/23/2021    MCH 29 1 01/23/2021    MCHC 33 8 01/23/2021    RDW 11 8 01/23/2021    MPV 10 4 01/23/2021    NRBC 0 01/23/2021   , CMP:   Lab Results   Component Value Date    SODIUM 136 01/23/2021    K 3 9 01/23/2021     01/23/2021    CO2 24 01/23/2021    BUN 15 01/23/2021    CREATININE 0 95 01/23/2021    CALCIUM 9 1 01/23/2021    AST 15 01/23/2021    ALT 26 01/23/2021    ALKPHOS 75 01/23/2021    EGFR 75 01/23/2021     Imaging: I have personally reviewed pertinent reports  EKG, Pathology, and Other Studies: I have personally reviewed pertinent reports  and I have personally reviewed pertinent films in PACS   Ct Abdomen Pelvis With Contrast    Result Date: 1/23/2021  Impression: Findings compatible with mild acute appendicitis without perforation or abscess  Nonspecific finding of fatty infiltration in the wall of most of the colon and a portion of the small bowel  This can be seen in patients with chronic inflammatory bowel disease although this should be correlated with patient history    I personally discussed this study with Domi Oakley on 1/23/2021 at 1:09 PM  Workstation performed: [id number]       Code Status: No Order  Advance Directive and Living Will:      Power of :    POLST:

## 2021-01-23 NOTE — ED ATTENDING ATTESTATION
1/23/2021  IMomo MD, saw and evaluated the patient  I have discussed the patient with the resident/non-physician practitioner and agree with the resident's/non-physician practitioner's findings, Plan of Care, and MDM as documented in the resident's/non-physician practitioner's note, except where noted  All available labs and Radiology studies were reviewed  I was present for key portions of any procedure(s) performed by the resident/non-physician practitioner and I was immediately available to provide assistance  At this point I agree with the current assessment done in the Emergency Department  I have conducted an independent evaluation of this patient a history and physical is as follows:    ED Course         Critical Care Time  Procedures    35 yo female with hx of hpv, celiac, c/o abdominal pain started at 3 am, periumbilical, migrating to rlq  No fever, no n/v  No urinary symptoms  Vss, afebrile, lungs cta, rrr, abdomen soft tender rlq, no rebound, no guarding    Labs, ct a/p, urine preg, pain meds,

## 2021-01-23 NOTE — ED PROVIDER NOTES
History  Chief Complaint   Patient presents with    Abdominal Pain     PT "I think I have appendecitits; my whole belly is bloated and I have pain for the last 7 hours " Pt c/o diarrhea and nausea  Pt denies CP and SOB     Patient is a 49-year-old female with past medical history of celiac disease and GERD who presents for evaluation of abdominal pain  Patient states that abdominal pain started suddenly at 3:00 this morning  Pain started around the umbilicus, then developed in the right lower quadrant  Patient has associated nausea and diarrhea, no vomiting  Diarrhea is nonbloody  Also endorses mild abdominal bloating  The patient denies any recent illness, denies any fevers  Patient does endorse chills currently  Patient states only abdominal surgery was a tubal ligation  Patient denies any dysuria or hematuria  Denies any back pain other than her chronic pain  Patient did state that she has had ovarian cysts in the past, states that this pain is different  Denies any chest pain, cough, or shortness of breath this time  History provided by:  Patient   used: No    Abdominal Pain  Pain location:  Periumbilical and RLQ  Pain severity:  Moderate  Onset quality:  Sudden  Duration:  8 hours  Timing:  Constant  Progression:  Worsening  Chronicity:  New  Context: not recent illness    Associated symptoms: anorexia, chills, diarrhea and nausea    Associated symptoms: no chest pain, no cough, no dysuria, no fever, no hematemesis, no hematuria, no shortness of breath and no vomiting        Prior to Admission Medications   Prescriptions Last Dose Informant Patient Reported? Taking?    Nutritional Supplements (VITAMIN D BOOSTER PO)   Yes Yes   Sig: Take by mouth   celecoxib (CeleBREX) 200 mg capsule 1/22/2021 at Unknown time  Yes Yes   cyanocobalamin (VITAMIN B-12) 500 MCG tablet   Yes Yes   Sig: Take 500 mcg by mouth daily   lidocaine (LIDODERM) 5 % Not Taking at Unknown time  No No   Sig: Apply 3 patches topically daily Remove & Discard patch within 12 hours or as directed by MD   Patient not taking: Reported on 12/8/2020   methocarbamol (ROBAXIN) 500 mg tablet   No No   Sig: Take 1 tablet (500 mg total) by mouth 3 (three) times a day for 3 days   naproxen (NAPROSYN) 500 mg tablet Not Taking at Unknown time  No No   Sig: TAKE 1 TABLET BY MOUTH TWICE DAILY WITH MEALS   Patient not taking: Reported on 12/8/2020   omeprazole (PriLOSEC) 40 MG capsule   No Yes   Sig: Take 1 capsule (40 mg total) by mouth daily      Facility-Administered Medications: None       Past Medical History:   Diagnosis Date    Abnormal Pap smear of cervix     Celiac disease     GERD without esophagitis     Heartburn     Hiatal hernia     Vitamin D deficiency        Past Surgical History:   Procedure Laterality Date    TUBAL LIGATION         Family History   Problem Relation Age of Onset    Arthritis Mother     Cervical cancer Mother     Fibromyalgia Mother     Mental illness Father     Stroke Father     Heart disease Father     Cervical cancer Sister     Heart disease Maternal Grandmother     No Known Problems Daughter     No Known Problems Maternal Grandfather     No Known Problems Paternal Grandmother     No Known Problems Paternal Grandfather      I have reviewed and agree with the history as documented  E-Cigarette/Vaping    E-Cigarette Use Never User      E-Cigarette/Vaping Substances    Nicotine No     THC No     CBD No     Flavoring No     Other No     Unknown No      Social History     Tobacco Use    Smoking status: Former Smoker    Smokeless tobacco: Never Used   Substance Use Topics    Alcohol use: Yes     Frequency: 2-4 times a month     Comment: social    Drug use: No        Review of Systems   Constitutional: Positive for chills  Negative for fever  HENT: Negative for congestion  Eyes: Negative for visual disturbance  Respiratory: Negative for cough and shortness of breath  Cardiovascular: Negative for chest pain  Gastrointestinal: Positive for abdominal distention, abdominal pain, anorexia, diarrhea and nausea  Negative for blood in stool, hematemesis and vomiting  Genitourinary: Negative for dysuria and hematuria  Musculoskeletal: Positive for back pain (chronic)  Skin: Negative for wound  Neurological: Negative for dizziness  All other systems reviewed and are negative  Physical Exam  ED Triage Vitals [01/23/21 0904]   Temperature Pulse Respirations Blood Pressure SpO2   98 °F (36 7 °C) 67 18 151/78 99 %      Temp Source Heart Rate Source Patient Position - Orthostatic VS BP Location FiO2 (%)   Oral Monitor Sitting Left arm --      Pain Score       8             Orthostatic Vital Signs  Vitals:    01/23/21 0904 01/23/21 1050 01/23/21 1334   BP: 151/78 145/70 141/80   Pulse: 67 84 76   Patient Position - Orthostatic VS: Sitting Sitting Lying       Physical Exam  Vitals signs and nursing note reviewed  Constitutional:       Comments: Patient appears uncomfortable, unable to lift her own legs onto the bed  Eyes:      General: Lids are normal  Vision grossly intact  Cardiovascular:      Rate and Rhythm: Normal rate and regular rhythm  Heart sounds: S1 normal and S2 normal    Pulmonary:      Effort: Pulmonary effort is normal  No respiratory distress  Breath sounds: Normal breath sounds  No wheezing, rhonchi or rales  Abdominal:      Palpations: Abdomen is soft  Tenderness: There is abdominal tenderness in the right lower quadrant and periumbilical area  There is no rebound  Positive signs include McBurney's sign  Negative signs include Rovsing's sign  Comments: Patient unable to swing her legs onto the bed because of reproduction of abdominal pain  Patient has reproduction of pain when bed was being elevated  Skin:     General: Skin is warm and dry  Neurological:      General: No focal deficit present        Mental Status: She is alert and oriented to person, place, and time           ED Medications  Medications   lactated ringers infusion (has no administration in time range)   ondansetron (ZOFRAN) injection 4 mg (has no administration in time range)   heparin (porcine) subcutaneous injection 5,000 Units (has no administration in time range)   ceFAZolin (ANCEF) IVPB (premix in dextrose) 2,000 mg 50 mL (has no administration in time range)   metroNIDAZOLE (FLAGYL) IVPB (premix) 500 mg 100 mL (has no administration in time range)   ketorolac (TORADOL) injection 15 mg (15 mg Intravenous Given 1/23/21 1123)   sodium chloride 0 9 % bolus 1,000 mL (0 mL Intravenous Stopped 1/23/21 1450)   ondansetron (ZOFRAN) injection 4 mg (4 mg Intravenous Given 1/23/21 1123)   iohexol (OMNIPAQUE) 350 MG/ML injection (MULTI-DOSE) 100 mL (100 mL Intravenous Given 1/23/21 1237)   HYDROmorphone (DILAUDID) injection 1 mg (1 mg Intravenous Given 1/23/21 1332)       Diagnostic Studies  Results Reviewed     Procedure Component Value Units Date/Time    Platelet count [[id number]]     Lab Status: No result Specimen: Blood     UA w Reflex to Microscopic w Reflex to Culture [[id number]]  (Abnormal) Collected: 01/23/21 1148    Lab Status: Final result Specimen: Urine, Clean Catch Updated: 01/23/21 1214     Color, UA Yellow     Clarity, UA Clear     Specific Gravity, UA 1 023     pH, UA 8 0     Leukocytes, UA Negative     Nitrite, UA Negative     Protein, UA Negative mg/dl      Glucose, UA Negative mg/dl      Ketones, UA 40 (2+) mg/dl      Urobilinogen, UA 0 2 E U /dl      Bilirubin, UA Negative     Blood, UA Negative    Lipase [[id number]]  (Normal) Collected: 01/23/21 1100    Lab Status: Final result Specimen: Blood from Arm, Left Updated: 01/23/21 1147     Lipase 120 u/L     Comprehensive metabolic panel [[id number]]  (Abnormal) Collected: 01/23/21 1100    Lab Status: Final result Specimen: Blood from Arm, Left Updated: 01/23/21 1147     Sodium 136 mmol/L      Potassium 3 9 mmol/L Chloride 105 mmol/L      CO2 24 mmol/L      ANION GAP 7 mmol/L      BUN 15 mg/dL      Creatinine 0 95 mg/dL      Glucose 116 mg/dL      Calcium 9 1 mg/dL      AST 15 U/L      ALT 26 U/L      Alkaline Phosphatase 75 U/L      Total Protein 8 4 g/dL      Albumin 4 1 g/dL      Total Bilirubin 0 69 mg/dL      eGFR 75 ml/min/1 73sq m     Narrative:      National Kid[employer] Disease Foundation guidelines for Chronic Kidney Disease (CKD):     Stage 1 with normal or high GFR (GFR > 90 mL/min/1 73 square meters)    Stage 2 Mild CKD (GFR = 60-89 mL/min/1 73 square meters)    Stage 3A Moderate CKD (GFR = 45-59 mL/min/1 73 square meters)    Stage 3B Moderate CKD (GFR = 30-44 mL/min/1 73 square meters)    Stage 4 Severe CKD (GFR = 15-29 mL/min/1 73 square meters)    Stage 5 End Stage CKD (GFR <15 mL/min/1 73 square meters)  Note: GFR calculation is accurate only with a steady state creatinine    POCT pregnancy, urine [[id number]]  (Normal) Resulted: 01/23/21 1145    Lab Status: Final result Specimen: Urine Updated: 01/23/21 1145     EXT PREG TEST UR (Ref: Negative) negative     Control valid    Urine Macroscopic, POC [[id number]]  (Abnormal) Collected: 01/23/21 1143    Lab Status: Final result Specimen: Urine Updated: 01/23/21 1145     Color, UA Yellow     Clarity, UA Clear     pH, UA 8 0     Leukocytes, UA Negative     Nitrite, UA Negative     Protein, UA Negative mg/dl      Glucose, UA Negative mg/dl      Ketones, UA 40 (2+) mg/dl      Urobilinogen, UA 0 2 E U /dl      Bilirubin, UA Negative     Blood, UA Negative     Specific Gravity, UA 1 020    Narrative:      CLINITEK RESULT    CBC and differential [[id number]]  (Abnormal) Collected: 01/23/21 1100    Lab Status: Final result Specimen: Blood from Arm, Left Updated: 01/23/21 1109     WBC 15 20 Thousand/uL      RBC 4 92 Million/uL      Hemoglobin 14 3 g/dL      Hematocrit 42 3 %      MCV 86 fL      MCH 29 1 pg      MCHC 33 8 g/dL      RDW 11 8 %      MPV 10 4 fL Platelets 914 Thousands/uL      nRBC 0 /100 WBCs      Neutrophils Relative 87 %      Immat GRANS % 1 %      Lymphocytes Relative 7 %      Monocytes Relative 5 %      Eosinophils Relative 0 %      Basophils Relative 0 %      Neutrophils Absolute 13 29 Thousands/µL      Immature Grans Absolute 0 08 Thousand/uL      Lymphocytes Absolute 1 06 Thousands/µL      Monocytes Absolute 0 70 Thousand/µL      Eosinophils Absolute 0 03 Thousand/µL      Basophils Absolute 0 04 Thousands/µL                  CT abdomen pelvis with contrast   Final Result by Julia Wahl MD (01/23 1309)      Findings compatible with mild acute appendicitis without perforation or abscess  Nonspecific finding of fatty infiltration in the wall of most of the colon and a portion of the small bowel  This can be seen in patients with chronic inflammatory bowel disease although this should be correlated with patient history  I personally discussed this study with Lamar Ba on 1/23/2021 at 1:09 PM                      Workstation performed: [id number]               Procedures  Procedures      ED Course  ED Course as of Jan 23 1454   Sat Jan 23, 2021   1111 WBC(!): 15 20                                       MDM  Number of Diagnoses or Management Options  Acute appendicitis: new and requires workup  Diagnosis management comments: 49-year-old female presents for sudden onset abdominal pain that began approximately 8 hours ago  On exam, patient is currently afebrile, but does appear uncomfortable  Patient was unable to swing her legs into the bed, and did have pain when the bed was being raised  Patient has reproducible abdominal tenderness in the periumbilical region and right lower quadrant over McBurney's point  No rebound tenderness elicited  Will evaluate patient with abdominal labs, UA, and CT abdomen pelvis to rule out appendicitis  Patient given toradol for pain relief      Patient's labs reveal a [place] blood [place] [id number], otherwise within normal limits  CT scan revealed acute appendicitis without perforation or abscess  Patient asking for medication for breakthrough pain, given dilaudid  Spoke with General surgery, who agreed to evaluate the patient  Patient was admitted to general surgery service for treatment of appendicitis  Patient admitted in stable condition  Amount and/or Complexity of Data Reviewed  Clinical lab tests: ordered and reviewed  Tests in the radiology section of CPT®: ordered and reviewed  Discuss the patient with other providers: yes    Patient Progress  Patient progress: stable      Disposition  Final diagnoses:   Acute appendicitis     Time reflects when diagnosis was documented in both MDM as applicable and the Disposition within this note     Time User Action Codes Description Comment    1/23/2021  1:32 PM Joselin Stringer Add [K35 80] Acute appendicitis       ED Disposition     ED Disposition Condition Date/Time Comment    Admit Stable Sat Jan 23, 2021  2:52 PM Case was discussed with General Surgery and the patient's admission status was agreed to be Admission Status: observation status to the service of Dr Shakira Medley   Follow-up Information    None         Patient's Medications   Discharge Prescriptions    No medications on file     No discharge procedures on file  PDMP Review     None           ED Provider  Attending physically available and evaluated Milans Ángel RILEY managed the patient along with the ED Attending      Electronically Signed by         Kanchan Taylor DO  01/23/21 7461

## 2021-01-23 NOTE — Clinical Note
Case was discussed with General Surgery and the patient's admission status was agreed to be Admission Status: observation status to the service of Dr Lotus Najjar

## 2021-01-24 VITALS
RESPIRATION RATE: 16 BRPM | OXYGEN SATURATION: 95 % | HEIGHT: 64 IN | DIASTOLIC BLOOD PRESSURE: 62 MMHG | HEART RATE: 77 BPM | WEIGHT: 164 LBS | BODY MASS INDEX: 28 KG/M2 | SYSTOLIC BLOOD PRESSURE: 115 MMHG | TEMPERATURE: 98.4 F

## 2021-01-24 PROCEDURE — NC001 PR NO CHARGE: Performed by: SURGERY

## 2021-01-24 RX ADMIN — OXYCODONE HYDROCHLORIDE 5 MG: 5 TABLET ORAL at 04:58

## 2021-01-24 RX ADMIN — METHOCARBAMOL 500 MG: 500 TABLET, FILM COATED ORAL at 08:32

## 2021-01-24 RX ADMIN — PANTOPRAZOLE SODIUM 40 MG: 40 TABLET, DELAYED RELEASE ORAL at 05:00

## 2021-01-24 RX ADMIN — ACETAMINOPHEN 650 MG: 325 TABLET, FILM COATED ORAL at 08:36

## 2021-01-24 RX ADMIN — HEPARIN SODIUM 5000 UNITS: 5000 INJECTION INTRAVENOUS; SUBCUTANEOUS at 05:00

## 2021-01-24 NOTE — NURSING NOTE
Pt discharged home as independent with mobility  Discharge instructions reviewed and given to the patient  No new medications  Pt continent of bowel and bladder  Aware of how to take care of her incisions  No questions or concerns at this time

## 2021-01-24 NOTE — QUICK NOTE
Postoperative check  Patient seen following laparoscopic appendectomy for acute non perforated appendicitis  She reports intermittent right lower quadrant abdominal pain varying by position and movement, improved with medication  She reports intermittent nausea, but has taken some clear liquids and solid food orally without vomiting  She continues to feel tired as she recovers from anesthesia, has not yet been out of bed, but does endorse the urge to get up and urinate      VSS, afebrile, hemodynamically normal, 97% saturation on room air    Exam  General:  No acute distress  HEENT:  Normocephalic, atraumatic  Cardiovascular:  Regular rate and rhythm, no murmurs  Respiratory:  No distress, nonlabored respirations, comfortable on room air  Abdomen:  Soft, nondistended, appropriate right lower quadrant and incisional tenderness, incisions C/D/I  Extremities:  No deformity, clubbing, cyanosis, or edema  Neuro:  AAO x3, moving all extremities  Skin:  Warm, dry    Assessment  51-year-old female recovering from laparoscopic appendectomy for acute non perforated appendicitis    Plan  -continue regular diet  -antibiotics discontinued after case  -pain control  -nausea control  -ambulation out of bed as tolerated, pulmonary toilet  -DVT prophylaxis  -intake and output recordings  -plan for discharge to home 1/24 if progressing appropriately

## 2021-01-24 NOTE — NURSING NOTE
Pt began c/o of a sharp shooting pain to her right lower abdomen, stating it felt different from before  All VS are WNL, pt is on 2 L O2 NC as ordered  Contacted her attending, then [facility] surgery with pt's complaints  [facility] verbalized he believes it is some residual pain from her operation, and to offer PRN oxy for now  He also stated he examined the pt just 20 mins prior and found her abdomen to be soft with no concerns or abnormal findings  Gave the okay to offer ice packs to area as needed  Will continue to monitor and f/u as necessary  Update: After 5mg of oxy given, pt reported the sharp pain subsided and reported a less severe tender type of pain  Ice pack as also given to pt

## 2021-01-24 NOTE — PROGRESS NOTES
Progress Note - General Surgery   Renelda Claude 36 y o  female MRN: [id number]  Unit/Bed#: -01 Encounter: [id number]    Assessment:  36 F now Day #1 post laparoscopic appendectomy for acute non-perforated appendicitis  VSS, afebrile, comfortable on room air    1 7L UOP    Abdomen is soft, nondistended, minimal right lower quadrant tenderness to palpation    Incisions C/D/I     Plan:  -continue regular diet  -ambulation out of bed  -pain control  -DVT prophylaxis  -home Protonix resume  -plan for discharge to home today with appropriate clinical progress    Subjective/Objective     Subjective: The patient reports intermittent right lower quadrant pain which varies with movement position, but is well controlled by medication  She reports having an appetite this morning though she was tired after her surgery and did not eat much overnight  She denies nausea and vomiting, is ambulating well, and is urinating  Objective:    Blood pressure 109/62, pulse 78, temperature 98 3 °F (36 8 °C), temperature source Oral, resp  rate 18, height 5' 4" (1 626 m), weight 74 4 kg (164 lb), SpO2 98 %, not currently breastfeeding  ,Body mass index is 28 15 kg/m²  Intake/Output Summary (Last 24 hours) at 1/24/2021 0517  Last data filed at 1/24/2021 0301  Gross per 24 hour   Intake 1200 ml   Output 1708 ml   Net -508 ml       Invasive Devices     Peripheral Intravenous Line            Peripheral IV 01/23/21 Left Antecubital less than 1 day                Physical Exam:   General:  No acute distress  HEENT:  Normocephalic, atraumatic  Cardiovascular:  Regular rate and rhythm  Respiratory:  Nondistressed, nonlabored respirations  Abdomen:  Soft, nondistended, appropriate right lower quadrant tenderness, incisions C/D/I  Extremities:  No clubbing, cyanosis, or deformity  Neuro:  AAO x3, moving all extremities  Skin:  Warm, dry    Lab, Imaging and other studies:  No labs this a m    VTE Pharmacologic Prophylaxis: Heparin  VTE Mechanical Prophylaxis: sequential compression device

## 2021-01-24 NOTE — DISCHARGE INSTRUCTIONS
Acute Care Surgery Discharge Instructions    Please follow-up as instructed  Schedule a follow up visit in the office in 2 weeks    Activity:  - No lifting greater than 20 pounds or strenuous physical activity or exercise for at least 2 weeks  - Walking and normal light activities are encouraged  - Normal daily activities including climbing steps are okay  - No driving until no longer using pain medications  Diet:    - You may resume your normal diet  Wound Care:  - Your incisions were closed with absorbable suture and covered with a special surgical glue  - Do NOT pick or peel the glue  It will come off on its own when the incision under it has healed in 1-2 weeks  - You may shower and let soapy water run over your incisions, then gently dab dry  Do NOT scrub  No tub baths or swimming until cleared by your surgeon   - Wash incision gently with soap and water and pat dry  - Do not apply any creams or ointments unless instructed to do so by your surgeon   - Georafael Garzaangela may apply ice as needed (no longer than 20 minutes an hour) for the first 48 hours  - Bruising is not unusual and will go away with a little time  You may apply a warm, moist compress that may help the bruising resolve quicker  - You may remove the dressings the day after surgery (unless otherwise instructed)  Leave any skin tapes (steri-strips) on the incision(s) in place until they fall off on their own  Any new dressings are optional     Medications:    - You should continue your current medication regimen after discharge unless otherwise instructed  Please refer to your discharge medication list for further details  - Please take the pain medications as directed  - You may become constipated, especially if taking pain medications  You may take any over the counter stool softeners or laxatives as needed  Examples: Milk of Magnesia, Colace, Senna      Additional Instructions:  - If you have any questions or concerns after discharge please call the office   - Call office or return to ER if fever greater than 101, chills, persistent nausea/vomiting, worsening/uncontrollable pain, and/or increasing redness or purulent/foul smelling drainage from incision(s)

## 2021-01-24 NOTE — PLAN OF CARE
Problem: PAIN - ADULT  Goal: Verbalizes/displays adequate comfort level or baseline comfort level  Description: Interventions:  - Encourage patient to monitor pain and request assistance  - Assess pain using appropriate pain scale  - Administer analgesics based on type and severity of pain and evaluate response  - Implement non-pharmacological measures as appropriate and evaluate response  - Consider cultural and social influences on pain and pain management  - Notify physician/advanced practitioner if interventions unsuccessful or patient reports new pain  Outcome: Progressing     Problem: INFECTION - ADULT  Goal: Absence or prevention of progression during hospitalization  Description: INTERVENTIONS:  - Assess and monitor for signs and symptoms of infection  - Monitor lab/diagnostic results  - Monitor all insertion sites, i e  indwelling lines, tubes, and drains  - Monitor endotracheal if appropriate and nasal secretions for changes in amount and color  - [place] appropriate cooling/warming therapies per order  - Administer medications as ordered  - Instruct and encourage patient and family to use good hand hygiene technique  - Identify and instruct in appropriate isolation precautions for identified infection/condition  Outcome: Progressing     Problem: SAFETY ADULT  Goal: Patient will remain free of falls  Description: INTERVENTIONS:  - Assess patient frequently for physical needs  -  Identify cognitive and physical deficits and behaviors that affect risk of falls    -  [place] fall precautions as indicated by assessment   - Educate patient/family on patient safety including physical limitations  - Instruct patient to call for assistance with activity based on assessment  - Modify environment to reduce risk of injury  - Consider OT/PT consult to assist with strengthening/mobility  Outcome: Progressing  Goal: Maintain or return to baseline ADL function  Description: INTERVENTIONS:  -  Assess patient's ability to carry out ADLs; assess patient's baseline for ADL function and identify physical deficits which impact ability to perform ADLs (bathing, care of mouth/teeth, toileting, grooming, dressing, etc )  - Assess/evaluate cause of self-care deficits   - Assess range of motion  - Assess patient's mobility; develop plan if impaired  - Assess patient's need for assistive devices and provide as appropriate  - Encourage maximum independence but intervene and supervise when necessary  - Involve family in performance of ADLs  - Assess for home care needs following discharge   - Consider OT consult to assist with ADL evaluation and planning for discharge  - Provide patient education as appropriate  Outcome: Progressing  Goal: Maintain or return mobility status to optimal level  Description: INTERVENTIONS:  - Assess patient's baseline mobility status (ambulation, transfers, stairs, etc )    - Identify cognitive and physical deficits and behaviors that affect mobility  - Identify mobility aids required to assist with transfers and/or ambulation (gait belt, sit-to-stand, lift, walker, cane, etc )  - [place] fall precautions as indicated by assessment  - Record patient progress and toleration of activity level on Mobility SBAR; progress patient to next Phase/Stage  - Instruct patient to call for assistance with activity based on assessment  - Consider rehabilitation consult to assist with strengthening/weightbearing, etc   Outcome: Progressing     Problem: DISCHARGE PLANNING  Goal: Discharge to home or other facility with appropriate resources  Description: INTERVENTIONS:  - Identify barriers to discharge w/patient and caregiver  - Arrange for needed discharge resources and transportation as appropriate  - Identify discharge learning needs (meds, wound care, etc )  - Arrange for interpretive services to assist at discharge as needed  - Refer to Case Management Department for coordinating discharge planning if the patient needs post-hospital services based on physician/advanced practitioner order or complex needs related to functional status, cognitive ability, or social support system  Outcome: Progressing     Problem: Knowledge Deficit  Goal: Patient/family/caregiver demonstrates understanding of disease process, treatment plan, medications, and discharge instructions  Description: Complete learning assessment and assess knowledge base    Interventions:  - Provide teaching at level of understanding  - Provide teaching via preferred learning methods  Outcome: Progressing

## 2021-01-24 NOTE — SOCIAL WORK
Spoke with patient in regards to her d/c plan  Patient lives with her  who can help with care and transportation as needed  Patient is independent prior to admission and expects no needs when d/c s/p appendectomy  Patient is aware that she is observation   Form signed  Patient  Has no DME and patient works and drives  Patient's  to provide transportation to home    Cm to follow

## 2021-01-24 NOTE — DISCHARGE SUMMARY
Discharge Summary    Dank Smith 36 y o  female MRN: [id number]    Unit/Bed#: -01 Encounter: [id number]    Admission Date: 1/23/2021     Discharge Date:1/24/2021/1/24/2021    Attending: Dr Chay Vogt    Consultants: none    Admitting Diagnosis: Abdominal pain [R10 9]  Acute appendicitis [K35 80]    Principle Diagnosis: acute appendicits    Secondary Diagnosis:  Past Medical History:   Diagnosis Date    Abnormal Pap smear of cervix     Celiac disease     GERD without esophagitis     Heartburn     Hiatal hernia     Vitamin D deficiency      Past Surgical History:   Procedure Laterality Date    TUBAL LIGATION          Procedures Performed: Procedure(s):  APPENDECTOMY LAPAROSCOPIC    Imaging:Procedure: Ct Abdomen Pelvis With Contrast    Result Date: 1/23/2021  Narrative: CT ABDOMEN AND PELVIS WITH IV CONTRAST INDICATION:   RLQ abdominal pain, appendicitis suspected (Age => 14y) RLQ pain, r/o appendicitis  COMPARISON:  None  TECHNIQUE:  CT examination of the abdomen and pelvis was performed  Axial, sagittal, and coronal 2D reformatted images were created from the source data and submitted for interpretation  Radiation dose length product (DLP) for this visit:  418 26 mGy-cm   This examination, like all CT scans performed in the [facility], was performed utilizing techniques to minimize radiation dose exposure, including the use of iterative  reconstruction and automated exposure control  IV Contrast:  100 mL of iohexol (OMNIPAQUE) Enteric Contrast:  Enteric contrast was not administered  FINDINGS: ABDOMEN LOWER CHEST:  No clinically significant abnormality identified in the visualized lower chest  LIVER/BILIARY TREE:  Unremarkable  GALLBLADDER:  No calcified gallstones  No pericholecystic inflammatory change  SPLEEN:  Unremarkable  PANCREAS:  Unremarkable  ADRENAL GLANDS:  Unremarkable  KIDNEYS/URETERS:  Unremarkable  No hydronephrosis   STOMACH AND BOWEL:  There is no bowel obstruction or perforation  There does not seem to be any significant bowel wall thickening  There appears to be some fatty infiltration in the wall of the colon, diffusely, and to a lesser extent in some of the small bowel  This is of uncertain significance  APPENDIX:  There are findings suggestive of mild acute appendicitis  There is an appendicolith seen at the appendiceal orifice most evident on coronal image 44 series 601 and another area of probable appendicolith in the lumen hardware along the appendix on image 46 series 601  The appendix is minimally dilated and fluid-filled beyond this point and there is slight periappendiceal fat stranding  Appendiceal diameter is maximum of 1 cm  There is no abscess or perforation  ABDOMINOPELVIC CAVITY:  No ascites  No pneumoperitoneum  No lymphadenopathy  VESSELS:  Unremarkable for patient's age  PELVIS REPRODUCTIVE ORGANS:  Uterus appears somewhat bulky although no discrete mass lesions are seen  URINARY BLADDER:  Unremarkable  ABDOMINAL WALL/INGUINAL REGIONS:  Unremarkable  OSSEOUS STRUCTURES:  Sclerosis at the superior aspect of T12 vertebral body is probably related to some chronic disc disease  No fractures or destructive bone lesions are seen  Impression: Findings compatible with mild acute appendicitis without perforation or abscess  Nonspecific finding of fatty infiltration in the wall of most of the colon and a portion of the small bowel  This can be seen in patients with chronic inflammatory bowel disease although this should be correlated with patient history  I personally discussed this study with Trip Vazquez on 1/23/2021 at 1:09 PM  Workstation performed: [id number]       Discharge Medications:  See after visit summary for reconciled discharge medications provided to patient and family  Brief HPI (per H/P): Rafat Gibson is a 36 y o  female who presents with sharp lower right-sided abdominal pain which began at 3:00 a m   This morning  She last had a normal meal at dinner last night, and since onset of her pain is had no appetite, has had nausea, has not vomited, and has a multiple episodes of watery diarrhea  She has a past medical history significant for GERD and celiac disease, as well as a surgical history of laparoscopic tubal ligation done 6 years ago  She denies fever, chills, chest pain and shortness of breath  Her abdominal pain is modified by medication but is otherwise persistent  She denies any drug allergies, and takes Protonix and vitamins at home  Presently she complains only of abdominal pain which she localizes to the right lower quadrant  Hospital Course: Angus Louis is a 36 y o  female who presented 1/23/2021 for above procedure  Intraoperative findings included the following from operative reportAcute appendicitis without perforation, abscess, or gangrene     The patient hospital course was uncomplicated  Patient did well and was discharged on HD/POD1    Patient was discharged on HD1/POD1  On the day of discharge, the patient was voiding spontaneously, ambulating at baseline, and pain was well controlled  She understood all instructions for discharge  She was also given the names and numbers of the providers as well as instructions for follow up appointments  Condition at Discharge: good     Provisions for Follow-Up Care:  See after visit summary for information related to follow-up care and any pertinent home health orders  Disposition: See After Visit Summary for discharge disposition information  Discharge instructions/Information to patient and family:   See after visit summary for information provided to patient and family  Planned Readmission: No    Discharge Statement   I spent 30 minutes discharging the patient  This time was spent on the day of discharge  I had direct contact with the patient on the day of discharge   Additional documentation is required if more than 30 minutes were spent on discharge

## 2021-01-26 ENCOUNTER — TRANSITIONAL CARE MANAGEMENT (OUTPATIENT)
Dept: FAMILY MEDICINE CLINIC | Facility: CLINIC | Age: 41
End: 2021-01-26

## 2021-02-03 NOTE — TELEPHONE ENCOUNTER
Per request from Mega Hernandez, I called pt to relay results of her Pap Smear  Explained to pt that it's recommeded that she get a colposcopy  Pt has had one before and does not need the procedure explained to her  I advised pt to come to her appt with a full bladder so we can do pregnancy test on her, and to take OTC tylenol or ibuprofen prior to the procedure  Pt is aware that she cannot be on her period when she gets her colpo done  Pt will call to reschedule if she is on her period  Scheduled pt for 6/21/18  Schedule at: [facility]  Surgeon: Misa Monroy MD  Co-Surgeon: No  Procedure left L3-4 minimally invasive microdiscectomy  Diagnosis: lumbar herniated disc, left leg weakness  Admit type: Day Surgery  Bed Type: Med/Surg  Blood: Type/Screen PRBC 0 Units  Films/PACS: MRI  Current Imaging in PACS  Yes  Additional Imaging Prior To Surgery: No  Previous Instrumentation No  Surgical Asst:  No  Anesthesia type:  General  Estimated length of case:  60 min  Additional imaging needed prior to surgery No  Equipment:Spine equipment: Microscope, C-Arm, Maulik Table and prone position  Allergies:   ALLERGIES:   Allergen Reactions   • Penicillins HIVES and RASH      Patient Considerations:NA  Preop Clearance by PCP Jesus Davis MD   Additional Instructions: Not Applicable

## 2021-02-10 ENCOUNTER — OFFICE VISIT (OUTPATIENT)
Dept: SURGERY | Facility: CLINIC | Age: 41
End: 2021-02-10

## 2021-02-10 VITALS — WEIGHT: 169 LBS | HEIGHT: 64 IN | BODY MASS INDEX: 28.85 KG/M2 | TEMPERATURE: 97.8 F

## 2021-02-10 DIAGNOSIS — Z90.49 S/P LAPAROSCOPIC APPENDECTOMY: Primary | ICD-10-CM

## 2021-02-10 PROBLEM — K35.80 ACUTE APPENDICITIS: Status: RESOLVED | Noted: 2021-01-23 | Resolved: 2021-02-10

## 2021-02-10 PROCEDURE — 99024 POSTOP FOLLOW-UP VISIT: CPT | Performed by: SURGERY

## 2021-02-10 NOTE — PROGRESS NOTES
Office Visit - General Surgery  Jeffrey Wolfe MRN: [id number]  Encounter: [id number]    Assessment and Plan    Problem List Items Addressed This Visit        Other    S/P laparoscopic appendectomy - Primary     Doing well  Allow activity as tolerated  Discussed benign path report  See back if needed  Chief Complaint:  Jeffrey Wolfe is a 36 y o  female who presents for Post-op (p/o appendectomy  Appetite, bowel and bladder normal)    Subjective  36year old female s/p lap appendectomy  Doing well with no major complaints or problems at this time    Past Medical History  Past Medical History:   Diagnosis Date    Abnormal Pap smear of cervix     Celiac disease     GERD without esophagitis     Heartburn     Hiatal hernia     Vitamin D deficiency        Past Surgical History  Past Surgical History:   Procedure Laterality Date    ME LAP,APPENDECTOMY N/A 1/23/2021    Procedure: APPENDECTOMY LAPAROSCOPIC;  Surgeon: David Cooper DO;  Location: [facility];  Service: General    TUBAL LIGATION         Family History  Family History   Problem Relation Age of Onset    Arthritis Mother     Cervical cancer Mother     Fibromyalgia Mother     Mental illness Father     Stroke Father     Heart disease Father     Cervical cancer Sister     Heart disease Maternal Grandmother     No Known Problems Daughter     No Known Problems Maternal Grandfather     No Known Problems Paternal Grandmother     No Known Problems Paternal Grandfather        Social History  Social History     Socioeconomic History    Marital status: /Civil Union     Spouse name: None    Number of children: None    Years of education: None    Highest education level: None   Occupational History    None   Social Needs    Financial resource strain: None    Food insecurity     Worry: None     Inability: None    Transportation needs     Medical: None     Non-medical: None   Tobacco Use    Smoking status: Former Smoker    Smokeless tobacco: Never Used   Substance and Sexual Activity    Alcohol use: Yes     Frequency: 2-4 times a month     Comment: social    Drug use: No    Sexual activity: Yes     Partners: Male     Birth control/protection: Female Sterilization   Lifestyle    Physical activity     Days per week: None     Minutes per session: None    Stress: None   Relationships    Social connections     Talks on phone: None     Gets together: None     Attends Scientologist service: None     Active member of club or organization: None     Attends meetings of clubs or organizations: None     Relationship status: None    Intimate partner violence     Fear of current or ex partner: None     Emotionally abused: None     Physically abused: None     Forced sexual activity: None   Other Topics Concern    None   Social History Narrative    None        Medications  Current Outpatient Medications on File Prior to Visit   Medication Sig Dispense Refill    celecoxib (CeleBREX) 200 mg capsule       cyanocobalamin (VITAMIN B-12) 500 MCG tablet Take 500 mcg by mouth daily      naproxen (NAPROSYN) 500 mg tablet TAKE 1 TABLET BY MOUTH TWICE DAILY WITH MEALS 30 tablet 0    Nutritional Supplements (VITAMIN D BOOSTER PO) Take by mouth      omeprazole (PriLOSEC) 40 MG capsule Take 1 capsule (40 mg total) by mouth daily 30 capsule 1    lidocaine (LIDODERM) 5 % Apply 3 patches topically daily Remove & Discard patch within 12 hours or as directed by MD (Patient not taking: Reported on 12/8/2020) 3 patch 0    methocarbamol (ROBAXIN) 500 mg tablet Take 1 tablet (500 mg total) by mouth 3 (three) times a day for 3 days 9 tablet 0     No current facility-administered medications on file prior to visit          Allergies  Allergies   Allergen Reactions    Gluten Meal Abdominal Pain       Review of Systems    Objective  Vitals:    02/10/21 1022   Temp: 97 8 °F (36 6 °C)       Physical Exam   Abdomen: incisions healing well, soft, non tender

## 2021-02-16 ENCOUNTER — OFFICE VISIT (OUTPATIENT)
Dept: FAMILY MEDICINE CLINIC | Facility: CLINIC | Age: 41
End: 2021-02-16
Payer: COMMERCIAL

## 2021-02-16 VITALS
DIASTOLIC BLOOD PRESSURE: 86 MMHG | BODY MASS INDEX: 28.85 KG/M2 | HEIGHT: 64 IN | TEMPERATURE: 97.7 F | WEIGHT: 169 LBS | HEART RATE: 66 BPM | SYSTOLIC BLOOD PRESSURE: 140 MMHG | OXYGEN SATURATION: 100 %

## 2021-02-16 DIAGNOSIS — M62.830 SPASM OF BACK MUSCLES: ICD-10-CM

## 2021-02-16 DIAGNOSIS — H53.9 VISION CHANGES: Primary | ICD-10-CM

## 2021-02-16 PROCEDURE — 3725F SCREEN DEPRESSION PERFORMED: CPT | Performed by: FAMILY MEDICINE

## 2021-02-16 PROCEDURE — 99213 OFFICE O/P EST LOW 20 MIN: CPT | Performed by: FAMILY MEDICINE

## 2021-02-16 PROCEDURE — 1036F TOBACCO NON-USER: CPT | Performed by: FAMILY MEDICINE

## 2021-02-16 PROCEDURE — 3008F BODY MASS INDEX DOCD: CPT | Performed by: FAMILY MEDICINE

## 2021-02-16 NOTE — PROGRESS NOTES
Family Medicine Follow-Up Office Visit  Belinda Mendenhall 36 y o  female   MRN: [id number] : 1980  ENCOUNTER: 2021 1:38 PM    Assessment and Plan   Vision changes  As per patient, vision change for about 20 minutes simulating a Kaleidoscope in the right eye only  Denies any other complaints  Vision returned completely afterwards  Family history of rare eye disorder, however patient unsure if the name  Eye exam within normal limits today  - Will refer to ophthalmology for further workup  - ED precautions if symptoms arise again    Spasm of back muscles  Pain in her lower back mostly resolved  She does see a chiropractor with minimal improvement  MRI of Lumbar Spine 10/2020 revealed Mild 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 and  Mild thoracolumbar dextroscoliosis  - Continue robaxin 500mg TID PRN  - Recommend massage, heating pads  - May try Tylenol for symptomatic relief      Chief Complaint     Chief Complaint   Patient presents with    Earache       History of Present Illness   Belinda Mendenhall is a 36y o -year-old female who presents today due to vision changes that occurred 4 days ago  States she was watching tv and her vision in her right eye turned blurry and was like a "kaleidoscope " Within 20 minutes patients vision returned to normal  Denies any other symptoms  Reports a family history of rare eye disorders    Review of Systems   Review of Systems   Constitutional: Negative for activity change, appetite change, chills, fatigue and fever  HENT: Negative for congestion, rhinorrhea, sneezing and sore throat  Eyes: Negative for pain, discharge, redness and itching  Respiratory: Negative for cough, chest tightness, shortness of breath and wheezing  Cardiovascular: Negative for chest pain and palpitations  Gastrointestinal: Negative for abdominal distention, abdominal pain, constipation, diarrhea, nausea and vomiting     Musculoskeletal: Negative for arthralgias, back pain, joint swelling and myalgias  Skin: Negative for rash  Neurological: Negative for dizziness, weakness, numbness and headaches  Hematological: Negative for adenopathy  Psychiatric/Behavioral: Negative for confusion  Active Problem List     Patient Active Problem List   Diagnosis    ASCUS with positive high risk HPV cervical    Atypical glandular cells of undetermined significance (TERRI) on cervical Pap smear    Gastroesophageal reflux disease without esophagitis    Microcytic anemia    Pain of upper abdomen    Encounter for screening mammogram for malignant neoplasm of breast    Joint pain    Spasm of back muscles    Celiac disease    S/P laparoscopic appendectomy    Vision changes       Past Medical History, Past Surgical History, Family History, and Social History were reviewed and updated today as appropriate  Objective   /86   Pulse 66   Temp 97 7 °F (36 5 °C)   Ht 5' 4" (1 626 m)   Wt 76 7 kg (169 lb)   SpO2 100%   BMI 29 01 kg/m²     Physical Exam  Constitutional:       General: She is not in acute distress  Appearance: She is well-developed  She is not diaphoretic  HENT:      Head: Normocephalic and atraumatic  Nose: Nose normal       Mouth/Throat:      Pharynx: No oropharyngeal exudate  Eyes:      General: No scleral icterus  Right eye: No discharge  Left eye: No discharge  Conjunctiva/sclera: Conjunctivae normal    Cardiovascular:      Rate and Rhythm: Normal rate and regular rhythm  Heart sounds: Normal heart sounds  No murmur  Pulmonary:      Effort: Pulmonary effort is normal  No respiratory distress  Breath sounds: Normal breath sounds  No wheezing  Abdominal:      General: There is no distension  Palpations: Abdomen is soft  Tenderness: There is no abdominal tenderness  Musculoskeletal: Normal range of motion  General: No tenderness     Lymphadenopathy:      Cervical: No cervical adenopathy  Skin:     General: Skin is warm and dry  Coloration: Skin is not pale  Findings: No erythema  Neurological:      Mental Status: She is alert     Psychiatric:         Behavior: Behavior normal            Pertinent Laboratory/Diagnostic Studies:  Lab Results   Component Value Date    BUN 15 01/23/2021    CREATININE 0 95 01/23/2021    CALCIUM 9 1 01/23/2021    K 3 9 01/23/2021    CO2 24 01/23/2021     01/23/2021     Lab Results   Component Value Date    ALT 26 01/23/2021    AST 15 01/23/2021    ALKPHOS 75 01/23/2021       Lab Results   Component Value Date    WBC 15 20 (H) 01/23/2021    HGB 14 3 01/23/2021    HCT 42 3 01/23/2021    MCV 86 01/23/2021     01/23/2021       No results found for: TSH    No results found for: CHOL  No results found for: TRIG  No results found for: HDL  No results found for: LDLCALC  No results found for: HGBA1C    Results for orders placed or performed during the hospital encounter of 01/23/21   CBC and differential   Result Value Ref Range    WBC 15 20 (H) 4 31 - 10 16 Thousand/uL    RBC 4 92 3 81 - 5 12 Million/uL    Hemoglobin 14 3 11 5 - 15 4 g/dL    Hematocrit 42 3 34 8 - 46 1 %    MCV 86 82 - 98 fL    MCH 29 1 26 8 - 34 3 pg    MCHC 33 8 31 4 - 37 4 g/dL    RDW 11 8 11 6 - 15 1 %    MPV 10 4 8 9 - 12 7 fL    Platelets 880 194 - 998 Thousands/uL    nRBC 0 /100 WBCs    Neutrophils Relative 87 (H) 43 - 75 %    Immat GRANS % 1 0 - 2 %    Lymphocytes Relative 7 (L) 14 - 44 %    Monocytes Relative 5 4 - 12 %    Eosinophils Relative 0 0 - 6 %    Basophils Relative 0 0 - 1 %    Neutrophils Absolute 13 29 (H) 1 85 - 7 62 Thousands/µL    Immature Grans Absolute 0 08 0 00 - 0 20 Thousand/uL    Lymphocytes Absolute 1 06 0 60 - 4 47 Thousands/µL    Monocytes Absolute 0 70 0 17 - 1 22 Thousand/µL    Eosinophils Absolute 0 03 0 00 - 0 61 Thousand/µL    Basophils Absolute 0 04 0 00 - 0 10 Thousands/µL   Comprehensive metabolic panel   Result Value Ref Range    Sodium 136 136 - 145 mmol/L    Potassium 3 9 3 5 - 5 3 mmol/L    Chloride 105 100 - 108 mmol/L    CO2 24 21 - 32 mmol/L    ANION GAP 7 4 - 13 mmol/L    BUN 15 5 - 25 mg/dL    Creatinine 0 95 0 60 - 1 30 mg/dL    Glucose 116 65 - 140 mg/dL    Calcium 9 1 8 3 - 10 1 mg/dL    AST 15 5 - 45 U/L    ALT 26 12 - 78 U/L    Alkaline Phosphatase 75 46 - 116 U/L    Total Protein 8 4 (H) 6 4 - 8 2 g/dL    Albumin 4 1 3 5 - 5 0 g/dL    Total Bilirubin 0 69 0 20 - 1 00 mg/dL    eGFR 75 ml/min/1 73sq m   Lipase   Result Value Ref Range    Lipase 120 73 - 393 u/L   UA w Reflex to Microscopic w Reflex to Culture    Specimen: Urine, Clean Catch   Result Value Ref Range    Color, UA Yellow     Clarity, UA Clear     Specific Gravity, UA 1 023 1 003 - 1 030    pH, UA 8 0 4 5, 5 0, 5 5, 6 0, 6 5, 7 0, 7 5, 8 0    Leukocytes, UA Negative Negative    Nitrite, UA Negative Negative    Protein, UA Negative Negative mg/dl    Glucose, UA Negative Negative mg/dl    Ketones, UA 40 (2+) (A) Negative mg/dl    Urobilinogen, UA 0 2 0 2, 1 0 E U /dl E U /dl    Bilirubin, UA Negative Negative    Blood, UA Negative Negative   POCT pregnancy, urine   Result Value Ref Range    EXT PREG TEST UR (Ref: Negative) negative     Control valid    Tissue Exam   Result Value Ref Range    Case Report       Surgical Pathology Report                         Case: [id number]                                   Authorizing Provider:  Tammi Choudhury DO          Collected:           01/23/2021 2018              Ordering Location:     [address] [place] [address]      Received:            01/24/2021 1700 [facility] Operating Room                                                      Pathologist:           Lelo Willosn MD                                                                 Specimen:    Appendix                                                                                   Final Diagnosis       A   Appendix, appendectomy:  - Acute appendicitis and acute periappendicitis  Note       Interpretation performed at [facility], [address] [place] [id number]        Additional Information       All reported additional testing was performed with appropriately reactive controls  These tests were developed and their performance characteristics determined by [address] [place] [address] [facility] or appropriate performing facility, though some tests may be performed on tissues which have not been validated for performance characteristics (such as staining performed on alcohol exposed cell blocks and decalcified tissues)  Results should be interpreted with caution and in the context of the patients clinical condition  These tests may not be cleared or approved by the [place]  Food and Drug Administration, though the FDA has determined that such clearance or approval is not necessary  These tests are used for clinical purposes and they should not be regarded as investigational or for research  This laboratory has been approved by CLIA 88, designated as a high-complexity laboratory and is qualified to perform these tests  Isabella Lopez Description          A  The specimen is received in formalin, labeled with the patient's name and hospital number, and is designated "appendix  The specimen consists of a vermiform appendix measuring 6 4 cm in length by 1 cm in average diameter  The serosa is tan purple, partially smooth and glistening and partially covered by hemorrhagic material and a tan exudate over 25% of the specimen  No perforations are identified grossly  The margin of resection is inked blue  The lumen contains hemorrhagic material   The appendiceal wall averages 2 mm in thickness  Representative sections  Two cassettes  Note: The estimated total formalin fixation time based upon information provided by the submitting clinician and the standard processing schedule is under 72 hours      MCrites         Urine Macroscopic, POC Result Value Ref Range    Color, UA Yellow     Clarity, UA Clear     pH, UA 8 0 4 5 - 8 0    Leukocytes, UA Negative Negative    Nitrite, UA Negative Negative    Protein, UA Negative Negative mg/dl    Glucose, UA Negative Negative mg/dl    Ketones, UA 40 (2+) (A) Negative mg/dl    Urobilinogen, UA 0 2 0 2, 1 0 E U /dl E U /dl    Bilirubin, UA Negative Negative    Blood, UA Negative Negative    Specific Gravity, UA 1 020 1 003 - 1 030       Orders Placed This Encounter   Procedures    Ambulatory referral to Ophthalmology         Current Medications     Current Outpatient Medications   Medication Sig Dispense Refill    celecoxib (CeleBREX) 200 mg capsule       cyanocobalamin (VITAMIN B-12) 500 MCG tablet Take 500 mcg by mouth daily      Nutritional Supplements (VITAMIN D BOOSTER PO) Take by mouth      omeprazole (PriLOSEC) 40 MG capsule Take 1 capsule (40 mg total) by mouth daily 30 capsule 1    lidocaine (LIDODERM) 5 % Apply 3 patches topically daily Remove & Discard patch within 12 hours or as directed by MD (Patient not taking: Reported on 12/8/2020) 3 patch 0    methocarbamol (ROBAXIN) 500 mg tablet Take 1 tablet (500 mg total) by mouth 3 (three) times a day for 3 days (Patient not taking: Reported on 2/16/2021) 9 tablet 0    naproxen (NAPROSYN) 500 mg tablet TAKE 1 TABLET BY MOUTH TWICE DAILY WITH MEALS (Patient not taking: Reported on 2/16/2021) 30 tablet 0     No current facility-administered medications for this visit          ALLERGIES:  Allergies   Allergen Reactions    Gluten Meal Abdominal Pain       Health Maintenance     Health Maintenance   Topic Date Due    HIV Screening  07/27/1995    BMI: Followup Plan  07/27/1998    Influenza Vaccine (1) 09/01/2020    Annual Physical  12/20/2020    MAMMOGRAM  07/31/2021    Depression Screening PHQ  02/16/2022    BMI: Adult  02/16/2022    Cervical Cancer Screening  12/20/2024    DTaP,Tdap,and Td Vaccines (2 - Td) 10/15/2025    Hepatitis C Screening  Completed    Pneumococcal Vaccine: Pediatrics (0 to 5 Years) and At-Risk Patients (6 to 59 Years)  Aged Out    HIB Vaccine  Aged Out    Hepatitis B Vaccine  Aged Out    IPV Vaccine  Aged Out    Hepatitis A Vaccine  Aged Out    Meningococcal ACWY Vaccine  Aged Out    HPV Vaccine  Aged [employer] History   Administered Date(s) Administered    Tdap 10/15/2015         Queenie Coombs MD   [address]  2/16/2021  1:38 PM    Parts of this note were dictated using [employer] dictation software and may have sounds-like errors due to variation in pronunciation

## 2021-02-16 NOTE — ASSESSMENT & PLAN NOTE
Pain in her lower back mostly resolved  She does see a chiropractor with minimal improvement  MRI of Lumbar Spine 10/2020 revealed Mild 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 and  Mild thoracolumbar dextroscoliosis      - Continue robaxin 500mg TID PRN  - Recommend massage, heating pads  - May try Tylenol for symptomatic relief

## 2021-02-16 NOTE — ASSESSMENT & PLAN NOTE
As per patient, vision change for about 20 minutes simulating a Kaleidoscope in the right eye only  Denies any other complaints  Vision returned completely afterwards  Family history of rare eye disorder, however patient unsure if the name  Eye exam within normal limits today      - Will refer to ophthalmology for further workup  - ED precautions if symptoms arise again

## 2021-03-08 DIAGNOSIS — K21.9 GASTROESOPHAGEAL REFLUX DISEASE WITHOUT ESOPHAGITIS: ICD-10-CM

## 2021-03-08 RX ORDER — OMEPRAZOLE 40 MG/1
40 CAPSULE, DELAYED RELEASE ORAL DAILY
Qty: 30 CAPSULE | Refills: 1 | Status: SHIPPED | OUTPATIENT
Start: 2021-03-08 | End: 2021-05-05 | Stop reason: SDUPTHER

## 2021-03-15 ENCOUNTER — DOCUMENTATION (OUTPATIENT)
Dept: CARDIAC SURGERY | Facility: CLINIC | Age: 41
End: 2021-03-15

## 2021-03-15 NOTE — PROGRESS NOTES
Pt was scheduled as a new patient for a hernia  We do not participate with pt insurance  She will call back to reschedule an appointment once she has new insurance that we accept

## 2021-04-01 ENCOUNTER — TRANSCRIBE ORDERS (OUTPATIENT)
Dept: ADMINISTRATIVE | Facility: HOSPITAL | Age: 41
End: 2021-04-01

## 2021-04-01 DIAGNOSIS — M54.12 RADICULOPATHY, CERVICAL REGION: Primary | ICD-10-CM

## 2021-04-20 ENCOUNTER — TELEPHONE (OUTPATIENT)
Dept: FAMILY MEDICINE CLINIC | Facility: CLINIC | Age: 41
End: 2021-04-20

## 2021-05-05 DIAGNOSIS — K21.9 GASTROESOPHAGEAL REFLUX DISEASE WITHOUT ESOPHAGITIS: ICD-10-CM

## 2021-05-06 RX ORDER — OMEPRAZOLE 40 MG/1
40 CAPSULE, DELAYED RELEASE ORAL DAILY
Qty: 30 CAPSULE | Refills: 1 | Status: SHIPPED | OUTPATIENT
Start: 2021-05-06 | End: 2021-07-07 | Stop reason: SDUPTHER

## 2021-06-01 ENCOUNTER — OFFICE VISIT (OUTPATIENT)
Dept: FAMILY MEDICINE CLINIC | Facility: CLINIC | Age: 41
End: 2021-06-01
Payer: COMMERCIAL

## 2021-06-01 VITALS
TEMPERATURE: 97.8 F | HEIGHT: 64 IN | OXYGEN SATURATION: 99 % | BODY MASS INDEX: 28.51 KG/M2 | WEIGHT: 167 LBS | SYSTOLIC BLOOD PRESSURE: 124 MMHG | HEART RATE: 66 BPM | DIASTOLIC BLOOD PRESSURE: 80 MMHG

## 2021-06-01 DIAGNOSIS — M62.830 SPASM OF BACK MUSCLES: Primary | ICD-10-CM

## 2021-06-01 PROBLEM — R03.0 ELEVATED BLOOD PRESSURE READING: Status: ACTIVE | Noted: 2021-06-01

## 2021-06-01 PROCEDURE — 99213 OFFICE O/P EST LOW 20 MIN: CPT | Performed by: FAMILY MEDICINE
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
## 2021-06-01 NOTE — ASSESSMENT & PLAN NOTE
Patient continues to have bilateral lumbar muscle pain  She reports that she will be starting physical therapy in 2 days     -   Recommend initiation of physical therapy  Follow-up in 6 months or sooner as needed

## 2021-06-01 NOTE — ASSESSMENT & PLAN NOTE
Patient had an elevated blood pressure reading of  140/86 on February 16, 2021  Patient states that she has been checking her blood pressure at home and has been normal   She also denies any symptoms  Today her blood pressure is 124/80     -   Recommend continuing to monitor blood pressure at home  -   ED precautions given to patient if she experiences any headaches or blurry vision

## 2021-06-02 ENCOUNTER — OFFICE VISIT (OUTPATIENT)
Dept: OBGYN CLINIC | Facility: CLINIC | Age: 41
End: 2021-06-02
Payer: COMMERCIAL

## 2021-06-02 VITALS
DIASTOLIC BLOOD PRESSURE: 82 MMHG | WEIGHT: 165 LBS | BODY MASS INDEX: 28.17 KG/M2 | HEART RATE: 62 BPM | HEIGHT: 64 IN | SYSTOLIC BLOOD PRESSURE: 122 MMHG

## 2021-06-02 DIAGNOSIS — M51.36 DDD (DEGENERATIVE DISC DISEASE), LUMBAR: Primary | ICD-10-CM

## 2021-06-02 DIAGNOSIS — G89.29 CHRONIC SI JOINT PAIN: ICD-10-CM

## 2021-06-02 DIAGNOSIS — M53.3 CHRONIC SI JOINT PAIN: ICD-10-CM

## 2021-06-02 PROCEDURE — 99204 OFFICE O/P NEW MOD 45 MIN: CPT | Performed by: ORTHOPAEDIC SURGERY

## 2021-06-02 PROCEDURE — 3008F BODY MASS INDEX DOCD: CPT | Performed by: ORTHOPAEDIC SURGERY

## 2021-06-02 PROCEDURE — 1036F TOBACCO NON-USER: CPT | Performed by: ORTHOPAEDIC SURGERY
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
## 2021-06-04 ENCOUNTER — TELEPHONE (OUTPATIENT)
Dept: FAMILY MEDICINE CLINIC | Facility: CLINIC | Age: 41
End: 2021-06-04

## 2021-06-04 NOTE — TELEPHONE ENCOUNTER
Fax received from [facility] needing to be signed  Placed in Dr Neisha Grijalva folder in preceptor room  Please advise  Thank you

## 2021-06-07 ENCOUNTER — TELEPHONE (OUTPATIENT)
Dept: FAMILY MEDICINE CLINIC | Facility: CLINIC | Age: 41
End: 2021-06-07

## 2021-06-07 NOTE — TELEPHONE ENCOUNTER
Patient dropped off physician verification form  Informed patient it could take 7-10 days but patient needs it by June 11th  Patient stated for end date to put pending out come of PT  Placed in Dr Margret Mohan folder in preceptor room  Please advise  Thank you

## 2021-06-08 ENCOUNTER — TRANSCRIBE ORDERS (OUTPATIENT)
Dept: PAIN MEDICINE | Facility: CLINIC | Age: 41
End: 2021-06-08

## 2021-06-08 ENCOUNTER — TELEPHONE (OUTPATIENT)
Dept: OBGYN CLINIC | Facility: HOSPITAL | Age: 41
End: 2021-06-08

## 2021-06-08 NOTE — TELEPHONE ENCOUNTER
Patient sees Dr Aguilera    Patients PCP Arti Negron @ [facility] called to ask questions regarding a form that the PT left for them to fill out for

## 2021-06-10 ENCOUNTER — TELEPHONE (OUTPATIENT)
Dept: PAIN MEDICINE | Facility: CLINIC | Age: 41
End: 2021-06-10

## 2021-06-10 NOTE — TELEPHONE ENCOUNTER
LM ON VM FOR PATIENT TO CALL BACK [facility] TO SET UP A CONSULT WITH DR Dereje ARAUJO TO SCHEDULE PER STAFF MESSAGE FROM DR NY  PROVIDED CALL BACK NUMBER

## 2021-07-01 ENCOUNTER — OFFICE VISIT (OUTPATIENT)
Dept: FAMILY MEDICINE CLINIC | Facility: CLINIC | Age: 41
End: 2021-07-01
Payer: COMMERCIAL

## 2021-07-01 VITALS
HEART RATE: 77 BPM | DIASTOLIC BLOOD PRESSURE: 78 MMHG | TEMPERATURE: 98.2 F | WEIGHT: 164.2 LBS | OXYGEN SATURATION: 99 % | SYSTOLIC BLOOD PRESSURE: 140 MMHG | HEIGHT: 64 IN | BODY MASS INDEX: 28.03 KG/M2

## 2021-07-01 DIAGNOSIS — H53.9 VISUAL CHANGES: ICD-10-CM

## 2021-07-01 DIAGNOSIS — R06.89 BREATHING DIFFICULTY: Primary | ICD-10-CM

## 2021-07-01 DIAGNOSIS — G47.30 SLEEP APNEA, UNSPECIFIED TYPE: ICD-10-CM

## 2021-07-01 PROCEDURE — 99213 OFFICE O/P EST LOW 20 MIN: CPT | Performed by: FAMILY MEDICINE

## 2021-07-01 NOTE — PROGRESS NOTES
Assessment/Plan:    Visual changes  Patient has history of kalidescope vision and a family history of glaucoma  This has been discussed with prior physicians and further evaluation was advised  Previous opthalmology referral was out of network  Plan: resubmitted opthalmology referral to in-network provider    Breathing difficulty  Given the prior history of smoking, difficulty breathing, with AM productive cough the differential includes underlying asthma/COPD  Plan: Obtain PFTs with bronchodilator and follow up at next OV  Sleep apnea  With witnessed apneic episodes and history she likely has sleep apnea as well  STOPBANG score = 3  Plan: Ambulatory referral to sleep medicine  Subjective:      Patient ID: Luc Hooks is a 36 y o  female who presents with a Hx of visual difficulties and difficulty breathing with apparent apneic episodes at night  HPI    This patient is here primarily for evaluation of respiratory difficulties as outlined below  The patient states she takes deep breaths during the day because she feels like she's not getting enough air in her lungs  Occasionally feels completely out of breath  She also reports witnessed apneic events by  while she is sleeping supine which last approximately 5 seconds long  Symptoms improve when laying on her side  Occasionally these episodes wake the patient at night  Episodes have been occuring 2x per week for ~4-5 years  She notes daytime somnolence most of the time, and never really feels like she slept well  She feels the need to take naps during the day although she has difficulty falling asleep  She also notes aggitation during the day  Both the patient and  deny snoring  She also notes the sensation of feeling like something is stuck in her throat  She does not have difficulty swallowing, but every morning she notes that she has a productive cough with thick white phlegmy appearance        She has a history of smoking off and on from 13 y/o to 29 y/o for a total of 15 pack-years  She denies a history of asthma, but she notes that her son has asthma  She also notes a history of gastritis     Patient notes a history of kaleidescope vision and a family history of glaucoma  She is requesting a new referral to ophthalmology on visit today as previous referral was out of network for her insurance  Review of Systems   Constitutional: Positive for fatigue  HENT: Positive for postnasal drip, rhinorrhea (Every morning) and trouble swallowing  Negative for congestion, drooling, hearing loss, sinus pressure and sinus pain  Respiratory: Positive for cough (productive in the morning) and shortness of breath (Occasional)  Negative for wheezing  Cardiovascular: Negative for chest pain and palpitations  Gastrointestinal: Positive for abdominal pain (with gluten consumption)  Endocrine: Positive for polyuria (Attributes to multiparity and good hydration)  Genitourinary: Negative for difficulty urinating, dysuria and enuresis  Neurological: Positive for light-headedness (Attributes this to high blood pressure)  Negative for dizziness, syncope and weakness  Psychiatric/Behavioral: Positive for agitation, decreased concentration and dysphoric mood (Hx of bipolar)  Objective:      /78 (BP Location: Left arm, Patient Position: Sitting, Cuff Size: Large)   Pulse 77   Temp 98 2 °F (36 8 °C)   Ht 5' 4" (1 626 m)   Wt 74 5 kg (164 lb 3 2 oz)   SpO2 99%   BMI 28 18 kg/m²          Physical Exam  Constitutional:       General: She is not in acute distress  HENT:      Head: Atraumatic  Right Ear: Tympanic membrane and ear canal normal       Left Ear: Tympanic membrane and ear canal normal       Nose: No rhinorrhea  Mouth/Throat:      Mouth: Mucous membranes are moist       Pharynx: Oropharynx is clear  Uvula midline   No pharyngeal swelling, oropharyngeal exudate or posterior oropharyngeal erythema  Tonsils: No tonsillar exudate or tonsillar abscesses  Eyes:      Extraocular Movements: Extraocular movements intact  Neck:      Thyroid: No thyroid mass, thyromegaly or thyroid tenderness  Cardiovascular:      Rate and Rhythm: Normal rate and regular rhythm  Pulmonary:      Breath sounds: Examination of the left-lower field reveals wheezing  Wheezing (Left lower lung field) and rales (course at both bases) present  Musculoskeletal:      Cervical back: No tenderness  Lymphadenopathy:      Cervical: No cervical adenopathy  Skin:     General: Skin is warm and dry  Neurological:      General: No focal deficit present  Mental Status: She is alert  Psychiatric:         Mood and Affect: Mood normal          Thought Content:  Thought content normal

## 2021-07-01 NOTE — ASSESSMENT & PLAN NOTE
With witnessed apneic episodes and history she likely has sleep apnea as well  STOPBANG score = 3  Plan: Ambulatory referral to sleep medicine

## 2021-07-01 NOTE — ASSESSMENT & PLAN NOTE
[facility]  Progress Note - Matty Lier 1954, 77 y o  male MRN: [id number]    Unit/Bed#: ED 28 Encounter: [id number]    Primary Care Provider: TATIANA Lloyd   Date and time admitted to hospital: 2/18/2020 11:32 PM      * Chronic obstructive pulmonary disease with acute exacerbation ([address] )  Assessment & Plan  · Pt has had multiple ER visits and hospitalizations at [facility] over last several weeks regarding his COPD  · After he is released from hospital he returns soon after  From note at [address] on 2/15, reportedly home is unkempt and there are cats/dogs in residence  Pt reports allergies to cats   · Pulmonology consult given frequent ER visits in last several weeks   · Continue scheduled nebs, prednisone   · CM consult   · Pt reports to improvement of symptoms following nebulizer treatment   · Currently on room air    KLARISSA (obstructive sleep apnea)  Assessment & Plan  Patient is supposedly on CPAP during bedtime however noncompliant  Type 2 diabetes mellitus with hyperglycemia, without long-term current use of insulin [facility])  Assessment & Plan  Lab Results   Component Value Date    HGBA1C 6 9 (H) 02/19/2020       Recent Labs     02/19/20  0632   POCGLU 159*       Blood Sugar Average: Last 72 hrs:  (P) 159   · Well controlled  · Hold oral meds  · SSI, accu checks     CAD (coronary artery disease)  Assessment & Plan  Currently on metoprolol and Plavix  Patient denies any current chest discomfort  Alcohol abuse  Assessment & Plan  · Pt reports to daily alcohol use - 4 beers/day   · He is on thiamine, folate and multivitamins    He also takes Seroquel 25 mg at night  · Ativan 0 5 mg tablet as needed  · CIWA protocol  · No s/sx of withdrawal at this time       VTE Pharmacologic Prophylaxis:   Pharmacologic: Enoxaparin (Lovenox)  Mechanical VTE Prophylaxis in Place: No    Patient Centered Rounds: I have evaluated patient without nursing staff present due to RN with other Patient has history of kalidescope vision and a family history of glaucoma  This has been discussed with prior physicians and further evaluation was advised  Previous opthalmology referral was out of network      Plan: resubmitted opthalmology referral to in-network provider patient    Discussions with Specialists or Other Care Team Provider: pulmonary     Education and Discussions with Family / Patient: patient, declined call to family  Time Spent for Care: 20 minutes  More than 50% of total time spent on counseling and coordination of care as described above  Current Length of Stay: 0 day(s)    Current Patient Status: Inpatient   Certification Statement: The patient will continue to require additional inpatient hospital stay due to acute COPD exacerbation requiring management and pulm eval    Discharge Plan: when COPD improves and follow pulm clearance    Code Status: Level 1 - Full Code      Subjective:   Patient reports he feels significantly improved today, SOB is better following nebulizer treatments  He is not entirely sure why he gets SOB at home other than he has dogs and cats and he is allergic to cats  Objective:     Vitals:   Temp (24hrs), Av °F (36 7 °C), Min:98 °F (36 7 °C), Max:98 °F (36 7 °C)    Temp:  [98 °F (36 7 °C)] 98 °F (36 7 °C)  HR:  [] 72  Resp:  [18-22] 18  BP: (133-150)/() 141/66  SpO2:  [92 %-98 %] 96 %  Body mass index is 21 77 kg/m²  Input and Output Summary (last 24 hours):     No intake or output data in the 24 hours ending 20 1037    Physical Exam:     Physical Exam   Constitutional: He is oriented to person, place, and time  He appears well-developed and well-nourished  No distress  HENT:   Head: Normocephalic and atraumatic  Eyes: EOM are normal  No scleral icterus  Neck: Normal range of motion  Neck supple  Cardiovascular: Normal rate and regular rhythm  Pulmonary/Chest: Effort normal    Diminished b/l   Abdominal: Soft  Bowel sounds are normal  He exhibits no distension  There is no tenderness  Musculoskeletal: Normal range of motion  He exhibits no edema  Neurological: He is alert and oriented to person, place, and time  No cranial nerve deficit  Skin: Skin is warm and dry   He is not diaphoretic  Psychiatric: He has a normal mood and affect  His behavior is normal    Vitals reviewed  Additional Data:     Labs:    Results from last 7 days   Lab Units 02/19/20  0011   WBC Thousand/uL 11 17*   HEMOGLOBIN g/dL 10 9*   HEMATOCRIT % 33 9*   PLATELETS Thousands/uL 385   NEUTROS PCT % 66   LYMPHS PCT % 20   MONOS PCT % 13*   EOS PCT % 0     Results from last 7 days   Lab Units 02/19/20  0011   SODIUM mmol/L 137   POTASSIUM mmol/L 3 6   CHLORIDE mmol/L 103   CO2 mmol/L 27   BUN mg/dL 18   CREATININE mg/dL 0 70   ANION GAP mmol/L 7   CALCIUM mg/dL 9 3   GLUCOSE RANDOM mg/dL 200*         Results from last 7 days   Lab Units 02/19/20  0632   POC GLUCOSE mg/dl 159*     Results from last 7 days   Lab Units 02/19/20  0624   HEMOGLOBIN A1C % 6 9*     Results from last 7 days   Lab Units 02/19/20  0014   PROCALCITONIN ng/ml 0 12           * I Have Reviewed All Lab Data Listed Above  * Additional Pertinent Lab Tests Reviewed:  All Labs Within Last 24 Hours Reviewed    Imaging:    Imaging Reports Reviewed Today Include: none  Imaging Personally Reviewed by Myself Includes:  none    Recent Cultures (last 7 days):           Last 24 Hours Medication List:     Current Facility-Administered Medications:  acetaminophen 650 mg Oral Q6H PRN Jonel Dixon MD   aluminum-magnesium hydroxide-simethicone 30 mL Oral Q6H PRN Jonel Dixon MD   atorvastatin 80 mg Oral Daily With Zeina Rodriguez MD   b complex-vitamin C-folic acid 1 capsule Oral Daily With Zeina Rodriguez MD   citalopram 10 mg Oral Daily Jonel Dixon MD   clopidogrel 75 mg Oral Daily Jonel Dixon MD   docusate sodium 100 mg Oral BID PRN oJnel Dixon MD   enoxaparin 40 mg Subcutaneous Daily Jonel Dixon MD   fluticasone 1 spray Nasal BID Jonel Dixon MD   fluticasone-vilanterol 1 puff Inhalation Daily Jonel Dixon MD   folic acid 1 mg Oral Daily Jonel Dixon MD   gabapentin 100 mg Oral TID Jonel Dixon MD   guaiFENesin 600 mg Oral BID Victor Hugo Davies MD   insulin lispro 1-5 Units Subcutaneous HS Victor Hugo Davies MD   insulin lispro 1-6 Units Subcutaneous TID AC Victor Hugo Davies MD   levalbuterol 1 25 mg Nebulization TID Victor Hugo Davies MD   lisinopril 40 mg Oral Daily Victor Hugo Davies MD   LORazepam 0 5 mg Oral Q6H PRN Victor Hugo Davies MD   melatonin 3 mg Oral HS PRN Victor Hugo Davies MD   metoprolol succinate 100 mg Oral Daily Victor Hugo Davies MD   multivitamin-minerals 1 tablet Oral Daily Victor Hugo Davies MD   ondansetron 4 mg Intravenous Q6H PRN Victor Hugo Davies MD   oxyCODONE 5 mg Oral Q4H PRN Victor Hugo Davies MD   pantoprazole 40 mg Oral BID AC Victor Hugo Davies MD   predniSONE 40 mg Oral Daily Victor Hugo Davies MD   QUEtiapine 25 mg Oral HS Victor Hugo Davies MD   sodium chloride       sodium chloride 3 mL Nebulization TID Lindsay Garcias MD   thiamine 100 mg Oral Daily Victor Hugo Davies MD   tiotropium 18 mcg Inhalation Daily Victor Hugo Davies MD   triamcinolone  Topical BID Victor Hugo Davies MD        Today, Patient Was Seen By: Santiago Heredia PA-C    ** Please Note: Dictation voice to text software may have been used in the creation of this document   **

## 2021-07-01 NOTE — ASSESSMENT & PLAN NOTE
Given the prior history of smoking, difficulty breathing, with AM productive cough the differential includes underlying asthma/COPD  Plan: Obtain PFTs with bronchodilator and follow up at next OV

## 2021-07-07 DIAGNOSIS — K21.9 GASTROESOPHAGEAL REFLUX DISEASE WITHOUT ESOPHAGITIS: ICD-10-CM

## 2021-07-07 RX ORDER — OMEPRAZOLE 40 MG/1
40 CAPSULE, DELAYED RELEASE ORAL DAILY
Qty: 30 CAPSULE | Refills: 1 | Status: SHIPPED | OUTPATIENT
Start: 2021-07-07 | End: 2021-08-10

## 2021-07-08 ENCOUNTER — OFFICE VISIT (OUTPATIENT)
Dept: SLEEP CENTER | Facility: CLINIC | Age: 41
End: 2021-07-08
Payer: COMMERCIAL

## 2021-07-08 VITALS
WEIGHT: 164 LBS | HEIGHT: 64 IN | DIASTOLIC BLOOD PRESSURE: 90 MMHG | BODY MASS INDEX: 28 KG/M2 | HEART RATE: 75 BPM | SYSTOLIC BLOOD PRESSURE: 132 MMHG

## 2021-07-08 DIAGNOSIS — G47.8 SLEEP PARALYSIS: ICD-10-CM

## 2021-07-08 DIAGNOSIS — G47.00 INSOMNIA, UNSPECIFIED TYPE: ICD-10-CM

## 2021-07-08 DIAGNOSIS — G47.30 SLEEP APNEA, UNSPECIFIED TYPE: ICD-10-CM

## 2021-07-08 DIAGNOSIS — R45.4 IRRITABILITY: ICD-10-CM

## 2021-07-08 DIAGNOSIS — R06.89 GASPING FOR BREATH: Primary | ICD-10-CM

## 2021-07-08 DIAGNOSIS — G47.30 OBSERVED SLEEP APNEA: ICD-10-CM

## 2021-07-08 PROCEDURE — 1036F TOBACCO NON-USER: CPT | Performed by: PSYCHIATRY & NEUROLOGY

## 2021-07-08 PROCEDURE — 3008F BODY MASS INDEX DOCD: CPT | Performed by: PSYCHIATRY & NEUROLOGY

## 2021-07-08 PROCEDURE — 99244 OFF/OP CNSLTJ NEW/EST MOD 40: CPT | Performed by: PSYCHIATRY & NEUROLOGY

## 2021-07-08 RX ORDER — ZOLPIDEM TARTRATE 5 MG/1
5 TABLET ORAL
Qty: 2 TABLET | Refills: 0 | Status: SHIPPED | OUTPATIENT
Start: 2021-07-08 | End: 2021-08-23 | Stop reason: ALTCHOICE

## 2021-07-08 RX ORDER — OMEPRAZOLE 20 MG/1
40 CAPSULE, DELAYED RELEASE ORAL DAILY
COMMUNITY
End: 2021-08-05 | Stop reason: DRUGHIGH
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
## 2021-07-08 NOTE — PATIENT INSTRUCTIONS
Recommendations:  1) In lab diagnostic Polysomnography   2) Ambien 5mg at bedtime for one night before the study and the night of the study if no adverse effects the night before  3) Driving safety was reviewed with patient  If the patient feels too sleepy to drive she knows not to drive  If she becomes sleepy while driving she will pull over and nap  4) Follow-up after initiation of treatment if needed  5) Call with any questions or concerns
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
## 2021-07-12 ENCOUNTER — TELEPHONE (OUTPATIENT)
Dept: FAMILY MEDICINE CLINIC | Facility: CLINIC | Age: 41
End: 2021-07-12

## 2021-07-14 ENCOUNTER — HOSPITAL ENCOUNTER (OUTPATIENT)
Dept: SLEEP CENTER | Facility: CLINIC | Age: 41
Discharge: HOME | End: 2021-07-14
Payer: COMMERCIAL

## 2021-07-14 DIAGNOSIS — G47.30 OBSERVED SLEEP APNEA: ICD-10-CM

## 2021-07-14 DIAGNOSIS — R06.89 GASPING FOR BREATH: ICD-10-CM

## 2021-07-14 DIAGNOSIS — G47.8 SLEEP PARALYSIS: ICD-10-CM

## 2021-07-14 DIAGNOSIS — R45.4 IRRITABILITY: ICD-10-CM

## 2021-07-14 DIAGNOSIS — G47.30 SLEEP APNEA, UNSPECIFIED TYPE: ICD-10-CM

## 2021-07-14 PROCEDURE — 95810 POLYSOM 6/> YRS 4/> PARAM: CPT

## 2021-07-14 PROCEDURE — 95810 POLYSOM 6/> YRS 4/> PARAM: CPT | Performed by: PSYCHIATRY & NEUROLOGY

## 2021-07-15 PROBLEM — G47.33 OSA (OBSTRUCTIVE SLEEP APNEA): Status: ACTIVE | Noted: 2021-07-01

## 2021-07-15 NOTE — PROGRESS NOTES
Sleep Study Documentation    Pre-Sleep Study       Sleep testing procedure explained to patient:YES    Patient napped prior to study:NO    Caffeine:Dayshift worker after 12PM   Caffeine use:YES- coffee  6 ounces and energy drinks  1 serving    Alcohol:Dayshift workers after 5PM: Alcohol use:NO    Typical day for patient:YES       Study Documentation    Sleep Study Indications: GERD, snoring, witnessed apnea, excessive daytime sleepiness, sleep apnea, breathing difficulty,     Sleep Study: Diagnostic   Snore:None  Supplemental O2: no    O2 flow rate (L/min) range   O2 flow rate (L/min) final   Minimum SaO2 87%  Baseline SaO2 96%        Mode of Therapy:  EKG abnormalities: no     EEG abnormalities: no    Sleep Study Recorded < 2 hours: N/A    Sleep Study Recorded > 2 hours but incomplete study: N/A    Sleep Study Recorded 6 hours but no sleep obtained: NO    Patient classification: unemployed       Post-Sleep Study    Medication used at bedtime or during sleep study:YES prescription sleep aid    Patient reports time it took to fall asleep:greater than 60 minutes    Patient reports waking up during study:3 or more times  Patient reports returning to sleep in greater than 30 minutes  Patient reports sleeping 2 to 4 hours without dreaming  Patient reports sleep during study:worse than usual    Patient rated sleepiness: Somewhat sleepy or tired    PAP treatment:no

## 2021-07-16 ENCOUNTER — TELEPHONE (OUTPATIENT)
Dept: SLEEP CENTER | Facility: CLINIC | Age: 41
End: 2021-07-16

## 2021-07-16 NOTE — TELEPHONE ENCOUNTER
----- Message from Ector Mathew MD sent at 7/16/2021 10:40 AM EDT -----   No significant sleep apnea seen  Advise patient to try and sleep on side or elevate the HOB

## 2021-07-16 NOTE — TELEPHONE ENCOUNTER
Advised patient sleep study results  Patient reports she did not sleep well and feels she does have YESSICA     Scheduled follow up for he to discuss with Dr Maria Elena Santiago

## 2021-07-26 ENCOUNTER — TELEPHONE (OUTPATIENT)
Dept: FAMILY MEDICINE CLINIC | Facility: CLINIC | Age: 41
End: 2021-07-26

## 2021-07-26 NOTE — TELEPHONE ENCOUNTER
Patient requires a form to be completed  Patient is aware of 5-7 business day turn around time  Please refer to the following information:       Type of Form: Physician verification form    Date of Visit (if applicable):     Doctor: Dr Doyle Posada    Expected date: 8/2    How patient would like to receive form: Patient would like to     Fax number:     Patient phone number: [phone] in Dr Gold Corbin folder in preceptor room  Please advise  Thank you

## 2021-08-02 ENCOUNTER — HOSPITAL ENCOUNTER (OUTPATIENT)
Dept: PULMONOLOGY | Facility: HOSPITAL | Age: 41
Discharge: HOME | End: 2021-08-02
Payer: COMMERCIAL

## 2021-08-02 DIAGNOSIS — R06.89 BREATHING DIFFICULTY: ICD-10-CM

## 2021-08-02 PROCEDURE — 94729 DIFFUSING CAPACITY: CPT | Performed by: INTERNAL MEDICINE

## 2021-08-02 PROCEDURE — 94729 DIFFUSING CAPACITY: CPT

## 2021-08-02 PROCEDURE — 94060 EVALUATION OF WHEEZING: CPT

## 2021-08-02 PROCEDURE — 94726 PLETHYSMOGRAPHY LUNG VOLUMES: CPT | Performed by: INTERNAL MEDICINE

## 2021-08-02 PROCEDURE — 94060 EVALUATION OF WHEEZING: CPT | Performed by: INTERNAL MEDICINE

## 2021-08-02 PROCEDURE — 94760 N-INVAS EAR/PLS OXIMETRY 1: CPT

## 2021-08-02 PROCEDURE — 94726 PLETHYSMOGRAPHY LUNG VOLUMES: CPT

## 2021-08-02 RX ORDER — ALBUTEROL SULFATE 2.5 MG/3ML
2.5 SOLUTION RESPIRATORY (INHALATION) ONCE
Status: COMPLETED | OUTPATIENT
Start: 2021-08-02 | End: 2021-08-02

## 2021-08-02 RX ADMIN — ALBUTEROL SULFATE 2.5 MG: 2.5 SOLUTION RESPIRATORY (INHALATION) at 13:45

## 2021-08-05 ENCOUNTER — OFFICE VISIT (OUTPATIENT)
Dept: SLEEP CENTER | Facility: CLINIC | Age: 41
End: 2021-08-05
Payer: COMMERCIAL

## 2021-08-05 VITALS
DIASTOLIC BLOOD PRESSURE: 62 MMHG | SYSTOLIC BLOOD PRESSURE: 100 MMHG | HEIGHT: 64 IN | WEIGHT: 163 LBS | BODY MASS INDEX: 27.83 KG/M2

## 2021-08-05 DIAGNOSIS — G47.8 NON-RESTORATIVE SLEEP: Primary | ICD-10-CM

## 2021-08-05 PROCEDURE — 1036F TOBACCO NON-USER: CPT | Performed by: PSYCHIATRY & NEUROLOGY

## 2021-08-05 PROCEDURE — 99213 OFFICE O/P EST LOW 20 MIN: CPT | Performed by: PSYCHIATRY & NEUROLOGY

## 2021-08-05 NOTE — PROGRESS NOTES
Sleep Medicine Follow-Up Note    HPI: 41yo F with sleep disordered breathing being seen to discuss her sleep study results  Treatment Summary: 7/2021 PSG: apnea/hypopnea index (AHI) of 3 5 events per hour of sleep   The AHI in   the supine position was 3 7   The AHI during REM sleep was 4 7  oxygen lima of 87%  No significant PLMs  HPI:   Today, patient presents and wanted to discuss the results of her PSG  Her symptoms have not worsened  She is concerned that she will stop breathing and not wake up in the middle of the night       Respiratory:  -Ongoing Snoring: no  -Mouth Breathing: no  -Dry Mouth: no  -Nocturnal Gasping: yes    ROS:   Genitourinary sleep problems that vary with menstrual cycle    Cardiology none   Gastrointestinal none   Neurology need to move extremities, forgetfulness, poor concentration or confusion,  and difficulty with memory   Constitutional fatigue   Integumentary none   Psychiatry anxiety, depression, aggressiveness or irritability and mood change   Musculoskeletal joint pain, muscle aches and back pain   Pulmonary shortness of breath with activity and frequent cough   ENT throat clearing   Endocrine none   Hematological none       Sleep Pattern:  -Position: back and side  -Bedtime: 9pm  -Lights Out: same time  -Environment: no Lights/TV  -Latency: 1h  -Awakenings: 1-2 times  -Wake Time: 5am  -Rise Time: same time  -Patient's estimate of Sleep Time: 4-8h      Daytime Symptoms:  -Upon Awakening: tired no matter how many hours she sleeps  -Daytime fatigue/sleepiness: sometimes and then will have an energy drinks  -Naps: no  -Involuntary Dozing: sometimes  -Cognitive Symptoms: has trouble with focus and memory  -Driving: Difficulty with sleepiness and driving:  no   -- Close calls related to sleepiness: no   -- Accidents related to sleepiness: no    Substance Use:  -Caffeine: 1 cup of coffee not daily and energy drinks V-8 in the morning  -Alcohol: once in a while  -THC: no    --> Denies any significant medical changes since last visit  --> Supplemental Oxygen Use: denies    Questionnaire:  Sitting and reading: Moderate chance of dozing  Watching TV: Slight chance of dozing  Sitting, inactive in a public place (e g  a theatre or a meeting): Would never doze  As a passenger in a car for an hour without a break: Would never doze  Lying down to rest in the afternoon when circumstances permit: Moderate chance of dozing  Sitting and talking to someone: Would never doze  Sitting quietly after a lunch without alcohol: Slight chance of dozing  In a car, while stopped for a few minutes in traffic: Would never doze  Total score: 6      PE:    /62   Ht 5' 4" (1 626 m)   Wt 73 9 kg (163 lb)   BMI 27 98 kg/m²     General:  In NAD  Pul: Respirations: unlaboured  MS: No atrophy  Neuro: No resting tremor  Gait normal turning & station; unremarkable overall  Psych: Socially appropriate  Pleasant  No overt dysphoria  Assessment: The patient continues to be symptomatic with waking up at night with gasping and tiredness during the day  The PSG was of good quality, all stages of sleep were seen and almost the entire night was supine  No significant amount of SDB seen to qualify her for YESSICA and treatment  Weight loss and elevating the [facility] will be helpful and she can come back to clinic and be rechecked if symptoms worsen  Recommendations:    1)weight loss and sleeping HOB elevated  2) Safe driving reviewed  3) Follow-up PRN when worsening  4) Call with any questions or concerns  All questions answered for the patient and partner, who indicated understanding and agreed with the plan       Maranda Gao MD  Psychiatry/ Sleep medicine

## 2021-08-05 NOTE — PATIENT INSTRUCTIONS
Recommendations:    1)weight loss and sleeping HOB elevated  2) Safe driving reviewed  3) Follow-up PRN when worsening  4) Call with any questions or concerns

## 2021-08-09 ENCOUNTER — TELEPHONE (OUTPATIENT)
Dept: FAMILY MEDICINE CLINIC | Facility: CLINIC | Age: 41
End: 2021-08-09

## 2021-08-09 NOTE — TELEPHONE ENCOUNTER
Fax received from [address] [facility] needing to be signed  Placed in Dr Lc Otero folder in preceptor room  Please advise  Thank you

## 2021-08-10 ENCOUNTER — OFFICE VISIT (OUTPATIENT)
Dept: FAMILY MEDICINE CLINIC | Facility: CLINIC | Age: 41
End: 2021-08-10
Payer: COMMERCIAL

## 2021-08-10 VITALS
OXYGEN SATURATION: 98 % | SYSTOLIC BLOOD PRESSURE: 120 MMHG | HEART RATE: 77 BPM | HEIGHT: 64 IN | TEMPERATURE: 97.7 F | DIASTOLIC BLOOD PRESSURE: 78 MMHG | BODY MASS INDEX: 27.96 KG/M2 | WEIGHT: 163.8 LBS

## 2021-08-10 DIAGNOSIS — G47.33 OSA (OBSTRUCTIVE SLEEP APNEA): Primary | ICD-10-CM

## 2021-08-10 PROCEDURE — 3008F BODY MASS INDEX DOCD: CPT | Performed by: PSYCHIATRY & NEUROLOGY

## 2021-08-10 PROCEDURE — 99213 OFFICE O/P EST LOW 20 MIN: CPT | Performed by: FAMILY MEDICINE

## 2021-08-10 NOTE — PROGRESS NOTES
Assessment/Plan:    YESSICA (obstructive sleep apnea)  Assessment: Cayetano Holland came in to discuss PFT and sleep apnea  She is under the criteria for sleep apnea diagnosis and her PFTs were normal      Plan    - Continue with medication regiment for GERD (Omeprazole 1 capsule 40mg by mouth daily)            Diagnoses and all orders for this visit:    YESSICA (obstructive sleep apnea)          Subjective:      Patient ID: Simone Baxter is a 39 y o  female  Following up for high blood pressure, but is in today to review PFTs  Cayetano Holland had a sleep study in which she was told she was under-qualification for sleep apnea  Cayetano Holland discussed an improvement in her sleep apnea with sleeping elevated using pillows  Cayetano Holland has also endorsed shortness of breath with mild exertion while at physical therapy  She has also noticed a decreased filling of her lungs upon deep breathing  Cayetano Holland has a history of tobacco smoking 5 years ago  Cayetano Holland originally came to discuss sleep apnea referral and was also given referral to PFTs  The following portions of the patient's history were reviewed and updated as appropriate: allergies, past family history, past medical history, past social history, past surgical history and problem list     Review of Systems   Constitutional: Negative for activity change and appetite change  Respiratory: Positive for cough (every morning productive cough) and shortness of breath  Negative for chest tightness and wheezing  Cardiovascular: Negative for chest pain and palpitations  Gastrointestinal: Negative for abdominal pain, constipation and diarrhea  Objective:      /78   Pulse 77   Temp 97 7 °F (36 5 °C)   Ht 5' 4" (1 626 m)   Wt 74 3 kg (163 lb 12 8 oz)   SpO2 98%   BMI 28 12 kg/m²          Physical Exam  Constitutional:       Appearance: Normal appearance  She is normal weight  HENT:      Head: Normocephalic     Cardiovascular:      Rate and Rhythm: Normal rate and regular rhythm  Pulses: Normal pulses  Heart sounds: Normal heart sounds  Pulmonary:      Effort: Pulmonary effort is normal       Breath sounds: Normal breath sounds  Abdominal:      General: Bowel sounds are normal       Palpations: Abdomen is soft  Neurological:      Mental Status: She is alert     Psychiatric:         Mood and Affect: Mood normal          Behavior: Behavior normal

## 2021-08-11 ENCOUNTER — CONSULT (OUTPATIENT)
Dept: PAIN MEDICINE | Facility: CLINIC | Age: 41
End: 2021-08-11
Payer: COMMERCIAL

## 2021-08-11 VITALS
WEIGHT: 165 LBS | DIASTOLIC BLOOD PRESSURE: 83 MMHG | HEIGHT: 64 IN | RESPIRATION RATE: 16 BRPM | SYSTOLIC BLOOD PRESSURE: 125 MMHG | BODY MASS INDEX: 28.17 KG/M2 | HEART RATE: 73 BPM

## 2021-08-11 DIAGNOSIS — G89.29 CHRONIC SI JOINT PAIN: ICD-10-CM

## 2021-08-11 DIAGNOSIS — M53.3 CHRONIC SI JOINT PAIN: ICD-10-CM

## 2021-08-11 DIAGNOSIS — M51.36 DDD (DEGENERATIVE DISC DISEASE), LUMBAR: ICD-10-CM

## 2021-08-11 PROCEDURE — 1036F TOBACCO NON-USER: CPT | Performed by: ANESTHESIOLOGY

## 2021-08-11 PROCEDURE — 99244 OFF/OP CNSLTJ NEW/EST MOD 40: CPT | Performed by: ANESTHESIOLOGY

## 2021-08-11 NOTE — PATIENT INSTRUCTIONS
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
## 2021-08-12 ENCOUNTER — TELEPHONE (OUTPATIENT)
Dept: PAIN MEDICINE | Facility: CLINIC | Age: 41
End: 2021-08-12

## 2021-08-12 NOTE — TELEPHONE ENCOUNTER
Scheduled pt for LESI for 9/7/21  Pt denies rx blood thinners  Went over pre-procedure instructions below:  Nothing to eat or drink 1 hr prior to procedure  Need to arrange transportation  Proper clothing for procedure  If ill or placed on antibiotics please call to reschedule  Covid/travel/ and vaccine instructions    Pt has not had covid vaccine advised about scheduling within 2 weeks

## 2021-08-12 NOTE — ASSESSMENT & PLAN NOTE
Assessment: Elke Blair came in to discuss PFT and sleep apnea   She is under the criteria for sleep apnea diagnosis and her PFTs were normal      Plan    - Continue with medication regiment for GERD (Omeprazole 1 capsule 40mg by mouth daily)

## 2021-08-17 ENCOUNTER — TELEPHONE (OUTPATIENT)
Dept: PAIN MEDICINE | Facility: CLINIC | Age: 41
End: 2021-08-17

## 2021-08-17 NOTE — TELEPHONE ENCOUNTER
Pt Lee Smart  1980  Pt is actively doing physical therapy 2 x a week  Along with home exercises  Her current pain level is between 7-8   Pt has been doing a home exercise program since 2021  To current 3-4 times a week

## 2021-08-23 ENCOUNTER — OFFICE VISIT (OUTPATIENT)
Dept: OBGYN CLINIC | Facility: CLINIC | Age: 41
End: 2021-08-23

## 2021-08-23 VITALS
HEART RATE: 74 BPM | DIASTOLIC BLOOD PRESSURE: 89 MMHG | HEIGHT: 64 IN | SYSTOLIC BLOOD PRESSURE: 127 MMHG | BODY MASS INDEX: 28.07 KG/M2 | WEIGHT: 164.4 LBS

## 2021-08-23 DIAGNOSIS — R39.9 UTI SYMPTOMS: Primary | ICD-10-CM

## 2021-08-23 LAB
BILIRUB UR QL STRIP: NEGATIVE
CLARITY UR: ABNORMAL
COLOR UR: YELLOW
GLUCOSE UR STRIP-MCNC: NEGATIVE MG/DL
HGB UR QL STRIP.AUTO: ABNORMAL
KETONES UR STRIP-MCNC: NEGATIVE MG/DL
LEUKOCYTE ESTERASE UR QL STRIP: ABNORMAL
NITRITE UR QL STRIP: NEGATIVE
PH UR STRIP.AUTO: 5.5 [PH]
PROT UR STRIP-MCNC: NEGATIVE MG/DL
SL AMB  POCT GLUCOSE, UA: NEGATIVE
SL AMB LEUKOCYTE ESTERASE,UA: ABNORMAL
SL AMB POCT BILIRUBIN,UA: NEGATIVE
SL AMB POCT BLOOD,UA: ABNORMAL
SL AMB POCT CLARITY,UA: ABNORMAL
SL AMB POCT COLOR,UA: ABNORMAL
SL AMB POCT KETONES,UA: NEGATIVE
SL AMB POCT NITRITE,UA: NEGATIVE
SL AMB POCT PH,UA: 5
SL AMB POCT SPECIFIC GRAVITY,UA: 1.02
SL AMB POCT URINE PROTEIN: NEGATIVE
SL AMB POCT UROBILINOGEN: 0.2
SP GR UR STRIP.AUTO: 1.01 (ref 1–1.03)
UROBILINOGEN UR QL STRIP.AUTO: 0.2 E.U./DL

## 2021-08-23 PROCEDURE — 81002 URINALYSIS NONAUTO W/O SCOPE: CPT | Performed by: NURSE PRACTITIONER

## 2021-08-23 PROCEDURE — 3008F BODY MASS INDEX DOCD: CPT | Performed by: ANESTHESIOLOGY

## 2021-08-23 PROCEDURE — 99213 OFFICE O/P EST LOW 20 MIN: CPT | Performed by: NURSE PRACTITIONER

## 2021-08-23 PROCEDURE — 87186 SC STD MICRODIL/AGAR DIL: CPT | Performed by: NURSE PRACTITIONER

## 2021-08-23 PROCEDURE — 87086 URINE CULTURE/COLONY COUNT: CPT | Performed by: NURSE PRACTITIONER

## 2021-08-23 PROCEDURE — 87077 CULTURE AEROBIC IDENTIFY: CPT | Performed by: NURSE PRACTITIONER

## 2021-08-23 PROCEDURE — 81001 URINALYSIS AUTO W/SCOPE: CPT | Performed by: NURSE PRACTITIONER

## 2021-08-23 RX ORDER — CEPHALEXIN 500 MG/1
500 CAPSULE ORAL EVERY 6 HOURS SCHEDULED
Qty: 20 CAPSULE | Refills: 0 | Status: SHIPPED | OUTPATIENT
Start: 2021-08-23 | End: 2021-08-28
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
## 2021-08-23 NOTE — PATIENT INSTRUCTIONS
Urinary Tract Infection in Older Adults   WHAT YOU NEED TO KNOW:   A urinary tract infection (UTI) is caused by bacteria that get inside your urinary tract  Your urinary tract includes your kidneys, ureters, bladder, and urethra  Urine is made in your kidneys, and it flows from the ureters to the bladder  Urine leaves the bladder through the urethra  A UTI is more common in your lower urinary tract, which includes your bladder and urethra  DISCHARGE INSTRUCTIONS:   Return to the emergency department if:   · You are urinating very little or not at all  · You are vomiting  · You have a high fever with shaking chills  · You have side or back pain that gets worse  Call your doctor if:   · You have a fever  · You are a woman and you have increased white or yellow discharge from your vagina  · You do not feel better after 2 days of taking antibiotics  · You have questions or concerns about your condition or care  Medicines:   · Medicines  help treat the bacterial infection or decrease pain and burning when you urinate  You may also need medicines to decrease the urge to urinate often  If you have UTIs often (called recurrent UTIs), you may be given antibiotics to take regularly  You will be given directions for when and how to use antibiotics  The goal is to prevent UTIs but not cause antibiotic resistance by using antibiotics too often  · Take your medicine as directed  Contact your healthcare provider if you think your medicine is not helping or if you have side effects  Tell him or her if you are allergic to any medicine  Keep a list of the medicines, vitamins, and herbs you take  Include the amounts, and when and why you take them  Bring the list or the pill bottles to follow-up visits  Carry your medicine list with you in case of an emergency  Self-care:   · Drink liquids as directed  Liquids can help flush bacteria from your urinary tract   Ask how much liquid to drink each day and which liquids are best for you  You may need to drink more liquids than usual to help flush out the bacteria  Do not drink alcohol, caffeine, and citrus juices  These can irritate your bladder and increase your symptoms  · Apply heat  on your abdomen for 20 to 30 minutes every 2 hours for as many days as directed  Heat helps decrease discomfort and pressure in your bladder  Prevent a UTI:   · Urinate when you feel the urge  Do not hold your urine  Bacteria can grow if urine stays in the bladder too long  It may be helpful to urinate at least every 3 to 4 hours  · Urinate after you have sex  to flush away bacteria that can enter your urinary tract during sex  · Wear cotton underwear and clothes that are loose  Tight pants and nylon underwear can trap moisture and cause bacteria to grow  · Cranberry juice or cranberry supplements  may help prevent UTIs  Your healthcare provider can recommend the right juice or supplement for you  · Women should wipe front to back  after urinating or having a bowel movement  This may prevent germs from getting into the urinary tract  Do not douche or use feminine deodorants  These can change the chemical balance in your vagina  You may also be given vaginal estrogen medicine  This medicine helps prevent recurrent UTIs in women who have gone through menopause or are in jannet-menopause  Follow up with your healthcare provider as directed:  Write down your questions so you remember to ask them during your visits  © Copyright "[employer] 2021 Information is for End User's use only and may not be sold, redistributed or otherwise used for commercial purposes  All illustrations and images included in CareNotes® are the copyrighted property of A D [employer] M [employer] Inc  or Marshall Romero  The above information is an  only  It is not intended as medical advice for individual conditions or treatments   Talk to your doctor, nurse or pharmacist before following any medical regimen to see if it is safe and effective for you

## 2021-08-24 LAB
BACTERIA UR QL AUTO: ABNORMAL /HPF
HYALINE CASTS #/AREA URNS LPF: ABNORMAL /LPF
NON-SQ EPI CELLS URNS QL MICRO: ABNORMAL /HPF
RBC #/AREA URNS AUTO: ABNORMAL /HPF
WBC #/AREA URNS AUTO: ABNORMAL /HPF

## 2021-08-26 LAB — BACTERIA UR CULT: ABNORMAL

## 2021-09-07 ENCOUNTER — HOSPITAL ENCOUNTER (OUTPATIENT)
Dept: RADIOLOGY | Facility: CLINIC | Age: 41
Discharge: HOME | End: 2021-09-07
Attending: ANESTHESIOLOGY
Payer: COMMERCIAL

## 2021-09-07 VITALS
DIASTOLIC BLOOD PRESSURE: 66 MMHG | HEART RATE: 70 BPM | RESPIRATION RATE: 20 BRPM | SYSTOLIC BLOOD PRESSURE: 122 MMHG | TEMPERATURE: 97.8 F | OXYGEN SATURATION: 98 %

## 2021-09-07 DIAGNOSIS — M51.36 DDD (DEGENERATIVE DISC DISEASE), LUMBAR: ICD-10-CM

## 2021-09-07 PROCEDURE — 62323 NJX INTERLAMINAR LMBR/SAC: CPT | Performed by: ANESTHESIOLOGY

## 2021-09-07 RX ORDER — METHYLPREDNISOLONE ACETATE 80 MG/ML
80 INJECTION, SUSPENSION INTRA-ARTICULAR; INTRALESIONAL; INTRAMUSCULAR; PARENTERAL; SOFT TISSUE ONCE
Status: COMPLETED | OUTPATIENT
Start: 2021-09-07 | End: 2021-09-07

## 2021-09-07 RX ORDER — BUPIVACAINE HCL/PF 2.5 MG/ML
10 VIAL (ML) INJECTION ONCE
Status: COMPLETED | OUTPATIENT
Start: 2021-09-07 | End: 2021-09-07

## 2021-09-07 RX ADMIN — METHYLPREDNISOLONE ACETATE 80 MG: 80 INJECTION, SUSPENSION INTRA-ARTICULAR; INTRALESIONAL; INTRAMUSCULAR; PARENTERAL; SOFT TISSUE at 09:39

## 2021-09-07 RX ADMIN — BUPIVACAINE HYDROCHLORIDE 2 ML: 2.5 INJECTION, SOLUTION EPIDURAL; INFILTRATION; INTRACAUDAL at 09:39

## 2021-09-07 RX ADMIN — IOHEXOL 1 ML: 300 INJECTION, SOLUTION INTRAVENOUS at 09:39

## 2021-09-07 NOTE — DISCHARGE INSTR - LAB
Epidural Steroid Injection   WHAT YOU NEED TO KNOW:   An epidural steroid injection (MADINA) is a procedure to inject steroid medicine into the epidural space  The epidural space is between your spinal cord and vertebrae  Steroids reduce inflammation and fluid buildup in your spine that may be causing pain  You may be given pain medicine along with the steroids  ACTIVITY  · Do not drive or operate machinery today  · No strenuous activity today - bending, lifting, etc   · You may resume normal activites starting tomorrow - start slowly and as tolerated  · You may shower today, but no tub baths or hot tubs  · You may have numbness for several hours from the local anesthetic  Please use caution and common sense, especially with weight-bearing activities  CARE OF THE INJECTION SITE  · If you have soreness or pain, apply ice to the area today (20 minutes on/20 minutes off)  · Starting tomorrow, you may use warm, moist heat or ice if needed  · You may have an increase or change in your discomfort for 36-48 hours after your treatment  · Apply ice and continue with any pain medication you have been prescribed  · Notify [facility] if you have any of the following: redness, drainage, swelling, headache, stiff neck or fever above 100°F     SPECIAL INSTRUCTIONS  · Our office will contact you in approximately 7 days for a progress report  MEDICATIONS  · Continue to take all routine medications  · Our office may have instructed you to hold some medications  As no general anesthesia was used in today's procedure, you should not experience any side effects related to anesthesia  If you have a problem specifically related to your procedure, please call our office at [phone]  Problems not related to your procedure should be directed to your primary care physician

## 2021-09-07 NOTE — H&P
History of Present Illness:  The patient is a 39 y o  female who presents with complaints of lower back pain secondary lumbar degenerative disease and is here today for lumbar epidural steroid injection    Patient Active Problem List   Diagnosis    ASCUS with positive high risk HPV cervical    Atypical glandular cells of undetermined significance (TERRI) on cervical Pap smear    Gastroesophageal reflux disease without esophagitis    Microcytic anemia    Pain of upper abdomen    Encounter for screening mammogram for malignant neoplasm of breast    Joint pain    Spasm of back muscles    Celiac disease    S/P laparoscopic appendectomy    Visual changes    Elevated blood pressure reading    Breathing difficulty    YESSICA (obstructive sleep apnea)    Sleep-disordered breathing       Past Medical History:   Diagnosis Date    Abnormal Pap smear of cervix     Celiac disease     GERD without esophagitis     Heartburn     Hiatal hernia     Vitamin D deficiency        Past Surgical History:   Procedure Laterality Date    [place] LAP,APPENDECTOMY N/A 1/23/2021    Procedure: APPENDECTOMY LAPAROSCOPIC;  Surgeon: Michael Dupont DO;  Location[facility];  Service: General    TUBAL LIGATION           Current Outpatient Medications:     celecoxib (CeleBREX) 200 mg capsule, , Disp: , Rfl:     cyanocobalamin (VITAMIN B-12) 500 MCG tablet, Take 500 mcg by mouth daily, Disp: , Rfl:     Nutritional Supplements (VITAMIN D BOOSTER PO), Take by mouth, Disp: , Rfl:     omeprazole (PriLOSEC) 40 MG capsule, TAKE 1 CAPSULE(40 MG) BY MOUTH DAILY, Disp: 30 capsule, Rfl: 1    Current Facility-Administered Medications:     bupivacaine (PF) (MARCAINE) 0 25 % injection 10 mL, 10 mL, Epidural, Once, Lisa Perez MD    iohexol (OMNIPAQUE) 300 mg/mL injection 50 mL, 50 mL, Epidural, Once, Lisa Perez MD    methylPREDNISolone acetate (DEPO-MEDROL) injection 80 mg, 80 mg, Epidural, Once, Lisa Perez MD    Allergies Allergen Reactions    Gluten Meal - Food Allergy Abdominal Pain       Physical Exam:   Vitals:    09/07/21 0916   BP: 111/65   Pulse: 72   Resp: 20   Temp: 97 8 °F (36 6 °C)   SpO2: 99%     General: Awake, Alert, Oriented x 3, Mood and affect appropriate  Respiratory: Respirations even and unlabored  Cardiovascular: Peripheral pulses intact; no edema  Musculoskeletal Exam:  Lower back tenderness    ASA Score: 1    Patient/Chart Verification  Patient ID Verified: Verbal  Consents Confirmed: To be obtained in the Pre-Procedure area  Interval H&P(within 24 hr) Complete (required for Outpatients and Surgery Admit only): To be obtained in the Pre-Procedure area  Allergies Reviewed: Yes  Anticoag/NSAID held?: NA  Currently on antibiotics?: No  Pregnancy denied?: Yes    Assessment:   1   DDD (degenerative disc disease), lumbar        Plan: BILL

## 2021-09-09 ENCOUNTER — TELEPHONE (OUTPATIENT)
Dept: FAMILY MEDICINE CLINIC | Facility: CLINIC | Age: 41
End: 2021-09-09

## 2021-09-09 NOTE — TELEPHONE ENCOUNTER
Patient requires a form to be completed  Patient is aware of 5-7 business day turn around time  Please refer to the following information:       Type of Form: Physician verification form    Date of Visit (if applicable): Last visit was on 8/10    Doctor: Dr Verenice Qureshi    Expected date: Needs to be in before 9/16     How patient would like to receive form: Patient would like to  the form      Patient phone number: [phone] form in Dr Maurilio Medina folder in the preceptor room  Please advise  Thank you

## 2021-09-14 ENCOUNTER — TELEPHONE (OUTPATIENT)
Dept: PAIN MEDICINE | Facility: CLINIC | Age: 41
End: 2021-09-14

## 2021-09-21 NOTE — TELEPHONE ENCOUNTER
Pt reports no improvement 2wk post inj   Pain level 6.5/10   She said her pain is worse than before the inj

## 2021-09-22 ENCOUNTER — ANNUAL EXAM (OUTPATIENT)
Dept: OBGYN CLINIC | Facility: CLINIC | Age: 41
End: 2021-09-22

## 2021-09-22 VITALS
HEIGHT: 64 IN | BODY MASS INDEX: 28.24 KG/M2 | HEART RATE: 81 BPM | DIASTOLIC BLOOD PRESSURE: 85 MMHG | WEIGHT: 165.4 LBS | SYSTOLIC BLOOD PRESSURE: 127 MMHG

## 2021-09-22 DIAGNOSIS — Z01.419 ENCOUNTER FOR GYNECOLOGICAL EXAMINATION WITHOUT ABNORMAL FINDING: Primary | ICD-10-CM

## 2021-09-22 DIAGNOSIS — Z12.31 ENCOUNTER FOR SCREENING MAMMOGRAM FOR MALIGNANT NEOPLASM OF BREAST: ICD-10-CM

## 2021-09-22 PROCEDURE — 99396 PREV VISIT EST AGE 40-64: CPT | Performed by: NURSE PRACTITIONER

## 2021-09-22 PROCEDURE — 3008F BODY MASS INDEX DOCD: CPT | Performed by: ANESTHESIOLOGY
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
## 2021-09-22 NOTE — PATIENT INSTRUCTIONS
HPV (Human Papillomavirus)   WHAT YOU NEED TO KNOW:   What is human papillomavirus (HPV)? HPV is the most common infection spread by sexual contact  It can also be spread from a mother to her baby during delivery  HPV may cause oral and genital warts or tumors in your nose, mouth, throat, and lungs  HPV may also cause vaginal, penile, and anal cancers  You may not show symptoms of any of these conditions for several years after being exposed to HPV  What are the symptoms of HPV? · Painless warts    · Genital or anal discharge, bleeding, itching, or pain    · Pain when you urinate    How is HPV diagnosed? Your healthcare provider may use a vinegar liquid to help diagnose HPV genital warts  Women 27to 72years old can be checked for HPV during regular cervical cancer screenings  An HPV test checks for certain types of HPV that can cause changes in cervical cells  Without treatment, the changed cells can become cancer  An HPV test can be done every 5 years if the results show no infection  The test can be done with or without a Pap smear  A Pap smear checks for cancer or for abnormal cells that can become cancer  You may be tested for HPV if you are diagnosed with a mouth or throat cancer  How is HPV treated? HPV cannot be cured  Conditions that are caused by HPV can be treated  You will need to be monitored closely for these conditions  Ask your healthcare provider for more information about monitoring, conditions caused by HPV, and available treatments  How can HPV infection be prevented? · Ask about the HPV vaccine  The vaccine can help protect against HPV infection  Females and males can receive the vaccine  It is most effective if given before sexual activity begins  This allows the body to build almost complete protection against HPV before contact with the virus  The vaccine is usually given at 6or 15years of age but may be given as early as 5 years   The vaccine can be given through age 45     · Always use a condom during intercourse  A condom will not completely protect you from HPV infection, but it will help lower your risk  Use a new condom or latex barrier each time you have sex  This includes oral, vaginal, and anal sex  Make sure the condom fits and is put on correctly  Rubber latex sheets or dental dams can be used for oral sex  If you are allergic to latex, use a nonlatex product such as polyurethane  When should I call my doctor? · You have warts in your genital or anal area  · You have genital or anal discharge, bleeding, itching, or pain  · You have pain when you urinate  · You have questions or concerns about your condition or care  CARE AGREEMENT:   You have the right to help plan your care  Learn about your health condition and how it may be treated  Discuss treatment options with your healthcare providers to decide what care you want to receive  You always have the right to refuse treatment  The above information is an  only  It is not intended as medical advice for individual conditions or treatments  Talk to your doctor, nurse or pharmacist before following any medical regimen to see if it is safe and effective for you  © Copyright [employer] 2021 Information is for End User's use only and may not be sold, redistributed or otherwise used for commercial purposes   All illustrations and images included in CareNotes® are the copyrighted property of [employer]  or [address] [place] [address] [employer]

## 2021-10-13 DIAGNOSIS — K21.9 GASTROESOPHAGEAL REFLUX DISEASE WITHOUT ESOPHAGITIS: ICD-10-CM

## 2021-10-15 RX ORDER — OMEPRAZOLE 40 MG/1
40 CAPSULE, DELAYED RELEASE ORAL DAILY
Qty: 30 CAPSULE | Refills: 0 | Status: SHIPPED | OUTPATIENT
Start: 2021-10-15 | End: 2021-11-16 | Stop reason: SDUPTHER

## 2021-10-19 ENCOUNTER — OFFICE VISIT (OUTPATIENT)
Dept: PAIN MEDICINE | Facility: CLINIC | Age: 41
End: 2021-10-19
Payer: COMMERCIAL

## 2021-10-19 VITALS
BODY MASS INDEX: 28.85 KG/M2 | WEIGHT: 169 LBS | DIASTOLIC BLOOD PRESSURE: 69 MMHG | HEART RATE: 58 BPM | RESPIRATION RATE: 18 BRPM | SYSTOLIC BLOOD PRESSURE: 131 MMHG | HEIGHT: 64 IN

## 2021-10-19 DIAGNOSIS — M46.1 SACROILIITIS (HCC): ICD-10-CM

## 2021-10-19 DIAGNOSIS — M51.36 DDD (DEGENERATIVE DISC DISEASE), LUMBAR: ICD-10-CM

## 2021-10-19 DIAGNOSIS — G89.4 CHRONIC PAIN SYNDROME: Primary | ICD-10-CM

## 2021-10-19 PROCEDURE — 1036F TOBACCO NON-USER: CPT | Performed by: NURSE PRACTITIONER

## 2021-10-19 PROCEDURE — 3008F BODY MASS INDEX DOCD: CPT | Performed by: NURSE PRACTITIONER

## 2021-10-19 PROCEDURE — 99214 OFFICE O/P EST MOD 30 MIN: CPT | Performed by: NURSE PRACTITIONER

## 2021-10-19 RX ORDER — GABAPENTIN 300 MG/1
300 CAPSULE ORAL
Qty: 30 CAPSULE | Refills: 1 | Status: SHIPPED | OUTPATIENT
Start: 2021-10-19

## 2021-10-19 RX ORDER — DICLOFENAC SODIUM 75 MG/1
75 TABLET, DELAYED RELEASE ORAL 2 TIMES DAILY
Qty: 60 TABLET | Refills: 1 | Status: SHIPPED | OUTPATIENT
Start: 2021-10-19 | End: 2021-12-08 | Stop reason: ALTCHOICE

## 2021-10-29 ENCOUNTER — TELEPHONE (OUTPATIENT)
Dept: FAMILY MEDICINE CLINIC | Facility: CLINIC | Age: 41
End: 2021-10-29

## 2021-10-29 ENCOUNTER — HOSPITAL ENCOUNTER (OUTPATIENT)
Dept: RADIOLOGY | Facility: CLINIC | Age: 41
Discharge: HOME | End: 2021-10-29
Admitting: ANESTHESIOLOGY
Payer: COMMERCIAL

## 2021-10-29 VITALS
DIASTOLIC BLOOD PRESSURE: 74 MMHG | HEART RATE: 75 BPM | RESPIRATION RATE: 18 BRPM | SYSTOLIC BLOOD PRESSURE: 131 MMHG | OXYGEN SATURATION: 97 % | TEMPERATURE: 98.6 F

## 2021-10-29 DIAGNOSIS — G89.4 CHRONIC PAIN SYNDROME: ICD-10-CM

## 2021-10-29 DIAGNOSIS — M46.1 SACROILIITIS (HCC): ICD-10-CM

## 2021-10-29 PROCEDURE — 64451 NJX AA&/STRD NRV NRVTG SI JT: CPT | Performed by: ANESTHESIOLOGY

## 2021-10-29 RX ORDER — BUPIVACAINE HCL/PF 2.5 MG/ML
10 VIAL (ML) INJECTION ONCE
Status: COMPLETED | OUTPATIENT
Start: 2021-10-29 | End: 2021-10-29

## 2021-10-29 RX ADMIN — BUPIVACAINE HYDROCHLORIDE 3 ML: 2.5 INJECTION, SOLUTION EPIDURAL; INFILTRATION; INTRACAUDAL at 13:15

## 2021-11-01 ENCOUNTER — TELEPHONE (OUTPATIENT)
Dept: PAIN MEDICINE | Facility: CLINIC | Age: 41
End: 2021-11-01

## 2021-11-01 DIAGNOSIS — M79.18 MYOFASCIAL PAIN SYNDROME: Primary | ICD-10-CM

## 2021-11-01 RX ORDER — CYCLOBENZAPRINE HCL 10 MG
10 TABLET ORAL
Qty: 30 TABLET | Refills: 2 | Status: SHIPPED | OUTPATIENT
Start: 2021-11-01

## 2021-11-03 ENCOUNTER — HOSPITAL ENCOUNTER (OUTPATIENT)
Dept: MAMMOGRAPHY | Facility: CLINIC | Age: 41
Discharge: HOME | End: 2021-11-03
Payer: COMMERCIAL

## 2021-11-03 VITALS — WEIGHT: 169 LBS | BODY MASS INDEX: 28.85 KG/M2 | HEIGHT: 64 IN

## 2021-11-03 DIAGNOSIS — Z12.31 ENCOUNTER FOR SCREENING MAMMOGRAM FOR MALIGNANT NEOPLASM OF BREAST: ICD-10-CM

## 2021-11-03 PROCEDURE — 77063 BREAST TOMOSYNTHESIS BI: CPT

## 2021-11-03 PROCEDURE — 77067 SCR MAMMO BI INCL CAD: CPT

## 2021-11-16 DIAGNOSIS — K21.9 GASTROESOPHAGEAL REFLUX DISEASE WITHOUT ESOPHAGITIS: ICD-10-CM

## 2021-11-16 RX ORDER — OMEPRAZOLE 40 MG/1
40 CAPSULE, DELAYED RELEASE ORAL DAILY
Qty: 30 CAPSULE | Refills: 0 | Status: SHIPPED | OUTPATIENT
Start: 2021-11-16 | End: 2022-01-13 | Stop reason: SDUPTHER

## 2021-12-08 ENCOUNTER — OFFICE VISIT (OUTPATIENT)
Dept: PAIN MEDICINE | Facility: CLINIC | Age: 41
End: 2021-12-08
Payer: COMMERCIAL

## 2021-12-08 VITALS
HEART RATE: 64 BPM | BODY MASS INDEX: 28.51 KG/M2 | RESPIRATION RATE: 18 BRPM | HEIGHT: 64 IN | DIASTOLIC BLOOD PRESSURE: 79 MMHG | SYSTOLIC BLOOD PRESSURE: 130 MMHG | WEIGHT: 167 LBS

## 2021-12-08 DIAGNOSIS — M46.1 SACROILIITIS (HCC): ICD-10-CM

## 2021-12-08 DIAGNOSIS — G89.4 CHRONIC PAIN SYNDROME: Primary | ICD-10-CM

## 2021-12-08 PROCEDURE — 1036F TOBACCO NON-USER: CPT | Performed by: NURSE PRACTITIONER

## 2021-12-08 PROCEDURE — 99214 OFFICE O/P EST MOD 30 MIN: CPT | Performed by: NURSE PRACTITIONER

## 2021-12-08 PROCEDURE — 3008F BODY MASS INDEX DOCD: CPT | Performed by: NURSE PRACTITIONER

## 2021-12-08 RX ORDER — NABUMETONE 500 MG/1
500 TABLET, FILM COATED ORAL 2 TIMES DAILY PRN
Qty: 60 TABLET | Refills: 0 | Status: SHIPPED | OUTPATIENT
Start: 2021-12-08

## 2022-01-03 ENCOUNTER — TELEPHONE (OUTPATIENT)
Dept: FAMILY MEDICINE CLINIC | Facility: CLINIC | Age: 42
End: 2022-01-03

## 2022-01-03 NOTE — TELEPHONE ENCOUNTER
Patient requires a form to be completed  Patient is aware of 5-7 business day turn around time  Please refer to the following information:       Type of Form:  Avelino Kimball     Date of Visit (if applicable): 3/15/7680    Doctor: Bong Tyson     Expected date: 1/11/2022    How patient would like to receive form:      Patient phone number: [phone]    Patient stated pain management is not working  Form in preceptor room folder

## 2022-01-13 DIAGNOSIS — K21.9 GASTROESOPHAGEAL REFLUX DISEASE WITHOUT ESOPHAGITIS: ICD-10-CM

## 2022-01-13 RX ORDER — OMEPRAZOLE 40 MG/1
40 CAPSULE, DELAYED RELEASE ORAL DAILY
Qty: 30 CAPSULE | Refills: 0 | Status: SHIPPED | OUTPATIENT
Start: 2022-01-13 | End: 2022-02-10

## 2022-01-13 NOTE — TELEPHONE ENCOUNTER
Dr Mark Lopez has completed the form and it is in the front office  file on the wall  I have copied and scanned to the chart and placed in the file drawer for follow up on scan   Thank you

## 2022-02-10 DIAGNOSIS — K21.9 GASTROESOPHAGEAL REFLUX DISEASE WITHOUT ESOPHAGITIS: ICD-10-CM

## 2022-02-10 RX ORDER — OMEPRAZOLE 40 MG/1
CAPSULE, DELAYED RELEASE ORAL
Qty: 30 CAPSULE | Refills: 0 | Status: SHIPPED | OUTPATIENT
Start: 2022-02-10 | End: 2022-03-10

## 2022-03-01 ENCOUNTER — TELEPHONE (OUTPATIENT)
Dept: FAMILY MEDICINE CLINIC | Facility: CLINIC | Age: 42
End: 2022-03-01

## 2022-03-01 NOTE — TELEPHONE ENCOUNTER
Physician verification form dropped off to be completed  Patient recently had a procedure but still needs pain management  Patient would like to  the form when completed  Placed in Dr Israel Corbin folder in preceptor room  Please advise  Thank you

## 2022-03-10 DIAGNOSIS — K21.9 GASTROESOPHAGEAL REFLUX DISEASE WITHOUT ESOPHAGITIS: ICD-10-CM

## 2022-03-10 RX ORDER — OMEPRAZOLE 40 MG/1
CAPSULE, DELAYED RELEASE ORAL
Qty: 30 CAPSULE | Refills: 0 | Status: SHIPPED | OUTPATIENT
Start: 2022-03-10 | End: 2022-04-06 | Stop reason: SDUPTHER

## 2022-03-14 ENCOUNTER — TELEPHONE (OUTPATIENT)
Dept: FAMILY MEDICINE CLINIC | Facility: CLINIC | Age: 42
End: 2022-03-14

## 2022-03-14 NOTE — TELEPHONE ENCOUNTER
Patient called requesting blood work to be ordered for her Thyroid, diabetes and any other blood work that might be appropriate  Please advise   Thank you

## 2022-03-17 ENCOUNTER — OFFICE VISIT (OUTPATIENT)
Dept: FAMILY MEDICINE CLINIC | Facility: CLINIC | Age: 42
End: 2022-03-17
Payer: MEDICARE

## 2022-03-17 VITALS
OXYGEN SATURATION: 98 % | SYSTOLIC BLOOD PRESSURE: 138 MMHG | WEIGHT: 164.13 LBS | BODY MASS INDEX: 28.17 KG/M2 | TEMPERATURE: 97.8 F | HEART RATE: 74 BPM | DIASTOLIC BLOOD PRESSURE: 98 MMHG

## 2022-03-17 DIAGNOSIS — Z11.4 SCREENING FOR HIV (HUMAN IMMUNODEFICIENCY VIRUS): ICD-10-CM

## 2022-03-17 DIAGNOSIS — Z13.1 SCREENING FOR DIABETES MELLITUS: ICD-10-CM

## 2022-03-17 DIAGNOSIS — R53.83 FATIGUE, UNSPECIFIED TYPE: Primary | ICD-10-CM

## 2022-03-17 DIAGNOSIS — Z13.220 SCREENING FOR LIPOID DISORDERS: ICD-10-CM

## 2022-03-17 PROCEDURE — 99213 OFFICE O/P EST LOW 20 MIN: CPT | Performed by: FAMILY MEDICINE

## 2022-03-18 ENCOUNTER — APPOINTMENT (OUTPATIENT)
Dept: LAB | Facility: CLINIC | Age: 42
End: 2022-03-18
Payer: MEDICARE

## 2022-03-18 DIAGNOSIS — R53.83 FATIGUE, UNSPECIFIED TYPE: ICD-10-CM

## 2022-03-18 DIAGNOSIS — Z11.4 SCREENING FOR HIV (HUMAN IMMUNODEFICIENCY VIRUS): ICD-10-CM

## 2022-03-18 DIAGNOSIS — Z13.1 SCREENING FOR DIABETES MELLITUS: ICD-10-CM

## 2022-03-18 DIAGNOSIS — Z13.220 SCREENING FOR LIPOID DISORDERS: ICD-10-CM

## 2022-03-18 LAB
ANION GAP SERPL CALCULATED.3IONS-SCNC: 5 MMOL/L (ref 4–13)
BASOPHILS # BLD AUTO: 0.04 THOUSANDS/ΜL (ref 0–0.1)
BASOPHILS NFR BLD AUTO: 1 % (ref 0–1)
BUN SERPL-MCNC: 11 MG/DL (ref 5–25)
CALCIUM SERPL-MCNC: 9.2 MG/DL (ref 8.3–10.1)
CHLORIDE SERPL-SCNC: 104 MMOL/L (ref 100–108)
CHOLEST SERPL-MCNC: 224 MG/DL
CO2 SERPL-SCNC: 27 MMOL/L (ref 21–32)
CREAT SERPL-MCNC: 0.98 MG/DL (ref 0.6–1.3)
EOSINOPHIL # BLD AUTO: 0.15 THOUSAND/ΜL (ref 0–0.61)
EOSINOPHIL NFR BLD AUTO: 3 % (ref 0–6)
ERYTHROCYTE [DISTWIDTH] IN BLOOD BY AUTOMATED COUNT: 15.4 % (ref 11.6–15.1)
GFR SERPL CREATININE-BSD FRML MDRD: 71 ML/MIN/1.73SQ M
GLUCOSE P FAST SERPL-MCNC: 88 MG/DL (ref 65–99)
HCT VFR BLD AUTO: 37.9 % (ref 34.8–46.1)
HDLC SERPL-MCNC: 44 MG/DL
HGB BLD-MCNC: 12 G/DL (ref 11.5–15.4)
IMM GRANULOCYTES # BLD AUTO: 0.02 THOUSAND/UL (ref 0–0.2)
IMM GRANULOCYTES NFR BLD AUTO: 0 % (ref 0–2)
LDLC SERPL CALC-MCNC: 138 MG/DL (ref 0–100)
LYMPHOCYTES # BLD AUTO: 1.98 THOUSANDS/ΜL (ref 0.6–4.47)
LYMPHOCYTES NFR BLD AUTO: 39 % (ref 14–44)
MCH RBC QN AUTO: 25.9 PG (ref 26.8–34.3)
MCHC RBC AUTO-ENTMCNC: 31.7 G/DL (ref 31.4–37.4)
MCV RBC AUTO: 82 FL (ref 82–98)
MONOCYTES # BLD AUTO: 0.34 THOUSAND/ΜL (ref 0.17–1.22)
MONOCYTES NFR BLD AUTO: 7 % (ref 4–12)
NEUTROPHILS # BLD AUTO: 2.5 THOUSANDS/ΜL (ref 1.85–7.62)
NEUTS SEG NFR BLD AUTO: 50 % (ref 43–75)
NRBC BLD AUTO-RTO: 0 /100 WBCS
PLATELET # BLD AUTO: 291 THOUSANDS/UL (ref 149–390)
PMV BLD AUTO: 11.3 FL (ref 8.9–12.7)
POTASSIUM SERPL-SCNC: 3.6 MMOL/L (ref 3.5–5.3)
RBC # BLD AUTO: 4.64 MILLION/UL (ref 3.81–5.12)
SODIUM SERPL-SCNC: 136 MMOL/L (ref 136–145)
TRIGL SERPL-MCNC: 210 MG/DL
TSH SERPL DL<=0.05 MIU/L-ACNC: 1.88 UIU/ML (ref 0.36–3.74)
WBC # BLD AUTO: 5.03 THOUSAND/UL (ref 4.31–10.16)

## 2022-03-18 PROCEDURE — 36415 COLL VENOUS BLD VENIPUNCTURE: CPT

## 2022-03-18 PROCEDURE — 87389 HIV-1 AG W/HIV-1&-2 AB AG IA: CPT

## 2022-03-18 PROCEDURE — 84443 ASSAY THYROID STIM HORMONE: CPT

## 2022-03-18 PROCEDURE — 80061 LIPID PANEL: CPT

## 2022-03-18 PROCEDURE — 80048 BASIC METABOLIC PNL TOTAL CA: CPT

## 2022-03-18 PROCEDURE — 85025 COMPLETE CBC W/AUTO DIFF WBC: CPT

## 2022-03-20 PROBLEM — R53.83 FATIGUE: Status: ACTIVE | Noted: 2022-03-20

## 2022-03-20 NOTE — ASSESSMENT & PLAN NOTE
Non-specific symptoms for the past few months  Strong family history of thyroid disease  Patient feels fatigued throughout day  Sleep study negative  Concern for thyroid disorder  No other thyroid symptoms or nodules      - Order CBC, TSH  - Order BMP  - Order Lipid Panel  - Order HIV screen    RTC in 3 months for follow up

## 2022-03-20 NOTE — PROGRESS NOTES
Assessment/Plan:    Fatigue  Non-specific symptoms for the past few months  Strong family history of thyroid disease  Patient feels fatigued throughout day  Sleep study negative  Concern for thyroid disorder  No other thyroid symptoms or nodules  - Order CBC, TSH  - Order BMP  - Order Lipid Panel  - Order HIV screen    RTC in 3 months for follow up       Diagnoses and all orders for this visit:    Fatigue, unspecified type  -     CBC and differential; Future  -     TSH, 3rd generation with Free T4 reflex; Future    Screening for diabetes mellitus  -     Basic metabolic panel; Future    Screening for lipoid disorders  -     Lipid Panel with Direct LDL reflex; Future    Screening for HIV (human immunodeficiency virus)  -     HIV 1/2 Antigen/Antibody (4th Generation) w Reflex [facility]; Future          Subjective:      Patient ID: Angus Primrose is a 39 y o  female  She presents today to discuss fatigue that has affected her for the past few months  States that she has a strong family history of thyroid disorders in her grandmother and sister who had thyroidectomies in the past  Upon further questioning, she denies any bloody stools or heavy periods  She feels very tired during the day and that she does not feel refreshed after napping  She had a sleep study done that did not show sleep apnea  She has concerns for her thyroid and is requesting labs  The following portions of the patient's history were reviewed and updated as appropriate: current medications, past family history, past medical history and problem list     Review of Systems   Constitutional: Negative for activity change, appetite change, chills, fatigue and fever  HENT: Negative for congestion, rhinorrhea, sneezing and sore throat  Respiratory: Negative for cough, chest tightness, shortness of breath and wheezing  Cardiovascular: Negative for chest pain and palpitations     Gastrointestinal: Negative for abdominal distention, abdominal pain, constipation, diarrhea, nausea and vomiting  Endocrine: Negative for cold intolerance and heat intolerance  Genitourinary: Negative for menstrual problem  Musculoskeletal: Negative for arthralgias, back pain, joint swelling and myalgias  Skin: Negative for rash  Neurological: Negative for dizziness, weakness, numbness and headaches  Hematological: Does not bruise/bleed easily  Objective:      /98 (BP Location: Right arm, Patient Position: Sitting, Cuff Size: Standard)   Pulse 74   Temp 97 8 °F (36 6 °C) (Temporal)   Wt 74 4 kg (164 lb 2 oz)   SpO2 98%   BMI 28 17 kg/m²          Physical Exam  Vitals reviewed  Constitutional:       General: She is not in acute distress  Appearance: She is well-developed  She is not toxic-appearing or diaphoretic  HENT:      Head: Normocephalic and atraumatic  Eyes:      General: No scleral icterus  Right eye: No discharge  Left eye: No discharge  Conjunctiva/sclera: Conjunctivae normal    Neck:      Comments: No thyroids nodules on exam  Cardiovascular:      Rate and Rhythm: Normal rate and regular rhythm  Pulses: Normal pulses  Heart sounds: Normal heart sounds  No murmur heard  Pulmonary:      Effort: Pulmonary effort is normal  No respiratory distress  Breath sounds: Normal breath sounds  No wheezing  Abdominal:      General: There is no distension  Palpations: Abdomen is soft  Tenderness: There is no abdominal tenderness  Musculoskeletal:         General: No tenderness  Normal range of motion  Cervical back: Normal range of motion  Lymphadenopathy:      Cervical: No cervical adenopathy  Skin:     General: Skin is warm and dry  Coloration: Skin is not pale  Findings: No erythema  Neurological:      General: No focal deficit present  Mental Status: She is alert     Psychiatric:         Behavior: Behavior normal

## 2022-03-21 LAB — HIV 1+2 AB+HIV1 P24 AG SERPL QL IA: NORMAL

## 2022-03-22 ENCOUNTER — TELEPHONE (OUTPATIENT)
Dept: FAMILY MEDICINE CLINIC | Facility: CLINIC | Age: 42
End: 2022-03-22

## 2022-03-23 ENCOUNTER — OFFICE VISIT (OUTPATIENT)
Dept: DENTISTRY | Facility: CLINIC | Age: 42
End: 2022-03-23

## 2022-03-23 VITALS — TEMPERATURE: 97.8 F | HEART RATE: 69 BPM | DIASTOLIC BLOOD PRESSURE: 91 MMHG | SYSTOLIC BLOOD PRESSURE: 129 MMHG

## 2022-03-23 DIAGNOSIS — Z01.20 ENCOUNTER FOR DENTAL EXAM AND CLEANING W/O ABNORMAL FINDINGS: Primary | ICD-10-CM

## 2022-03-23 PROCEDURE — D0150 COMPREHENSIVE ORAL EVALUATION - NEW OR ESTABLISHED PATIENT: HCPCS | Performed by: DENTIST

## 2022-03-23 PROCEDURE — D0210 INTRAORAL - COMPLETE SERIES OF RADIOGRAPHIC IMAGES: HCPCS

## 2022-03-23 RX ORDER — OMEGA-3S/DHA/EPA/FISH OIL/D3 300MG-1000
400 CAPSULE ORAL DAILY
COMMUNITY

## 2022-03-28 ENCOUNTER — OFFICE VISIT (OUTPATIENT)
Dept: DENTISTRY | Facility: CLINIC | Age: 42
End: 2022-03-28

## 2022-03-28 VITALS — DIASTOLIC BLOOD PRESSURE: 82 MMHG | SYSTOLIC BLOOD PRESSURE: 122 MMHG | HEART RATE: 76 BPM | TEMPERATURE: 97.1 F

## 2022-03-28 DIAGNOSIS — Z01.20 ENCOUNTER FOR DENTAL EXAM AND CLEANING W/O ABNORMAL FINDINGS: Primary | ICD-10-CM

## 2022-03-28 PROCEDURE — D1110 PROPHYLAXIS - ADULT: HCPCS

## 2022-03-28 NOTE — PROGRESS NOTES
ADULT PROPHY  Reviewed med history, meds, allergies in EPIC  CHIEF CONCERN: none   PAIN SCALE: 0  ASA CLASS: I  PLAQUE: mild   CALCULUS: mod calculus/ some dark deposits sub gingival  BLEEDING: light to moderate   STAIN :none   ORAL HYGIENE:  poor    PERIO: some gingivitis present    Hand scaled, polished and flossed      Soft tissue exam:  soft tissue exam was normal  ExtraOral exam:   Extraoral exam was normal    REFERRALS: no referrals needed    CARIES FINDINGS: numerous caries/ see previous tx plan    Next Visit: start tx plan    Next Recall: 6 month recall -stressed importance of 6 mth recalls    Last  FMX : 3/23/22

## 2022-04-06 DIAGNOSIS — K21.9 GASTROESOPHAGEAL REFLUX DISEASE WITHOUT ESOPHAGITIS: ICD-10-CM

## 2022-04-06 RX ORDER — OMEPRAZOLE 40 MG/1
40 CAPSULE, DELAYED RELEASE ORAL DAILY
Qty: 30 CAPSULE | Refills: 0 | Status: SHIPPED | OUTPATIENT
Start: 2022-04-06 | End: 2022-05-06 | Stop reason: SDUPTHER

## 2022-04-13 ENCOUNTER — OFFICE VISIT (OUTPATIENT)
Dept: FAMILY MEDICINE CLINIC | Facility: CLINIC | Age: 42
End: 2022-04-13
Payer: MEDICARE

## 2022-04-13 VITALS
SYSTOLIC BLOOD PRESSURE: 120 MMHG | DIASTOLIC BLOOD PRESSURE: 82 MMHG | BODY MASS INDEX: 27.46 KG/M2 | HEART RATE: 77 BPM | OXYGEN SATURATION: 100 % | WEIGHT: 160 LBS | TEMPERATURE: 98.2 F

## 2022-04-13 DIAGNOSIS — R53.83 FATIGUE, UNSPECIFIED TYPE: Primary | ICD-10-CM

## 2022-04-13 DIAGNOSIS — E78.89 LIPIDS ABNORMAL: ICD-10-CM

## 2022-04-13 PROCEDURE — 99213 OFFICE O/P EST LOW 20 MIN: CPT | Performed by: FAMILY MEDICINE

## 2022-04-13 NOTE — ASSESSMENT & PLAN NOTE
Multiple elevations on lipid panel with decreased HDL    Patient does admit to a poor diet previously, however she has been working on eating a healthier diet including salads and watch her intake of fat and sugar     -   Continue to work on diet  -  Will recheck lipid panel in 6 months to 1 year     RTC in 3 months for follow-up

## 2022-04-13 NOTE — ASSESSMENT & PLAN NOTE
Mostly resolved after changing her diet to a healthier diet  States that she does admits is of feeling fatigued in the morning, however she is able to work through the fatigue  Denies any other complaints today  Previous lab work for fatigue including CBC and TSH were unremarkable      -   Continue current diet as this may be playing a role in her feeling better

## 2022-04-13 NOTE — PROGRESS NOTES
Assessment/Plan:    Fatigue    Mostly resolved after changing her diet to a healthier diet  States that she does admits is of feeling fatigued in the morning, however she is able to work through the fatigue  Denies any other complaints today  Previous lab work for fatigue including CBC and TSH were unremarkable  -   Continue current diet as this may be playing a role in her feeling better    Lipids abnormal   Multiple elevations on lipid panel with decreased HDL  Patient does admit to a poor diet previously, however she has been working on eating a healthier diet including salads and watch her intake of fat and sugar     -   Continue to work on diet  -  Will recheck lipid panel in 6 months to 1 year     RTC in 3 months for follow-up       Diagnoses and all orders for this visit:    Fatigue, unspecified type    Lipids abnormal          Subjective:      Patient ID: Ashlee Ramos is a 39 y o  female  She presents today for follow-up on fatigue  States that she has actually been feeling much better since previously after changing her diet  States that she use the a lot of unhealthy foods, however she has changed her more healthier diet since then  Does have a few instances of feeling tired mostly in the morning however she says that she is able to push through it  Denies any new complaints today  The following portions of the patient's history were reviewed and updated as appropriate: current medications, past medical history and problem list     Review of Systems   Constitutional: Negative for activity change, appetite change, chills, fatigue and fever  HENT: Negative for congestion, rhinorrhea, sneezing and sore throat  Respiratory: Negative for cough, chest tightness, shortness of breath and wheezing  Cardiovascular: Negative for chest pain and palpitations  Gastrointestinal: Negative for abdominal distention, abdominal pain, constipation, diarrhea, nausea and vomiting     Endocrine: Negative for cold intolerance and heat intolerance  Genitourinary: Negative for menstrual problem  Musculoskeletal: Negative for arthralgias, back pain, joint swelling and myalgias  Skin: Negative for rash  Neurological: Negative for dizziness, weakness, numbness and headaches  Hematological: Does not bruise/bleed easily  Objective:      /82   Pulse 77   Temp 98 2 °F (36 8 °C)   Wt 72 6 kg (160 lb)   SpO2 100%   BMI 27 46 kg/m²          Physical Exam  Vitals reviewed  Constitutional:       General: She is not in acute distress  Appearance: She is well-developed  She is not toxic-appearing or diaphoretic  HENT:      Head: Normocephalic and atraumatic  Eyes:      General: No scleral icterus  Right eye: No discharge  Left eye: No discharge  Conjunctiva/sclera: Conjunctivae normal    Cardiovascular:      Rate and Rhythm: Normal rate and regular rhythm  Pulses: Normal pulses  Heart sounds: Normal heart sounds  No murmur heard  Pulmonary:      Effort: Pulmonary effort is normal  No respiratory distress  Breath sounds: Normal breath sounds  No wheezing  Abdominal:      General: There is no distension  Palpations: Abdomen is soft  Tenderness: There is no abdominal tenderness  Musculoskeletal:         General: No tenderness  Normal range of motion  Cervical back: Normal range of motion  Skin:     General: Skin is warm and dry  Coloration: Skin is not pale  Findings: No erythema  Neurological:      General: No focal deficit present  Mental Status: She is alert     Psychiatric:         Behavior: Behavior normal

## 2022-04-19 ENCOUNTER — TELEPHONE (OUTPATIENT)
Dept: FAMILY MEDICINE CLINIC | Facility: CLINIC | Age: 42
End: 2022-04-19

## 2022-04-19 NOTE — TELEPHONE ENCOUNTER
Patient came into the office and dropped off a Physician Verification Form that needs to be filled out  It will be placed in your folder in the preceptor room  Please advise, thank you

## 2022-05-06 DIAGNOSIS — K21.9 GASTROESOPHAGEAL REFLUX DISEASE WITHOUT ESOPHAGITIS: ICD-10-CM

## 2022-05-06 RX ORDER — OMEPRAZOLE 40 MG/1
40 CAPSULE, DELAYED RELEASE ORAL DAILY
Qty: 30 CAPSULE | Refills: 0 | Status: SHIPPED | OUTPATIENT
Start: 2022-05-06 | End: 2022-06-06 | Stop reason: SDUPTHER

## 2022-05-21 ENCOUNTER — HOSPITAL ENCOUNTER (EMERGENCY)
Facility: HOSPITAL | Age: 42
Discharge: HOME | End: 2022-05-22
Attending: EMERGENCY MEDICINE
Payer: MEDICARE

## 2022-05-21 VITALS
BODY MASS INDEX: 26.61 KG/M2 | SYSTOLIC BLOOD PRESSURE: 148 MMHG | DIASTOLIC BLOOD PRESSURE: 93 MMHG | OXYGEN SATURATION: 99 % | WEIGHT: 155 LBS | TEMPERATURE: 98.5 F | RESPIRATION RATE: 16 BRPM | HEART RATE: 83 BPM

## 2022-05-21 DIAGNOSIS — M54.50 ACUTE LOW BACK PAIN: Primary | ICD-10-CM

## 2022-05-21 PROCEDURE — 99285 EMERGENCY DEPT VISIT HI MDM: CPT | Performed by: EMERGENCY MEDICINE

## 2022-05-21 PROCEDURE — 99283 EMERGENCY DEPT VISIT LOW MDM: CPT

## 2022-05-21 PROCEDURE — 96372 THER/PROPH/DIAG INJ SC/IM: CPT

## 2022-05-21 RX ORDER — OXYCODONE HYDROCHLORIDE 5 MG/1
5 TABLET ORAL ONCE
Status: COMPLETED | OUTPATIENT
Start: 2022-05-21 | End: 2022-05-21

## 2022-05-21 RX ORDER — KETOROLAC TROMETHAMINE 30 MG/ML
30 INJECTION, SOLUTION INTRAMUSCULAR; INTRAVENOUS ONCE
Status: COMPLETED | OUTPATIENT
Start: 2022-05-21 | End: 2022-05-21

## 2022-05-21 RX ORDER — DIAZEPAM 5 MG/1
5 TABLET ORAL ONCE
Status: COMPLETED | OUTPATIENT
Start: 2022-05-21 | End: 2022-05-21

## 2022-05-21 RX ORDER — LIDOCAINE 50 MG/G
1 PATCH TOPICAL ONCE
Status: DISCONTINUED | OUTPATIENT
Start: 2022-05-21 | End: 2022-05-22 | Stop reason: HOSPADM

## 2022-05-21 RX ORDER — HYDROMORPHONE HCL/PF 1 MG/ML
0.5 SYRINGE (ML) INJECTION ONCE
Status: COMPLETED | OUTPATIENT
Start: 2022-05-21 | End: 2022-05-22

## 2022-05-21 RX ORDER — ACETAMINOPHEN 325 MG/1
975 TABLET ORAL ONCE
Status: COMPLETED | OUTPATIENT
Start: 2022-05-21 | End: 2022-05-21

## 2022-05-21 RX ADMIN — DIAZEPAM 5 MG: 5 TABLET ORAL at 20:14

## 2022-05-21 RX ADMIN — OXYCODONE HYDROCHLORIDE 5 MG: 5 TABLET ORAL at 22:22

## 2022-05-21 RX ADMIN — ACETAMINOPHEN 975 MG: 325 TABLET, FILM COATED ORAL at 20:14

## 2022-05-21 RX ADMIN — KETOROLAC TROMETHAMINE 30 MG: 30 INJECTION, SOLUTION INTRAMUSCULAR at 20:14

## 2022-05-21 RX ADMIN — LIDOCAINE 5% 1 PATCH: 700 PATCH TOPICAL at 20:14
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
## 2022-05-22 PROCEDURE — 96374 THER/PROPH/DIAG INJ IV PUSH: CPT

## 2022-05-22 RX ORDER — MELOXICAM 7.5 MG/1
7.5 TABLET ORAL DAILY
Qty: 14 TABLET | Refills: 0 | Status: SHIPPED | OUTPATIENT
Start: 2022-05-22 | End: 2022-06-05

## 2022-05-22 RX ORDER — LIDOCAINE 50 MG/G
1 PATCH TOPICAL DAILY
Qty: 14 PATCH | Refills: 0 | Status: SHIPPED | OUTPATIENT
Start: 2022-05-22 | End: 2022-06-05

## 2022-05-22 RX ORDER — METHOCARBAMOL 500 MG/1
500 TABLET, FILM COATED ORAL 2 TIMES DAILY PRN
Qty: 20 TABLET | Refills: 0 | Status: SHIPPED | OUTPATIENT
Start: 2022-05-22

## 2022-05-22 RX ORDER — OXYCODONE HYDROCHLORIDE 5 MG/1
5 TABLET ORAL EVERY 4 HOURS PRN
Qty: 8 TABLET | Refills: 0 | Status: SHIPPED | OUTPATIENT
Start: 2022-05-22

## 2022-05-22 RX ADMIN — HYDROMORPHONE HYDROCHLORIDE 0.5 MG: 1 INJECTION, SOLUTION INTRAMUSCULAR; INTRAVENOUS; SUBCUTANEOUS at 00:29

## 2022-05-22 NOTE — DISCHARGE INSTRUCTIONS
Take the meloxicam as prescribed  Take the Robaxin as needed for muscle spasm, if you are taking the Robaxin, do not take with Flexeril  Take oxycodone as prescribed as needed for breakthrough pain    Avoid operating a vehicle while taking oxycodone and Robaxin    Follow-up with [facility]    Return emergency department if you experience worsening of symptoms including worsening pain, weakness in your legs, numbness or tingling in the area where you wipe, or if you develop bowel or bladder incontinence

## 2022-05-23 ENCOUNTER — TELEPHONE (OUTPATIENT)
Dept: PAIN MEDICINE | Facility: MEDICAL CENTER | Age: 42
End: 2022-05-23

## 2022-05-24 ENCOUNTER — TELEPHONE (OUTPATIENT)
Dept: PHYSICAL THERAPY | Facility: OTHER | Age: 42
End: 2022-05-24

## 2022-05-24 NOTE — TELEPHONE ENCOUNTER
Call placed to the patient per [facility] referral     After explanation of the program the patient is refusing at this time  Patient states she is already in the same type of program through a different provider  Patient appreciative for the call  Referral Closed

## 2022-06-06 DIAGNOSIS — K21.9 GASTROESOPHAGEAL REFLUX DISEASE WITHOUT ESOPHAGITIS: ICD-10-CM

## 2022-06-06 RX ORDER — OMEPRAZOLE 40 MG/1
40 CAPSULE, DELAYED RELEASE ORAL DAILY
Qty: 30 CAPSULE | Refills: 0 | Status: SHIPPED | OUTPATIENT
Start: 2022-06-06 | End: 2022-07-12 | Stop reason: SDUPTHER

## 2022-06-15 ENCOUNTER — HOSPITAL ENCOUNTER (OUTPATIENT)
Dept: CT IMAGING | Facility: HOSPITAL | Age: 42
Discharge: HOME | End: 2022-06-15
Payer: MEDICARE

## 2022-06-15 DIAGNOSIS — M53.3 SI (SACROILIAC) PAIN: ICD-10-CM

## 2022-06-15 PROCEDURE — 72192 CT PELVIS W/O DYE: CPT

## 2022-06-20 ENCOUNTER — TELEPHONE (OUTPATIENT)
Dept: FAMILY MEDICINE CLINIC | Facility: CLINIC | Age: 42
End: 2022-06-20

## 2022-06-20 NOTE — TELEPHONE ENCOUNTER
Physician certification forms needs to be completed and signed for this patient  The form will be placed in your folder in the preceptor room  Please advise, thank you

## 2022-07-11 ENCOUNTER — TELEPHONE (OUTPATIENT)
Dept: OTHER | Facility: OTHER | Age: 42
End: 2022-07-11

## 2022-07-11 NOTE — TELEPHONE ENCOUNTER
PT  States her pharamcy told her to reach out to her PCP office they are unable to fill her omeprazole 40 MG, PT is asking for a refill sent and call to confirm

## 2022-07-12 DIAGNOSIS — K21.9 GASTROESOPHAGEAL REFLUX DISEASE WITHOUT ESOPHAGITIS: ICD-10-CM

## 2022-07-12 RX ORDER — OMEPRAZOLE 40 MG/1
40 CAPSULE, DELAYED RELEASE ORAL DAILY
Qty: 30 CAPSULE | Refills: 0 | Status: SHIPPED | OUTPATIENT
Start: 2022-07-12 | End: 2022-08-09

## 2022-08-08 DIAGNOSIS — K21.9 GASTROESOPHAGEAL REFLUX DISEASE WITHOUT ESOPHAGITIS: ICD-10-CM

## 2022-08-09 RX ORDER — OMEPRAZOLE 40 MG/1
CAPSULE, DELAYED RELEASE ORAL
Qty: 30 CAPSULE | Refills: 0 | Status: SHIPPED | OUTPATIENT
Start: 2022-08-09 | End: 2022-09-07

## 2022-09-07 DIAGNOSIS — K21.9 GASTROESOPHAGEAL REFLUX DISEASE WITHOUT ESOPHAGITIS: ICD-10-CM

## 2022-09-07 RX ORDER — OMEPRAZOLE 40 MG/1
CAPSULE, DELAYED RELEASE ORAL
Qty: 30 CAPSULE | Refills: 0 | Status: SHIPPED | OUTPATIENT
Start: 2022-09-07 | End: 2022-10-05

## 2022-09-21 ENCOUNTER — TELEPHONE (OUTPATIENT)
Dept: PAIN MEDICINE | Facility: CLINIC | Age: 42
End: 2022-09-21

## 2022-09-21 ENCOUNTER — TELEPHONE (OUTPATIENT)
Dept: FAMILY MEDICINE CLINIC | Facility: CLINIC | Age: 42
End: 2022-09-21

## 2022-09-21 ENCOUNTER — OFFICE VISIT (OUTPATIENT)
Dept: PAIN MEDICINE | Facility: CLINIC | Age: 42
End: 2022-09-21
Payer: MEDICARE

## 2022-09-21 VITALS
BODY MASS INDEX: 26.61 KG/M2 | SYSTOLIC BLOOD PRESSURE: 141 MMHG | HEART RATE: 84 BPM | DIASTOLIC BLOOD PRESSURE: 84 MMHG | WEIGHT: 155 LBS

## 2022-09-21 DIAGNOSIS — M50.120 CERVICAL DISC DISORDER WITH RADICULOPATHY OF MID-CERVICAL REGION: Primary | ICD-10-CM

## 2022-09-21 DIAGNOSIS — M79.18 MYOFASCIAL PAIN SYNDROME: ICD-10-CM

## 2022-09-21 PROCEDURE — 99214 OFFICE O/P EST MOD 30 MIN: CPT | Performed by: ANESTHESIOLOGY

## 2022-09-21 RX ORDER — CHLORZOXAZONE 500 MG/1
500 TABLET ORAL 2 TIMES DAILY PRN
Qty: 60 TABLET | Refills: 0 | Status: SHIPPED | OUTPATIENT
Start: 2022-09-21 | End: 2022-09-22 | Stop reason: SDUPTHER

## 2022-09-21 NOTE — TELEPHONE ENCOUNTER
Patient   [phone]  Dr [place]     Patient is calling about her script  I made pt aware that the system is down  Can you please fax the script over to the pharmacy if possible  MARA NITE PT IS NOT HAVING A MEDICATION REACTION  I CLICKED THE WRONG BUTTON

## 2022-09-21 NOTE — TELEPHONE ENCOUNTER
Patient dropped off Physician verification form to be completed  Form in provider pool folder in preceptor pool  Patient stated she is going to be starting PT on Friday 9/24/2022  Pending appointment 10/11/2022 with Dr Dana Dubin to establish care  Please advise  Thank you
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
## 2022-09-21 NOTE — TELEPHONE ENCOUNTER
RN s/w pharmacist with verbal order for chlorzoxazone 500 mg take one tablet twice a day as needed for muscle spasms, # 60 with no refills per Dr Nory Moses  Pt informed script was called to her pharmacy

## 2022-09-22 ENCOUNTER — TELEPHONE (OUTPATIENT)
Dept: RADIOLOGY | Facility: CLINIC | Age: 42
End: 2022-09-22

## 2022-09-22 DIAGNOSIS — M79.18 MYOFASCIAL PAIN SYNDROME: ICD-10-CM

## 2022-09-22 RX ORDER — CHLORZOXAZONE 500 MG/1
500 TABLET ORAL 2 TIMES DAILY PRN
Qty: 60 TABLET | Refills: 2 | Status: SHIPPED | OUTPATIENT
Start: 2022-09-22

## 2022-09-22 NOTE — TELEPHONE ENCOUNTER
Reviewed most recent notes that stated fatigue has improved and apple to work, so I think she needs to come to appointment to fill out that paper to assess her ability or inability to work

## 2022-10-05 DIAGNOSIS — K21.9 GASTROESOPHAGEAL REFLUX DISEASE WITHOUT ESOPHAGITIS: ICD-10-CM

## 2022-10-05 RX ORDER — OMEPRAZOLE 40 MG/1
CAPSULE, DELAYED RELEASE ORAL
Qty: 30 CAPSULE | Refills: 0 | Status: SHIPPED | OUTPATIENT
Start: 2022-10-05

## 2022-10-11 ENCOUNTER — OFFICE VISIT (OUTPATIENT)
Dept: FAMILY MEDICINE CLINIC | Facility: CLINIC | Age: 42
End: 2022-10-11

## 2022-10-11 ENCOUNTER — TELEPHONE (OUTPATIENT)
Dept: FAMILY MEDICINE CLINIC | Facility: CLINIC | Age: 42
End: 2022-10-11

## 2022-10-11 VITALS
BODY MASS INDEX: 28 KG/M2 | HEART RATE: 94 BPM | SYSTOLIC BLOOD PRESSURE: 120 MMHG | HEIGHT: 64 IN | TEMPERATURE: 98 F | OXYGEN SATURATION: 96 % | DIASTOLIC BLOOD PRESSURE: 78 MMHG | WEIGHT: 164 LBS

## 2022-10-11 DIAGNOSIS — M54.12 CERVICAL RADICULOPATHY: ICD-10-CM

## 2022-10-11 DIAGNOSIS — M62.830 SPASM OF BACK MUSCLES: Primary | ICD-10-CM

## 2022-10-11 DIAGNOSIS — G89.4 CHRONIC PAIN SYNDROME: ICD-10-CM

## 2022-10-11 DIAGNOSIS — M46.1 SACROILIITIS (HCC): ICD-10-CM

## 2022-10-11 DIAGNOSIS — M51.36 DDD (DEGENERATIVE DISC DISEASE), LUMBAR: ICD-10-CM

## 2022-10-11 NOTE — PATIENT INSTRUCTIONS
Recommend that you establish with our OMT clinic  See you in 1 week for your annual physical    Cervical Radiculopathy   WHAT YOU NEED TO KNOW:   Cervical radiculopathy is a painful condition that happens when a spinal nerve in your neck is pinched or irritated  DISCHARGE INSTRUCTIONS:   Medicines: You may need any of the following:  NSAIDs  help decrease swelling and pain  This medicine can be bought without a doctor's order  This medicine can cause stomach bleeding or kidney problems in certain people  If you take blood thinner medicine, always ask your healthcare provider if NSAIDs are safe for you  Always read the medicine label and follow the directions on it before using this medicine  Prescription pain medicine  helps decrease pain  Do not wait until the pain is severe before you take this medicine  Steroids  help decrease pain and swelling  These may be given as a pill or as an injection in your neck  You may need more than 1 injection if your symptoms do not improve after the first treatment  Take your medicine as directed  Contact your healthcare provider if you think your medicine is not helping or if you have side effects  Tell him of her if you are allergic to any medicine  Keep a list of the medicines, vitamins, and herbs you take  Include the amounts, and when and why you take them  Bring the list or the pill bottles to follow-up visits  Carry your medicine list with you in case of an emergency  Follow up with your healthcare provider or spine specialist as directed:  Write down your questions so you remember to ask them during your visits  Physical therapy:  Your healthcare provider may suggest physical therapy to stretch and strengthen your muscles  Your physical therapist can teach you how to improve your posture and the way you hold your neck  He may also teach you how to be safely active and avoid further injury   He can also help you develop an exercise program that is safe for your back and neck  Self-care:   Ice  helps decrease swelling and pain  Ice may also help prevent tissue damage  Use an ice pack, or put crushed ice in a plastic bag  Cover it with a towel and place it on your neck for 15 to 20 minutes every hour or as directed  Rest  when you feel it is needed  Slowly start to do more each day  Return to your daily activities as directed  Wear a soft collar  You may be given a soft collar to support your neck while you sleep  Wear the soft collar only as directed  Do light stretches and regular exercise  Your healthcare provider may suggest light stretches to help decrease stiffness in your neck and arm as you recover  After your pain is controlled, you may benefit from regular exercise  Ask what type of exercise is safe for your back and neck  Review your work area  A comfortable work area can help prevent neck strain  Ask your employer for an ergonomic review to check the position of your desk, chair, phone, and computer  Make any necessary adjustments for your comfort  Contact your healthcare provider or spine specialist if:   You have a fever  You are losing weight without trying  Your pain is worse, even with medicine  One or both hands feel more numb than before, or you cannot move your fingers well  You have questions or concerns about your condition or care  © Copyright [employer] 2022 Information is for End User's use only and may not be sold, redistributed or otherwise used for commercial purposes  All illustrations and images included in CareNotes® are the copyrighted property of A D A M , Inc  or Marshall Romero  The above information is an  only  It is not intended as medical advice for individual conditions or treatments  Talk to your doctor, nurse or pharmacist before following any medical regimen to see if it is safe and effective for you

## 2022-10-11 NOTE — TELEPHONE ENCOUNTER
PT  Called in states she is having a hard time getting in contact with occupational medicine, transferred to several different scheduling services, none which are [facility]  Please call PT back
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
## 2022-10-11 NOTE — TELEPHONE ENCOUNTER
She needs to be seen at [address] [place] [address] not occupational therapy  We need to check and see if our [address] can see her for a consult / functional evaluation for potential disability purposes
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
## 2022-10-17 NOTE — TELEPHONE ENCOUNTER
[facility] does not do this evaluation and we do not have any paperwork to be completed requiring or requesting a functional capacity  I imagine this request is related to us signing off on papers for her    Please review
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
## 2022-10-19 NOTE — TELEPHONE ENCOUNTER
Caller: Patient   Doctor: Dr Batool Long     Reason for call: Pt called in she has completed 3 weeks of PT and wanted to know if she can get the MRI is so can he place the order     Call back#: [phone]

## 2022-10-19 NOTE — TELEPHONE ENCOUNTER
RN explained to the pt that the Ins Co requires 6 wks of PT to be performed before approval for MRI  Told pt to contact the office once she has completed 6 wks of PT  Pt understood

## 2022-10-24 ENCOUNTER — OFFICE VISIT (OUTPATIENT)
Dept: DENTISTRY | Facility: CLINIC | Age: 42
End: 2022-10-24

## 2022-10-24 VITALS — HEART RATE: 92 BPM | SYSTOLIC BLOOD PRESSURE: 131 MMHG | DIASTOLIC BLOOD PRESSURE: 82 MMHG

## 2022-10-24 DIAGNOSIS — K02.9 CARIES: ICD-10-CM

## 2022-10-24 NOTE — PROGRESS NOTES
Composite Filling    Ross Santos presents for composite filling  PMH reviewed, no changes  Applied topical benzocaine, administered carps 2% lido 1:100k epi via and carps 4% articaine 1:100k epi via     Prepped tooth #12 DO and #13 MOD with 245 carbide on high speed  Caries removed with round carbide on slow speed  Placed wilson matrix, wedge and ring  Isolation with dry shield    Etch with 37% H2PO4, rinse, dry  Applied Adhese with 20 second scrub once, gentle air dry and light cured for 10s  Restored with Tetric bulk sukhwinder shade A2 and light cured  Refined with finishing burs, polished with enhance point  Verified occlusion and contacts  Pt left satisfied      NV: continue restoration

## 2022-10-31 ENCOUNTER — OFFICE VISIT (OUTPATIENT)
Dept: DENTISTRY | Facility: CLINIC | Age: 42
End: 2022-10-31

## 2022-10-31 VITALS — SYSTOLIC BLOOD PRESSURE: 152 MMHG | HEART RATE: 74 BPM | DIASTOLIC BLOOD PRESSURE: 87 MMHG

## 2022-10-31 DIAGNOSIS — K02.62 DENTAL CARIES EXTENDING INTO DENTIN: Primary | ICD-10-CM

## 2022-10-31 NOTE — PROGRESS NOTES
Patient presents for a dental restoration and verbally consents for treatment: Pt disclosed she is having some minor pain and discomfort on upper left where filling where done last visit  Checked occlusion and high occlusion noted on the resins competed last visit  Today lowered occlusion and pt said it felt much better    Will follow up again next visit  Reviewed health history-  Pt is ASA type II  Treatment consents signed: Yes  Perio: plaque  Pain Scale:2  Caries Assessment: high  Radiographs: Films are current  Oral Hygiene instruction reviewed and given  Hygiene recall visits recommended to the patient  Oral cancer screening done and no pathology noted on ROM, FOM , Palate,soft tissue or tongue  Patient agrees with the diagnosis of Caries and the proposed treatment plan for the resin restoration: Pt preferred working on upper left today  #14 was treatment planned for OL but no caries noted, only staining  Explained to pt and mentioned no resin needed for the moment and will watch it for now  Pt agreed and consented  Tooth #15-O Caries  Dental Anesthesia: 3/4 Carpule 2% Lidocaine 1:100K epi infiltrations  Excavated caries with high speed and round bur on slow speed  Material:  Selective etch and rinsed  Ivoclar bond placed and EvoCeram resin placed and cured  Shade: A2  Polished with finishing burs and Enhance  Occlusion adjusted and contact good and adequate  Gave post op instructions to avoid chewing till numbness goes away  All questions answered  Pt left satisfied and in good health  Prognosis is Good  Referrals Needed: No      Next visit: resins-follow up with UL discomfort (read notes)      Gaudencio Adkins

## 2022-11-03 ENCOUNTER — TELEPHONE (OUTPATIENT)
Dept: PAIN MEDICINE | Facility: MEDICAL CENTER | Age: 42
End: 2022-11-03

## 2022-11-03 DIAGNOSIS — K21.9 GASTROESOPHAGEAL REFLUX DISEASE WITHOUT ESOPHAGITIS: ICD-10-CM

## 2022-11-03 RX ORDER — OMEPRAZOLE 40 MG/1
CAPSULE, DELAYED RELEASE ORAL
Qty: 30 CAPSULE | Refills: 0 | Status: SHIPPED | OUTPATIENT
Start: 2022-11-03

## 2022-11-03 NOTE — TELEPHONE ENCOUNTER
Caller: patient    Doctor: Shayla Menezes    Reason for call: patient requesting neck MRI order    Call back#: [phone]

## 2022-11-03 NOTE — TELEPHONE ENCOUNTER
She needs to be scheduled for re-evaluation in the office by our nurse practitioner who can then order the MRI as long as she is completed 6 weeks of PT otherwise they will deny it

## 2022-11-03 NOTE — TELEPHONE ENCOUNTER
Caller: Patient  Doctor: Richard Nascimento    Reason for call:  Will be calling central schedule for her MRI    Call back#:

## 2022-11-03 NOTE — TELEPHONE ENCOUNTER
RN s/w pt and she confirmed that she has now completed 6 wks for PT for her neck at [address]  Explained to pt that she now needs an ovs w/ CRNP for re-eval and to document PT for neck is completed then CRNP can order the MRI  Explained without these steps Ins will deny MRI  Pt understood  Pt given ovs for 12/7 w/ Neri Purvis for re-eval   RN will call [facility] to have PT notes faxed over

## 2022-11-15 ENCOUNTER — OFFICE VISIT (OUTPATIENT)
Dept: FAMILY MEDICINE CLINIC | Facility: CLINIC | Age: 42
End: 2022-11-15

## 2022-11-15 VITALS
OXYGEN SATURATION: 100 % | HEART RATE: 100 BPM | SYSTOLIC BLOOD PRESSURE: 140 MMHG | BODY MASS INDEX: 26.96 KG/M2 | RESPIRATION RATE: 20 BRPM | TEMPERATURE: 98.5 F | DIASTOLIC BLOOD PRESSURE: 88 MMHG | WEIGHT: 161.8 LBS | HEIGHT: 65 IN

## 2022-11-15 DIAGNOSIS — Z13.220 SCREENING FOR LIPID DISORDERS: ICD-10-CM

## 2022-11-15 DIAGNOSIS — Z00.00 ANNUAL PHYSICAL EXAM: Primary | ICD-10-CM

## 2022-11-15 DIAGNOSIS — F31.60 BIPOLAR 1 DISORDER, MIXED (HCC): ICD-10-CM

## 2022-11-15 DIAGNOSIS — M62.838 MUSCLE SPASM: ICD-10-CM

## 2022-11-15 RX ORDER — CYCLOBENZAPRINE HCL 10 MG
10 TABLET ORAL 3 TIMES DAILY PRN
Qty: 30 TABLET | Refills: 0 | Status: SHIPPED | OUTPATIENT
Start: 2022-11-15

## 2022-11-15 NOTE — PROGRESS NOTES
[address] [facility] [place]    NAME: Ashlee Moulton  AGE: 43 y o  SEX: female  : 1980     DATE: 11/15/2022     Assessment and Plan:     Problem List Items Addressed This Visit        Other    Bipolar 1 disorder, mixed ([address] )    Relevant Orders    Ambulatory Referral to Andrea Gould      Other Visit Diagnoses     Annual physical exam    -  Primary    Screening for lipid disorders        Relevant Orders    Lipid Panel with Direct LDL reflex    Muscle spasm        Relevant Medications    cyclobenzaprine (FLEXERIL) 10 mg tablet        Effie Alaniz is a 42 yo F PMH celiac disease, depression, GERD without esophagitis, hiatal hernia, and hypertension who presents for her annual physical      Immunizations and preventive care screenings were discussed with patient today  Patient denied influenza vaccination today  Appropriate education was printed on patient's after visit summary  Counseling:  Alcohol/drug use: discussed moderation in alcohol intake, the recommendations for healthy alcohol use, and avoidance of illicit drug use  Dental Health: discussed importance of regular tooth brushing, flossing, and dental visits  Injury prevention: discussed safety/seat belts, safety helmets, smoke detectors, carbon dioxide detectors, and smoking near bedding or upholstery  Sexual health: discussed sexually transmitted diseases, partner selection, use of condoms, avoidance of unintended pregnancy, and contraceptive alternatives  · Exercise: the importance of regular exercise/physical activity was discussed  Recommend exercise 3-5 times per week for at least 30 minutes  Return in about 6 months (around 5/15/2023) for Next scheduled follow up        Chief Complaint:     Chief Complaint   Patient presents with   • Physical Exam      History of Present Illness:     Adult Annual Physical   Patient here for a comprehensive physical exam  The patient reports concern of a "lump" located on her right mid-back that she noticed yesterday  Diet and Physical Activity  · Diet/Nutrition: well balanced diet and limited fruits/vegetables  Eats salads frequently   · Exercise: no formal exercise  Limited by pain and mobility     Depression Screening  PHQ-2/9 Depression Screening    Little interest or pleasure in doing things: 2 - more than half the days  Feeling down, depressed, or hopeless: 2 - more than half the days  Trouble falling or staying asleep, or sleeping too much: 3 - nearly every day  Feeling tired or having little energy: 2 - more than half the days  Poor appetite or overeatin - several days  Feeling bad about yourself - or that you are a failure or have let yourself or your family down: 1 - several days  Trouble concentrating on things, such as reading the newspaper or watching television: 3 - nearly every day  Moving or speaking so slowly that other people could have noticed  Or the opposite - being so fidgety or restless that you have been moving around a lot more than usual: 1 - several days  Thoughts that you would be better off dead, or of hurting yourself in some way: 0 - not at all  PHQ-2 Score: 4  PHQ-2 Interpretation: POSITIVE depression screen  PHQ-9 Score: 15   PHQ-9 Interpretation: Moderately severe depression        General Health  · Sleep: sleeps poorly, gets 4-6 hours of sleep on average and unrefreshing sleep  Difficulty initiating sleep  · Hearing: normal - bilateral   · Vision: no vision problems and most recent eye exam <1 year ago  · Dental: regular dental visits, brushes teeth twice daily and flosses teeth occasionally  /GYN Health  · Patient is: Premenopausal   · Last menstrual period: 10/25/2022  · Contraceptive method: tubal ligation  Review of Systems:     Review of Systems   Constitutional: Negative for fatigue, fever and unexpected weight change  HENT: Negative for congestion and sore throat  Eyes: Negative for pain and visual disturbance  Respiratory: Positive for cough and shortness of breath  Negative for wheezing  Cardiovascular: Negative for chest pain, palpitations and leg swelling  Gastrointestinal: Negative for abdominal pain, constipation and diarrhea  Genitourinary: Negative for difficulty urinating  Musculoskeletal: Positive for arthralgias and back pain  Negative for myalgias  Skin: Negative for rash  Neurological: Positive for headaches  Negative for dizziness and light-headedness  Psychiatric/Behavioral: The patient is not nervous/anxious         Past Medical History:     Past Medical History:   Diagnosis Date   • Abnormal Pap smear of cervix    • Celiac disease    • Depression    • GERD without esophagitis    • Heartburn    • Hiatal hernia    • Hypertension    • Vitamin D deficiency       Past Surgical History:     Past Surgical History:   Procedure Laterality Date   • MO LAP,APPENDECTOMY N/A 1/23/2021    Procedure: APPENDECTOMY LAPAROSCOPIC;  Surgeon: Shilpa Aden DO;  [facility];  Service: General   • TUBAL LIGATION        Social History:     Social History     Socioeconomic History   • Marital status: /Civil Union     Spouse name: Not on file   • Number of children: 5   • Years of education: Not on file   • Highest education level: Not on file   Occupational History   • Not on file   Tobacco Use   • Smoking status: Former Smoker   • Smokeless tobacco: Never Used   Vaping Use   • Vaping Use: Never used   Substance and Sexual Activity   • Alcohol use: Yes     Comment: social   • Drug use: No   • Sexual activity: Yes     Partners: Male     Birth control/protection: Female Sterilization   Other Topics Concern   • Not on file   Social History Narrative   • Not on file     Social Determinants of Health     Financial Resource Strain: Not on file   Food Insecurity: Not on file   Transportation Needs: Not on file   Physical Activity: Not on file   Stress: Not on file   Social Connections: Not on file   Intimate Partner Violence: Not on file   Housing Stability: Not on file      Family History:     Family History   Problem Relation Age of Onset   • Arthritis Mother    • Cervical cancer Mother    • Fibromyalgia Mother    • Mental illness Father    • Stroke Father    • Heart disease Father    • Heart disease Maternal Grandmother    • Hypertension Maternal Grandmother    • Diabetes Maternal Grandmother    • No Known Problems Daughter    • No Known Problems Maternal Grandfather    • No Known Problems Paternal Grandmother    • Kidney disease Paternal Grandfather    • No Known Problems Brother    • No Known Problems Son    • Heart disease Brother    • No Known Problems Son    • No Known Problems Son    • No Known Problems Son       Current Medications:     Current Outpatient Medications   Medication Sig Dispense Refill   • cholecalciferol (VITAMIN D3) 400 units tablet Take 400 Units by mouth daily     • cyanocobalamin (VITAMIN B-12) 500 MCG tablet Take 500 mcg by mouth daily     • cyclobenzaprine (FLEXERIL) 10 mg tablet Take 1 tablet (10 mg total) by mouth 3 (three) times a day as needed for muscle spasms 30 tablet 0   • Nutritional Supplements (VITAMIN D BOOSTER PO) Take by mouth     • omeprazole (PriLOSEC) 40 MG capsule TAKE 1 CAPSULE(40 MG) BY MOUTH DAILY 30 capsule 0     No current facility-administered medications for this visit  Allergies: Allergies   Allergen Reactions   • Gluten Meal - Food Allergy Abdominal Pain      Physical Exam:     /88 (BP Location: Left arm, Patient Position: Sitting, Cuff Size: Standard)   Pulse 100   Temp 98 5 °F (36 9 °C)   Resp 20   Ht 5' 4 5" (1 638 m)   Wt 73 4 kg (161 lb 12 8 oz)   SpO2 100%   BMI 27 34 kg/m²     Physical Exam  Constitutional:       General: She is not in acute distress  Appearance: Normal appearance  HENT:      Head: Normocephalic and atraumatic        Right Ear: External ear normal       Left Ear: External ear normal       Mouth/Throat:      Mouth: Mucous membranes are moist       Pharynx: Oropharynx is clear  Eyes:      Conjunctiva/sclera: Conjunctivae normal       Pupils: Pupils are equal, round, and reactive to light  Cardiovascular:      Rate and Rhythm: Normal rate and regular rhythm  Pulmonary:      Effort: Pulmonary effort is normal  No respiratory distress  Breath sounds: Normal breath sounds  Abdominal:      Palpations: Abdomen is soft  Tenderness: There is no abdominal tenderness  Musculoskeletal:      Cervical back: No bony tenderness  Thoracic back: Spasms and tenderness present  No lacerations or bony tenderness  Lumbar back: No bony tenderness  Back:       Right lower leg: No edema  Left lower leg: No edema  Lymphadenopathy:      Cervical: No cervical adenopathy  Skin:     Capillary Refill: Capillary refill takes less than 2 seconds  Neurological:      General: No focal deficit present  Mental Status: She is alert     Psychiatric:         Mood and Affect: Mood normal          Behavior: Behavior normal           Hubert Chaidez MD  [facility]

## 2022-11-15 NOTE — PATIENT INSTRUCTIONS

## 2022-11-16 ENCOUNTER — APPOINTMENT (OUTPATIENT)
Dept: LAB | Facility: CLINIC | Age: 42
End: 2022-11-16

## 2022-11-16 ENCOUNTER — TELEPHONE (OUTPATIENT)
Dept: PSYCHIATRY | Facility: CLINIC | Age: 42
End: 2022-11-16

## 2022-11-16 DIAGNOSIS — Z13.220 SCREENING FOR LIPID DISORDERS: ICD-10-CM

## 2022-11-16 LAB
CHOLEST SERPL-MCNC: 144 MG/DL
HDLC SERPL-MCNC: 40 MG/DL
LDLC SERPL CALC-MCNC: 79 MG/DL (ref 0–100)
TRIGL SERPL-MCNC: 127 MG/DL

## 2022-11-16 NOTE — TELEPHONE ENCOUNTER
Returning patient call  Spoke w  Patient whom stated they were calling in regards to referral pcp sent in looking for [facility] therapy services  Prefers [place] location, open for provider looking for next available  Prefers in person appt   Patient added to [facility] therapy waitlist

## 2022-11-21 ENCOUNTER — OFFICE VISIT (OUTPATIENT)
Dept: DENTISTRY | Facility: CLINIC | Age: 42
End: 2022-11-21

## 2022-11-21 VITALS — TEMPERATURE: 97.5 F | DIASTOLIC BLOOD PRESSURE: 86 MMHG | HEART RATE: 90 BPM | SYSTOLIC BLOOD PRESSURE: 133 MMHG

## 2022-11-21 DIAGNOSIS — Z01.20 ENCOUNTER FOR DENTAL EXAM AND CLEANING W/O ABNORMAL FINDINGS: Primary | ICD-10-CM

## 2022-11-21 NOTE — PROGRESS NOTES
RECALL EXAM, ADULT PROPHY    Last dental recall was 3/28/22  REVIEWED MED HX: meds, allergies, health changes reviewed in EPIC  CHIEF CONCERN:  none   PAIN SCALE:  0  ASA CLASS:  I  PLAQUE:  mild   CALCULUS:   light     BLEEDING:  light    STAIN :   none    ORAL HYGIENE:  good  PERIO: full probe done in 03/2022  Home care excellent  Some recession noted with sensitivity to scaling lower anterior    Hand scaled, polished and flossed  Used Cavitron  Visual and Tactile Intraoral/ Extraoral evaluation: Oral and Oropharyngeal cancer evaluation  No findings     Dr Mauricio Torres exam=   Reviewed with patient clinical and radiographic findings and patient verbalized understanding  All questions and concerns addressed  REFERRALS: no referrals needed    CARIES FINDINGS:        TREATMENT  PLAN :   1) continute tx plan from previous visit      Pt questioned gum recession   Dr Elizabeth Black discussed possibly placing desensitizer nv    Next Recall: 6 month recall     Last FMX : 3/23/22

## 2022-12-01 DIAGNOSIS — K21.9 GASTROESOPHAGEAL REFLUX DISEASE WITHOUT ESOPHAGITIS: ICD-10-CM

## 2022-12-01 RX ORDER — OMEPRAZOLE 40 MG/1
CAPSULE, DELAYED RELEASE ORAL
Qty: 30 CAPSULE | Refills: 0 | Status: SHIPPED | OUTPATIENT
Start: 2022-12-01

## 2022-12-02 ENCOUNTER — TELEPHONE (OUTPATIENT)
Dept: RADIOLOGY | Facility: CLINIC | Age: 42
End: 2022-12-02

## 2022-12-07 ENCOUNTER — OFFICE VISIT (OUTPATIENT)
Dept: PAIN MEDICINE | Facility: CLINIC | Age: 42
End: 2022-12-07

## 2022-12-07 VITALS
HEIGHT: 65 IN | WEIGHT: 161 LBS | DIASTOLIC BLOOD PRESSURE: 83 MMHG | SYSTOLIC BLOOD PRESSURE: 128 MMHG | BODY MASS INDEX: 26.82 KG/M2 | HEART RATE: 80 BPM | RESPIRATION RATE: 18 BRPM

## 2022-12-07 DIAGNOSIS — M50.120 CERVICAL DISC DISORDER WITH RADICULOPATHY OF MID-CERVICAL REGION: ICD-10-CM

## 2022-12-07 DIAGNOSIS — M54.2 NECK PAIN: ICD-10-CM

## 2022-12-07 DIAGNOSIS — G89.4 CHRONIC PAIN SYNDROME: Primary | ICD-10-CM

## 2022-12-07 DIAGNOSIS — M79.18 MYOFASCIAL PAIN SYNDROME: ICD-10-CM
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
## 2022-12-12 ENCOUNTER — PROCEDURE VISIT (OUTPATIENT)
Dept: FAMILY MEDICINE CLINIC | Facility: CLINIC | Age: 42
End: 2022-12-12

## 2022-12-12 DIAGNOSIS — M99.02 SOMATIC DYSFUNCTION OF SPINE, THORACIC: ICD-10-CM

## 2022-12-12 DIAGNOSIS — M62.830 SPASM OF BACK MUSCLES: ICD-10-CM

## 2022-12-12 DIAGNOSIS — M54.50 CHRONIC BILATERAL LOW BACK PAIN, UNSPECIFIED WHETHER SCIATICA PRESENT: Primary | ICD-10-CM

## 2022-12-12 DIAGNOSIS — M99.00 SOMATIC DYSFUNCTION OF HEAD REGION: ICD-10-CM

## 2022-12-12 DIAGNOSIS — G89.29 CHRONIC BILATERAL LOW BACK PAIN, UNSPECIFIED WHETHER SCIATICA PRESENT: Primary | ICD-10-CM

## 2022-12-12 DIAGNOSIS — M99.03 SOMATIC DYSFUNCTION OF SPINE, LUMBAR: ICD-10-CM

## 2022-12-12 DIAGNOSIS — M99.01 SOMATIC DYSFUNCTION OF SPINE, CERVICAL: ICD-10-CM
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
## 2022-12-19 PROBLEM — M54.50 CHRONIC BILATERAL LOW BACK PAIN: Status: ACTIVE | Noted: 2022-12-19

## 2022-12-19 PROBLEM — G89.29 CHRONIC BILATERAL LOW BACK PAIN: Status: ACTIVE | Noted: 2022-12-19

## 2022-12-20 ENCOUNTER — TELEPHONE (OUTPATIENT)
Dept: NEUROLOGY | Facility: CLINIC | Age: 42
End: 2022-12-20

## 2022-12-20 ENCOUNTER — OFFICE VISIT (OUTPATIENT)
Dept: FAMILY MEDICINE CLINIC | Facility: CLINIC | Age: 42
End: 2022-12-20

## 2022-12-20 VITALS
TEMPERATURE: 97.2 F | OXYGEN SATURATION: 99 % | BODY MASS INDEX: 27.66 KG/M2 | DIASTOLIC BLOOD PRESSURE: 80 MMHG | WEIGHT: 162 LBS | SYSTOLIC BLOOD PRESSURE: 130 MMHG | HEART RATE: 88 BPM | HEIGHT: 64 IN

## 2022-12-20 DIAGNOSIS — R41.3 MEMORY LOSS OR IMPAIRMENT: Primary | ICD-10-CM

## 2022-12-20 NOTE — TELEPHONE ENCOUNTER
Patient calling to schedule new patient appointment for memory loss  No testing done  Triage intake sent
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
## 2022-12-29 ENCOUNTER — PROCEDURE VISIT (OUTPATIENT)
Dept: FAMILY MEDICINE CLINIC | Facility: CLINIC | Age: 42
End: 2022-12-29

## 2022-12-29 DIAGNOSIS — M54.2 NECK PAIN: ICD-10-CM

## 2022-12-29 DIAGNOSIS — M51.36 DDD (DEGENERATIVE DISC DISEASE), LUMBAR: Primary | ICD-10-CM

## 2022-12-29 DIAGNOSIS — K21.9 GASTROESOPHAGEAL REFLUX DISEASE WITHOUT ESOPHAGITIS: ICD-10-CM

## 2022-12-29 DIAGNOSIS — M99.03 SOMATIC DYSFUNCTION OF LUMBAR REGION: ICD-10-CM

## 2022-12-29 DIAGNOSIS — M99.01 SOMATIC DYSFUNCTION OF CERVICAL REGION: ICD-10-CM

## 2022-12-29 DIAGNOSIS — M99.04 SOMATIC DYSFUNCTION OF SACRAL REGION: ICD-10-CM

## 2022-12-29 RX ORDER — OMEPRAZOLE 40 MG/1
CAPSULE, DELAYED RELEASE ORAL
Qty: 30 CAPSULE | Refills: 0 | Status: SHIPPED | OUTPATIENT
Start: 2022-12-29

## 2022-12-30 ENCOUNTER — HOSPITAL ENCOUNTER (OUTPATIENT)
Dept: MRI IMAGING | Facility: HOSPITAL | Age: 42
End: 2022-12-30

## 2022-12-30 DIAGNOSIS — M50.120 CERVICAL DISC DISORDER WITH RADICULOPATHY OF MID-CERVICAL REGION: ICD-10-CM

## 2022-12-30 DIAGNOSIS — M54.2 NECK PAIN: ICD-10-CM
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
## 2023-01-03 ENCOUNTER — TELEPHONE (OUTPATIENT)
Dept: PAIN MEDICINE | Facility: CLINIC | Age: 43
End: 2023-01-03

## 2023-01-03 NOTE — TELEPHONE ENCOUNTER
Caller: Evangelista Rendon    Doctor: Jv Montes    Reason for call: patient calling to get result of MRI, made her aware as soon as final we will call her with results     Call back#: [phone]

## 2023-01-04 NOTE — TELEPHONE ENCOUNTER
Please call patient with MRI of cervical spine showing spondylosis resulting in mild canal narrowing  May likely benefit from a cervical epidural steroid injection  Can you please get an update on the patient's symptoms?

## 2023-01-04 NOTE — TELEPHONE ENCOUNTER
Twila     S/w pt and advised of same  Pt verbalized understanding and is agreeable to BEAN  Pt states she feels like "something is stopping my neck from turning Rt or Lt " Pt states she has episodes of pressure at occiput that leads to a HA for 2 days which goes away on its own being that no meds aid in relief Pt states Lt side of neck between trap and shoulder is painful to touch and causes "electrical" sensation  Pt states mobility has incr but not completely  Schd--> Pls schd pt for BEAN  Thank you!

## 2023-01-05 DIAGNOSIS — M50.120 CERVICAL DISC DISORDER WITH RADICULOPATHY OF MID-CERVICAL REGION: Primary | ICD-10-CM

## 2023-01-18 ENCOUNTER — TELEPHONE (OUTPATIENT)
Dept: PAIN MEDICINE | Facility: CLINIC | Age: 43
End: 2023-01-18

## 2023-01-18 NOTE — TELEPHONE ENCOUNTER
Called patient regarding her procedure 2/2/23  She has been going to PT at [facility], will send the clinicals to [facility] for approval     Patient also states pain level has been increasing, it is currently between 5-6/10

## 2023-01-25 DIAGNOSIS — K21.9 GASTROESOPHAGEAL REFLUX DISEASE WITHOUT ESOPHAGITIS: ICD-10-CM

## 2023-01-25 RX ORDER — OMEPRAZOLE 40 MG/1
CAPSULE, DELAYED RELEASE ORAL
Qty: 30 CAPSULE | Refills: 0 | Status: SHIPPED | OUTPATIENT
Start: 2023-01-25

## 2023-01-26 ENCOUNTER — PROCEDURE VISIT (OUTPATIENT)
Dept: FAMILY MEDICINE CLINIC | Facility: CLINIC | Age: 43
End: 2023-01-26

## 2023-01-26 DIAGNOSIS — M51.36 DDD (DEGENERATIVE DISC DISEASE), LUMBAR: Primary | ICD-10-CM

## 2023-01-26 DIAGNOSIS — M54.2 NECK PAIN: ICD-10-CM

## 2023-01-26 DIAGNOSIS — M99.03 SOMATIC DYSFUNCTION OF LUMBAR REGION: ICD-10-CM

## 2023-01-26 DIAGNOSIS — M99.01 SOMATIC DYSFUNCTION OF CERVICAL REGION: ICD-10-CM

## 2023-01-26 DIAGNOSIS — M99.04 SOMATIC DYSFUNCTION OF SACRAL REGION: ICD-10-CM

## 2023-01-26 DIAGNOSIS — M99.02 SOMATIC DYSFUNCTION OF THORACIC REGION: ICD-10-CM

## 2023-01-26 NOTE — PROGRESS NOTES
Assessment/Plan     This is a 43 y o  female who presents for OMT follow-up for:  1  DDD (degenerative disc disease), lumbar        2  Neck pain        3  Somatic dysfunction of cervical region        4  Somatic dysfunction of thoracic region        5  Somatic dysfunction of lumbar region        6  Somatic dysfunction of sacral region            Plan:   1  Patient tolerated OMT well for the above problems,  advised patient to drink fluids and can use NSAID for soreness after treatment     2  OMT Follow up in 3 weeks  Subjective     Wan Clemente is a 43 y o  female and is here for a OMT follow up  The patient reports pain somewhat improved in her neck, no improvement in her low back  She has had 2 previous treatments  She had L4 ablation which is helping some last week  She is planned for an epidural in her cervical spine next week  Is the patient taking Pain medication? no  Has the patient completed physical therapy for this condition? no  Did Patient symptoms improve from last OMT appointment? yes some in neck     The following portions of the patient's history were reviewed and updated as appropriate: allergies, current medications, past family history, past medical history, past social history, past surgical history and problem list     Review of Systems  Do you have pain that bothers you in your daily life? yes  Review of Systems   Constitutional: Negative for fatigue and fever  HENT: Negative for congestion  Respiratory: Negative for cough and shortness of breath  Cardiovascular: Negative for chest pain  Gastrointestinal: Negative for abdominal pain  Genitourinary: Negative for difficulty urinating  Musculoskeletal: Positive for back pain and neck pain  As notes in HPI    Neurological: Negative for dizziness and headaches         Objective     OMT Exam   OMT  Performed by: Rey Christensen DO  Authorized by: Rey Christensen DO   Universal Protocol:  Consent: Verbal consent obtained  Consent given by: patient  Patient understanding: patient states understanding of the procedure being performed  Patient identity confirmed: verbally with patient        Procedure Details:     Region evaluated and treated:  Cervical, Thoracic, Sacrum/Pelvis and Lumbar    Thoracic Information  Thoracic Region: T1 - T4  Cervical Details:     Examination Method:  Tissue Texture Change, Stability, Laxity, Effusions, Tone and Tenderness, Pain    Severity:  Moderate    Osteopathic Findings:  Bilateral trapezius hypertonicity, tenderpoint at C3 left, [id number],     Treatment Method:  Facilitated Positional Release Treatment, Muscle Energy Treatment and Soft Tissue Treatment    Response:  Improved - The somatic dysfunction is improved but not completely resolved  Thoracic T1 - T4 details:     Examination Method:  Tissue Texture Change, Stability, Laxity, Effusions, Tone and Tenderness, Pain    Severity:  Moderate    Osteopathic Findings:  Right mid trapezius hypertonicity, elevated 1st rib left, T2 NRLSR    Treatment Method:  Muscle Energy Treatment and Soft Tissue Treatment    Response:  Improved    Lumbar details:     Examination Method:  Tissue Texture Change, Stability, Laxity, Effusions, Tone and Tenderness, Pain    Severity:  Moderate    Osteopathic Findings:  L5 NRLSR    Treatment Method:  Muscle Energy Treatment and Soft Tissue Treatment    Response:  Improved - The somatic dysfunction is improved but not completely resolved  Sacrum/Pelvis details:     Examination Method:  Tissue Texture Change, Stability, Laxity, Effusions, Tone and Tenderness, Pain    Severity:  Moderate    Osteopathic Findings:  Hypertonicity along bilateral sacral borders     Treatment Method:  Soft Tissue Treatment and Muscle Energy Treatment    Response:  Improved - The somatic dysfunction is improved but not completely resolved      Total Regions Treated:  4

## 2023-02-02 ENCOUNTER — HOSPITAL ENCOUNTER (OUTPATIENT)
Dept: RADIOLOGY | Facility: CLINIC | Age: 43
Discharge: HOME | End: 2023-02-02
Admitting: ANESTHESIOLOGY

## 2023-02-02 VITALS
SYSTOLIC BLOOD PRESSURE: 144 MMHG | OXYGEN SATURATION: 95 % | RESPIRATION RATE: 18 BRPM | HEART RATE: 71 BPM | TEMPERATURE: 98.3 F | DIASTOLIC BLOOD PRESSURE: 86 MMHG

## 2023-02-02 DIAGNOSIS — M50.120 CERVICAL DISC DISORDER WITH RADICULOPATHY OF MID-CERVICAL REGION: ICD-10-CM

## 2023-02-02 RX ORDER — METHYLPREDNISOLONE ACETATE 80 MG/ML
80 INJECTION, SUSPENSION INTRA-ARTICULAR; INTRALESIONAL; INTRAMUSCULAR; PARENTERAL; SOFT TISSUE ONCE
Status: COMPLETED | OUTPATIENT
Start: 2023-02-02 | End: 2023-02-02

## 2023-02-02 RX ADMIN — METHYLPREDNISOLONE ACETATE 80 MG: 80 INJECTION, SUSPENSION INTRA-ARTICULAR; INTRALESIONAL; INTRAMUSCULAR; PARENTERAL; SOFT TISSUE at 14:34

## 2023-02-02 RX ADMIN — IOHEXOL 1 ML: 300 INJECTION, SOLUTION INTRAVENOUS at 14:31

## 2023-02-02 NOTE — DISCHARGE INSTR - LAB
Epidural Steroid Injection   WHAT YOU NEED TO KNOW:   An epidural steroid injection (MADINA) is a procedure to inject steroid medicine into the epidural space  The epidural space is between your spinal cord and vertebrae  Steroids reduce inflammation and fluid buildup in your spine that may be causing pain  You may be given pain medicine along with the steroids  ACTIVITY  Do not drive or operate machinery today  No strenuous activity today - bending, lifting, etc   You may resume normal activites starting tomorrow - start slowly and as tolerated  You may shower today, but no tub baths or hot tubs  You may have numbness for several hours from the local anesthetic  Please use caution and common sense, especially with weight-bearing activities  CARE OF THE INJECTION SITE  If you have soreness or pain, apply ice to the area today (20 minutes on/20 minutes off)  Starting tomorrow, you may use warm, moist heat or ice if needed  You may have an increase or change in your discomfort for 36-48 hours after your treatment  Apply ice and continue with any pain medication you have been prescribed  Notify the [facility] if you have any of the following: redness, drainage, swelling, headache, stiff neck or fever above 100°F     SPECIAL INSTRUCTIONS  Our office will contact you in approximately 7 days for a progress report  MEDICATIONS  Continue to take all routine medications  Our office may have instructed you to hold some medications  As no general anesthesia was used in today's procedure, you should not experience any side effects related to anesthesia  If you are diabetic, the steroids used in today's injection may temporarily increase your blood sugar levels after the first few days after your injection  Please keep a close eye on your sugars and alert the doctor who manages your diabetes if your sugars are significantly high from your baseline or you are symptomatic       If you have a problem specifically related to your procedure, please call our office at [phone]  Problems not related to your procedure should be directed to your primary care physician

## 2023-02-03 ENCOUNTER — TELEPHONE (OUTPATIENT)
Dept: NEUROLOGY | Facility: CLINIC | Age: 43
End: 2023-02-03

## 2023-02-03 NOTE — TELEPHONE ENCOUNTER
Called and offered patient sooner appt   2/7/23 @ 8:00am Demond Patient accepted & confirmed appt
No

## 2023-02-07 ENCOUNTER — APPOINTMENT (OUTPATIENT)
Dept: LAB | Facility: CLINIC | Age: 43
End: 2023-02-07

## 2023-02-07 ENCOUNTER — CONSULT (OUTPATIENT)
Dept: NEUROLOGY | Facility: CLINIC | Age: 43
End: 2023-02-07

## 2023-02-07 VITALS
DIASTOLIC BLOOD PRESSURE: 88 MMHG | SYSTOLIC BLOOD PRESSURE: 118 MMHG | BODY MASS INDEX: 27.66 KG/M2 | TEMPERATURE: 97.3 F | HEART RATE: 76 BPM | WEIGHT: 162 LBS | HEIGHT: 64 IN

## 2023-02-07 DIAGNOSIS — R41.3 MEMORY LOSS OR IMPAIRMENT: ICD-10-CM

## 2023-02-07 LAB
FOLATE SERPL-MCNC: 18.7 NG/ML (ref 3.1–17.5)
VIT B12 SERPL-MCNC: 1283 PG/ML (ref 100–900)

## 2023-02-07 NOTE — ASSESSMENT & PLAN NOTE
Patient is a 43year old female presenting with memory problems  Patient has trouble with short term memory and has difficulty with word finding  It is distressing to her  Her  doesn't notice much change in her memory  No family history of early onset alzheimer's  No learning disability or problems with memory in the past  No work up for memory has been done yet  Patient scored a 29/30 on MoCA  The only point deducted was more one of the recall words which she immediately was able to remember with a  Category clue    Plan:  - Will refer for  neurocognitive assessment  -Will get memory labs (B1, B6, B12, folate, lyme) Patient already had TSH in March which was normal   -Will get Brain MRI to look for structural abnormality   - Patient will follow up after testing is done   Likely, around 3-4 months
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
## 2023-02-08 LAB — B BURGDOR IGG+IGM SER-ACNC: 0.2 AI

## 2023-02-09 ENCOUNTER — TELEPHONE (OUTPATIENT)
Dept: RADIOLOGY | Facility: MEDICAL CENTER | Age: 43
End: 2023-02-09

## 2023-02-09 NOTE — TELEPHONE ENCOUNTER
Caller: patient    Doctor: Giuseppe Orona    Reason for call: stated is experiencing a new patient pain level 7  Improvement 70%  Would like to know what would be the next step    Call back#:

## 2023-02-10 ENCOUNTER — TELEPHONE (OUTPATIENT)
Dept: PAIN MEDICINE | Facility: MEDICAL CENTER | Age: 43
End: 2023-02-10
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
## 2023-02-10 NOTE — TELEPHONE ENCOUNTER
Caller: Michelle Saucedo     Doctor: Dr Garrett Jara     Reason for call: Patient calling asking IF EMG result were received and would like to know next plan of care      Call back#: [phone]

## 2023-02-11 LAB
VIT B1 BLD-SCNC: 113.2 NMOL/L (ref 66.5–200)
VIT B6 SERPL-MCNC: 21.5 UG/L (ref 3.4–65.2)

## 2023-02-14 ENCOUNTER — TELEPHONE (OUTPATIENT)
Dept: PSYCHIATRY | Facility: CLINIC | Age: 43
End: 2023-02-14

## 2023-02-14 NOTE — TELEPHONE ENCOUNTER
Pt informed of EMG results  Pt said since the BEAN on 2/2/23 she has better ROM of her neck  The nerve pain that is at the bottom of her neck/top of the left shoulder is not as sharp and is slowly getting better  If she tilts her head downward she feels a pulling down the spine to the middle of the back  If she touches that spot she feels a zapping in the left shoulder  Pls advise as to the next step

## 2023-02-14 NOTE — TELEPHONE ENCOUNTER
She may continue to get more relief following the cervical epidural steroid injection    If she continues to have breakthrough pain, she can utilize over-the-counter pain medications such as Tylenol and ibuprofen

## 2023-02-14 NOTE — TELEPHONE ENCOUNTER
Her EMG does show a cervical radiculopathy on the left at C5  She did have a cervical epidural steroid injection done in the beginning of February, is that helping?

## 2023-02-16 ENCOUNTER — PROCEDURE VISIT (OUTPATIENT)
Dept: FAMILY MEDICINE CLINIC | Facility: CLINIC | Age: 43
End: 2023-02-16

## 2023-02-16 DIAGNOSIS — M99.04 SOMATIC DYSFUNCTION OF SACRAL REGION: ICD-10-CM

## 2023-02-16 DIAGNOSIS — M99.00 SOMATIC DYSFUNCTION OF HEAD REGION: ICD-10-CM

## 2023-02-16 DIAGNOSIS — M54.2 NECK PAIN: ICD-10-CM

## 2023-02-16 DIAGNOSIS — M54.6 TRIGGER POINT OF THORACIC REGION: ICD-10-CM

## 2023-02-16 DIAGNOSIS — M54.50 CHRONIC BILATERAL LOW BACK PAIN, UNSPECIFIED WHETHER SCIATICA PRESENT: ICD-10-CM

## 2023-02-16 DIAGNOSIS — M99.01 SOMATIC DYSFUNCTION OF CERVICAL REGION: ICD-10-CM

## 2023-02-16 DIAGNOSIS — M99.02 SOMATIC DYSFUNCTION OF THORACIC REGION: ICD-10-CM

## 2023-02-16 DIAGNOSIS — G89.29 CHRONIC BILATERAL LOW BACK PAIN, UNSPECIFIED WHETHER SCIATICA PRESENT: ICD-10-CM

## 2023-02-16 DIAGNOSIS — M99.07 SOMATIC DYSFUNCTION OF UPPER EXTREMITY: ICD-10-CM

## 2023-02-16 DIAGNOSIS — M67.922 BICEPS TENDINOPATHY, LEFT: Primary | ICD-10-CM

## 2023-02-16 DIAGNOSIS — M75.92 SUPRASPINATUS TENDINITIS, LEFT: ICD-10-CM

## 2023-02-16 NOTE — PROGRESS NOTES
Assessment/Plan     This is a 43 y o  female who presents for OMT follow-up for:  1  Biceps tendinopathy, left        2  Neck pain        3  Chronic bilateral low back pain, unspecified whether sciatica present        4  Trigger point of thoracic region        5  Somatic dysfunction of head region        6  Somatic dysfunction of cervical region        7  Somatic dysfunction of thoracic region        8  Somatic dysfunction of sacral region        9  Somatic dysfunction of upper extremity            Plan:   1  Patient tolerated OMT well for the above problems,  advised patient to drink fluids and can use NSAID for soreness after treatment     2  Try PT exercise with weighted band for resistance rather than weights with tendinitis     3  OMT Follow up in 2 weeks  Subjective     David Ingram is a 43 y o  female and is here for a OMT follow up  The patient reports new shoulder pain and tingling  She had EMG which showed C5 impingement  She reports continuing PT, she is doing I, Y, and T exercises with 8 pound weights  Is the patient taking Pain medication? no  Has the patient completed physical therapy for this condition? yes  Did Patient symptoms improve from last OMT appointment? some     The following portions of the patient's history were reviewed and updated as appropriate: allergies, current medications, past family history, past medical history, past social history, past surgical history and problem list     Review of Systems  Do you have pain that bothers you in your daily life? yes  Review of Systems   Constitutional: Negative for fatigue and fever  HENT: Negative for congestion  Respiratory: Negative for cough and shortness of breath  Cardiovascular: Negative for chest pain  Gastrointestinal: Negative for abdominal pain  Genitourinary: Negative for difficulty urinating  Musculoskeletal:        As notes in HPI    Neurological: Negative for dizziness and headaches         Objective OMT Exam   OMT  Performed by: Ramana Jerez DO  Authorized by: Ramana Jerez DO   Universal Protocol:  Consent: Verbal consent obtained  Consent given by: patient  Patient understanding: patient states understanding of the procedure being performed  Patient identity confirmed: verbally with patient        Procedure Details:     Region evaluated and treated:  Head, Cervical, Sacrum/Pelvis, Thoracic and Left Extremities    Extremity Information  Extremities: left upper extremity    Thoracic Information  Thoracic Region: T5 - T9  Head Details:     Examination Method:  Tissue Texture Change, Stability, Laxity, Effusions, Tone    Severity:  Moderate    Osteopathic Findings:  Occipital facial restriction     Treatment Method:  Cranial Treatment, Osteopathy in the Cranial Field, Cranial Osteopathy, Myofascial Release Treatment and Soft Tissue Treatment    Response:  Improved - The somatic dysfunction is improved but not completely resolved  Cervical Details:     Examination Method:  Tissue Texture Change, Stability, Laxity, Effusions, Tone and Tenderness, Pain    Severity:  Moderate    Osteopathic Findings:  Bilateral scalene hypertonicity, Right C2 tenderpoint, C5 NRLSL     Treatment Method:  Soft Tissue Treatment, Muscle Energy Treatment and Facilitated Positional Release Treatment    Response:  Improved - The somatic dysfunction is improved but not completely resolved  Thoracic T5 - T9 details:     Examination Method:  Tissue Texture Change, Stability, Laxity, Effusions, Tone and Tenderness, Pain    Severity:  Moderate    Osteopathic Findings:  Right trigger point in rhomboid     Treatment Method:  Facilitated Positional Release Treatment and Soft Tissue Treatment    Response:  Improved - The somatic dysfunction is improved but not completely resolved      Sacrum/Pelvis details:     Examination Method:  Tissue Texture Change, Stability, Laxity, Effusions, Tone and Tenderness, Pain    Severity: Moderate    Osteopathic Findings:  L on R sacral torsion  Hypertonicity along bilateral sacral border     Treatment Method:  Soft Tissue Treatment and High Velocity, Low Amplitude Treatment    Left Upper Extremity details:     Examination Method:  Tissue Texture Change, Stability, Laxity, Effusions, Tone and Tenderness, Pain    Severity:  Moderate    Osteopathic Findings:  Tendonitis of supraspinitis, biceps short head and hypertonicity of pectoralis  Treatment Method:  Soft Tissue Treatment and Facilitated Positional Release Treatment    Response:  Improved - The somatic dysfunction is improved but not completely resolved      Total Regions Treated:  5

## 2023-02-17 ENCOUNTER — TELEPHONE (OUTPATIENT)
Dept: FAMILY MEDICINE CLINIC | Facility: CLINIC | Age: 43
End: 2023-02-17

## 2023-02-17 NOTE — TELEPHONE ENCOUNTER
Patient dropped off form from [facility] to be completed. Form in preceptor room folder.  Thank you
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
## 2023-02-24 ENCOUNTER — TELEPHONE (OUTPATIENT)
Dept: FAMILY MEDICINE CLINIC | Facility: CLINIC | Age: 43
End: 2023-02-24

## 2023-02-24 DIAGNOSIS — K21.9 GASTROESOPHAGEAL REFLUX DISEASE WITHOUT ESOPHAGITIS: ICD-10-CM

## 2023-02-24 RX ORDER — OMEPRAZOLE 40 MG/1
CAPSULE, DELAYED RELEASE ORAL
Qty: 30 CAPSULE | Refills: 0 | Status: SHIPPED | OUTPATIENT
Start: 2023-02-24

## 2023-02-24 NOTE — TELEPHONE ENCOUNTER
Patient called in to request form be filled out by Tuesday Form is in provider folder If form cannot be filled out by Tuesday Patient would like to be notified

## 2023-02-27 ENCOUNTER — HOSPITAL ENCOUNTER (OUTPATIENT)
Dept: MRI IMAGING | Facility: HOSPITAL | Age: 43
Discharge: HOME | End: 2023-02-27

## 2023-02-27 DIAGNOSIS — R41.3 MEMORY LOSS OR IMPAIRMENT: ICD-10-CM

## 2023-03-03 ENCOUNTER — TELEPHONE (OUTPATIENT)
Dept: NEUROLOGY | Facility: CLINIC | Age: 43
End: 2023-03-03

## 2023-03-03 NOTE — TELEPHONE ENCOUNTER
Spoke w/pt  Advised her of results and recommendations below  Pt verbalized understanding  Confirmed 5/2/23 f/u appt w/Dr Akshat Pimentel

## 2023-03-03 NOTE — TELEPHONE ENCOUNTER
----- Message from Kemi Morales MD sent at 3/1/2023  5:52 PM EST -----  Let pt know mri head -No acute infarction, intracranial hemorrhage or mass effect  Mild to moderate scattered paranasal sinus disease most pronounced in the posterior ethmoid air cells bilaterally  Send copy of report to pcp   Have pt follow up with pcp for sinus issues

## 2023-03-06 ENCOUNTER — OFFICE VISIT (OUTPATIENT)
Dept: DENTISTRY | Facility: CLINIC | Age: 43
End: 2023-03-06

## 2023-03-06 VITALS — DIASTOLIC BLOOD PRESSURE: 87 MMHG | HEART RATE: 82 BPM | SYSTOLIC BLOOD PRESSURE: 125 MMHG

## 2023-03-06 DIAGNOSIS — K02.9 CARIES: Primary | ICD-10-CM

## 2023-03-06 NOTE — PROGRESS NOTES
Composite Filling    Farrukh Fonseca presents for composite filling #2MO and #5DO  PMH reviewed, no changes  ASA 2, pain 0  Discussed with patient need for RCT if pulp exposure occurs or in future if pulp is inflamed  Pt understands and consents  Applied topical benzocaine, administered 1 carp 4% septocaine 1:100k epi via buccal infiltration  Prepped tooth #2MO #5DO with 245 carbide on high speed  Caries removed with round carbide on slow speed  Placed wilson matrix  Isolation with cotton rolls and dri-angles  Etch with 37% H2PO4, rinse, dry  Applied Adhese with 20 second scrub once, gentle air dry and light cured for 10s  Restored with Tetric bulk suhkwinder shade A2 and light cured  Refined with finishing burs, polished with enhance point  Verified occlusion and contacts  Pt left satisfied      NV: resins

## 2023-03-08 ENCOUNTER — PROCEDURE VISIT (OUTPATIENT)
Dept: FAMILY MEDICINE CLINIC | Facility: CLINIC | Age: 43
End: 2023-03-08

## 2023-03-08 DIAGNOSIS — M99.01 SOMATIC DYSFUNCTION OF SPINE, CERVICAL: ICD-10-CM

## 2023-03-08 DIAGNOSIS — M54.2 NECK PAIN: ICD-10-CM

## 2023-03-08 DIAGNOSIS — M25.512 LEFT SHOULDER PAIN, UNSPECIFIED CHRONICITY: Primary | ICD-10-CM

## 2023-03-08 DIAGNOSIS — M99.07 ARM SOMATIC DYSFUNCTION: ICD-10-CM

## 2023-03-08 DIAGNOSIS — M99.02 SOMATIC DYSFUNCTION OF THORACIC REGION: ICD-10-CM

## 2023-03-08 DIAGNOSIS — M79.602 LEFT ARM PAIN: ICD-10-CM
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
## 2023-03-13 ENCOUNTER — TELEPHONE (OUTPATIENT)
Dept: PSYCHIATRY | Facility: CLINIC | Age: 43
End: 2023-03-13

## 2023-03-13 NOTE — TELEPHONE ENCOUNTER
Ref pt to [facility] ([phone])  Pt is aware referral will be sent over and closed on our end

## 2023-03-14 ENCOUNTER — OFFICE VISIT (OUTPATIENT)
Dept: FAMILY MEDICINE CLINIC | Facility: CLINIC | Age: 43
End: 2023-03-14

## 2023-03-14 VITALS
HEIGHT: 64 IN | SYSTOLIC BLOOD PRESSURE: 120 MMHG | TEMPERATURE: 97.4 F | DIASTOLIC BLOOD PRESSURE: 88 MMHG | BODY MASS INDEX: 28.13 KG/M2 | HEART RATE: 91 BPM | OXYGEN SATURATION: 99 % | WEIGHT: 164.8 LBS

## 2023-03-14 DIAGNOSIS — J34.9 PARANASAL SINUS DISEASE: Primary | ICD-10-CM

## 2023-03-14 DIAGNOSIS — J30.9 ALLERGIC RHINITIS, UNSPECIFIED SEASONALITY, UNSPECIFIED TRIGGER: ICD-10-CM

## 2023-03-14 RX ORDER — CETIRIZINE HYDROCHLORIDE 10 MG/1
10 TABLET ORAL DAILY
Qty: 90 TABLET | Refills: 1 | Status: SHIPPED | OUTPATIENT
Start: 2023-03-14

## 2023-03-14 RX ORDER — FLUTICASONE PROPIONATE 50 MCG
1 SPRAY, SUSPENSION (ML) NASAL DAILY
Qty: 15.8 ML | Refills: 1 | Status: SHIPPED | OUTPATIENT
Start: 2023-03-14

## 2023-03-14 NOTE — ASSESSMENT & PLAN NOTE
Pt with recent MRI indicative paranasal sinus disease, with no signs of recent viral or bacterial infection  Additionally, physical exam demonstrates erythematous and inflammatous nasal turbinates       Plan:  · Zyrtec 10 mg daily  · Flonase 1 spray in each nostril daily  · Referral to otolaryngology

## 2023-03-14 NOTE — PROGRESS NOTES
Name: Ellaree Favre      : 1980      MRN: [id number]  Encounter Provider: Claudene Stamp, DO  Encounter Date: 3/14/2023   Encounter department: [facility]    Assessment & Plan     1  Paranasal sinus disease  Assessment & Plan:  Pt with recent MRI demonstration mild to moderate paranasal sinus disease, most prominent in ethmoid air cells  Pt reports no acute illness symptoms  Reports needing to blow her nose frequently and sneezes often  DDx bacterial sinusitis vs viral sinusitis vs allergic rhinitis  More likely due to be allergic rhinitis due to patient's lack of any recent illnesses and more chronic picture  Plan:  · Referral to Otolaryngology  · Zyrtec 10 mg daily  · Flonase 1 spray in each nostril daily  · Nasal rinse with parmjit-med once daily  · Follow up in office in 6 weeks or sooner prn    Orders:  -     Ambulatory Referral to Otolaryngology; Future  -     cetirizine (ZyrTEC) 10 mg tablet; Take 1 tablet (10 mg total) by mouth daily  -     fluticasone (FLONASE) 50 mcg/act nasal spray; 1 spray into each nostril daily    2  Allergic rhinitis, unspecified seasonality, unspecified trigger  Assessment & Plan:  Pt with recent MRI indicative paranasal sinus disease, with no signs of recent viral or bacterial infection  Additionally, physical exam demonstrates erythematous and inflammatous nasal turbinates  Plan:  · Zyrtec 10 mg daily  · Flonase 1 spray in each nostril daily  · Referral to otolaryngology    Orders:  -     cetirizine (ZyrTEC) 10 mg tablet; Take 1 tablet (10 mg total) by mouth daily  -     fluticasone (FLONASE) 50 mcg/act nasal spray; 1 spray into each nostril daily         Subjective      Pt  Is a 42 yo F here to discuss recent MRI results  MRI showed no evidence acute infarction, intracranial hemorrhage or mass effect, but did show mild to moderate scattered paranasal sinus disease, most prominent in the posterior ethmoid air cells   Pt states neurologist mentioned that she would need a referral for ENT based on the MRI results  Pt reports that she doesn't notice feeling sinus congestion, but frequently has to blow nose and sneeze  Pt notices some slight muffling in her hearing lately, no tinnitus, no trauma  No fevers, chills, nausea, vomiting, diarrhea, dizziness  Pt notes has hx of seasonal allergies but not really using anything right now for them  Pt has no other complaints or symptoms  Review of Systems   Constitutional: Negative  HENT: Negative  Eyes: Negative  Respiratory: Negative  Gastrointestinal: Negative  Endocrine: Negative  Genitourinary: Negative  Musculoskeletal: Negative  Allergic/Immunologic: Negative  Neurological: Positive for headaches  Hematological: Negative  Psychiatric/Behavioral: Negative  Current Outpatient Medications on File Prior to Visit   Medication Sig   • cholecalciferol (VITAMIN D3) 400 units tablet Take 400 Units by mouth daily   • cyanocobalamin (VITAMIN B-12) 500 MCG tablet Take 500 mcg by mouth daily   • omeprazole (PriLOSEC) 40 MG capsule TAKE 1 CAPSULE(40 MG) BY MOUTH DAILY   • cyclobenzaprine (FLEXERIL) 10 mg tablet Take 1 tablet (10 mg total) by mouth 3 (three) times a day as needed for muscle spasms (Patient not taking: Reported on 12/20/2022)   • Nutritional Supplements (VITAMIN D BOOSTER PO) Take by mouth (Patient not taking: Reported on 12/20/2022)       Objective     /88 (BP Location: Right arm, Patient Position: Sitting, Cuff Size: Large)   Pulse 91   Temp (!) 97 4 °F (36 3 °C) (Tympanic)   Ht 5' 4" (1 626 m)   Wt 74 8 kg (164 lb 12 8 oz)   SpO2 99%   BMI 28 29 kg/m²     Physical Exam  Constitutional:       Appearance: Normal appearance  She is normal weight  HENT:      Head: Normocephalic and atraumatic        Right Ear: Tympanic membrane, ear canal and external ear normal       Left Ear: Tympanic membrane, ear canal and external ear normal       Ears: Comments: Clear fluid visualized behind tympanic membranes bilaterally     Nose: Congestion present  Comments: Erythematous and inflammed     Mouth/Throat:      Mouth: Mucous membranes are moist       Pharynx: Oropharynx is clear  Eyes:      Extraocular Movements: Extraocular movements intact  Conjunctiva/sclera: Conjunctivae normal       Pupils: Pupils are equal, round, and reactive to light  Cardiovascular:      Rate and Rhythm: Normal rate and regular rhythm  Pulses: Normal pulses  Heart sounds: Normal heart sounds  Pulmonary:      Effort: Pulmonary effort is normal       Breath sounds: Normal breath sounds  Abdominal:      General: Abdomen is flat  Bowel sounds are normal       Palpations: Abdomen is soft  Musculoskeletal:         General: Normal range of motion  Cervical back: Normal range of motion and neck supple  Skin:     General: Skin is warm and dry  Capillary Refill: Capillary refill takes less than 2 seconds  Neurological:      Mental Status: She is alert  Psychiatric:         Mood and Affect: Mood normal          Behavior: Behavior normal          Thought Content:  Thought content normal          Judgment: Judgment normal        Concepcion Pounds, DO

## 2023-03-14 NOTE — ASSESSMENT & PLAN NOTE
Pt with recent MRI demonstration mild to moderate paranasal sinus disease, most prominent in ethmoid air cells  Pt reports no acute illness symptoms  Reports needing to blow her nose frequently and sneezes often  DDx bacterial sinusitis vs viral sinusitis vs allergic rhinitis  More likely due to be allergic rhinitis due to patient's lack of any recent illnesses and more chronic picture       Plan:  · Referral to Otolaryngology  · Zyrtec 10 mg daily  · Flonase 1 spray in each nostril daily  · Nasal rinse with parmjit-med once daily  · Follow up in office in 6 weeks or sooner prn

## 2023-03-15 ENCOUNTER — TELEPHONE (OUTPATIENT)
Dept: DENTISTRY | Facility: CLINIC | Age: 43
End: 2023-03-15

## 2023-03-15 ENCOUNTER — OFFICE VISIT (OUTPATIENT)
Dept: DENTISTRY | Facility: CLINIC | Age: 43
End: 2023-03-15

## 2023-03-15 VITALS — SYSTOLIC BLOOD PRESSURE: 143 MMHG | TEMPERATURE: 98.6 F | DIASTOLIC BLOOD PRESSURE: 94 MMHG | HEART RATE: 97 BPM

## 2023-03-15 DIAGNOSIS — K08.89 TOOTH PAIN: Primary | ICD-10-CM

## 2023-03-15 DIAGNOSIS — K02.9 TOOTH DECAY: ICD-10-CM

## 2023-03-15 NOTE — DENTAL PROCEDURE DETAILS
Limited Focus Exam + Resin Filling #2-MO    Darell Curet  Tiesha Luevano is a 35yo  female and presented to clinic for tooth pain #2 following recent filling on 3/6/23  PMH reviewed, no changes  ASA II  Pain = yes, pt has pain in tooth #2 when drinking cold, breathing air, and biting ever since filling was done    BWX taken #2 and showed void in filling on mesial (possible air pocket)  Decided to re-do filling #2 w/ pt permisison  Discussed with patient need for RCT if pulp exposure occurs or in future if pulp is inflamed  Pt understands and consents  Applied topical benzocaine, administered 0 5 carps 4% articaine 1:100k epi via PSA, MSA block    Prepped tooth #2 with 330, 557 carbide on high speed and 2 round carbide on slow speed  Placed tofflemyer/Hawley matrix  Isolation with cotton rolls/dri-angles  Etched with 37% H2PO4, rinse and dry  Applied Adhese with 20 second scrub once, gentle air dry and light cured for 10s  Restored with Tetric flowable/bulk sukhwinder shade A2 and light cured  Note: upon dismissal pt requested Prevident  Called pt at 5:02p to alert pt Prevident script sent to Pharmacy    Refined with finishing burs, polished with enhance point  Verified occlusion and contacts  POI given   Pt left satisfied and ambulatory      NV: cont restos

## 2023-03-15 NOTE — TELEPHONE ENCOUNTER
Sunshine Gunn who answered and informed her that Prevident script had been filled to Pharmacy on-file  Pt understood and confirmed Pharmacy location

## 2023-03-15 NOTE — PROGRESS NOTES
Limited Focus Exam + Resin Filling #2-MO    Saadia Benavides is a 37yo  female and presented to clinic for tooth pain #2 following recent filling on 3/6/23  PMH reviewed, no changes  ASA II  Pain = yes, pt has pain in tooth #2 when drinking cold, breathing air, and biting ever since filling was done    BWX taken #2 and showed void in filling on mesial (possible air pocket)  Decided to re-do filling #2 w/ pt permisison  Discussed with patient need for RCT if pulp exposure occurs or in future if pulp is inflamed  Pt understands and consents  Applied topical benzocaine, administered 0 5 carps 4% articaine 1:100k epi via PSA, MSA block    Prepped tooth #2 with 330, 557 carbide on high speed and 2 round carbide on slow speed  Placed tofflemyer/Hawley matrix  Isolation with cotton rolls/dri-angles  Etched with 37% H2PO4, rinse and dry  Applied Adhese with 20 second scrub once, gentle air dry and light cured for 10s  Restored with Tetric flowable/bulk sukhwinder shade A2 and light cured  Porfirio 1850 taken following removal of matrix band to confirm no voids before refining prep  Note: upon dismissal pt requested Prevident  Called pt at 5:02p to alert pt Prevident script sent to Pharmacy    Refined with finishing burs, polished with enhance point  Verified occlusion and contacts  POI given   Pt left satisfied and ambulatory      NV: cont restos

## 2023-03-22 ENCOUNTER — PROCEDURE VISIT (OUTPATIENT)
Dept: FAMILY MEDICINE CLINIC | Facility: CLINIC | Age: 43
End: 2023-03-22

## 2023-03-22 DIAGNOSIS — M62.830 SPASM OF BACK MUSCLES: ICD-10-CM

## 2023-03-22 DIAGNOSIS — M54.2 NECK PAIN: Primary | ICD-10-CM

## 2023-03-22 DIAGNOSIS — M50.120 CERVICAL DISC DISORDER WITH RADICULOPATHY OF MID-CERVICAL REGION: ICD-10-CM

## 2023-03-22 DIAGNOSIS — M99.00 SOMATIC DYSFUNCTION OF HEAD REGION: ICD-10-CM

## 2023-03-22 DIAGNOSIS — M99.01 SOMATIC DYSFUNCTION OF SPINE, CERVICAL: ICD-10-CM

## 2023-03-22 NOTE — PATIENT INSTRUCTIONS
Neck Exercises   AMBULATORY CARE:   Neck exercises  help reduce neck pain, and improve neck movement and strength  Neck exercises also help prevent long-term neck problems  Call your doctor if:   Your pain does not get better, or gets worse  You have questions or concerns about your condition, care, or exercise program     What you need to know about exercise safety:   Move slowly, gently, and smoothly  Avoid fast or jerky motions  Stand and sit the way your healthcare provider shows you  Good posture may reduce your neck pain  Check your posture often, even when you are not doing your neck exercises  Follow the exercise program recommended by your healthcare provider  He or she will tell you which exercises are best for your condition  He or she will also tell you how many repetitions to do and how often you should do the exercises  How to perform neck exercises safely:   Exercise position:  You may sit or stand while you do neck exercises  Face forward  Your shoulders should be straight and relaxed, with a good posture  Head tilts, forward and back:  Gently bow your head and try to touch your chin to your chest  Your healthcare provider may tell you to push on the back of your neck to help bow your head  Raise your chin back to the starting position  Tilt your head back as far as possible so you are looking up at the ceiling  Your healthcare provider may tell you to lift your chin to help tilt your head back  Return your head to the starting position  Head tilts, side to side:  Tilt your head, bringing your ear toward your shoulder  Then tilt your head toward the other shoulder  Head turns:  Turn your head to look over your shoulder  Tilt your chin down and try to touch it to your shoulder  Do not raise your shoulder to your chin  Face forward again  Do the same on the other side           Head rolls:  Slowly bring your chin toward your chest  Next, roll your head to the right  Your ear should be positioned over your shoulder  Hold this position for 5 seconds  Roll your head back toward your chest and to the left into the same position  Hold for 5 seconds  Gently roll your head back and around in a clockwise [place] 3 times  Next, move your head in the reverse direction (counterclockwise) in a [place] 3 times  Do not shrug your shoulders upwards while you do this exercise  Follow up with your doctor as directed:  Write down your questions so you remember to ask them during your visits  © Copyright Renata Armendariz 2022 Information is for End User's use only and may not be sold, redistributed or otherwise used for commercial purposes  The above information is an  only  It is not intended as medical advice for individual conditions or treatments  Talk to your doctor, nurse or pharmacist before following any medical regimen to see if it is safe and effective for you  Upper Back Exercises   AMBULATORY CARE:   Upper back exercises  help heal and strengthen your back muscles and prevent another injury  Ask your healthcare provider if you need to see a physical therapist for more advanced exercises  Seek care immediately if:   You have severe pain that prevents you from moving  Call your doctor if:   Your pain becomes worse  You have new pain  You have questions or concerns about your condition, care, or exercise program     What you need to know about exercise safety:   Do the exercises on a mat or firm surface (not on a bed)  A firm surface will support your spine and prevent upper back pain  Move slowly and smoothly  Avoid fast or jerky motions  Breathe normally  Do not hold your breath  Stop if you feel pain  It is normal to feel some discomfort at first, but you should not feel pain  Regular exercise will help decrease your discomfort over time      Perform upper back exercises safely:  Ask your healthcare provider which of the following exercises are best for you and how often to do them  Head rolls:  Sit in a chair or stand  Bring your chin toward your chest and roll your head to the right  Your ear should be over your shoulder  Hold this position for 5 seconds  Roll your head back toward your chest and to the left  Your ear should be over your left shoulder  Hold this position for 5 seconds  Next, roll your head back slowly in a clockwise [place] and repeat 3 times  Do 3 sets of head rolls  Scapular squeeze:  Sit or stand with your arms at your sides  Squeeze your shoulder blades together and hold for 3 seconds  Relax and repeat 3 times  Pectoralis stretch:   a doorway  Lift your hands and place them on each side of the door frame or wall slightly higher than your head  Lean forward slowly until you feel a gentle stretch  Hold for 15 seconds  Repeat 3 times, or as directed  Cat and camel exercise:  Place your hands and knees on the floor  Arch your back upward toward the ceiling and lower your head  Round out your spine as much as you can  Hold for 5 seconds  Lift your head upward and push your chest downward toward the floor  Hold for 5 seconds  Do 3 sets or as directed  Bird dog:  Place your hands and knees on the floor  Keep your wrists directly below your shoulders and your knees directly below your hips  Pull your belly button in toward your spine  Do not flatten or arch your back  Tighten your abdominal muscles  Raise one arm straight out so that it is aligned with your head  Next, raise the leg opposite your arm  Hold this position for 15 seconds  Lower your arm and leg slowly and change sides  Do 5 sets  Follow up with your doctor as directed:  Write down your questions so you remember to ask them during your visits  © Copyright Giuliana Ambriz 2022 Information is for End User's use only and may not be sold, redistributed or otherwise used for commercial purposes    The above information is an  only  It is not intended as medical advice for individual conditions or treatments  Talk to your doctor, nurse or pharmacist before following any medical regimen to see if it is safe and effective for you

## 2023-03-22 NOTE — PROGRESS NOTES
The Assessment/Plan     This is a 43 y o  female who presents for OMT follow-up for:  1  Neck pain        2  Spasm of back muscles        3  Cervical disc disorder with radiculopathy of mid-cervical region        4  Somatic dysfunction of spine, cervical        5  Somatic dysfunction of head region             1  Patient tolerated OMT well for the above problems,  advised patient to drink fluids and can use NSAID for soreness after treatment     2  OMT Follow up in 2 weeks  Subjective Marton Sever is a 43 y o  female and is here for a OMT follow up  The patient reports pain in the upper back, neck that radiates into her head  She currently has a headache that began yesterday  Headache associated with photophobia and nausea  She tried tylenol yesterday for the pain, which did not help alleviate it  Is the patient taking Pain medication? yes  Has the patient completed physical therapy for this condition? yes  Did Patient symptoms improve from last OMT appointment? yes    The following portions of the patient's history were reviewed and updated as appropriate: allergies, current medications, past family history, past medical history, past social history, past surgical history and problem list     Review of Systems  Review of Systems   Eyes: Positive for photophobia  Gastrointestinal: Positive for nausea  Musculoskeletal: Positive for back pain and neck pain  Neurological: Positive for headaches  Objective     OMT Exam     OMT  Performed by: Giulia Solorzano DO  Authorized by: Giulia Solorzano DO   Universal Protocol:  Procedure performed by: (Dr Yarelis Neves, Dr Debby Koenig)  Consent: Verbal consent obtained    Consent given by: patient        Procedure Details:     Region evaluated and treated:  Head, Cervical and Thoracic    Thoracic Information  Thoracic Region: T1 - T4  Head Details:     Examination Method:  Tissue Texture Change, Stability, Laxity, Effusions, Tone, Asymmetry, Misalignment, Crepitation, Defects, Masses and Tenderness, Pain    Severity:  Moderate    Osteopathic Findings:  Hypertrophic paraspinal muscles    Treatment Method:  Muscle Energy Treatment, Soft Tissue Treatment, Myofascial Release Treatment and Counterstrain Treatment    Response:  Improved - The somatic dysfunction is improved but not completely resolved  Cervical Details:     Examination Method:  Tissue Texture Change, Stability, Laxity, Effusions, Tone, Asymmetry, Misalignment, Crepitation, Defects, Masses and Tenderness, Pain    Severity:  Moderate    Osteopathic Findings:  Left cervical trigger point near C3, 4, 5  Right cervical trigger point near C6  Hypertrophic paraspinal cervical muscles, worse on R  Hypertrophic trapezius     Treatment Method:  Soft Tissue Treatment, Counterstrain Treatment, Muscle Energy Treatment and Facilitated Positional Release Treatment    Response:  Improved - The somatic dysfunction is improved but not completely resolved  Thoracic T1 - T4 details:     Osteopathic Findings:  Hypertrophic trapezius  T3 trigger points  T7 trigger point     Treatment Method:  Soft Tissue Treatment, High Velocity, Low Amplitude Treatment and Counterstrain Treatment    Response:  Improved    Total Regions Treated:  3  Attending provider present in exam room for procedure:  Yes

## 2023-03-23 DIAGNOSIS — K21.9 GASTROESOPHAGEAL REFLUX DISEASE WITHOUT ESOPHAGITIS: ICD-10-CM

## 2023-03-23 RX ORDER — OMEPRAZOLE 40 MG/1
CAPSULE, DELAYED RELEASE ORAL
Qty: 30 CAPSULE | Refills: 0 | Status: SHIPPED | OUTPATIENT
Start: 2023-03-23

## 2023-04-06 ENCOUNTER — PROCEDURE VISIT (OUTPATIENT)
Dept: FAMILY MEDICINE CLINIC | Facility: CLINIC | Age: 43
End: 2023-04-06

## 2023-04-06 DIAGNOSIS — M99.07 SOMATIC DYSFUNCTION OF UPPER EXTREMITY: ICD-10-CM

## 2023-04-06 DIAGNOSIS — M99.02 SOMATIC DYSFUNCTION OF THORACIC REGION: ICD-10-CM

## 2023-04-06 DIAGNOSIS — M50.120 CERVICAL DISC DISORDER WITH RADICULOPATHY OF MID-CERVICAL REGION: Primary | ICD-10-CM

## 2023-04-06 DIAGNOSIS — M77.8 TRICEPS TENDONITIS: ICD-10-CM

## 2023-04-06 DIAGNOSIS — M99.01 SOMATIC DYSFUNCTION OF CERVICAL REGION: ICD-10-CM

## 2023-04-21 ENCOUNTER — APPOINTMENT (EMERGENCY)
Dept: CT IMAGING | Facility: HOSPITAL | Age: 43
End: 2023-04-21

## 2023-04-21 ENCOUNTER — HOSPITAL ENCOUNTER (EMERGENCY)
Facility: HOSPITAL | Age: 43
Discharge: HOME | End: 2023-04-21
Attending: EMERGENCY MEDICINE

## 2023-04-21 VITALS
HEIGHT: 64 IN | TEMPERATURE: 98.4 F | BODY MASS INDEX: 27.31 KG/M2 | HEART RATE: 77 BPM | OXYGEN SATURATION: 100 % | WEIGHT: 160 LBS | DIASTOLIC BLOOD PRESSURE: 84 MMHG | RESPIRATION RATE: 20 BRPM | SYSTOLIC BLOOD PRESSURE: 148 MMHG

## 2023-04-21 DIAGNOSIS — R42 DIZZINESS: Primary | ICD-10-CM

## 2023-04-21 DIAGNOSIS — D64.9 ANEMIA: ICD-10-CM

## 2023-04-21 LAB
ALBUMIN SERPL BCP-MCNC: 4.1 G/DL (ref 3.5–5)
ALP SERPL-CCNC: 59 U/L (ref 34–104)
ALT SERPL W P-5'-P-CCNC: 16 U/L (ref 7–52)
ANION GAP SERPL CALCULATED.3IONS-SCNC: 10 MMOL/L (ref 4–13)
AST SERPL W P-5'-P-CCNC: 19 U/L (ref 13–39)
BASOPHILS # BLD AUTO: 0.04 THOUSANDS/ΜL (ref 0–0.1)
BASOPHILS NFR BLD AUTO: 1 % (ref 0–1)
BILIRUB SERPL-MCNC: 0.33 MG/DL (ref 0.2–1)
BUN SERPL-MCNC: 11 MG/DL (ref 5–25)
CALCIUM SERPL-MCNC: 9 MG/DL (ref 8.4–10.2)
CHLORIDE SERPL-SCNC: 102 MMOL/L (ref 96–108)
CO2 SERPL-SCNC: 24 MMOL/L (ref 21–32)
CREAT SERPL-MCNC: 0.93 MG/DL (ref 0.6–1.3)
EOSINOPHIL # BLD AUTO: 0.08 THOUSAND/ΜL (ref 0–0.61)
EOSINOPHIL NFR BLD AUTO: 2 % (ref 0–6)
ERYTHROCYTE [DISTWIDTH] IN BLOOD BY AUTOMATED COUNT: 16.2 % (ref 11.6–15.1)
GFR SERPL CREATININE-BSD FRML MDRD: 76 ML/MIN/1.73SQ M
GLUCOSE SERPL-MCNC: 97 MG/DL (ref 65–140)
HCT VFR BLD AUTO: 29.6 % (ref 34.8–46.1)
HGB BLD-MCNC: 9.1 G/DL (ref 11.5–15.4)
IMM GRANULOCYTES # BLD AUTO: 0.01 THOUSAND/UL (ref 0–0.2)
IMM GRANULOCYTES NFR BLD AUTO: 0 % (ref 0–2)
LYMPHOCYTES # BLD AUTO: 2.04 THOUSANDS/ΜL (ref 0.6–4.47)
LYMPHOCYTES NFR BLD AUTO: 39 % (ref 14–44)
MCH RBC QN AUTO: 21.2 PG (ref 26.8–34.3)
MCHC RBC AUTO-ENTMCNC: 30.7 G/DL (ref 31.4–37.4)
MCV RBC AUTO: 69 FL (ref 82–98)
MONOCYTES # BLD AUTO: 0.34 THOUSAND/ΜL (ref 0.17–1.22)
MONOCYTES NFR BLD AUTO: 7 % (ref 4–12)
NEUTROPHILS # BLD AUTO: 2.72 THOUSANDS/ΜL (ref 1.85–7.62)
NEUTS SEG NFR BLD AUTO: 51 % (ref 43–75)
NRBC BLD AUTO-RTO: 0 /100 WBCS
PLATELET # BLD AUTO: 367 THOUSANDS/UL (ref 149–390)
PMV BLD AUTO: 9.6 FL (ref 8.9–12.7)
POTASSIUM SERPL-SCNC: 3.8 MMOL/L (ref 3.5–5.3)
PROT SERPL-MCNC: 7.5 G/DL (ref 6.4–8.4)
RBC # BLD AUTO: 4.29 MILLION/UL (ref 3.81–5.12)
SODIUM SERPL-SCNC: 136 MMOL/L (ref 135–147)
WBC # BLD AUTO: 5.23 THOUSAND/UL (ref 4.31–10.16)

## 2023-04-21 RX ORDER — MECLIZINE HYDROCHLORIDE 25 MG/1
25 TABLET ORAL EVERY 8 HOURS PRN
Qty: 10 TABLET | Refills: 0 | Status: SHIPPED | OUTPATIENT
Start: 2023-04-21

## 2023-04-21 RX ORDER — MECLIZINE HYDROCHLORIDE 25 MG/1
25 TABLET ORAL ONCE
Status: COMPLETED | OUTPATIENT
Start: 2023-04-21 | End: 2023-04-21

## 2023-04-21 RX ADMIN — MECLIZINE HYDROCHLORIDE 25 MG: 25 TABLET ORAL at 17:04

## 2023-04-21 NOTE — ED NOTES
D/c reviewed with pt  Pt verbalized understanding and has no further questions at this time  Pt ambulatory off unit with steady gait       Evie Manyard RN  04/21/23 2241

## 2023-04-21 NOTE — ED PROVIDER NOTES
"History  Chief Complaint   Patient presents with   • Dizziness     PT \"I have been dizzy since 0930  I have been dizzy in the past but not like this  I have a sinus infection for about a month now  I do get a little nauseated but I just breath thought\" Pt denies CP and SOB     Past Medical History: Celiac disease, Depression, GERD without esophagitis, Hiatal hernia, HTN, Abnormal PAP: ASCUS, +HPV,  TERRI, Neck pain-  Spasm of back muscles, DDD (degenerative disc disease), lumbar, bipolar 1 disorder, mixed, Cervical disc disorder with radiculopathy of mid-cervical region, Somatic dysfunction of spine, cervical/Arm somatic dysfunction/Somatic dysfunction of thoracic region, Paranasal sinus disease/Allergic rhinitis  PSH S/P laparoscopic appendectomy,  TUBAL LIGATION      Pt presents to ED c/o several hour h/o sudden onset of constant, atraumatic dizziness described as \"offbalance\"/started when she got up quick, worse with change in position and moving head with no fever, cp, sob, abd pain, +Nausea without vomiting, no bowel changes, no visual changes  + recent sinus infection, had MRI and ENT did nasal endoscopy: no acute infection, + deviated septum and enlarged turbinates, told to continue sinus rinse, nasal steroids, antihistamines  Prior to Admission Medications   Prescriptions Last Dose Informant Patient Reported? Taking? Nutritional Supplements (VITAMIN D BOOSTER PO)   Yes No   Sig: Take by mouth   Patient not taking: Reported on 12/20/2022   Sodium Fluoride 1 1 % PSTE   No No   Sig: Take 1 Film by mouth daily at bedtime Use once per day at bedtime  Brush, spit out  Don't rinse  Let sit on teeth   Generic brand OK   cetirizine (ZyrTEC) 10 mg tablet   No Yes   Sig: Take 1 tablet (10 mg total) by mouth daily   cholecalciferol (VITAMIN D3) 400 units tablet   Yes Yes   Sig: Take 400 Units by mouth daily   cyanocobalamin (VITAMIN B-12) 500 MCG tablet   Yes Yes   Sig: Take 500 mcg by mouth daily " cyclobenzaprine (FLEXERIL) 10 mg tablet   No No   Sig: Take 1 tablet (10 mg total) by mouth 3 (three) times a day as needed for muscle spasms   Patient not taking: Reported on 2022   fluticasone (FLONASE) 50 mcg/act nasal spray   No Yes   Si spray into each nostril daily   omeprazole (PriLOSEC) 40 MG capsule   No Yes   Sig: TAKE 1 CAPSULE(40 MG) BY MOUTH DAILY      Facility-Administered Medications: None       Past Medical History:   Diagnosis Date   • Abnormal Pap smear of cervix    • Celiac disease    • Depression    • GERD without esophagitis    • Heartburn    • Hiatal hernia    • Hypertension    • Vitamin D deficiency        Past Surgical History:   Procedure Laterality Date   • NH LAPAROSCOPIC APPENDECTOMY N/A 2021    Procedure: APPENDECTOMY LAPAROSCOPIC;  Surgeon: Evette Pereira DO;  Location: [facility];  Service: General   • TUBAL LIGATION         Family History   Problem Relation Age of Onset   • Arthritis Mother    • Cervical cancer Mother    • Fibromyalgia Mother    • Mental illness Father    • Stroke Father    • Heart disease Father    • Heart disease Maternal Grandmother    • Hypertension Maternal Grandmother    • Diabetes Maternal Grandmother    • No Known Problems Daughter    • No Known Problems Maternal Grandfather    • No Known Problems Paternal Grandmother    • Kidney disease Paternal Grandfather    • No Known Problems Brother    • No Known Problems Son    • Heart disease Brother    • No Known Problems Son    • No Known Problems Son    • No Known Problems Son      I have reviewed and agree with the history as documented      E-Cigarette/Vaping   • E-Cigarette Use Never User      E-Cigarette/Vaping Substances   • Nicotine No    • THC No    • CBD No    • Flavoring No    • Other No    • Unknown No      Social History     Tobacco Use   • Smoking status: Former   • Smokeless tobacco: Never   Vaping Use   • Vaping Use: Never used   Substance Use Topics   • Alcohol use: Yes     Comment: social   • Drug use: No       Review of Systems   Constitutional: Positive for activity change  Negative for fever  HENT: Positive for congestion, postnasal drip, rhinorrhea, sinus pressure and sinus pain  Negative for ear discharge, ear pain, hearing loss, nosebleeds, sore throat and trouble swallowing  Eyes: Negative for discharge and visual disturbance  Respiratory: Negative for cough and shortness of breath  Cardiovascular: Negative for chest pain and leg swelling  Gastrointestinal: Positive for nausea  Negative for abdominal pain, constipation, diarrhea and vomiting  Genitourinary: Negative for dysuria and frequency  Musculoskeletal: Positive for arthralgias, myalgias and neck pain  Skin: Negative for color change and pallor  Neurological: Positive for dizziness and light-headedness  Negative for facial asymmetry, weakness and headaches  Psychiatric/Behavioral: Negative for behavioral problems  All other systems reviewed and are negative  Physical Exam  Physical Exam  Vitals and nursing note reviewed  Constitutional:       General: She is in acute distress  Appearance: Normal appearance  She is well-developed  HENT:      Head: Normocephalic and atraumatic  Right Ear: External ear normal       Left Ear: External ear normal       Nose: Nose normal       Mouth/Throat:      Mouth: Mucous membranes are moist       Pharynx: Oropharynx is clear  Eyes:      Conjunctiva/sclera: Conjunctivae normal       Comments: Neg nystagmus   Cardiovascular:      Rate and Rhythm: Normal rate and regular rhythm  Pulmonary:      Effort: Pulmonary effort is normal  No respiratory distress  Breath sounds: Normal breath sounds  Abdominal:      General: Bowel sounds are normal       Palpations: Abdomen is soft  Tenderness: There is no abdominal tenderness  Musculoskeletal:         General: No tenderness or signs of injury  Normal range of motion        Cervical back: Normal range of motion and neck supple  No tenderness  Right lower leg: No edema  Left lower leg: No edema  Lymphadenopathy:      Cervical: No cervical adenopathy  Skin:     General: Skin is warm and dry  Capillary Refill: Capillary refill takes less than 2 seconds  Neurological:      General: No focal deficit present  Mental Status: She is alert and oriented to person, place, and time  Cranial Nerves: No cranial nerve deficit  Motor: No weakness        Coordination: Coordination normal       Gait: Gait normal       Deep Tendon Reflexes: Reflexes normal    Psychiatric:         Mood and Affect: Mood normal          Behavior: Behavior normal          Vital Signs  ED Triage Vitals [04/21/23 1627]   Temperature Pulse Respirations Blood Pressure SpO2   98 4 °F (36 9 °C) 77 20 148/84 100 %      Temp Source Heart Rate Source Patient Position - Orthostatic VS BP Location FiO2 (%)   Oral Monitor Sitting Left arm --      Pain Score       5           Vitals:    04/21/23 1627   BP: 148/84   Pulse: 77   Patient Position - Orthostatic VS: Sitting         Visual Acuity      ED Medications  Medications   meclizine (ANTIVERT) tablet 25 mg (25 mg Oral Given 4/21/23 1704)       Diagnostic Studies  Results Reviewed     Procedure Component Value Units Date/Time    Comprehensive metabolic panel [[id number]] Collected: 04/21/23 1706    Lab Status: Final result Specimen: Blood from Arm, Left Updated: 04/21/23 1726     Sodium 136 mmol/L      Potassium 3 8 mmol/L      Chloride 102 mmol/L      CO2 24 mmol/L      ANION GAP 10 mmol/L      BUN 11 mg/dL      Creatinine 0 93 mg/dL      Glucose 97 mg/dL      Calcium 9 0 mg/dL      AST 19 U/L      ALT 16 U/L      Alkaline Phosphatase 59 U/L      Total Protein 7 5 g/dL      Albumin 4 1 g/dL      Total Bilirubin 0 33 mg/dL      eGFR 76 ml/min/1 73sq m     Narrative:      [place] guidelines for Chronic Kidney Disease (CKD):   •  Stage 1 with normal or high GFR (GFR > 90 mL/min/1 73 square meters)  •  Stage 2 Mild CKD (GFR = 60-89 mL/min/1 73 square meters)  •  Stage 3A Moderate CKD (GFR = 45-59 mL/min/1 73 square meters)  •  Stage 3B Moderate CKD (GFR = 30-44 mL/min/1 73 square meters)  •  Stage 4 Severe CKD (GFR = 15-29 mL/min/1 73 square meters)  •  Stage 5 End Stage CKD (GFR <15 mL/min/1 73 square meters)  Note: GFR calculation is accurate only with a steady state creatinine    CBC and differential [[id number]]  (Abnormal) Collected: 04/21/23 1706    Lab Status: Final result Specimen: Blood from Arm, Left Updated: 04/21/23 1711     WBC 5 23 Thousand/uL      RBC 4 29 Million/uL      Hemoglobin 9 1 g/dL      Hematocrit 29 6 %      MCV 69 fL      MCH 21 2 pg      MCHC 30 7 g/dL      RDW 16 2 %      MPV 9 6 fL      Platelets 797 Thousands/uL      nRBC 0 /100 WBCs      Neutrophils Relative 51 %      Immat GRANS % 0 %      Lymphocytes Relative 39 %      Monocytes Relative 7 %      Eosinophils Relative 2 %      Basophils Relative 1 %      Neutrophils Absolute 2 72 Thousands/µL      Immature Grans Absolute 0 01 Thousand/uL      Lymphocytes Absolute 2 04 Thousands/µL      Monocytes Absolute 0 34 Thousand/µL      Eosinophils Absolute 0 08 Thousand/µL      Basophils Absolute 0 04 Thousands/µL                  CT head without contrast   Final Result by Marissa Turcios MD (04/21 1757)      No acute intracranial abnormality  Workstation performed: [id number]                    Procedures  Procedures         ED Course  ED Course as of 04/21/23 1851 Fri Apr 21, 2023   1753 Cmp normal   1753 Hemoglobin(!): 9 1   1753 HCT(!): 29 6  Mild anemia, down from last h/h from 1 year go which was 12/29 6                                             Medical Decision Making  PT here with dizziness, room spinning, vertigo-like sx; no h/o same    Pt states just had sinus issues, recnt MRI-normal ENT visit, and recent CMT, but sx began this am     PT feeling better, has neurology FU next week for other issues  Mild anemia, pt states h/o same, took iron supplements in the past, not presently; can restart, stable, no indication for stat transfusion; this may be exacerbating the dizziness    Amount and/or Complexity of Data Reviewed  External Data Reviewed: labs, radiology and notes  Details: MRI 2/27/2023 IMPRESSION:   1   No acute infarction, intracranial hemorrhage or mass effect  2   Mild to moderate scattered paranasal sinus disease most pronounced in the posterior ethmoid air cells bilaterally  recent vitamin labs done wnl     Labs: ordered  Decision-making details documented in ED Course  Radiology: ordered  Risk  OTC drugs  Prescription drug management  Disposition  Final diagnoses:   Dizziness - Vertigo   Anemia     Time reflects when diagnosis was documented in both MDM as applicable and the Disposition within this note     Time User Action Codes Description Comment    4/21/2023  6:47 PM Rafael Hem Add [R42] Dizziness     4/21/2023  6:47 PM Lida All [R42] Dizziness Vertigo    4/21/2023  6:50 PM Rafael Hem Add [D64 9] Anemia       ED Disposition     ED Disposition   Discharge    Condition   Stable    Date/Time   Fri Apr 21, 2023  6:47 PM    Comment   [id number] discharge to home/self care  Follow-up Information     Follow up With Specialties Details Why Contact Info    Your PCP and Neurologist              Patient's Medications   Discharge Prescriptions    MECLIZINE (ANTIVERT) 25 MG TABLET    Take 1 tablet (25 mg total) by mouth every 8 (eight) hours as needed for dizziness       Start Date: 4/21/2023 End Date: --       Order Dose: 25 mg       Quantity: 10 tablet    Refills: 0       No discharge procedures on file      PDMP Review       Value Time User    PDMP Reviewed  Yes 7/8/2021  2:51 PM Johanne Ortiz MD          ED Provider  Electronically Signed by           Kisha Lerner PA-C  04/21/23 [address] [place] [address]

## 2023-04-21 NOTE — DISCHARGE INSTRUCTIONS
Get rest, drink plenty of fluids, eat well    Get up slowly and avoid sudden head movements  Use Antivert as needed for dizziness    Restart your iron supplements

## 2023-04-24 ENCOUNTER — VBI (OUTPATIENT)
Dept: FAMILY MEDICINE CLINIC | Facility: CLINIC | Age: 43
End: 2023-04-24

## 2023-04-24 ENCOUNTER — OFFICE VISIT (OUTPATIENT)
Dept: DENTISTRY | Facility: CLINIC | Age: 43
End: 2023-04-24

## 2023-04-24 VITALS — HEART RATE: 85 BPM | DIASTOLIC BLOOD PRESSURE: 68 MMHG | SYSTOLIC BLOOD PRESSURE: 138 MMHG

## 2023-04-24 DIAGNOSIS — Z01.21 ENCOUNTER FOR DENTAL EXAMINATION AND CLEANING WITH ABNORMAL FINDINGS: Primary | ICD-10-CM

## 2023-04-24 DIAGNOSIS — K21.9 GASTROESOPHAGEAL REFLUX DISEASE WITHOUT ESOPHAGITIS: ICD-10-CM

## 2023-04-24 RX ORDER — OMEPRAZOLE 40 MG/1
CAPSULE, DELAYED RELEASE ORAL
Qty: 30 CAPSULE | Refills: 0 | Status: SHIPPED | OUTPATIENT
Start: 2023-04-24

## 2023-04-24 NOTE — TELEPHONE ENCOUNTER
Ras Davalos    ED Visit Information     Ed visit date: 4/23/23  Diagnosis Description: Dizziness and Anemia  In Network? [facility]  Discharge status: Home  Discharged with meds ? Yes  Number of ED visits to date: 1  ED Severity:3     Outreach Information    Outreach successful: Yes 1  Date letter mailed:N/A  Date Finalized:4/24/23    Care Coordination    Follow up appointment with pcp: yes pcp on 5/8 and Neuro on 5/2  Transportation issues ? No    Value [employer] type: 3 Day Outreach  Emergent necessity warranted by diagnosis: Yes  [facility] PCP: Yes  Transportation: Self Transport  If able to choose an ED   Would you have chosen [facility]: Yes  Called PCP first?: No  Feels able to call PCP for urgent problems ?: Yes  Understands what emergencies can be handled by PCP ?: No  Practice Contacted Patient ?: Yes  Pt had ED follow up with pcp/staff ?: No    Seen for follow-up out of network ?: No  Reason Patient went to ED instead of Urgent Care or PCP?: Perceived Severity of Illness  04/24/2023 02:56 PM EDT by Liz Harris 04/24/2023 02:56 PM EDT by Moni Meyer (Self) [phone] (DSVI)[phone] (Home)  [phone] (Home)   - Call Complete

## 2023-04-24 NOTE — DENTAL PROCEDURE DETAILS
Patient presents for a dental restoration and verbally consents for treatment:  Reviewed health history-  Pt is ASA type II  Treatment consents signed: Yes  Perio: Healthy  Pain Scale: 0  Caries Assessment: Low    Radiographs: Films are current  Oral Hygiene instruction reviewed and given  Hygiene recall visits recommended to the patient    Patient agrees with the diagnosis of Caries and the proposed treatment plan for the resin restoration:  Tooth ##29 and #30  Dental Anesthesia:  No  Material:   Etch Ivoclar bond and resin   Shade: Shade A2    Prognosis is Good     Referrals Needed: No  Next visit: recall Visits

## 2023-04-24 NOTE — PROGRESS NOTES
Composite Filling    Mary Laura presents for composite filling  PMH and existing x-rays reviewed, no changes  Prepped teeth # 29 (O) and # 30 (O), No anesthesia needed with 245 carbide on high speed  Caries removed with round carbide on slow speed  Isolation with cotton rolls and dri-angles    Etch with 37% H2PO4, rinse, dry  Applied Adhese with 20 second scrub once, gentle air dry and light cured for 10s  Restored with Tetric bulk sukhwinder shade A2 and light cured  Refined with finishing burs, polished with enhance point  Verified occlusion and contacts  Post op instructions given     Also occlusal adjustment performed on # 2 filling to relief the pt  From her severe sensitivity when eating and drinking cold water  Pt was comfortable during the entire treatment sessioneft satisfied

## 2023-04-27 ENCOUNTER — TELEPHONE (OUTPATIENT)
Dept: NEUROLOGY | Facility: CLINIC | Age: 43
End: 2023-04-27

## 2023-05-02 ENCOUNTER — OFFICE VISIT (OUTPATIENT)
Dept: NEUROLOGY | Facility: CLINIC | Age: 43
End: 2023-05-02

## 2023-05-02 VITALS
WEIGHT: 160 LBS | SYSTOLIC BLOOD PRESSURE: 118 MMHG | DIASTOLIC BLOOD PRESSURE: 80 MMHG | HEIGHT: 64 IN | BODY MASS INDEX: 27.31 KG/M2

## 2023-05-02 DIAGNOSIS — R41.3 MEMORY LOSS OR IMPAIRMENT: Primary | ICD-10-CM

## 2023-05-02 NOTE — ASSESSMENT & PLAN NOTE
"Patient is a 43year old woman here for a follow up for her concern of memory problems  She scored a 29/30 in her MoCA in February 2023  She missed on point for missin gone recall word but immediatley remembered with a category clus  MRI was unremarkable except for sinus inflammation  She takes zyrtec and  Flonase for this now which she stated has helped relieve her \" brain fog\"  She states she is now able to remember more things than before but remembering TV show lines that she watched when she was younger is still hard for her  Lab work was performed which was unremarkable except for a high B12 and folate  Patient had an episode of vertigo last weekend that caused her to go to the ED  She was treated with meclizine which helped subside the vertigo symptoms  Her sinus inflammation/congestion  is likely the cause of her memory problems as well as her vertigo symptoms            Plan:  - f/u with ENT for sinus inflammation and vertigo  -F/u with PCP for sinus inflammation and B12 level  - continue to try to schedule a neuropsych cognitive evaluation  -F/u with neurology clinic in 4 months    "

## 2023-05-02 NOTE — PROGRESS NOTES
"Patient ID: Romy Roa is a 43 y o  female  Assessment/Plan:    Memory loss or impairment  Patient is a 43year old woman here for a follow up for her concern of memory problems  She scored a 29/30 in her MoCA in February 2023  She missed on point for missin gone recall word but immediatley remembered with a category clus  MRI was unremarkable except for sinus inflammation  She takes zyrtec and  Flonase for this now which she stated has helped relieve her \" brain fog\"  She states she is now able to remember more things than before but remembering TV show lines that she watched when she was younger is still hard for her  Lab work was performed which was unremarkable except for a high B12 and folate  Patient had an episode of vertigo last weekend that caused her to go to the ED  She was treated with meclizine which helped subside the vertigo symptoms  Her sinus inflammation/congestion  is likely the cause of her memory problems as well as her vertigo symptoms  Plan:  - f/u with ENT for sinus inflammation and vertigo  -F/u with PCP for sinus inflammation and B12 level  - continue to try to schedule a neuropsych cognitive evaluation  -F/u with neurology clinic in 4 months         Diagnoses and all orders for this visit:    Memory loss or impairment           Subjective:    HPI   Patient is a 43year old woman here for a follow up for her concern of memory problems  Patient recently had lab work and MRI brain which was requested by the office before her follow up appointment  The MRI brain was unremarkable except for sinua inflammation  Patient was advised to follow up with her PCP for that  Patient's labe work was also unremarkable except for a high vitamin B12 and folate levels  She scored a 29/30 in her MoCA in February 2023  She missed on point for missin gone recall word but immediatley remembered with a category clus  She states that her memory has improved since her last visit   She stated that " "she started taking zyrtec and Flonase which she stated helped relieve some of her \"brain fog\"  She also saw ENT who agreed with the plan from her PCP for the zyrtec  Patient stated that last weekend she went to the ED for vertigo  She stated that she had dizziness and felt wobbly on her feet  She stated that she never fell but felt like she could if she didn't hold on to something  Patient was given meclizine in the ED and she felt much better after that  She has only had to take one more dose of meclizine the morning after the ED visit  Patient stated that she tried to see neuropsychology for the cognitive evaluation, however, her insurance has yet to approve it  The following portions of the patient's history were reviewed and updated as appropriate:   She  has a past medical history of Abnormal Pap smear of cervix, Celiac disease, Depression, GERD without esophagitis, Heartburn, Hiatal hernia, Hypertension, and Vitamin D deficiency    She   Patient Active Problem List    Diagnosis Date Noted    Paranasal sinus disease 03/14/2023    Allergic rhinitis 03/14/2023    Left shoulder pain 03/08/2023    Left arm pain 03/08/2023    Somatic dysfunction of spine, cervical 03/08/2023    Arm somatic dysfunction 03/08/2023    Somatic dysfunction of thoracic region 03/08/2023    Biceps tendinopathy, left 02/16/2023    Memory loss or impairment 02/07/2023    Cervical disc disorder with radiculopathy of mid-cervical region     Chronic bilateral low back pain 12/19/2022    Bipolar 1 disorder, mixed ([facility] [address] ) 11/15/2022    Lipids abnormal 04/13/2022    Fatigue 03/20/2022    Sacroiliitis ([facility] [address] )     Encounter for gynecological examination without abnormal finding 09/22/2021    DDD (degenerative disc disease), lumbar     Sleep-disordered breathing     Breathing difficulty 07/01/2021    YESSICA (obstructive sleep apnea) 07/01/2021    Elevated blood pressure reading 06/01/2021    Visual changes 02/16/2021    S/P " laparoscopic appendectomy 02/10/2021    Celiac disease     Neck pain 08/14/2020    Spasm of back muscles 08/14/2020    Joint pain 02/03/2020    Encounter for screening mammogram for malignant neoplasm of breast 12/20/2019    Gastroesophageal reflux disease without esophagitis 07/15/2019    Microcytic anemia 07/15/2019    Pain of upper abdomen 07/15/2019    ASCUS with positive high risk HPV cervical 06/21/2018    Atypical glandular cells of undetermined significance (TERRI) on cervical Pap smear 06/21/2018     She  has a past surgical history that includes Tubal ligation and pr laparoscopic appendectomy (N/A, 1/23/2021)  Her family history includes Arthritis in her mother; Cervical cancer in her mother; Diabetes in her maternal grandmother; Fibromyalgia in her mother; Heart disease in her brother, father, and maternal grandmother; Hypertension in her maternal grandmother; Kidney disease in her paternal grandfather; Mental illness in her father; No Known Problems in her brother, daughter, maternal grandfather, paternal grandmother, son, son, son, and son; Stroke in her father  She  reports that she has quit smoking  She has never used smokeless tobacco  She reports current alcohol use  She reports that she does not use drugs    Current Outpatient Medications   Medication Sig Dispense Refill    cetirizine (ZyrTEC) 10 mg tablet Take 1 tablet (10 mg total) by mouth daily 90 tablet 1    cholecalciferol (VITAMIN D3) 400 units tablet Take 400 Units by mouth daily      cyanocobalamin (VITAMIN B-12) 500 MCG tablet Take 500 mcg by mouth daily      fluticasone (FLONASE) 50 mcg/act nasal spray 1 spray into each nostril daily 15 8 mL 1    meclizine (ANTIVERT) 25 mg tablet Take 1 tablet (25 mg total) by mouth every 8 (eight) hours as needed for dizziness 10 tablet 0    omeprazole (PriLOSEC) 40 MG capsule TAKE 1 CAPSULE(40 MG) BY MOUTH DAILY 30 capsule 0    Sodium Fluoride 1 1 % PSTE Take 1 Film by mouth daily at "bedtime Use once per day at bedtime  Brush, spit out  Don't rinse  Let sit on teeth  Generic brand OK 51 g 3    cyclobenzaprine (FLEXERIL) 10 mg tablet Take 1 tablet (10 mg total) by mouth 3 (three) times a day as needed for muscle spasms (Patient not taking: Reported on 12/20/2022) 30 tablet 0    Nutritional Supplements (VITAMIN D BOOSTER PO) Take by mouth (Patient not taking: Reported on 12/20/2022)       No current facility-administered medications for this visit  Current Outpatient Medications on File Prior to Visit   Medication Sig    cetirizine (ZyrTEC) 10 mg tablet Take 1 tablet (10 mg total) by mouth daily    cholecalciferol (VITAMIN D3) 400 units tablet Take 400 Units by mouth daily    cyanocobalamin (VITAMIN B-12) 500 MCG tablet Take 500 mcg by mouth daily    fluticasone (FLONASE) 50 mcg/act nasal spray 1 spray into each nostril daily    meclizine (ANTIVERT) 25 mg tablet Take 1 tablet (25 mg total) by mouth every 8 (eight) hours as needed for dizziness    omeprazole (PriLOSEC) 40 MG capsule TAKE 1 CAPSULE(40 MG) BY MOUTH DAILY    Sodium Fluoride 1 1 % PSTE Take 1 Film by mouth daily at bedtime Use once per day at bedtime  Brush, spit out  Don't rinse  Let sit on teeth  Generic brand OK    cyclobenzaprine (FLEXERIL) 10 mg tablet Take 1 tablet (10 mg total) by mouth 3 (three) times a day as needed for muscle spasms (Patient not taking: Reported on 12/20/2022)    Nutritional Supplements (VITAMIN D BOOSTER PO) Take by mouth (Patient not taking: Reported on 12/20/2022)     No current facility-administered medications on file prior to visit  She is allergic to gluten meal - food allergy            Objective:    Blood pressure 118/80, height 5' 4\" (1 626 m), weight 72 6 kg (160 lb), last menstrual period 04/15/2023, not currently breastfeeding  Physical Exam  Vitals reviewed  Constitutional:       General: She is not in acute distress  Appearance: Normal appearance     HENT:      Head: " Normocephalic and atraumatic  Right Ear: External ear normal       Left Ear: External ear normal       Nose: Congestion present  Eyes:      General: Lids are normal       Extraocular Movements: Extraocular movements intact  Pupils: Pupils are equal, round, and reactive to light  Cardiovascular:      Rate and Rhythm: Regular rhythm  Pulmonary:      Effort: Pulmonary effort is normal  No respiratory distress  Breath sounds: Normal breath sounds  Neurological:      General: No focal deficit present  Motor: Motor strength is normal       Deep Tendon Reflexes: Reflexes are normal and symmetric  Psychiatric:         Mood and Affect: Mood normal          Behavior: Behavior normal          Neurological Exam  Mental Status  Awake, alert and oriented to person, place and time  Cranial Nerves  CN II: Visual acuity is normal  Visual fields full to confrontation  CN III, IV, VI: Extraocular movements intact bilaterally  Normal lids and orbits bilaterally  Pupils equal round and reactive to light bilaterally  CN V: Facial sensation is normal   CN VII: Full and symmetric facial movement  CN IX, X: Palate elevates symmetrically  CN XI: Shoulder shrug strength is normal   CN XII: Tongue midline without atrophy or fasciculations  Motor  Normal muscle bulk throughout  Strength is 5/5 throughout all four extremities  Sensory  Light touch is normal in upper and lower extremities  Reflexes  Deep tendon reflexes are 2+ and symmetric in all four extremities  ROS:    Review of Systems   Constitutional: Negative  Negative for appetite change and fever  HENT: Negative  Negative for hearing loss, tinnitus, trouble swallowing and voice change  Eyes: Negative  Negative for photophobia, pain and visual disturbance  Respiratory: Negative  Negative for shortness of breath  Cardiovascular: Negative  Negative for palpitations  Gastrointestinal: Negative    Negative for nausea and vomiting  Endocrine: Negative  Negative for cold intolerance  Genitourinary: Negative  Negative for dysuria, frequency and urgency  Musculoskeletal: Negative  Negative for gait problem, myalgias and neck pain  Legs gave out approx 4 times when she went to ER   Skin: Negative  Negative for rash  Allergic/Immunologic: Negative  Neurological: Positive for dizziness  Negative for tremors, seizures, syncope, facial asymmetry, speech difficulty, weakness, light-headedness, numbness and headaches  Went to ER last weekend and was DX:Vertigo, & Anemia   Hematological: Negative  Does not bruise/bleed easily  Psychiatric/Behavioral: Negative  Negative for confusion, hallucinations and sleep disturbance  All other systems reviewed and are negative

## 2023-05-08 ENCOUNTER — PROCEDURE VISIT (OUTPATIENT)
Dept: FAMILY MEDICINE CLINIC | Facility: CLINIC | Age: 43
End: 2023-05-08

## 2023-05-08 DIAGNOSIS — M99.05 SOMATIC DYSFUNCTION OF PELVIS REGION: ICD-10-CM

## 2023-05-08 DIAGNOSIS — M99.01 SOMATIC DYSFUNCTION OF CERVICAL REGION: ICD-10-CM

## 2023-05-08 DIAGNOSIS — M99.02 SOMATIC DYSFUNCTION OF THORACIC REGION: ICD-10-CM

## 2023-05-08 DIAGNOSIS — M50.120 CERVICAL DISC DISORDER WITH RADICULOPATHY OF MID-CERVICAL REGION: Primary | ICD-10-CM

## 2023-05-08 NOTE — PROGRESS NOTES
Assessment/Plan     This is a 43 y o  female who presents for OMT follow-up for neck pain, thoracolumbar spine pain, and SIJ pain  Patient states minimal response following last session focusing on soft tissue so today we administered primarily HVLA to the SIJ's and gentle thoracolumbar stretching mobilization  Explained patient may have some pain sensitivity along with her anemia  Patient may also benefit from adding magnesium supplementation to her daily regimen  Reviewed lumbosacral and hamstring stretches recommend patient continue daily at home  1  Cervical disc disorder with radiculopathy of mid-cervical region  OMT      2  Somatic dysfunction of cervical region  OMT      3  Somatic dysfunction of thoracic region  OMT      4  Somatic dysfunction of pelvis region  OMT          Plan:   1  Patient tolerated OMT well for the above problems,  advised patient to drink fluids and can use NSAID for soreness after treatment     2  OMT Follow up in 2 weeks  Subjective     Yuriy Lombardo is a 43 y o  female and is here for a OMT follow up  The patient reports pain and discomfort primarily at the thoracolumbar junction, SIJ's and cervical thoracic junction  She reports having little relief with her current regimen, but some improvement following OMT  Is the patient taking Pain medication? yes  Has the patient completed physical therapy for this condition? yes  Did Patient symptoms improve from last OMT appointment? yes        Review of Systems  Do you have pain that bothers you in your daily life? yes  Review of Systems   Musculoskeletal: Positive for back pain, neck pain and neck stiffness  Objective     OMT Exam   OMT  Performed by: Young Brown MD  Authorized by: Young Brown MD   Universal Protocol:  Consent: Verbal consent obtained    Consent given by: patient  Patient understanding: patient states understanding of the procedure being performed  Patient identity confirmed: verbally with patient        Procedure Details:     Region evaluated and treated:  Cervical, Sacrum/Pelvis and Thoracic    Thoracic Information  Thoracic Region: T10 - T12  Cervical Details:     Examination Method:  Tenderness, Pain, Asymmetry, Misalignment, Crepitation, Defects, Masses and Passive    Severity:  Moderate    Treatment Method:  Direct Treatment and Myofascial Release Treatment    Response:  Improved - The somatic dysfunction is improved but not completely resolved  Thoracic T10 - T12 details:     Examination Method:  Asymmetry, Misalignment, Crepitation, Defects, Masses, Tenderness, Pain and Passive    Severity:  Severe    Treatment Method:  Direct Treatment and Soft Tissue Treatment    Sacrum/Pelvis details:     Examination Method:  Passive, Tenderness, Pain and Asymmetry, Misalignment, Crepitation, Defects, Masses    Severity:  Severe    Treatment Method:  High Velocity, Low Amplitude Treatment    Response:  Improved - The somatic dysfunction is improved but not completely resolved      Total Regions Treated:  3

## 2023-05-10 ENCOUNTER — TELEPHONE (OUTPATIENT)
Dept: OTHER | Facility: OTHER | Age: 43
End: 2023-05-10

## 2023-05-15 DIAGNOSIS — J34.9 PARANASAL SINUS DISEASE: ICD-10-CM

## 2023-05-15 DIAGNOSIS — J30.9 ALLERGIC RHINITIS, UNSPECIFIED SEASONALITY, UNSPECIFIED TRIGGER: ICD-10-CM

## 2023-05-16 ENCOUNTER — OFFICE VISIT (OUTPATIENT)
Dept: FAMILY MEDICINE CLINIC | Facility: CLINIC | Age: 43
End: 2023-05-16

## 2023-05-16 VITALS
WEIGHT: 166 LBS | RESPIRATION RATE: 18 BRPM | HEIGHT: 64 IN | SYSTOLIC BLOOD PRESSURE: 117 MMHG | OXYGEN SATURATION: 99 % | BODY MASS INDEX: 28.34 KG/M2 | HEART RATE: 72 BPM | DIASTOLIC BLOOD PRESSURE: 60 MMHG | TEMPERATURE: 98.2 F

## 2023-05-16 DIAGNOSIS — L98.9 SKIN LESION OF BACK: ICD-10-CM

## 2023-05-16 DIAGNOSIS — D50.9 MICROCYTIC ANEMIA: Primary | ICD-10-CM

## 2023-05-16 RX ORDER — FLUTICASONE PROPIONATE 50 MCG
SPRAY, SUSPENSION (ML) NASAL
Qty: 16 G | Refills: 3 | Status: SHIPPED | OUTPATIENT
Start: 2023-05-16

## 2023-05-16 RX ORDER — IRON,CARBONYL/ASCORBIC ACID 100-250 MG
TABLET ORAL
COMMUNITY

## 2023-05-16 NOTE — PATIENT INSTRUCTIONS
Iron Deficiency Anemia   AMBULATORY CARE:   Iron deficiency anemia (MAGED)  means you have low red blood cell and hemoglobin levels  Hemoglobin is part of red blood cells and helps carry oxygen to your body  Iron helps make hemoglobin  MAGED is caused by a lack of iron in the blood  Blood loss and not enough iron in the foods you eat are the most common causes of low iron  Common symptoms include the following:   Feeling weak, tired, or irritable    Pale skin    Headache, dizziness    Shortness of breath with activity    Fast or uneven heartbeat    Sore or swollen tongue and mouth    Nails that break easily    An urge to eat ice, paint, starch, or dirt    Seek care immediately if:   You have dark or bloody bowel movements  You vomit blood  You are too dizzy to stand up  You have trouble swallowing because of the pain in your mouth and throat  Call your doctor or hematologist if:   You have heartburn, constipation, or diarrhea  You have nausea or are vomiting  You are dizzy or very tired  You have questions or concerns about your condition or care  Treatment for MAGED  may take 3 to 6 months  You may need medicines and supplements to increase the amount of iron in your blood  Ask your healthcare provider how much iron you should take each day  A blood transfusion may be needed if your anemia is severe  This will help replace the blood and iron you have lost   Eat foods rich in iron and protein:  Nuts, meat, dark leafy green vegetables, and beans are high in iron and protein  Limit milk to 2 cups a day  The calcium in milk can interfere with how your body absorbs iron  Take the iron supplement with food or a drink that is high in vitamin C  This helps your body absorb the iron  You may need to meet with a dietitian to create the right food plan for you  Drink liquids as directed:  Iron supplements may cause constipation  Liquids help prevent constipation   Ask how much liquid to drink each day and which liquids are best for you  Follow up with your doctor or hematologist as directed: You may need to see a specialist to help find the cause of your MAGED  Write down your questions so you remember to ask them during your visits  © Copyright Kel[employer] Thompson 2022 Information is for End User's use only and may not be sold, redistributed or otherwise used for commercial purposes  The above information is an  only  It is not intended as medical advice for individual conditions or treatments  Talk to your doctor, nurse or pharmacist before following any medical regimen to see if it is safe and effective for you  Iron Rich Diet   WHAT YOU NEED TO KNOW:   An iron-rich diet includes foods that are good sources of iron  People need extra iron during childhood, adolescence (teenage years), and pregnancy  Iron is a mineral that your body needs to make hemoglobin  Hemoglobin is part of your blood and helps carry oxygen from your lungs to the rest of your body  Eat iron-rich foods and vitamin C every day to prevent iron deficiency anemia  Iron deficiency anemia can lead to other health problems in adults and growth or development problems in children  DISCHARGE INSTRUCTIONS:   Daily iron needs:   Males:      3to 1years old: 7 mg    3to 6years old: 10 mg    5to 15years old: 8 mg    15to 25years old: 11 mg    19 years and older: 8 mg    Females:      3to 1years old: 7 mg    3to 6years old: 10 mg    5to 15years old: 8 mg    15to 25years old: 15 mg    19 to 50 years: 18 mg    Over 46years old: 8 mg    Pregnant women:  27 mg    Foods that contain iron:   Meat, fish, and poultry are good sources of iron  They contain heme iron, a form of iron that your body absorbs very well  Fruit, vegetables, eggs, and grains such as pasta, rice, and cereal also contain iron  They contain nonheme iron, a form of iron that is not absorbed as well as heme iron   You can absorb more iron from these foods by eating a food that is high in vitamin C at the same time  You can also absorb more nonheme iron by eating a food from the meat, fish, and poultry group at the same time  Fish and shellfish contain some mercury, a metal that can be harmful to the body  Children and unborn babies are at higher risk for harm caused by mercury  Children and pregnant women should avoid eating fish high in mercury, such as shark and swordfish  They should also eat only fish that are lower in mercury, such as salmon, canned light tuna, and catfish  Limit the amount of low-mercury fish and shellfish you eat to less than 12 ounces per week  Iron-rich foods:   Foods that contain 2 mg or more per serving:      3 ounces of cooked beef (sky, eye of round) or cooked turkey (dark meat)    ½ cup of beans (black, kidney, or lentil, or soybeans)    ½ cup of tofu    1 medium baked potato    1 cup of cooked artichoke or cooked spinach    ¾ cup of instant oatmeal    1 cup of corn flakes    Foods that contain 1 to 2 mg per serving:      3 ounces of chicken    3 ounces of pork    3 ounces of turkey (light meat)    3 ounces of light tuna    ½ cup of seedless, packed raisins    1 slice of whole-wheat or white bread       Good sources of vitamin C:  Eat a serving of vitamin C with any iron-rich food to help your body absorb more iron  The following fruits and vegetables are good sources of vitamin C:  1 cup of fresh orange juice (124 mg) or pink grapefruit juice (83 mg)    1 cup of strawberries (106 mg)    1 cup of diced cantaloupe (68 mg)    1 cup of sweet yellow pepper (283 mg)    1 cup of fresh, boiled broccoli (116 mg) or cooked brussels sprouts (97 mg)    1 cup of kale (53 mg)    1 cup of tomato juice (45 mg)       Other guidelines to follow:   Tea and coffee can decrease the amount of iron that your body absorbs from iron-rich foods  Drink coffee and tea separately from meals that contain iron-rich foods      Have foods and liquids high in calcium separately from iron-rich foods  Calcium prevents iron from being absorbed  Cow's milk and products made from it, such as cheese and yogurt, are high in calcium  Children older than 1 year only need about 24 ounces of cow's milk each day  Other foods high in calcium include leafy greens, green vegetables, almonds, and canned sardines  © Copyright Rosmery Meagan 2022 Information is for End User's use only and may not be sold, redistributed or otherwise used for commercial purposes  The above information is an  only  It is not intended as medical advice for individual conditions or treatments  Talk to your doctor, nurse or pharmacist before following any medical regimen to see if it is safe and effective for you
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
## 2023-05-17 ENCOUNTER — APPOINTMENT (OUTPATIENT)
Dept: LAB | Facility: CLINIC | Age: 43
End: 2023-05-17

## 2023-05-17 DIAGNOSIS — D50.9 MICROCYTIC ANEMIA: ICD-10-CM

## 2023-05-17 PROBLEM — L98.9 SKIN LESION OF BACK: Status: ACTIVE | Noted: 2023-05-17

## 2023-05-17 LAB
FERRITIN SERPL-MCNC: 11 NG/ML (ref 11–307)
IRON SATN MFR SERPL: 6 % (ref 15–50)
IRON SERPL-MCNC: 26 UG/DL (ref 50–170)
TIBC SERPL-MCNC: 427 UG/DL (ref 250–450)

## 2023-05-17 NOTE — ASSESSMENT & PLAN NOTE
Per pt has long hx recurrent iron deficiency anemia of unknown etiology  Possibly 2/2 poor dietary intake of iron  No obvious source of non-menstrual bleeding  No hx hematemesis, hemoptysis, epistaxis, melena, hematochezia  No other known hematological disease, hemoglobinopathy  PMHx celiac disease however no recent flares      Plan:  Iron panel  Continue iron supplementation  Repeat cbc platelet x 1 month  Counseled on iron-rich diet

## 2023-05-17 NOTE — ASSESSMENT & PLAN NOTE
Pt noticed lesion on L shoulder after sunburn 1 year ago  Reports lesion has changed over the course of the year, occasionally appearing like a scab, or an eczema-like rash  Today appears slightly hyperpigmented, dry w/small flaking, with small petechiae noticed on handheld microscope  She is concerned because this is not resolving over time and her brother was recently diagnosed with BCC      Plan:  Return for punch biopsy  Possible referral to Derm pending pathology results

## 2023-05-24 ENCOUNTER — PROCEDURE VISIT (OUTPATIENT)
Dept: FAMILY MEDICINE CLINIC | Facility: CLINIC | Age: 43
End: 2023-05-24

## 2023-05-24 DIAGNOSIS — M50.120 CERVICAL DISC DISORDER WITH RADICULOPATHY OF MID-CERVICAL REGION: Primary | ICD-10-CM

## 2023-05-24 DIAGNOSIS — M99.02 SOMATIC DYSFUNCTION OF THORACIC REGION: ICD-10-CM

## 2023-05-24 DIAGNOSIS — M99.03 SOMATIC DYSFUNCTION OF SPINE, LUMBAR: ICD-10-CM

## 2023-05-24 DIAGNOSIS — M99.01 SOMATIC DYSFUNCTION OF SPINE, CERVICAL: ICD-10-CM

## 2023-05-24 DIAGNOSIS — M54.2 NECK PAIN: ICD-10-CM
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
## 2023-06-02 ENCOUNTER — TELEPHONE (OUTPATIENT)
Dept: FAMILY MEDICINE CLINIC | Facility: CLINIC | Age: 43
End: 2023-06-02

## 2023-06-06 ENCOUNTER — OFFICE VISIT (OUTPATIENT)
Dept: PAIN MEDICINE | Facility: CLINIC | Age: 43
End: 2023-06-06
Payer: MEDICARE

## 2023-06-06 VITALS
BODY MASS INDEX: 28.49 KG/M2 | SYSTOLIC BLOOD PRESSURE: 127 MMHG | WEIGHT: 166 LBS | DIASTOLIC BLOOD PRESSURE: 90 MMHG | HEART RATE: 87 BPM

## 2023-06-06 DIAGNOSIS — M54.2 NECK PAIN: ICD-10-CM

## 2023-06-06 DIAGNOSIS — M50.120 CERVICAL DISC DISORDER WITH RADICULOPATHY OF MID-CERVICAL REGION: ICD-10-CM

## 2023-06-06 DIAGNOSIS — G89.4 CHRONIC PAIN SYNDROME: Primary | ICD-10-CM

## 2023-06-06 PROCEDURE — 99214 OFFICE O/P EST MOD 30 MIN: CPT

## 2023-06-06 RX ORDER — MULTIVITAMIN WITH IRON
250 TABLET ORAL DAILY
COMMUNITY

## 2023-06-06 NOTE — PATIENT INSTRUCTIONS
Epidural Steroid Injection, Ambulatory Care   GENERAL INFORMATION:   What do I need to know about an epidural steroid injection? An epidural steroid injection (MADINA) is a procedure to inject steroid medicine into the epidural space  The epidural space is between your spinal cord and vertebrae  Steroids reduce inflammation and fluid buildup in your spine that may be causing pain  You may be given pain medicine along with the steroids  How do I prepare for an MADINA? Your healthcare provider will talk to you about how to prepare for your procedure  He will tell you what medicines to take or not take on the day of your procedure  You may need to stop taking blood thinners or other medicines several days before your procedure  You may need to adjust any diabetes medicine you take on the day of your procedure  Steroid medicine can increase your blood sugar level  What will happen during an MADINA? You will be given medicine to numb the procedure area  You will be awake for the procedure, but you will not feel pain  You may also be given medicine to help you relax during the procedure  Contrast liquid will be used to help your healthcare provider see the area better  Tell the healthcare provider if you have ever had an allergic reaction to contrast liquid  Your healthcare provider may place the needle into your neck area, middle of your back, or tailbone area  He may inject the medicine next to the nerves that are causing your pain  He may instead inject the medicine into a larger area of the epidural space  This helps the medicine spread to more nerves  Your healthcare provider will use a fluoroscope to help guide the needle to the right place  A fluoroscope is a type of x-ray  After the procedure, a bandage will be placed over the injection site to prevent infection  What are the risks of an MADINA? You may have temporary or permanent nerve damage or paralysis   You may have bleeding or develop a serious infection, such as meningitis (swelling of the brain coverings)  An abscess may also develop  You may need surgery to fix the abscess  You may have a seizure, anxiety, or trouble sleeping  If you are a man, you may have temporary erectile dysfunction (not able to have an erection)  CARE AGREEMENT:   You have the right to help plan your care  Learn about your health condition and how it may be treated  Discuss treatment options with your caregivers to decide what care you want to receive  You always have the right to refuse treatment  The above information is an  only  It is not intended as medical advice for individual conditions or treatments  Talk to your doctor, nurse or pharmacist before following any medical regimen to see if it is safe and effective for you  © 2014 [address] is for End User's use only and may not be sold, redistributed or otherwise used for commercial purposes  All illustrations and images included in CareNotes® are the copyrighted property of A D A M [employer] Inc  or Aditya Ayala

## 2023-06-06 NOTE — PROGRESS NOTES
Assessment:  1  Chronic pain syndrome    2  Cervical disc disorder with radiculopathy of mid-cervical region    3  Neck pain        Plan:    Patient presents the office with worsening cervical pain with radiculopathy  On 2/2/2023 patient had a C7-T1 which provided the patient approximately 70% relief of her pain symptoms up until recently  Patient pain symptoms do follow in C5 dermatomal pattern which correlates with the patient's EMG study therefore I find it appropriate for the patient to have a repeat C7-T1 cervical epidural steroid injection  The patient is in agreement with this plan  Complete risks and benefits including bleeding, infection, tissue reaction, nerve injury and allergic reaction were discussed  The approach was demonstrated using models and literature was provided  Verbal and written consent was obtained  My impressions and treatment recommendations were discussed in detail with the patient who verbalized understanding and had no further questions  Discharge instructions were provided  I personally saw and examined the patient and I agree with the above discussed plan of care  Orders Placed This Encounter   Procedures   • FL spine and pain procedure     Artist Bahman     Standing Status:   Future     Standing Expiration Date:   6/6/2027     Order Specific Question:   Reason for Exam:     Answer:   BEAN C7-T1     Order Specific Question:   Is the patient pregnant? Answer:   No     Order Specific Question:   Anticoagulant hold needed? Answer:   no     New Medications Ordered This Visit   Medications   • Magnesium 250 MG TABS     Sig: Take 250 mg by mouth in the morning OTC       History of Present Illness:  John Tyson is a 43 y o  female who presents for a follow up office visit in regards to Neck Pain  The patient’s current symptoms include neck pain that radiates into her bilateral shoulders      At today's visit patient states that their pain symptoms are worse with a pain score of 5-6/10 on the verbal numeric pain scale  On 2/2/2023 patient had a C7-T1 cervical epidural steroid injection which provided the patient approximately 70% relief of her pain symptoms up until recently  The patient's pain is worse at no specific time  The patient's pain is constant in nature  And the quality of the patient's pain is described as dull-aching, cramping, pressure-like  The patient's pain is located in the posterior neck radiating down into her bilateral shoulders  I have personally reviewed and/or updated the patient's past medical history, past surgical history, family history, social history, current medications, allergies, and vital signs today  Review of Systems   Respiratory: Negative for shortness of breath  Cardiovascular: Negative for chest pain  Gastrointestinal: Negative for constipation, diarrhea, nausea and vomiting  Musculoskeletal: Positive for joint swelling, neck pain and neck stiffness  Negative for arthralgias, gait problem and myalgias  Skin: Negative for rash  Neurological: Positive for headaches  Negative for dizziness, seizures and weakness  All other systems reviewed and are negative        Patient Active Problem List   Diagnosis   • ASCUS with positive high risk HPV cervical   • Atypical glandular cells of undetermined significance (TERRI) on cervical Pap smear   • Gastroesophageal reflux disease without esophagitis   • Microcytic anemia   • Pain of upper abdomen   • Encounter for screening mammogram for malignant neoplasm of breast   • Joint pain   • Neck pain   • Spasm of back muscles   • Celiac disease   • S/P laparoscopic appendectomy   • Visual changes   • Elevated blood pressure reading   • Breathing difficulty   • YESSICA (obstructive sleep apnea)   • Sleep-disordered breathing   • DDD (degenerative disc disease), lumbar   • Encounter for gynecological examination without abnormal finding   • Sacroiliitis (HCC)   • Fatigue   • Lipids abnormal   • Bipolar 1 disorder, mixed ([facility] [address] )   • Chronic bilateral low back pain   • Cervical disc disorder with radiculopathy of mid-cervical region   • Memory loss or impairment   • Biceps tendinopathy, left   • Left shoulder pain   • Left arm pain   • Somatic dysfunction of spine, cervical   • Arm somatic dysfunction   • Somatic dysfunction of thoracic region   • Paranasal sinus disease   • Allergic rhinitis   • Skin lesion of back       Past Medical History:   Diagnosis Date   • Abnormal Pap smear of cervix    • Celiac disease    • Depression    • GERD without esophagitis    • Heartburn    • Hiatal hernia    • Hypertension    • Vitamin D deficiency        Past Surgical History:   Procedure Laterality Date   • DE LAPAROSCOPIC APPENDECTOMY N/A 1/23/2021    Procedure: APPENDECTOMY LAPAROSCOPIC;  Surgeon: Vania Shaw DO;  Location: [facility];  Service: General   • TUBAL LIGATION         Family History   Problem Relation Age of Onset   • Arthritis Mother    • Cervical cancer Mother    • Fibromyalgia Mother    • Mental illness Father    • Stroke Father    • Heart disease Father    • Heart disease Maternal Grandmother    • Hypertension Maternal Grandmother    • Diabetes Maternal Grandmother    • No Known Problems Daughter    • No Known Problems Maternal Grandfather    • No Known Problems Paternal Grandmother    • Kidney disease Paternal Grandfather    • No Known Problems Brother    • No Known Problems Son    • Heart disease Brother    • No Known Problems Son    • No Known Problems Son    • No Known Problems Son        Social History     Occupational History   • Not on file   Tobacco Use   • Smoking status: Former   • Smokeless tobacco: Never   Vaping Use   • Vaping Use: Never used   Substance and Sexual Activity   • Alcohol use: Yes     Comment: social   • Drug use: No   • Sexual activity: Yes     Partners: Male     Birth control/protection: Female Sterilization       Current Outpatient Medications on File Prior to Visit Medication Sig   • Magnesium 250 MG TABS Take 250 mg by mouth in the morning OTC   • cetirizine (ZyrTEC) 10 mg tablet Take 1 tablet (10 mg total) by mouth daily   • cholecalciferol (VITAMIN D3) 400 units tablet Take 400 Units by mouth daily   • cyanocobalamin (VITAMIN B-12) 500 MCG tablet Take 500 mcg by mouth daily   • fluticasone (FLONASE) 50 mcg/act nasal spray SHAKE LIQUID AND USE 1 SPRAY IN EACH NOSTRIL DAILY   • Iron-Vitamin C (Iron 100/C) 100-250 MG TABS Take by mouth   • meclizine (ANTIVERT) 25 mg tablet Take 1 tablet (25 mg total) by mouth every 8 (eight) hours as needed for dizziness   • omeprazole (PriLOSEC) 40 MG capsule TAKE 1 CAPSULE(40 MG) BY MOUTH DAILY   • Sodium Fluoride 1 1 % PSTE Take 1 Film by mouth daily at bedtime Use once per day at bedtime  Brush, spit out  Don't rinse  Let sit on teeth  Generic brand OK     No current facility-administered medications on file prior to visit  Allergies   Allergen Reactions   • Gluten Meal - Food Allergy Abdominal Pain       Physical Exam:    /90   Pulse 87   Wt 75 3 kg (166 lb)   BMI 28 49 kg/m²     Constitutional:normal, well developed, well nourished, alert, in no distress and non-toxic and no overt pain behavior   and overweight  Eyes:anicteric  HEENT:grossly intact  Neck:Tightness limited ROM  Pulmonary:even and unlabored  Cardiovascular:No edema or pitting edema present  Skin:Normal without rashes or lesions and well hydrated  Psychiatric:Mood and affect appropriate  Neurologic:Cranial Nerves II-XII grossly intact  Musculoskeletal:antalgic     Cervical Spine Exam    Appearance:  Normal lordosis  Palpation/Tenderness:  left cervical paraspinal tenderness  right cervical paraspinal tenderness  Sensory:  no sensory deficits noted  Range of Motion:  Flexion:  No limitation  without pain  Extension:  Minimally limited  with pain  Rotation - Left:  Minimally limited  with pain  Rotation - Right:  Minimally limited  with pain  Motor Strength:  Left Arm Flexion  5/5  Left Arm Extension  5/5  Right Arm Flexion  5/5  Right Arm Extension  5/5  Left Wrist Flexion  5/5  Left Wrist Extension  5/5   Right wrist flexion 5/5  Right wrist extension 5/5  Special Tests:  Left Spurlings:  negative  Right Spurlings  negative      This document was created using speech voice recognition software  Grammatical errors, random word insertions, pronoun errors, and incomplete sentences are an occasional consequence of this system due to software limitations, ambient noise, and hardware issues  Any formal questions or concerns about content, text, or information contained within the body of this dictation should be directly addressed to the provider for clarification

## 2023-06-07 ENCOUNTER — TELEPHONE (OUTPATIENT)
Dept: PAIN MEDICINE | Facility: CLINIC | Age: 43
End: 2023-06-07

## 2023-06-11 ENCOUNTER — APPOINTMENT (OUTPATIENT)
Dept: LAB | Facility: CLINIC | Age: 43
End: 2023-06-11
Payer: MEDICARE

## 2023-06-11 DIAGNOSIS — Z13.29 SCREENING FOR THYROID DISORDER: ICD-10-CM

## 2023-06-11 DIAGNOSIS — Z31.41 FERTILITY TESTING: ICD-10-CM

## 2023-06-11 DIAGNOSIS — E28.39 RESISTANT OVARY SYNDROME: ICD-10-CM

## 2023-06-11 LAB
ESTRADIOL SERPL-MCNC: 58 PG/ML
FSH SERPL-ACNC: 10.5 MIU/ML
LH SERPL-ACNC: 7.6 MIU/ML
PROGEST SERPL-MCNC: 1.38 NG/ML
TSH SERPL DL<=0.05 MIU/L-ACNC: 2.18 UIU/ML (ref 0.45–4.5)

## 2023-06-11 PROCEDURE — 84443 ASSAY THYROID STIM HORMONE: CPT

## 2023-06-11 PROCEDURE — 82397 CHEMILUMINESCENT ASSAY: CPT

## 2023-06-11 PROCEDURE — 36415 COLL VENOUS BLD VENIPUNCTURE: CPT

## 2023-06-11 PROCEDURE — 82670 ASSAY OF TOTAL ESTRADIOL: CPT

## 2023-06-11 PROCEDURE — 83001 ASSAY OF GONADOTROPIN (FSH): CPT

## 2023-06-11 PROCEDURE — 84144 ASSAY OF PROGESTERONE: CPT

## 2023-06-11 PROCEDURE — 83002 ASSAY OF GONADOTROPIN (LH): CPT

## 2023-06-13 ENCOUNTER — PROCEDURE VISIT (OUTPATIENT)
Dept: FAMILY MEDICINE CLINIC | Facility: CLINIC | Age: 43
End: 2023-06-13
Payer: MEDICARE

## 2023-06-13 VITALS
HEART RATE: 68 BPM | TEMPERATURE: 97.9 F | BODY MASS INDEX: 27.72 KG/M2 | OXYGEN SATURATION: 100 % | SYSTOLIC BLOOD PRESSURE: 122 MMHG | WEIGHT: 162.4 LBS | HEIGHT: 64 IN | DIASTOLIC BLOOD PRESSURE: 88 MMHG

## 2023-06-13 DIAGNOSIS — L98.9 SKIN LESION: Primary | ICD-10-CM

## 2023-06-13 PROCEDURE — 11104 PUNCH BX SKIN SINGLE LESION: CPT

## 2023-06-13 PROCEDURE — 99214 OFFICE O/P EST MOD 30 MIN: CPT

## 2023-06-15 LAB — MIS SERPL-MCNC: 1.45 NG/ML

## 2023-06-15 NOTE — PROGRESS NOTES
Biopsy    Date/Time: 6/13/2023 4:00 PM    Performed by: Iveth Foley MD  Authorized by: Iveth Foley MD  Universal Protocol:  Consent: Verbal consent obtained  Written consent obtained  Risks and benefits: risks, benefits and alternatives were discussed  Consent given by: patient  Patient understanding: patient states understanding of the procedure being performed  Patient consent: the patient's understanding of the procedure matches consent given  Procedure consent: procedure consent matches procedure scheduled  Relevant documents: relevant documents present and verified  Patient identity confirmed: verbally with patient      Procedure Details - Lesion Biopsy: Body area:  Trunk    Trunk location:  Back    Biopsy method: punch biopsy      Biopsy tissue type: skin    Initial size (mm):  20    Final defect size (mm):  16    Malignancy: malignancy unknown       Pt requested examination of a lesion on her back that has persisted for over a year  Her brother was recently diagnosed with [facility] and she wants to make sure she does not have this as well  Pt tolerated procedure without obvious complication

## 2023-06-17 ENCOUNTER — APPOINTMENT (OUTPATIENT)
Dept: LAB | Facility: CLINIC | Age: 43
End: 2023-06-17
Payer: MEDICARE

## 2023-06-17 DIAGNOSIS — D50.9 MICROCYTIC ANEMIA: ICD-10-CM

## 2023-06-17 LAB
ERYTHROCYTE [DISTWIDTH] IN BLOOD BY AUTOMATED COUNT: 20.3 % (ref 11.6–15.1)
HCT VFR BLD AUTO: 38.6 % (ref 34.8–46.1)
HGB BLD-MCNC: 12.3 G/DL (ref 11.5–15.4)
MCH RBC QN AUTO: 24.9 PG (ref 26.8–34.3)
MCHC RBC AUTO-ENTMCNC: 31.9 G/DL (ref 31.4–37.4)
MCV RBC AUTO: 78 FL (ref 82–98)
PLATELET # BLD AUTO: 281 THOUSANDS/UL (ref 149–390)
PMV BLD AUTO: 10.2 FL (ref 8.9–12.7)
RBC # BLD AUTO: 4.94 MILLION/UL (ref 3.81–5.12)
WBC # BLD AUTO: 5.84 THOUSAND/UL (ref 4.31–10.16)

## 2023-06-17 PROCEDURE — 36415 COLL VENOUS BLD VENIPUNCTURE: CPT

## 2023-06-17 PROCEDURE — 85027 COMPLETE CBC AUTOMATED: CPT

## 2023-06-19 ENCOUNTER — PROCEDURE VISIT (OUTPATIENT)
Dept: FAMILY MEDICINE CLINIC | Facility: CLINIC | Age: 43
End: 2023-06-19
Payer: MEDICARE

## 2023-06-19 VITALS
RESPIRATION RATE: 18 BRPM | HEIGHT: 64 IN | DIASTOLIC BLOOD PRESSURE: 70 MMHG | BODY MASS INDEX: 27.79 KG/M2 | TEMPERATURE: 97.4 F | SYSTOLIC BLOOD PRESSURE: 111 MMHG | HEART RATE: 83 BPM | OXYGEN SATURATION: 99 % | WEIGHT: 162.8 LBS

## 2023-06-19 DIAGNOSIS — L98.9 SKIN LESION OF BACK: ICD-10-CM

## 2023-06-19 DIAGNOSIS — D50.9 MICROCYTIC ANEMIA: ICD-10-CM

## 2023-06-19 DIAGNOSIS — E61.1 HYPOFERREMIA: Primary | ICD-10-CM

## 2023-06-19 DIAGNOSIS — M79.89 PARASPINAL SOFT TISSUE MASS: ICD-10-CM

## 2023-06-19 PROCEDURE — 99213 OFFICE O/P EST LOW 20 MIN: CPT | Performed by: CHIROPRACTOR

## 2023-06-19 RX ORDER — MULTIVIT WITH MINERALS/LUTEIN
250 TABLET ORAL
Qty: 90 TABLET | Refills: 2 | Status: SHIPPED | OUTPATIENT
Start: 2023-06-19

## 2023-06-19 RX ORDER — QUINIDINE GLUCONATE 324 MG
240 TABLET, EXTENDED RELEASE ORAL
Qty: 90 TABLET | Refills: 2 | Status: SHIPPED | OUTPATIENT
Start: 2023-06-19

## 2023-06-19 NOTE — ASSESSMENT & PLAN NOTE
Patient is status post skin punch biopsy with some peripheral soft tissue irritation with raised edges and some midline discoloration  Area was cleaned with alcohol, bacitracin applied, dry dressing over, patient was advised to use bacitracin with dressing changes for the next 3 days  Recommend patient be reexamined in a week or so or prior to that for any worsening for reassessment

## 2023-06-19 NOTE — ASSESSMENT & PLAN NOTE
The patient's previously noted hemoglobin has improved however she still is very symptomatic, specifically with regard to fatigue and occasional SOB  With the patient's hemoglobin level being so low, I recommend she change from ferrous sulfate p o  which she cannot tolerate to ferrous gluconate with vitamin C 3 times daily  Also recommend consulting with heme-onc for possible further work-up but also feel patient may benefit from Venofer  Also strongly recommend patient have repeat iron studies done at 6-month intervals to monitor this as this has been a very chronic issue for this patient

## 2023-06-19 NOTE — PROGRESS NOTES
Family Medicine Follow-Up Office Visit  Berta Campos 43 y o  female   MRN: [id number] : 1980  ENCOUNTER: 2023 11:53 AM    Assessment and Plan   Hypoferremia   The patient's previously noted hemoglobin has improved however she still is very symptomatic, specifically with regard to fatigue and occasional SOB  With the patient's hemoglobin level being so low, I recommend she change from ferrous sulfate p o  which she cannot tolerate to ferrous gluconate with vitamin C 3 times daily  Also recommend consulting with heme-onc for possible further work-up but also feel patient may benefit from Venofer  Also strongly recommend patient have repeat iron studies done at 6-month intervals to monitor this as this has been a very chronic issue for this patient  Skin lesion of back  Patient is status post skin punch biopsy with some peripheral soft tissue irritation with raised edges and some midline discoloration  Area was cleaned with alcohol, bacitracin applied, dry dressing over, patient was advised to use bacitracin with dressing changes for the next 3 days  Recommend patient be reexamined in a week or so or prior to that for any worsening for reassessment  Paraspinal soft tissue mass   Patient has what upon palpation feels like possible lipoma right mid thoracic at her bra line  Differential includes muscular trigger point, possible scar tissue related to prior mole excision  She states this is chronically irritated and does cause her some considerable discomfort  Patient was referred for soft tissue ultrasound  Further treatment planning pending results of US  Chief Complaint     Chief Complaint   Patient presents with   • Other     Lump on her back, and lab review       History of Present Illness   Berta Campos is a 43y o -year-old female who presents today for follow-up    She is status post recent skin biopsy on her left mid back and reports she generally feels well except for some minor skin irritation  Patient also is reporting chronic discomfort in the soft tissue at her bra line on the right thoracic area relatively longstanding, with some days much more bothersome than others  Lastly, patient reports she is still having issues with extreme fatigue, sometimes SOB as well  States she has been taking a small amount of ferrous gluconate OTC daily  Patient had significant GI issues with ferrous sulfate  Review of Systems   Review of Systems    Active Problem List     Patient Active Problem List   Diagnosis   • ASCUS with positive high risk HPV cervical   • Atypical glandular cells of undetermined significance (TERRI) on cervical Pap smear   • Gastroesophageal reflux disease without esophagitis   • Microcytic anemia   • Pain of upper abdomen   • Encounter for screening mammogram for malignant neoplasm of breast   • Joint pain   • Neck pain   • Spasm of back muscles   • Celiac disease   • S/P laparoscopic appendectomy   • Visual changes   • Elevated blood pressure reading   • Breathing difficulty   • YESSICA (obstructive sleep apnea)   • Sleep-disordered breathing   • DDD (degenerative disc disease), lumbar   • Encounter for gynecological examination without abnormal finding   • Sacroiliitis (HCC)   • Fatigue   • Lipids abnormal   • Bipolar 1 disorder, mixed (HCC)   • Chronic bilateral low back pain   • Cervical disc disorder with radiculopathy of mid-cervical region   • Memory loss or impairment   • Biceps tendinopathy, left   • Left shoulder pain   • Left arm pain   • Somatic dysfunction of spine, cervical   • Arm somatic dysfunction   • Somatic dysfunction of thoracic region   • Paranasal sinus disease   • Allergic rhinitis   • Skin lesion of back   • Hypoferremia   • Paraspinal soft tissue mass       Past Medical History, Past Surgical History, Family History, and Social History were reviewed and updated today as appropriate      Objective   /70 (BP Location: Right arm, "Patient Position: Sitting, Cuff Size: Standard)   Pulse 83   Temp (!) 97 4 °F (36 3 °C) (Tympanic)   Resp 18   Ht 5' 4\" (1 626 m)   Wt 73 8 kg (162 lb 12 8 oz)   SpO2 99%   BMI 27 94 kg/m²     Physical Exam  Diabetic Foot Exam    Pertinent Laboratory/Diagnostic Studies:  Lab Results   Component Value Date    BUN 11 04/21/2023    CREATININE 0 93 04/21/2023    CALCIUM 9 0 04/21/2023    K 3 8 04/21/2023    CO2 24 04/21/2023     04/21/2023     Lab Results   Component Value Date    ALT 16 04/21/2023    AST 19 04/21/2023    ALKPHOS 59 04/21/2023       Lab Results   Component Value Date    WBC 5 84 06/17/2023    HGB 12 3 06/17/2023    HCT 38 6 06/17/2023    MCV 78 (L) 06/17/2023     06/17/2023       No results found for: \"TSH\"    No results found for: \"CHOL\"  Lab Results   Component Value Date    TRIG 127 11/16/2022     Lab Results   Component Value Date    HDL 40 (L) 11/16/2022     Lab Results   Component Value Date    LDLCALC 79 11/16/2022     No results found for: \"HGBA1C\"    Results for orders placed or performed in visit on 06/17/23   CBC and Platelet   Result Value Ref Range    WBC 5 84 4 31 - 10 16 Thousand/uL    RBC 4 94 3 81 - 5 12 Million/uL    Hemoglobin 12 3 11 5 - 15 4 g/dL    Hematocrit 38 6 34 8 - 46 1 %    MCV 78 (L) 82 - 98 fL    MCH 24 9 (L) 26 8 - 34 3 pg    MCHC 31 9 31 4 - 37 4 g/dL    RDW 20 3 (H) 11 6 - 15 1 %    Platelets 004 031 - 889 Thousands/uL    MPV 10 2 8 9 - 12 7 fL       Orders Placed This Encounter   Procedures   • US MSK limited   • Ambulatory Referral to Hematology / Oncology         Current Medications     Current Outpatient Medications   Medication Sig Dispense Refill   • ascorbic acid (VITAMIN C) 250 mg tablet Take 1 tablet (250 mg total) by mouth 3 (three) times a day with meals 90 tablet 2   • cetirizine (ZyrTEC) 10 mg tablet Take 1 tablet (10 mg total) by mouth daily 90 tablet 1   • cholecalciferol (VITAMIN D3) 400 units tablet Take 400 Units by mouth daily   " • cyanocobalamin (VITAMIN B-12) 500 MCG tablet Take 500 mcg by mouth daily     • ferrous gluconate (FERGON) 240 (27 FE) MG tablet Take 1 tablet (240 mg total) by mouth 3 (three) times a day with meals Always take with Vit C at same time  90 tablet 2   • fluticasone (FLONASE) 50 mcg/act nasal spray SHAKE LIQUID AND USE 1 SPRAY IN EACH NOSTRIL DAILY 16 g 3   • Magnesium 250 MG TABS Take 250 mg by mouth in the morning OTC     • meclizine (ANTIVERT) 25 mg tablet Take 1 tablet (25 mg total) by mouth every 8 (eight) hours as needed for dizziness 10 tablet 0   • omeprazole (PriLOSEC) 40 MG capsule TAKE 1 CAPSULE(40 MG) BY MOUTH DAILY 30 capsule 0   • Sodium Fluoride 1 1 % PSTE Take 1 Film by mouth daily at bedtime Use once per day at bedtime  Brush, spit out  Don't rinse  Let sit on teeth  Generic brand OK 51 g 3     No current facility-administered medications for this visit         ALLERGIES:  Allergies   Allergen Reactions   • Gluten Meal - Food Allergy Abdominal Pain       Health Maintenance     Health Maintenance   Topic Date Due   • COVID-19 Vaccine (1) Never done   • Influenza Vaccine (Season Ended) 09/01/2023   • Annual Physical  11/15/2023   • Breast Cancer Screening: Mammogram  04/18/2024   • BMI: Followup Plan  05/17/2024   • BMI: Adult  06/19/2024   • Cervical Cancer Screening  12/20/2024   • DTaP,Tdap,and Td Vaccines (2 - Td or Tdap) 10/15/2025   • HIV Screening  Completed   • Hepatitis C Screening  Completed   • Pneumococcal Vaccine: Pediatrics (0 to 5 Years) and At-Risk Patients (6 to 59 Years)  Aged Out   • HIB Vaccine  Aged Out   • IPV Vaccine  Aged Out   • Hepatitis A Vaccine  Aged Out   • Meningococcal ACWY Vaccine  Aged Out   • HPV Vaccine  Aged [employer] History   Administered Date(s) Administered   • Tdap 10/15/2015         Elio Mccarthy MD   [address]  6/19/2023  11:53 AM    Parts of this note were dictated using [facility] dictation software and may have sounds-like errors due to variation in pronunciation

## 2023-06-19 NOTE — ASSESSMENT & PLAN NOTE
Patient has what upon palpation feels like possible lipoma right mid thoracic at her bra line  Differential includes muscular trigger point, possible scar tissue related to prior mole excision  She states this is chronically irritated and does cause her some considerable discomfort  Patient was referred for soft tissue ultrasound  Further treatment planning pending results of US

## 2023-06-20 ENCOUNTER — TELEPHONE (OUTPATIENT)
Dept: HEMATOLOGY ONCOLOGY | Facility: CLINIC | Age: 43
End: 2023-06-20

## 2023-06-20 ENCOUNTER — CONSULT (OUTPATIENT)
Dept: HEMATOLOGY ONCOLOGY | Facility: CLINIC | Age: 43
End: 2023-06-20
Payer: MEDICARE

## 2023-06-20 VITALS
SYSTOLIC BLOOD PRESSURE: 116 MMHG | OXYGEN SATURATION: 99 % | RESPIRATION RATE: 18 BRPM | DIASTOLIC BLOOD PRESSURE: 66 MMHG | BODY MASS INDEX: 28.17 KG/M2 | HEIGHT: 64 IN | HEART RATE: 71 BPM | WEIGHT: 165 LBS | TEMPERATURE: 97.7 F

## 2023-06-20 DIAGNOSIS — E61.1 HYPOFERREMIA: ICD-10-CM

## 2023-06-20 DIAGNOSIS — K90.0 CELIAC DISEASE: ICD-10-CM

## 2023-06-20 DIAGNOSIS — D50.9 MICROCYTIC ANEMIA: ICD-10-CM

## 2023-06-20 DIAGNOSIS — D50.9 IRON DEFICIENCY ANEMIA, UNSPECIFIED IRON DEFICIENCY ANEMIA TYPE: Primary | ICD-10-CM

## 2023-06-20 PROCEDURE — 99244 OFF/OP CNSLTJ NEW/EST MOD 40: CPT | Performed by: PHYSICIAN ASSISTANT

## 2023-06-20 RX ORDER — SODIUM CHLORIDE 9 MG/ML
20 INJECTION, SOLUTION INTRAVENOUS ONCE
Status: CANCELLED | OUTPATIENT
Start: 2023-06-30

## 2023-06-20 NOTE — TELEPHONE ENCOUNTER
What would be a preferred day of the week that would work best for your infusion appointment? Any day but Monday  Do you prefer mornings or afternoons for your appointments? AM  Are there any days or dates that do not work for your schedule, including any upcoming vacations? N/a  We are going to try our best to schedule you at the infusion center closest to your home  In the event that we are unable to what would be your next preferred infusion site or sites? AN    1  AN  2  BE    Do you have transportation to take you to all of your appointments?  yes

## 2023-06-20 NOTE — PROGRESS NOTES
[facility] - Hematology & Medical Oncology  Outpatient Visit Encounter Note      Barbara Menchaca 43 y o  female [id number] Date:  6/20/2023    HEMATOLOGICAL HISTORY      Clotting History Denies, denies family hx but does have strong family hx of cva; grandmother was on blood thinners unsure of indication    Bleeding History Denies, denies family hx   Cancer History Denies personal history malignancy   Family Cancer History  Mother cervical cancer and thyroid cancer, brother skin cancer of her    H/O Blood/Plt Transfusion denies   Tobacco Use Cessation 7 years ago, smoked for 15 years total 1ppd   ETOH Socially    Herbal Denies   Occupation Not currently working     2015 last time pregnant     Adis Aggarwal is a 43 y o  here for new consultation with me today  The patient is referred by PCP and the reason for consultation is iron deficiency anemia  In review of the chart and talking with the patient, past medical history significant for celiac disease, heartburn, vitamin D deficiency, HTN, hiatal hernia, GERD without esophagitis, abnormal Pap smear of cervix  Having severe fatigue for several years, hx of dizziness and lightheaded    Occasional SOB and MANDUJANO, ice chewing, restless legs, easy bruising, feels like she's cold most of the time  No abdominal pain, n/v/d/c, blood in stool or urine, muscle cramping, fevers, night sweats, infections, unintentional weight loss, lumps/bumps/adenopathy or rashes  Hx of palpitations not currently  Menses - monthly first two days heavy then light   Diet  - has celiac's disease, does eat meat   Surgeries - s/p appendectomy, tubal ligation no bowel surgery     I have reviewed the relevant past medical, surgical, social and family history  I have also reviewed allergies and medications for this patient  Review of Systems  Review of Systems   All other systems reviewed and are negative  "  OBJECTIVE     Physical Exam  Vitals:    06/20/23 0806   BP: 116/66   BP Location: Left arm   Patient Position: Sitting   Cuff Size: Adult   Pulse: 71   Resp: 18   Temp: 97 7 °F (36 5 °C)   TempSrc: Temporal   SpO2: 99%   Weight: 74 8 kg (165 lb)   Height: 5' 4\" (1 626 m)       Physical Exam  Vitals reviewed  Constitutional:       General: She is not in acute distress  Appearance: Normal appearance  She is not ill-appearing  HENT:      Head: Normocephalic and atraumatic  Eyes:      General: No scleral icterus  Cardiovascular:      Rate and Rhythm: Normal rate and regular rhythm  Heart sounds: Normal heart sounds  No murmur heard  Pulmonary:      Effort: Pulmonary effort is normal  No respiratory distress  Breath sounds: Normal breath sounds  No wheezing or rhonchi  Abdominal:      General: Abdomen is flat  Palpations: Abdomen is soft  Tenderness: There is no abdominal tenderness  There is no guarding  Musculoskeletal:      Cervical back: Normal range of motion and neck supple  Right lower leg: No edema  Left lower leg: No edema  Lymphadenopathy:      Cervical: No cervical adenopathy  Skin:     General: Skin is warm and dry  Findings: No bruising  Neurological:      Mental Status: She is alert and oriented to person, place, and time          Imaging  Relevant imaging reviewed in chart    EGD 6/20/2020  IMPRESSION:  3 cm hiatal hernia  Random biopsies taken from stomach to rule out H pylori  Random biopsies taken from duodenum to rule out celiac     RECOMMENDATION:  Continue with PPI  Consider surgical evaluation for hiatal hernia repair-MIS vs Thoracic Surgery  Follow-up pathology    Labs  Relevant labs reviewed in chart     Component Ref Range & Units 6/17/23  8:16 AM 4/21/23  5:06 PM 3/18/22  7:51 AM 1/23/21 11:00 AM 7/17/20 11:01 AM 2/3/20 11:43 AM 7/15/19 11:54 AM   WBC 4 31 - 10 16 Thousand/uL 5 84  5 23  5 03  15 20 High   5 58  4 31  4 20 Low     RBC " 3 81 - 5 12 Million/uL 4 94  4 29  4 64  4 92  4 54  4 76  4 51    Hemoglobin 11 5 - 15 4 g/dL 12 3  9 1 Low   12 0  14 3  13 7  10 5 Low   10 9 Low     Hematocrit 34 8 - 46 1 % 38 6  29 6 Low   37 9  42 3  41 6  35 9  35 8    MCV 82 - 98 fL 78 Low   69 Low   82  86  92  75 Low   79 Low     MCH 26 8 - 34 3 pg 24  9 Low   21 2 Low   25 9 Low   29 1  30 2  22 1 Low   24 2 Low     MCHC 31 4 - 37 4 g/dL 31 9  30 7 Low   31 7  33 8  32 9  29 2 Low   30 4 Low     RDW 11 6 - 15 1 % 20 3 High   16 2 High   15 4 High   11 8  11 8  18 6 High   14 4    Platelets 044 - 751 Thousands/uL 281  367  291  244  259  291  286    MPV 8 9 - 12 7 fL 10 2  9 6  11 3  10 4  10 4  10 9  11 3      Component Ref Range & Units 5/17/23  7:07 AM 2/3/20 11:43 AM 7/15/19 11:54 AM   Iron Saturation 15 - 50 % 6 Low   10 R  4 R    TIBC 250 - 450 ug/dL 427  481 High   470 High     Iron 50 - 170 ug/dL 26 Low   50 CM  19 Low  CM      Component Ref Range & Units 5/17/23  7:07 AM 2/3/20 11:43 AM 7/15/19 11:54 AM   Ferritin 11 - 307 ng/mL 11  11 R  6 Low  R      Component Ref Range & Units 2/7/23  9:19 AM   Vitamin B-12 100 - 900 pg/mL 1,283 High            ASSESSMENT & PLAN      Diagnosis ICD-10-CM Associated Orders   1  Iron deficiency anemia, unspecified iron deficiency anemia type  D50 9 Ambulatory referral to Gastroenterology     CBC and differential     Comprehensive metabolic panel     Iron Panel (Includes Ferritin, Iron Sat%, Iron, and TIBC)     Vitamin B12     Methylmalonic acid, serum     Folate      2  Microcytic anemia  D50 9 Ambulatory Referral to Hematology / Oncology     CBC and differential     Comprehensive metabolic panel     Iron Panel (Includes Ferritin, Iron Sat%, Iron, and TIBC)     Vitamin B12     Methylmalonic acid, serum     Folate      3  Hypoferremia  E61 1 Ambulatory Referral to Hematology / Oncology      4   Celiac disease  K90 0 Ambulatory referral to Gastroenterology          43 y o  presenting for evaluation of low ferritin, most recent iron panel from 5/17/2023 iron saturation 6%, TIBC 427, iron 26, ferritin 11 prior ferritin 11 2/3/2020 and prior to that 7/15/2019 ferritin of 6  Patient severely iron deficient, do not feel as though oral iron will supplement patient effectively at this moment, may be utilized in the future  She's not tolerating ferrous sulfate, was changed yesterday to ferrous gluconate with vitamin C 3x daily  Discussion:  • Given Carola Moreira's iron deficiency anemia, I recommend IV iron supplementation with IV Venofer    o I reviewed the reasoning, process, and side effect profile of Venofer, which includes but is not limited to nausea, headache, hypotension, tattooing of the skin, and an anaphylactic reaction  • The underlying etiology of Carola Green iron deficiency anemia is likely malabsorption from celiac disease  However, I defer to her PCP to investigate the underlying cause and coordinate the appropriate management  I explained that IV iron supplementation will only temporarily alleviate her iron deficiency anemia unless the underlying etiology is managed  Thus, I strongly encouraged her to follow up with her PCP and re-referred to GI for evaluation of screening colonoscopy with longstanding hx of iron deficiency anemia and close to being 39years of age  Plan/Labs:  • I ordered IV iron sucrose (Venofer) 300mg weekly x 4    • I have ordered a CBC and platelet and iron panel in 3 months to confirm adequate iron supplementation and resolution of anemia  • Also included additional malabsorption labs as above    Follow-up  • 3 months labs 1 week prior       All questions were answered to the patient's satisfaction during this encounter  They appreciated and thanked me for spending time with them  The patient knows the contact information for our office and know to reach out for any relevant concerns related to this encounter   For all other listed problems and medical diagnosis in his chart - they are managed by PCP and/or other specialists, which patient acknowledges        Hematology & Medical Oncology

## 2023-06-22 ENCOUNTER — HOSPITAL ENCOUNTER (OUTPATIENT)
Dept: ULTRASOUND IMAGING | Facility: HOSPITAL | Age: 43
Discharge: HOME | End: 2023-06-22
Payer: MEDICARE

## 2023-06-22 ENCOUNTER — OFFICE VISIT (OUTPATIENT)
Dept: DENTISTRY | Facility: CLINIC | Age: 43
End: 2023-06-22

## 2023-06-22 VITALS — HEART RATE: 80 BPM | DIASTOLIC BLOOD PRESSURE: 74 MMHG | SYSTOLIC BLOOD PRESSURE: 108 MMHG

## 2023-06-22 DIAGNOSIS — J30.9 ALLERGIC RHINITIS, UNSPECIFIED SEASONALITY, UNSPECIFIED TRIGGER: ICD-10-CM

## 2023-06-22 DIAGNOSIS — M79.89 PARASPINAL SOFT TISSUE MASS: ICD-10-CM

## 2023-06-22 DIAGNOSIS — Z29.8 ENCOUNTER FOR OTHER SPECIFIED PROPHYLACTIC MEASURES: Primary | ICD-10-CM

## 2023-06-22 DIAGNOSIS — Z01.20 ENCOUNTER FOR DENTAL EXAMINATION: ICD-10-CM

## 2023-06-22 DIAGNOSIS — J34.9 PARANASAL SINUS DISEASE: ICD-10-CM

## 2023-06-22 PROCEDURE — D0274 BITEWINGS - 4 RADIOGRAPHIC IMAGES: HCPCS

## 2023-06-22 PROCEDURE — 76705 ECHO EXAM OF ABDOMEN: CPT

## 2023-06-22 PROCEDURE — D0120 PERIODIC ORAL EVALUATION - ESTABLISHED PATIENT: HCPCS | Performed by: DENTIST

## 2023-06-22 PROCEDURE — D1110 PROPHYLAXIS - ADULT: HCPCS

## 2023-06-22 NOTE — DENTAL PROCEDURE DETAILS
Clara Eliz presents for a Periodic exam  Verbal consent for treatment given in addition to the forms  Reviewed health history - Patient is ASA II  Consents signed: Yes     Perio: Generalized and Slight bleeding  Pain Scale: 2  Radiographs: Bitewings x4     Oral Hygiene instruction reviewed and given  Recommended Hygiene recall visits with the [facility]  Reason for visit:Periodic Exam   Rooming Includes:  Dental Vitals recorded  Allergies Reviewed  Medication Reviewed  Dental Health Compliance: Twice daily brushing, never flossing, use of fluoride toothpaste  Medical History Reviewed  ASA 2 - Patient with mild systemic disease with no functional limitations    Patient has complaints of biting sensitivity remaining on tooth #2  Patient presents for hygiene appointment  Treatment provided includes periodic exam performed by Dr Basil Williamson,  adult prophy, handscale, polish(mint paste), floss,  4bwx taken to rule out interproximal decay, oral hygiene instructions  Intraoral exam/Oral Cancer Screening presents with no significant findings  Plaque buildup is generalized Light  Calculus buildup is Localized  Light mandibular anterior teeth and maxillary molars  Gingival evaluation is pink with slight bleeding  Generalized slight recession  Stain evaluation is no stain present  Oral hygiene instructions include brushing 2x daily and flossing daily  Reviewed brushing along gumline  Porfirio 1850 due June 2024  Next visit: 6 month recall

## 2023-06-22 NOTE — DENTAL PROCEDURE DETAILS
Prophylaxis completed with hand instrumentation  Soft plaque removed and supragingival calculus removed  Polished with prophy cup and paste  Flossed and provided Oral Health Instructions  Patient left satisfied and ambulatory  Reason for visit:Periodic Exam   Rooming Includes:  Dental Vitals recorded  Allergies Reviewed  Medication Reviewed  Dental Health Compliance: Twice daily brushing, never flossing, use of fluoride toothpaste  Medical History Reviewed  ASA 2 - Patient with mild systemic disease with no functional limitations    Patient has complaints of biting sensitivity remaining on tooth #2  Patient presents for hygiene appointment  Treatment provided includes periodic exam performed by Dr Alma Berry,  adult prophy, handscale, polish(mint paste), floss,  4bwx taken to rule out interproximal decay, oral hygiene instructions  Intraoral exam/Oral Cancer Screening presents with no significant findings  Plaque buildup is generalized Light  Calculus buildup is Localized  Light mandibular anterior teeth and maxillary molars  Gingival evaluation is pink with slight bleeding  Generalized slight recession  Stain evaluation is no stain present  Oral hygiene instructions include brushing 2x daily and flossing daily  Reviewed brushing along gumline  Porfirio 1850 due June 2024  Next visit: 6 month recall

## 2023-06-22 NOTE — PATIENT INSTRUCTIONS
Mouth Care   WHAT YOU NEED TO KNOW:   Mouth care prevents infection, plaque, bleeding gums, mouth sores, and cavities  It also freshens breath and improves appetite  Do mouth care in the morning, after each meal, and before bed each night  You may need more frequent mouth care if your mouth is in poor condition  DISCHARGE INSTRUCTIONS:   Items used for mouth care: An electric or manual toothbrush    Toothpaste, dental sticks, and floss    A cup of water for rinsing    Mouthwash     Water-based lip balm or moisturizer    Brush your teeth: Wet the toothbrush and place a small amount of toothpaste on it  Gently place the brush on each tooth, and move it in a [place]  Do not press too hard  This may injure your gums  Clean the inner, outer, and top surfaces of your teeth  Brush your gums and the top of your tongue  Swish the water in your mouth and spit it out  Repeat this step with mouthwash  Dry around your mouth  Apply water-based lip balm or moisturizer to your lips to prevent cracking and dryness  Floss your teeth:  Thread the floss between each tooth, but do not push down on the gums too hard  This can cause gum damage  Make sure to floss on each side of every tooth  You may need to use waxed floss for easier movement between your teeth  Clean your dentures:  Remove the dentures from your mouth before you clean them  Moist dentures come out more easily  Drink a sip of water before you remove your dentures  Gently rock the dentures from side to side to loosen them  Then pull them out  Brush the dentures with clean water and denture  or toothpaste  Brush the dentures on all surfaces with cool water  Do not use hot water  Hot water could damage the dentures  Be careful not to bend any clasps on the dentures as you brush them  Rinse them  Place a thin layer of denture adhesive on the dentures and put them back in your mouth   Ask your healthcare provider which adhesive to use     Soak the dentures in a denture solution each night after you brush them  Rinse them in cold water before you place them back in your mouth  Follow up with your healthcare provider or dentist every 6 months or as directed:  Write down your questions so you remember to ask them during your visits  Contact your healthcare provider or dentist if:   You develop mouth sores  Your dentures do not fit well  You have pain with brushing  You have questions or concerns about your condition or care  © Copyright [employer] 2022 Information is for End User's use only and may not be sold, redistributed or otherwise used for commercial purposes  The above information is an  only  It is not intended as medical advice for individual conditions or treatments  Talk to your doctor, nurse or pharmacist before following any medical regimen to see if it is safe and effective for you

## 2023-06-23 RX ORDER — FLUTICASONE PROPIONATE 50 MCG
1 SPRAY, SUSPENSION (ML) NASAL DAILY
Qty: 16 G | Refills: 3 | Status: SHIPPED | OUTPATIENT
Start: 2023-06-23

## 2023-06-23 RX ORDER — CETIRIZINE HYDROCHLORIDE 10 MG/1
10 TABLET ORAL DAILY
Qty: 90 TABLET | Refills: 3 | Status: SHIPPED | OUTPATIENT
Start: 2023-06-23

## 2023-06-26 PROCEDURE — 88305 TISSUE EXAM BY PATHOLOGIST: CPT | Performed by: PATHOLOGY

## 2023-06-27 ENCOUNTER — PROCEDURE VISIT (OUTPATIENT)
Dept: FAMILY MEDICINE CLINIC | Facility: CLINIC | Age: 43
End: 2023-06-27
Payer: MEDICARE

## 2023-06-27 DIAGNOSIS — M99.02 SOMATIC DYSFUNCTION OF THORACIC REGION: ICD-10-CM

## 2023-06-27 DIAGNOSIS — M50.120 CERVICAL DISC DISORDER WITH RADICULOPATHY OF MID-CERVICAL REGION: Primary | ICD-10-CM

## 2023-06-27 DIAGNOSIS — M99.07 SOMATIC DYSFUNCTION OF UPPER EXTREMITY: ICD-10-CM

## 2023-06-27 DIAGNOSIS — M99.01 SOMATIC DYSFUNCTION OF SPINE, CERVICAL: ICD-10-CM

## 2023-06-27 PROCEDURE — 99213 OFFICE O/P EST LOW 20 MIN: CPT | Performed by: CHIROPRACTOR

## 2023-06-27 NOTE — PROGRESS NOTES
Assessment/Plan     This is a 43 y o  female who presents for OMT follow-up for:  Patient is suffering from chronic pain involving her full spine lumbar being the worst but also C-spine being extremely bothersome still  She does obtain some relief with OMT  I did suggest the patient consider massage therapy  She has previously tried some OTC magnesium supplementation with no significant change  I recommend she try magnesium glycinayte/malate to see if that does not help better with sleep and pain in addition to her current regimen  Should continue OMT as long as helpful  Plan:   1  Patient tolerated OMT well for the above problems,  advised patient to drink fluids and can use NSAID for soreness after treatment     2  OMT Follow up in 2 weeks  Subjective     Yu Alonzo is a 43 y o  female and is here for a OMT follow up  The patient reports     Is the patient taking Pain medication? no  Has the patient completed physical therapy for this condition? yes  Did Patient symptoms improve from last OMT appointment? yes        Review of Systems  Do you have pain that bothers you in your daily life? yes  Review of Systems   Musculoskeletal: Positive for back pain, myalgias, neck pain and neck stiffness  Objective     OMT Exam   OMT    Performed by: Nani Lugo MD  Authorized by: Nani Lugo MD  Universal Protocol:  Consent: Verbal consent obtained    Consent given by: patient  Patient understanding: patient states understanding of the procedure being performed  Patient identity confirmed: verbally with patient        Procedure Details:     Region evaluated and treated:  Cervical, Left Extremities and Thoracic    Extremity Information  Extremities: left upper extremity    Thoracic Information  Thoracic Region: T5 - T9  Cervical Details:     Examination Method:  Tissue Texture Change, Stability, Laxity, Effusions, Tone, Range of Motion, Contracture, Passive and Tenderness, Pain    Severity: Moderate    Treatment Method:  Direct Treatment, Myofascial Release Treatment and Facilitated Positional Release Treatment    Response:  Improved - The somatic dysfunction is improved but not completely resolved  Thoracic T5 - T9 details:     Examination Method:  Range of Motion, Contracture, Passive and Tenderness, Pain    Severity:  Moderate    Treatment Method:  Direct Treatment, Myofascial Release Treatment and Soft Tissue Treatment    Response:  Improved - The somatic dysfunction is improved but not completely resolved  Left Upper Extremity details:     Examination Method:  Tenderness, Pain and Range of Motion, Contracture    Severity:  Moderate    Osteopathic Findings:  Deep thoracic and trigger point areas treated with periscapular side posture myofascial release    Treatment Method:  Muscle Energy Treatment and Soft Tissue Treatment    Response:  Improved - The somatic dysfunction is improved but not completely resolved  Total Regions Treated:  3  Attending provider present in exam room for procedure:  No

## 2023-06-30 ENCOUNTER — OFFICE VISIT (OUTPATIENT)
Dept: FAMILY MEDICINE CLINIC | Facility: CLINIC | Age: 43
End: 2023-06-30

## 2023-06-30 VITALS
HEIGHT: 64 IN | OXYGEN SATURATION: 100 % | BODY MASS INDEX: 28.1 KG/M2 | DIASTOLIC BLOOD PRESSURE: 90 MMHG | WEIGHT: 164.6 LBS | SYSTOLIC BLOOD PRESSURE: 120 MMHG | HEART RATE: 84 BPM | TEMPERATURE: 98.5 F

## 2023-06-30 DIAGNOSIS — D17.1 LIPOMA OF TORSO: Primary | ICD-10-CM

## 2023-06-30 DIAGNOSIS — D50.9 IRON DEFICIENCY ANEMIA, UNSPECIFIED IRON DEFICIENCY ANEMIA TYPE: Primary | ICD-10-CM

## 2023-06-30 RX ORDER — SODIUM CHLORIDE 9 MG/ML
20 INJECTION, SOLUTION INTRAVENOUS ONCE
Status: CANCELLED | OUTPATIENT
Start: 2023-07-06

## 2023-06-30 NOTE — PROGRESS NOTES
Name: Shirley Dickerson      : 1980      MRN: [id number]  Encounter Provider: Lyndsey Love MD  Encounter Date: 2023   Encounter department: [facility]    Assessment & Plan     1  Lipoma of torso  Assessment & Plan:  Referral placed for counseling from general surgery as pt deliberating on removal    Orders:  -     Ambulatory Referral to General Surgery; Future         Subjective      Lipoma of upper back irritating, especially after OMT  She is interested in seeking a surgical referral but is unsure about undergoing the procedure  Review of Systems   Constitutional: Negative for chills and fever  HENT: Negative for ear pain and sore throat  Eyes: Negative for pain and visual disturbance  Respiratory: Negative for cough and shortness of breath  Cardiovascular: Negative for chest pain and palpitations  Gastrointestinal: Negative for abdominal pain and vomiting  Genitourinary: Negative for dysuria and hematuria  Musculoskeletal: Negative for arthralgias and back pain  Skin: Negative for color change and rash  Neurological: Negative for seizures and syncope  All other systems reviewed and are negative  Current Outpatient Medications on File Prior to Visit   Medication Sig   • ascorbic acid (VITAMIN C) 250 mg tablet Take 1 tablet (250 mg total) by mouth 3 (three) times a day with meals   • cetirizine (ZyrTEC) 10 mg tablet Take 1 tablet (10 mg total) by mouth daily   • cholecalciferol (VITAMIN D3) 400 units tablet Take 400 Units by mouth daily   • Coenzyme Q10 600 MG CHEW Chew   • cyanocobalamin (VITAMIN B-12) 500 MCG tablet Take 500 mcg by mouth daily   • ferrous gluconate (FERGON) 240 (27 FE) MG tablet Take 1 tablet (240 mg total) by mouth 3 (three) times a day with meals Always take with Vit C at same time     • fluticasone (FLONASE) 50 mcg/act nasal spray 1 spray into each nostril daily   • Magnesium 250 MG TABS Take 250 mg by mouth in "the morning OTC   • meclizine (ANTIVERT) 25 mg tablet Take 1 tablet (25 mg total) by mouth every 8 (eight) hours as needed for dizziness   • omeprazole (PriLOSEC) 40 MG capsule TAKE 1 CAPSULE(40 MG) BY MOUTH DAILY   • Sodium Fluoride 1 1 % PSTE Take 1 Film by mouth daily at bedtime Use once per day at bedtime  Brush, spit out  Don't rinse  Let sit on teeth  Generic brand OK       Objective     /90 (BP Location: Left arm, Patient Position: Sitting, Cuff Size: Standard)   Pulse 84   Temp 98 5 °F (36 9 °C) (Tympanic)   Ht 5' 4\" (1 626 m)   Wt 74 7 kg (164 lb 9 6 oz)   LMP 06/12/2023   SpO2 100%   BMI 28 25 kg/m²     Physical Exam  Constitutional:       General: She is not in acute distress  Appearance: Normal appearance  She is not ill-appearing  HENT:      Head: Normocephalic and atraumatic  Right Ear: Tympanic membrane and external ear normal       Left Ear: Tympanic membrane and external ear normal       Mouth/Throat:      Mouth: Mucous membranes are moist       Pharynx: Oropharynx is clear  No oropharyngeal exudate  Eyes:      Extraocular Movements: Extraocular movements intact  Pupils: Pupils are equal, round, and reactive to light  Cardiovascular:      Rate and Rhythm: Normal rate and regular rhythm  Pulses: Normal pulses  Heart sounds: Normal heart sounds  Pulmonary:      Effort: Pulmonary effort is normal       Breath sounds: Normal breath sounds  No wheezing, rhonchi or rales  Abdominal:      General: Abdomen is flat  Bowel sounds are normal  There is no distension  Palpations: Abdomen is soft  Tenderness: There is no abdominal tenderness  Skin:     General: Skin is warm and dry  Capillary Refill: Capillary refill takes less than 2 seconds  Neurological:      General: No focal deficit present  Mental Status: She is alert and oriented to person, place, and time     Psychiatric:         Mood and Affect: Mood normal          Behavior: Behavior " normal        Reji Francisco MD

## 2023-06-30 NOTE — PATIENT INSTRUCTIONS
Lipoma   WHAT YOU NEED TO KNOW:   A lipoma is a benign (non cancer) tumor made up of fat tissue  Lipomas can form anywhere in your body, but are usually found on the back, shoulders, neck, and head  The cause of lipomas is unknown  A lipoma looks like a round lump of tissue  It may feel soft and rubbery  Lipomas move around underneath your skin when you press on them  They usually do not hurt  If your lipoma grows large, it may cause pain  DISCHARGE INSTRUCTIONS:   Contact your healthcare provider if:   Your lipoma is getting bigger  Your lipoma causes new or increased pain  You develop new symptoms  You have questions or concerns about your condition or care  Follow up with your healthcare provider as directed: Your healthcare provider may recommend regular follow-up visits to check the lipoma for any changes  You may need tests if your lipoma becomes painful or changes in size or color  Write down your questions so you remember to ask them during your visits  © Copyright [employer] 2022 Information is for End User's use only and may not be sold, redistributed or otherwise used for commercial purposes  The above information is an  only  It is not intended as medical advice for individual conditions or treatments  Talk to your doctor, nurse or pharmacist before following any medical regimen to see if it is safe and effective for you

## 2023-07-05 ENCOUNTER — PROCEDURE VISIT (OUTPATIENT)
Dept: FAMILY MEDICINE CLINIC | Facility: CLINIC | Age: 43
End: 2023-07-05
Payer: MEDICARE

## 2023-07-05 DIAGNOSIS — M54.2 NECK PAIN: Primary | ICD-10-CM

## 2023-07-05 DIAGNOSIS — M99.00 SOMATIC DYSFUNCTION OF HEAD REGION: ICD-10-CM

## 2023-07-05 DIAGNOSIS — M99.01 SOMATIC DYSFUNCTION OF SPINE, CERVICAL: ICD-10-CM

## 2023-07-05 DIAGNOSIS — M99.02 SOMATIC DYSFUNCTION OF SPINE, THORACIC: ICD-10-CM

## 2023-07-05 PROCEDURE — 98926 OSTEOPATH MANJ 3-4 REGIONS: CPT | Performed by: FAMILY MEDICINE

## 2023-07-05 NOTE — PROGRESS NOTES
The Assessment/Plan     This is a 37 y.o. female who presents for OMT follow-up for:  1. Neck pain        2. Somatic dysfunction of head region        3. Somatic dysfunction of spine, cervical        4. Somatic dysfunction of spine, thoracic             1. Patient tolerated OMT well for the above problems,  advised patient to drink fluids and can use NSAID for soreness after treatment     2. OMT Follow up in 2 weeks. Julio James is a 37 y.o. female and is here for a OMT follow up. The patient reports resolution of pain for a day at most after OMT sessions. Patient notes difficulty sleeping due to pain at times. Pain is 5-6/10 in neck, left shoulder, midthoracic and lumbar pain. Is the patient taking Pain medication? no  Has the patient completed physical therapy for this condition? yes  Did Patient symptoms improve from last OMT appointment? no    The following portions of the patient's history were reviewed and updated as appropriate: allergies, current medications, past family history, past medical history, past social history, past surgical history and problem list.    Review of Systems  Review of Systems   Constitutional: Negative for chills and fever. HENT: Negative for ear pain and sore throat. Eyes: Negative for pain and visual disturbance. Respiratory: Negative for cough and shortness of breath. Cardiovascular: Negative for chest pain and palpitations. Gastrointestinal: Negative for abdominal pain and vomiting. Genitourinary: Negative for dysuria and hematuria. Musculoskeletal: Positive for back pain, myalgias (left calf) and neck pain. Negative for arthralgias. Skin: Negative for color change and rash. Neurological: Positive for headaches. Negative for dizziness, weakness and numbness. All other systems reviewed and are negative.         Objective     OMT Exam     OMT    Performed by: La Duncan DO  Authorized by: La Duncan DO Universal Protocol:  Procedure performed by:  Consent: Verbal consent obtained. Consent given by: patient  Patient identity confirmed: verbally with patient        Procedure Details:     Region evaluated and treated:  Head, Cervical and Thoracic    Thoracic Information  Thoracic Region: T1 - T4  Head Details:     Examination Method:  Tissue Texture Change, Stability, Laxity, Effusions, Tone, Asymmetry, Misalignment, Crepitation, Defects, Masses and Tenderness, Pain    Severity:  Mild    Osteopathic Findings:  B/l suboccipital tenderness     Treatment Method:  Soft Tissue Treatment, Myofascial Release Treatment, Muscle Energy Treatment and Balanced Ligamentous Tension, Ligamentous Articular Strain Treatment    Response:  Improved - The somatic dysfunction is improved but not completely resolved. Cervical Details:     Examination Method:  Tissue Texture Change, Stability, Laxity, Effusions, Tone, Asymmetry, Misalignment, Crepitation, Defects, Masses, Tenderness, Pain and Range of Motion, Contracture    Severity:  Moderate    Osteopathic Findings:  L > R cervical hypertonicity  L PC5 tenderpoint     Treatment Method:  Myofascial Release Treatment, Soft Tissue Treatment, Muscle Energy Treatment and Counterstrain Treatment    Response:  Resolved - The somatic dysfunction is completely resolved without evidence of it ever having been present.     Thoracic T1 - T4 details:     Examination Method:  Tissue Texture Change, Stability, Laxity, Effusions, Tone, Asymmetry, Misalignment, Crepitation, Defects, Masses, Tenderness, Pain and Range of Motion, Contracture    Severity:  Moderate    Osteopathic Findings:  B/l upper trapezius hypertonicity     Treatment Method:  Myofascial Release Treatment, Soft Tissue Treatment and Muscle Energy Treatment    Response:  Improved    Total Regions Treated:  3

## 2023-07-06 ENCOUNTER — HOSPITAL ENCOUNTER (OUTPATIENT)
Dept: INFUSION CENTER | Facility: HOSPITAL | Age: 43
Discharge: HOME | End: 2023-07-06
Attending: INTERNAL MEDICINE
Payer: MEDICARE

## 2023-07-06 VITALS
SYSTOLIC BLOOD PRESSURE: 136 MMHG | RESPIRATION RATE: 18 BRPM | DIASTOLIC BLOOD PRESSURE: 85 MMHG | HEART RATE: 76 BPM | TEMPERATURE: 97.3 F

## 2023-07-06 DIAGNOSIS — D50.9 IRON DEFICIENCY ANEMIA, UNSPECIFIED IRON DEFICIENCY ANEMIA TYPE: Primary | ICD-10-CM

## 2023-07-06 PROCEDURE — 96365 THER/PROPH/DIAG IV INF INIT: CPT

## 2023-07-06 RX ORDER — SODIUM CHLORIDE 9 MG/ML
20 INJECTION, SOLUTION INTRAVENOUS ONCE
Status: CANCELLED | OUTPATIENT
Start: 2023-07-13

## 2023-07-06 RX ORDER — SODIUM CHLORIDE 9 MG/ML
20 INJECTION, SOLUTION INTRAVENOUS ONCE
Status: COMPLETED | OUTPATIENT
Start: 2023-07-06 | End: 2023-07-06

## 2023-07-06 RX ADMIN — IRON SUCROSE 300 MG: 20 INJECTION, SOLUTION INTRAVENOUS at 07:43

## 2023-07-06 RX ADMIN — SODIUM CHLORIDE 20 ML/HR: 0.9 INJECTION, SOLUTION INTRAVENOUS at 07:43

## 2023-07-07 ENCOUNTER — TELEPHONE (OUTPATIENT)
Dept: FAMILY MEDICINE CLINIC | Facility: CLINIC | Age: 43
End: 2023-07-07

## 2023-07-07 ENCOUNTER — TELEPHONE (OUTPATIENT)
Dept: PSYCHIATRY | Facility: CLINIC | Age: 43
End: 2023-07-07

## 2023-07-07 NOTE — TELEPHONE ENCOUNTER
Patient call stating that the Number on the Ambulatory Refrral to General Surgery is not a working number. Patient ask is there any other number she can call.

## 2023-07-07 NOTE — TELEPHONE ENCOUNTER
Pt referred to [address] [place] [address] Road ([phone]). Pt is aware referral will be sent over and closed on our side. Pt called and I stated that she was referred out back in March. [facility] neuropsych does not take her insurance.

## 2023-07-13 ENCOUNTER — HOSPITAL ENCOUNTER (OUTPATIENT)
Dept: RADIOLOGY | Facility: CLINIC | Age: 43
Discharge: HOME | End: 2023-07-13
Admitting: ANESTHESIOLOGY
Payer: MEDICARE

## 2023-07-13 VITALS
DIASTOLIC BLOOD PRESSURE: 78 MMHG | TEMPERATURE: 97.6 F | HEART RATE: 69 BPM | RESPIRATION RATE: 18 BRPM | OXYGEN SATURATION: 99 % | SYSTOLIC BLOOD PRESSURE: 116 MMHG

## 2023-07-13 DIAGNOSIS — M54.2 NECK PAIN: ICD-10-CM

## 2023-07-13 DIAGNOSIS — M50.120 CERVICAL DISC DISORDER WITH RADICULOPATHY OF MID-CERVICAL REGION: ICD-10-CM

## 2023-07-13 PROCEDURE — 62321 NJX INTERLAMINAR CRV/THRC: CPT | Performed by: ANESTHESIOLOGY

## 2023-07-13 RX ORDER — METHYLPREDNISOLONE ACETATE 80 MG/ML
80 INJECTION, SUSPENSION INTRA-ARTICULAR; INTRALESIONAL; INTRAMUSCULAR; PARENTERAL; SOFT TISSUE ONCE
Status: COMPLETED | OUTPATIENT
Start: 2023-07-13 | End: 2023-07-13

## 2023-07-13 RX ADMIN — METHYLPREDNISOLONE ACETATE 80 MG: 80 INJECTION, SUSPENSION INTRA-ARTICULAR; INTRALESIONAL; INTRAMUSCULAR; PARENTERAL; SOFT TISSUE at 14:53

## 2023-07-13 RX ADMIN — IOHEXOL 1 ML: 300 INJECTION, SOLUTION INTRAVENOUS at 14:53

## 2023-07-13 NOTE — H&P
History of Present Illness: The patient is a 43 y.o. female who presents with complaints of neck and arm pain and is here today for cervical epidural steroid injection    Past Medical History:   Diagnosis Date   • Abnormal Pap smear of cervix    • Celiac disease    • Depression    • GERD without esophagitis    • Heartburn    • Hiatal hernia    • Hypertension    • Vitamin D deficiency        Past Surgical History:   Procedure Laterality Date   • [place] LAPAROSCOPIC APPENDECTOMY N/A 1/23/2021    Procedure: APPENDECTOMY LAPAROSCOPIC;  Surgeon: Adam Sam DO;  Location: [facility];  Service: General   • TUBAL LIGATION           Current Outpatient Medications:   •  ascorbic acid (VITAMIN C) 250 mg tablet, Take 1 tablet (250 mg total) by mouth 3 (three) times a day with meals (Patient not taking: Reported on 7/6/2023), Disp: 90 tablet, Rfl: 2  •  cetirizine (ZyrTEC) 10 mg tablet, Take 1 tablet (10 mg total) by mouth daily, Disp: 90 tablet, Rfl: 3  •  cholecalciferol (VITAMIN D3) 400 units tablet, Take 400 Units by mouth daily, Disp: , Rfl:   •  Coenzyme Q10 600 MG CHEW, Chew, Disp: , Rfl:   •  cyanocobalamin (VITAMIN B-12) 500 MCG tablet, Take 500 mcg by mouth daily, Disp: , Rfl:   •  ferrous gluconate (FERGON) 240 (27 FE) MG tablet, Take 1 tablet (240 mg total) by mouth 3 (three) times a day with meals Always take with Vit C at same time.  (Patient not taking: Reported on 7/6/2023), Disp: 90 tablet, Rfl: 2  •  fluticasone (FLONASE) 50 mcg/act nasal spray, 1 spray into each nostril daily, Disp: 16 g, Rfl: 3  •  Magnesium 250 MG TABS, Take 250 mg by mouth in the morning OTC, Disp: , Rfl:   •  meclizine (ANTIVERT) 25 mg tablet, Take 1 tablet (25 mg total) by mouth every 8 (eight) hours as needed for dizziness, Disp: 10 tablet, Rfl: 0  •  omeprazole (PriLOSEC) 40 MG capsule, TAKE 1 CAPSULE(40 MG) BY MOUTH DAILY, Disp: 30 capsule, Rfl: 0  •  Sodium Fluoride 1.1 % PSTE, Take 1 Film by mouth daily at bedtime Use once per day at bedtime. Brush, spit out. Don't rinse. Let sit on teeth. Generic brand OK, Disp: 51 g, Rfl: 3    Current Facility-Administered Medications:   •  iohexol (OMNIPAQUE) 300 mg/mL injection 1 mL, 1 mL, Epidural, Once, Jasmien Moreno MD  •  methylPREDNISolone acetate (DEPO-MEDROL) injection 80 mg, 80 mg, Epidural, Once, Jasmine Moreno MD    Allergies   Allergen Reactions   • Gluten Meal - Food Allergy Abdominal Pain       Physical Exam:   Vitals:    07/13/23 1437   BP: 128/83   Pulse: 68   Resp: 18   Temp: 97.6 °F (36.4 °C)   SpO2: 99%     General: Awake, Alert, Oriented x 3, Mood and affect appropriate  Respiratory: Respirations even and unlabored  Cardiovascular: Peripheral pulses intact; no edema  Musculoskeletal Exam: Neck and arm pain    ASA Score: 3    Patient/Chart Verification  Patient ID Verified: Verbal  ID Band Applied: No  Consents Confirmed: Procedural, To be obtained in the Pre-Procedure area  H&P( within 30 days) Verified: To be obtained in the Pre-Procedure area  Interval H&P(within 24 hr) Complete (required for Outpatients and Surgery Admit only): To be obtained in the Pre-Procedure area  Allergies Reviewed: Yes  Anticoag/NSAID held?: NA  Currently on antibiotics?: No  Pregnancy denied?: Yes (pt denied)    Assessment:   1. Cervical disc disorder with radiculopathy of mid-cervical region    2.  Neck pain        Plan: BEAN C7-T1

## 2023-07-13 NOTE — DISCHARGE INSTR - LAB
Epidural Steroid Injection   WHAT YOU NEED TO KNOW:   An epidural steroid injection (MADINA) is a procedure to inject steroid medicine into the epidural space. The epidural space is between your spinal cord and vertebrae. Steroids reduce inflammation and fluid buildup in your spine that may be causing pain. You may be given pain medicine along with the steroids. ACTIVITY  Do not drive or operate machinery today. No strenuous activity today - bending, lifting, etc.  You may resume normal activites starting tomorrow - start slowly and as tolerated. You may shower today, but no tub baths or hot tubs. You may have numbness for several hours from the local anesthetic. Please use caution and common sense, especially with weight-bearing activities. CARE OF THE INJECTION SITE  If you have soreness or pain, apply ice to the area today (20 minutes on/20 minutes off). Starting tomorrow, you may use warm, moist heat or ice if needed. You may have an increase or change in your discomfort for 36-48 hours after your treatment. Apply ice and continue with any pain medication you have been prescribed. Notify the [facility] if you have any of the following: redness, drainage, swelling, headache, stiff neck or fever above 100°F.    SPECIAL INSTRUCTIONS  Our office will contact you in approximately 7 days for a progress report. MEDICATIONS  Continue to take all routine medications. Our office may have instructed you to hold some medications. As no general anesthesia was used in today's procedure, you should not experience any side effects related to anesthesia. If you are diabetic, the steroids used in today's injection may temporarily increase your blood sugar levels after the first few days after your injection. Please keep a close eye on your sugars and alert the doctor who manages your diabetes if your sugars are significantly high from your baseline or you are symptomatic.      If you have a problem specifically related to your procedure, please call our office at [phone]. Problems not related to your procedure should be directed to your primary care physician. PAST MEDICAL HISTORY:  Atrial fibrillation     BPH (benign prostatic hyperplasia)     CAD (coronary artery disease)     Hemorrhage in the brain     HLD (hyperlipidemia)     HTN (hypertension)     Major depression     Peripheral neuropathy     Pneumonia due to COVID-19 virus June 2022    TBI (traumatic brain injury)

## 2023-07-14 ENCOUNTER — HOSPITAL ENCOUNTER (OUTPATIENT)
Dept: INFUSION CENTER | Facility: HOSPITAL | Age: 43
End: 2023-07-14
Attending: INTERNAL MEDICINE
Payer: MEDICARE

## 2023-07-14 VITALS
RESPIRATION RATE: 19 BRPM | SYSTOLIC BLOOD PRESSURE: 118 MMHG | DIASTOLIC BLOOD PRESSURE: 82 MMHG | OXYGEN SATURATION: 99 % | TEMPERATURE: 97.8 F | HEART RATE: 80 BPM

## 2023-07-14 DIAGNOSIS — D50.9 IRON DEFICIENCY ANEMIA, UNSPECIFIED IRON DEFICIENCY ANEMIA TYPE: Primary | ICD-10-CM

## 2023-07-14 PROCEDURE — 96365 THER/PROPH/DIAG IV INF INIT: CPT

## 2023-07-14 RX ORDER — SODIUM CHLORIDE 9 MG/ML
20 INJECTION, SOLUTION INTRAVENOUS ONCE
Status: COMPLETED | OUTPATIENT
Start: 2023-07-14 | End: 2023-07-14

## 2023-07-14 RX ORDER — SODIUM CHLORIDE 9 MG/ML
20 INJECTION, SOLUTION INTRAVENOUS ONCE
Status: CANCELLED | OUTPATIENT
Start: 2023-07-21

## 2023-07-14 RX ADMIN — SODIUM CHLORIDE 20 ML/HR: 0.9 INJECTION, SOLUTION INTRAVENOUS at 09:15

## 2023-07-14 RX ADMIN — IRON SUCROSE 300 MG: 20 INJECTION, SOLUTION INTRAVENOUS at 09:15

## 2023-07-17 DIAGNOSIS — K21.9 GASTROESOPHAGEAL REFLUX DISEASE WITHOUT ESOPHAGITIS: ICD-10-CM

## 2023-07-17 RX ORDER — OMEPRAZOLE 40 MG/1
CAPSULE, DELAYED RELEASE ORAL
Qty: 30 CAPSULE | Refills: 5 | Status: SHIPPED | OUTPATIENT
Start: 2023-07-17

## 2023-07-19 ENCOUNTER — CONSULT (OUTPATIENT)
Dept: SURGERY | Facility: CLINIC | Age: 43
End: 2023-07-19
Payer: MEDICARE

## 2023-07-19 VITALS
TEMPERATURE: 97.5 F | DIASTOLIC BLOOD PRESSURE: 76 MMHG | HEIGHT: 64 IN | HEART RATE: 78 BPM | RESPIRATION RATE: 18 BRPM | SYSTOLIC BLOOD PRESSURE: 118 MMHG | WEIGHT: 160 LBS | OXYGEN SATURATION: 98 % | BODY MASS INDEX: 27.31 KG/M2

## 2023-07-19 DIAGNOSIS — D17.1 LIPOMA OF TORSO: ICD-10-CM

## 2023-07-19 DIAGNOSIS — R22.2 MASS OF SUBCUTANEOUS TISSUE OF BACK: Primary | ICD-10-CM

## 2023-07-19 PROCEDURE — 99244 OFF/OP CNSLTJ NEW/EST MOD 40: CPT | Performed by: SURGERY

## 2023-07-19 NOTE — PROGRESS NOTES
Assessment/Plan: She has a mass present subcutaneously over her back which is causing problems. I am scheduling her for excision of this mass under IV sedation and local anesthetic. This will be done on August 21, 2023. She signed the consent. No problem-specific Assessment & Plan notes found for this encounter. Diagnoses and all orders for this visit:    Mass of subcutaneous tissue of back    Lipoma of torso  -     Ambulatory Referral to General Surgery          Subjective:      Patient ID: Lucero Rogers is a 43 y.o. female. 80-year-old female patient came to my office with complaints of a mass on her right upper back. She says she noticed it about a year ago. She tells me that it causes her discomfort and is sited above her bra line. No complaints of sudden change in size. No complaints of drainage. No complaints of any pins-and-needles or neurological problems. Patient has history of laparoscopic appendectomy done 2 years ago. She has allergy to gluten. The following portions of the patient's history were reviewed and updated as appropriate:   She  has a past medical history of Abnormal Pap smear of cervix, Celiac disease, Depression, GERD without esophagitis, Heartburn, Hiatal hernia, Hypertension, and Vitamin D deficiency.   She   Patient Active Problem List    Diagnosis Date Noted   • Lipoma of torso 06/30/2023   • Iron deficiency anemia 06/20/2023   • Hypoferremia 06/19/2023   • Paraspinal soft tissue mass 06/19/2023   • Skin lesion of back 05/17/2023   • Paranasal sinus disease 03/14/2023   • Allergic rhinitis 03/14/2023   • Left shoulder pain 03/08/2023   • Left arm pain 03/08/2023   • Somatic dysfunction of spine, cervical 03/08/2023   • Arm somatic dysfunction 03/08/2023   • Somatic dysfunction of thoracic region 03/08/2023   • Biceps tendinopathy, left 02/16/2023   • Memory loss or impairment 02/07/2023   • Cervical disc disorder with radiculopathy of mid-cervical region • Chronic bilateral low back pain 12/19/2022   • Bipolar 1 disorder, mixed ([address]) 11/15/2022   • Lipids abnormal 04/13/2022   • Fatigue 03/20/2022   • Sacroiliitis ([address])    • Encounter for gynecological examination without abnormal finding 09/22/2021   • DDD (degenerative disc disease), lumbar    • Sleep-disordered breathing    • Breathing difficulty 07/01/2021   • YESSICA (obstructive sleep apnea) 07/01/2021   • Elevated blood pressure reading 06/01/2021   • Visual changes 02/16/2021   • S/P laparoscopic appendectomy 02/10/2021   • Celiac disease    • Neck pain 08/14/2020   • Spasm of back muscles 08/14/2020   • Joint pain 02/03/2020   • Encounter for screening mammogram for malignant neoplasm of breast 12/20/2019   • Gastroesophageal reflux disease without esophagitis 07/15/2019   • Microcytic anemia 07/15/2019   • Pain of upper abdomen 07/15/2019   • ASCUS with positive high risk HPV cervical 06/21/2018   • Atypical glandular cells of undetermined significance (TERRI) on cervical Pap smear 06/21/2018     She  has a past surgical history that includes Tubal ligation and pr laparoscopic appendectomy (N/A, 1/23/2021). Her family history includes Arthritis in her mother; Cervical cancer in her mother; Diabetes in her maternal grandmother; Fibromyalgia in her mother; Heart disease in her brother, father, and maternal grandmother; Hypertension in her maternal grandmother; Kidney disease in her paternal grandfather; Mental illness in her father; No Known Problems in her brother, daughter, maternal grandfather, paternal grandmother, son, son, son, and son; Stroke in her father. She  reports that she has quit smoking. She has been exposed to tobacco smoke. She has never used smokeless tobacco. She reports current alcohol use. She reports that she does not use drugs.   Current Outpatient Medications   Medication Sig Dispense Refill   • cetirizine (ZyrTEC) 10 mg tablet Take 1 tablet (10 mg total) by mouth daily 90 tablet 3   • cholecalciferol (VITAMIN D3) 400 units tablet Take 400 Units by mouth daily     • Coenzyme Q10 600 MG CHEW Chew     • cyanocobalamin (VITAMIN B-12) 500 MCG tablet Take 500 mcg by mouth daily     • fluticasone (FLONASE) 50 mcg/act nasal spray 1 spray into each nostril daily 16 g 3   • Magnesium 250 MG TABS Take 250 mg by mouth in the morning OTC     • meclizine (ANTIVERT) 25 mg tablet Take 1 tablet (25 mg total) by mouth every 8 (eight) hours as needed for dizziness 10 tablet 0   • omeprazole (PriLOSEC) 40 MG capsule TAKE 1 CAPSULE(40 MG) BY MOUTH DAILY 30 capsule 5   • Sodium Fluoride 1.1 % PSTE Take 1 Film by mouth daily at bedtime Use once per day at bedtime. Brush, spit out. Don't rinse. Let sit on teeth. Generic brand OK 51 g 3   • ascorbic acid (VITAMIN C) 250 mg tablet Take 1 tablet (250 mg total) by mouth 3 (three) times a day with meals (Patient not taking: Reported on 7/6/2023) 90 tablet 2   • ferrous gluconate (FERGON) 240 (27 FE) MG tablet Take 1 tablet (240 mg total) by mouth 3 (three) times a day with meals Always take with Vit C at same time. (Patient not taking: Reported on 7/6/2023) 90 tablet 2     No current facility-administered medications for this visit.      Current Outpatient Medications on File Prior to Visit   Medication Sig   • cetirizine (ZyrTEC) 10 mg tablet Take 1 tablet (10 mg total) by mouth daily   • cholecalciferol (VITAMIN D3) 400 units tablet Take 400 Units by mouth daily   • Coenzyme Q10 600 MG CHEW Chew   • cyanocobalamin (VITAMIN B-12) 500 MCG tablet Take 500 mcg by mouth daily   • fluticasone (FLONASE) 50 mcg/act nasal spray 1 spray into each nostril daily   • Magnesium 250 MG TABS Take 250 mg by mouth in the morning OTC   • meclizine (ANTIVERT) 25 mg tablet Take 1 tablet (25 mg total) by mouth every 8 (eight) hours as needed for dizziness   • omeprazole (PriLOSEC) 40 MG capsule TAKE 1 CAPSULE(40 MG) BY MOUTH DAILY   • Sodium Fluoride 1.1 % PSTE Take 1 Film by mouth daily at bedtime Use once per day at bedtime. Brush, spit out. Don't rinse. Let sit on teeth. Generic brand OK   • ascorbic acid (VITAMIN C) 250 mg tablet Take 1 tablet (250 mg total) by mouth 3 (three) times a day with meals (Patient not taking: Reported on 7/6/2023)   • ferrous gluconate (FERGON) 240 (27 FE) MG tablet Take 1 tablet (240 mg total) by mouth 3 (three) times a day with meals Always take with Vit C at same time. (Patient not taking: Reported on 7/6/2023)     No current facility-administered medications on file prior to visit. She is allergic to gluten meal - food allergy. .    Review of Systems   Constitutional: Negative. HENT: Negative. Eyes: Negative. Respiratory: Negative. Cardiovascular: Negative. Gastrointestinal: Negative. Endocrine: Negative. Genitourinary: Negative. Musculoskeletal: Negative. Skin: Negative. Allergic/Immunologic: Negative. Neurological: Negative. Hematological: Negative. Psychiatric/Behavioral: Negative. Objective:      /76 (BP Location: Left arm, Patient Position: Sitting, Cuff Size: Standard)   Pulse 78   Temp 97.5 °F (36.4 °C) (Tympanic)   Resp 18   Ht 5' 4" (1.626 m)   Wt 72.6 kg (160 lb)   LMP 06/12/2023   SpO2 98%   BMI 27.46 kg/m²          Physical Exam  Vitals reviewed. Constitutional:       Appearance: Normal appearance. HENT:      Head: Normocephalic. Nose: Nose normal.   Eyes:      Pupils: Pupils are equal, round, and reactive to light. Cardiovascular:      Rate and Rhythm: Normal rate and regular rhythm. Pulses: Normal pulses. Heart sounds: Normal heart sounds. Pulmonary:      Effort: Pulmonary effort is normal.      Breath sounds: Normal breath sounds. Abdominal:      General: Bowel sounds are normal.      Palpations: Abdomen is soft. Musculoskeletal:         General: Normal range of motion. Cervical back: Normal range of motion. Back:    Skin:     General: Skin is warm. Capillary Refill: Capillary refill takes less than 2 seconds. Neurological:      General: No focal deficit present. Mental Status: She is alert and oriented to person, place, and time.

## 2023-07-20 ENCOUNTER — TELEPHONE (OUTPATIENT)
Dept: RADIOLOGY | Facility: MEDICAL CENTER | Age: 43
End: 2023-07-20

## 2023-07-20 NOTE — TELEPHONE ENCOUNTER
Patient Reports  70-75      %     improvement post injection    Pain Level    3-4 /10  Pinched nerve on C5 is worse pain 6

## 2023-07-21 ENCOUNTER — HOSPITAL ENCOUNTER (OUTPATIENT)
Dept: INFUSION CENTER | Facility: HOSPITAL | Age: 43
End: 2023-07-21
Attending: INTERNAL MEDICINE
Payer: MEDICARE

## 2023-07-21 VITALS
TEMPERATURE: 96.9 F | HEART RATE: 82 BPM | RESPIRATION RATE: 18 BRPM | DIASTOLIC BLOOD PRESSURE: 80 MMHG | SYSTOLIC BLOOD PRESSURE: 134 MMHG

## 2023-07-21 DIAGNOSIS — D50.9 IRON DEFICIENCY ANEMIA, UNSPECIFIED IRON DEFICIENCY ANEMIA TYPE: Primary | ICD-10-CM

## 2023-07-21 PROCEDURE — 96365 THER/PROPH/DIAG IV INF INIT: CPT

## 2023-07-21 RX ORDER — SODIUM CHLORIDE 9 MG/ML
20 INJECTION, SOLUTION INTRAVENOUS ONCE
Status: CANCELLED | OUTPATIENT
Start: 2023-07-28

## 2023-07-21 RX ORDER — SODIUM CHLORIDE 9 MG/ML
20 INJECTION, SOLUTION INTRAVENOUS ONCE
Status: COMPLETED | OUTPATIENT
Start: 2023-07-21 | End: 2023-07-21

## 2023-07-21 RX ADMIN — SODIUM CHLORIDE 20 ML/HR: 0.9 INJECTION, SOLUTION INTRAVENOUS at 09:39

## 2023-07-21 RX ADMIN — IRON SUCROSE 300 MG: 20 INJECTION, SOLUTION INTRAVENOUS at 09:40

## 2023-07-21 NOTE — PROGRESS NOTES
Pt tolerated Venofer treatment well with no complications. IV removed and gauze dressing applied. Pt aware of future appts. AVS printed and given to pt.

## 2023-07-26 ENCOUNTER — HOSPITAL ENCOUNTER (OUTPATIENT)
Dept: INFUSION CENTER | Facility: HOSPITAL | Age: 43
Discharge: HOME | End: 2023-07-26
Attending: INTERNAL MEDICINE
Payer: MEDICARE

## 2023-07-26 VITALS
SYSTOLIC BLOOD PRESSURE: 144 MMHG | TEMPERATURE: 97.3 F | HEART RATE: 78 BPM | DIASTOLIC BLOOD PRESSURE: 88 MMHG | RESPIRATION RATE: 18 BRPM

## 2023-07-26 DIAGNOSIS — D50.9 IRON DEFICIENCY ANEMIA, UNSPECIFIED IRON DEFICIENCY ANEMIA TYPE: Primary | ICD-10-CM

## 2023-07-26 DIAGNOSIS — J34.9 PARANASAL SINUS DISEASE: ICD-10-CM

## 2023-07-26 DIAGNOSIS — J30.9 ALLERGIC RHINITIS, UNSPECIFIED SEASONALITY, UNSPECIFIED TRIGGER: ICD-10-CM

## 2023-07-26 PROCEDURE — 96365 THER/PROPH/DIAG IV INF INIT: CPT

## 2023-07-26 RX ORDER — CETIRIZINE HYDROCHLORIDE 10 MG/1
10 TABLET ORAL DAILY
Qty: 90 TABLET | Refills: 3 | Status: SHIPPED | OUTPATIENT
Start: 2023-07-26

## 2023-07-26 RX ORDER — SODIUM CHLORIDE 9 MG/ML
20 INJECTION, SOLUTION INTRAVENOUS ONCE
Status: CANCELLED | OUTPATIENT
Start: 2023-07-28

## 2023-07-26 RX ORDER — FLUTICASONE PROPIONATE 50 MCG
1 SPRAY, SUSPENSION (ML) NASAL DAILY
Qty: 16 G | Refills: 3 | Status: SHIPPED | OUTPATIENT
Start: 2023-07-26

## 2023-07-26 RX ORDER — SODIUM CHLORIDE 9 MG/ML
20 INJECTION, SOLUTION INTRAVENOUS ONCE
Status: COMPLETED | OUTPATIENT
Start: 2023-07-26 | End: 2023-07-26

## 2023-07-26 RX ADMIN — IRON SUCROSE 300 MG: 20 INJECTION, SOLUTION INTRAVENOUS at 09:14

## 2023-07-26 RX ADMIN — SODIUM CHLORIDE 20 ML/HR: 0.9 INJECTION, SOLUTION INTRAVENOUS at 09:13

## 2023-07-27 NOTE — TELEPHONE ENCOUNTER
Patient Reports     70-75    %     improvement post injection 2week    Pain Level   3-4 pinched nerve on C5 is still about a 6   /10                       Neck is  doing better

## 2023-08-03 ENCOUNTER — TELEPHONE (OUTPATIENT)
Dept: HEMATOLOGY ONCOLOGY | Facility: CLINIC | Age: 43
End: 2023-08-03

## 2023-08-08 ENCOUNTER — APPOINTMENT (OUTPATIENT)
Dept: PREADMISSION TESTING | Facility: HOSPITAL | Age: 43
End: 2023-08-08
Payer: MEDICARE

## 2023-08-09 NOTE — PRE-PROCEDURE INSTRUCTIONS
Pre-Surgery Instructions:   Medication Instructions   • cetirizine (ZyrTEC) 10 mg tablet Uses PRN- OK to take day of surgery   • cholecalciferol (VITAMIN D3) 400 units tablet Stop taking 7 days prior to surgery. • Coenzyme Q10 600 MG CHEW Stop taking 7 days prior to surgery. • cyanocobalamin (VITAMIN B-12) 500 MCG tablet Stop taking 7 days prior to surgery. • fluticasone (FLONASE) 50 mcg/act nasal spray Uses PRN- OK to take day of surgery   • Magnesium 250 MG TABS Stop taking 7 days prior to surgery   • meclizine (ANTIVERT) 25 mg tablet Uses PRN- OK to take day of surgery   • omeprazole (PriLOSEC) 40 MG capsule Take day of surgery. Medication instructions for day surgery reviewed. Please use only a sip of water to take your instructed medications. Avoid all over the counter vitamins, supplements and NSAIDS for one week prior to surgery per anesthesia guidelines. Tylenol is ok to take as needed. You will receive a call one business day prior to surgery with an arrival time and hospital directions. If your surgery is scheduled on a Monday, the hospital will be calling you on the Friday prior to your surgery. If you have not heard from anyone by 8pm, please call the hospital supervisor through the hospital  at [phone]. Laura Nunes [phone]). Do not eat or drink anything after midnight the night before your surgery, including candy, mints, lifesavers, or chewing gum. Do not drink alcohol 24hrs before your surgery. Try not to smoke at least 24hrs before your surgery. Follow the pre surgery showering instructions as listed in the [facility] Surgical Experience Booklet” or otherwise provided by your surgeon's office. Do not shave the surgical area 24 hours before surgery. Do not apply any lotions, creams, including makeup, cologne, deodorant, or perfumes after showering on the day of your surgery. No contact lenses, eye make-up, or artificial eyelashes.  Remove nail polish, including gel polish, and any artificial, gel, or acrylic nails if possible. Remove all jewelry including rings and body piercing jewelry. Wear causal clothing that is easy to take on and off. Consider your type of surgery. Keep any valuables, jewelry, piercings at home. Please bring any specially ordered equipment (sling, braces) if indicated. Arrange for a responsible person to drive you to and from the hospital on the day of your surgery. Visitor Guidelines discussed. Call the surgeon's office with any new illnesses, exposures, or additional questions prior to surgery. Please reference your [facility] Experience Booklet” for additional information to prepare for your upcoming surgery.

## 2023-08-11 NOTE — TELEPHONE ENCOUNTER
MARY  DR tu CRANE   Who are you speaking with? Patient   If it is not the patient, are they listed on an active communication consent form? Yes   Is this a MARY or DR tu CRANE MARY   Which provider is patient currently scheduled or established with? Matt Lu PA-C   What is the original appointment date and time? 9/20/23  10am   At which location is the appointment scheduled to take place? [facility]   Which provider is the patient transitioning care to? YU Hernandez   What is the new appointment date and time? 9/20/23  10am   At which location is the new appointment scheduled to take place? [facility]   What is the reason for this change?  Provider leaving Xeljanz Counseling: I discussed with the patient the risks of Xeljanz therapy including increased risk of infection, liver issues, headache, diarrhea, or cold symptoms. Live vaccines should be avoided. They were instructed to call if they have any problems.

## 2023-08-18 ENCOUNTER — TELEPHONE (OUTPATIENT)
Dept: FAMILY MEDICINE CLINIC | Facility: CLINIC | Age: 43
End: 2023-08-18

## 2023-08-18 NOTE — TELEPHONE ENCOUNTER
Encounter for forms      Please refer to the following information:       Type of Form: Physician verification Form     Date of Visit (if applicable): 1/04/4199    Doctor: Ra Simon    Expected date: As soon as Possible     Patient phone number: [phone]     Original in (Dr. Ra Simon)  folder to be completed.
2019

## 2023-08-21 ENCOUNTER — ANESTHESIA EVENT (OUTPATIENT)
Dept: PERIOP | Facility: HOSPITAL | Age: 43
End: 2023-08-21
Payer: MEDICARE

## 2023-08-21 ENCOUNTER — ANESTHESIA (OUTPATIENT)
Dept: PERIOP | Facility: HOSPITAL | Age: 43
End: 2023-08-21
Payer: MEDICARE

## 2023-08-21 ENCOUNTER — HOSPITAL ENCOUNTER (OUTPATIENT)
Facility: HOSPITAL | Age: 43
Setting detail: OUTPATIENT SURGERY
Discharge: HOME | End: 2023-08-21
Attending: SURGERY | Admitting: SURGERY
Payer: MEDICARE

## 2023-08-21 VITALS
SYSTOLIC BLOOD PRESSURE: 153 MMHG | BODY MASS INDEX: 27.39 KG/M2 | WEIGHT: 160.4 LBS | DIASTOLIC BLOOD PRESSURE: 72 MMHG | RESPIRATION RATE: 16 BRPM | TEMPERATURE: 97.2 F | HEIGHT: 64 IN | OXYGEN SATURATION: 98 % | HEART RATE: 67 BPM

## 2023-08-21 DIAGNOSIS — R22.2 MASS OF SUBCUTANEOUS TISSUE OF BACK: ICD-10-CM

## 2023-08-21 DIAGNOSIS — D17.1 LIPOMA OF TORSO: Primary | ICD-10-CM

## 2023-08-21 PROCEDURE — 21931 EXC BACK LES SC 3 CM/>: CPT | Performed by: PHYSICIAN ASSISTANT

## 2023-08-21 PROCEDURE — 88305 TISSUE EXAM BY PATHOLOGIST: CPT | Performed by: PATHOLOGY

## 2023-08-21 PROCEDURE — NC001 PR NO CHARGE: Performed by: SURGERY

## 2023-08-21 PROCEDURE — 21931 EXC BACK LES SC 3 CM/>: CPT | Performed by: SURGERY

## 2023-08-21 RX ORDER — LIDOCAINE HYDROCHLORIDE 10 MG/ML
INJECTION, SOLUTION EPIDURAL; INFILTRATION; INTRACAUDAL; PERINEURAL AS NEEDED
Status: DISCONTINUED | OUTPATIENT
Start: 2023-08-21 | End: 2023-08-21

## 2023-08-21 RX ORDER — CEFAZOLIN SODIUM 2 G/50ML
2000 SOLUTION INTRAVENOUS ONCE
Status: COMPLETED | OUTPATIENT
Start: 2023-08-21 | End: 2023-08-21

## 2023-08-21 RX ORDER — KETAMINE HYDROCHLORIDE 50 MG/ML
INJECTION, SOLUTION, CONCENTRATE INTRAMUSCULAR; INTRAVENOUS AS NEEDED
Status: DISCONTINUED | OUTPATIENT
Start: 2023-08-21 | End: 2023-08-21

## 2023-08-21 RX ORDER — METOCLOPRAMIDE HYDROCHLORIDE 5 MG/ML
10 INJECTION INTRAMUSCULAR; INTRAVENOUS ONCE AS NEEDED
Status: DISCONTINUED | OUTPATIENT
Start: 2023-08-21 | End: 2023-08-21 | Stop reason: HOSPADM

## 2023-08-21 RX ORDER — PROPOFOL 10 MG/ML
INJECTION, EMULSION INTRAVENOUS CONTINUOUS PRN
Status: DISCONTINUED | OUTPATIENT
Start: 2023-08-21 | End: 2023-08-21

## 2023-08-21 RX ORDER — SODIUM CHLORIDE, SODIUM LACTATE, POTASSIUM CHLORIDE, CALCIUM CHLORIDE 600; 310; 30; 20 MG/100ML; MG/100ML; MG/100ML; MG/100ML
INJECTION, SOLUTION INTRAVENOUS CONTINUOUS PRN
Status: DISCONTINUED | OUTPATIENT
Start: 2023-08-21 | End: 2023-08-21

## 2023-08-21 RX ORDER — OXYCODONE HYDROCHLORIDE AND ACETAMINOPHEN 5; 325 MG/1; MG/1
1 TABLET ORAL EVERY 4 HOURS PRN
Qty: 6 TABLET | Refills: 0 | Status: SHIPPED | OUTPATIENT
Start: 2023-08-21 | End: 2023-08-31

## 2023-08-21 RX ORDER — FENTANYL CITRATE 50 UG/ML
INJECTION, SOLUTION INTRAMUSCULAR; INTRAVENOUS AS NEEDED
Status: DISCONTINUED | OUTPATIENT
Start: 2023-08-21 | End: 2023-08-21

## 2023-08-21 RX ORDER — ALBUTEROL SULFATE 2.5 MG/3ML
2.5 SOLUTION RESPIRATORY (INHALATION) ONCE AS NEEDED
Status: DISCONTINUED | OUTPATIENT
Start: 2023-08-21 | End: 2023-08-21 | Stop reason: HOSPADM

## 2023-08-21 RX ORDER — FENTANYL CITRATE/PF 50 MCG/ML
25 SYRINGE (ML) INJECTION
Status: DISCONTINUED | OUTPATIENT
Start: 2023-08-21 | End: 2023-08-21 | Stop reason: HOSPADM

## 2023-08-21 RX ORDER — ONDANSETRON 2 MG/ML
INJECTION INTRAMUSCULAR; INTRAVENOUS AS NEEDED
Status: DISCONTINUED | OUTPATIENT
Start: 2023-08-21 | End: 2023-08-21

## 2023-08-21 RX ORDER — DEXAMETHASONE SODIUM PHOSPHATE 10 MG/ML
INJECTION, SOLUTION INTRAMUSCULAR; INTRAVENOUS AS NEEDED
Status: DISCONTINUED | OUTPATIENT
Start: 2023-08-21 | End: 2023-08-21

## 2023-08-21 RX ORDER — BUPIVACAINE HYDROCHLORIDE AND EPINEPHRINE 5; 5 MG/ML; UG/ML
INJECTION, SOLUTION EPIDURAL; INTRACAUDAL; PERINEURAL AS NEEDED
Status: DISCONTINUED | OUTPATIENT
Start: 2023-08-21 | End: 2023-08-21 | Stop reason: HOSPADM

## 2023-08-21 RX ORDER — MIDAZOLAM HYDROCHLORIDE 2 MG/2ML
INJECTION, SOLUTION INTRAMUSCULAR; INTRAVENOUS AS NEEDED
Status: DISCONTINUED | OUTPATIENT
Start: 2023-08-21 | End: 2023-08-21

## 2023-08-21 RX ORDER — MAGNESIUM HYDROXIDE 1200 MG/15ML
LIQUID ORAL AS NEEDED
Status: DISCONTINUED | OUTPATIENT
Start: 2023-08-21 | End: 2023-08-21 | Stop reason: HOSPADM

## 2023-08-21 RX ORDER — PROPOFOL 10 MG/ML
INJECTION, EMULSION INTRAVENOUS AS NEEDED
Status: DISCONTINUED | OUTPATIENT
Start: 2023-08-21 | End: 2023-08-21

## 2023-08-21 RX ADMIN — SODIUM CHLORIDE, SODIUM LACTATE, POTASSIUM CHLORIDE, AND CALCIUM CHLORIDE: .6; .31; .03; .02 INJECTION, SOLUTION INTRAVENOUS at 14:24

## 2023-08-21 RX ADMIN — PROPOFOL 60 MCG/KG/MIN: 10 INJECTION, EMULSION INTRAVENOUS at 15:00

## 2023-08-21 RX ADMIN — KETAMINE HYDROCHLORIDE 10 MG: 50 INJECTION INTRAMUSCULAR; INTRAVENOUS at 14:33

## 2023-08-21 RX ADMIN — PROPOFOL 80 MCG/KG/MIN: 10 INJECTION, EMULSION INTRAVENOUS at 14:30

## 2023-08-21 RX ADMIN — PROPOFOL 30 MG: 10 INJECTION, EMULSION INTRAVENOUS at 14:47

## 2023-08-21 RX ADMIN — FENTANYL CITRATE 50 MCG: 50 INJECTION, SOLUTION INTRAMUSCULAR; INTRAVENOUS at 14:30

## 2023-08-21 RX ADMIN — ONDANSETRON 4 MG: 2 INJECTION INTRAMUSCULAR; INTRAVENOUS at 14:38

## 2023-08-21 RX ADMIN — LIDOCAINE HYDROCHLORIDE 50 MG: 10 INJECTION, SOLUTION EPIDURAL; INFILTRATION; INTRACAUDAL; PERINEURAL at 14:30

## 2023-08-21 RX ADMIN — DEXAMETHASONE SODIUM PHOSPHATE 8 MG: 10 INJECTION, SOLUTION INTRAMUSCULAR; INTRAVENOUS at 14:37

## 2023-08-21 RX ADMIN — PROPOFOL 40 MG: 10 INJECTION, EMULSION INTRAVENOUS at 14:30

## 2023-08-21 RX ADMIN — KETAMINE HYDROCHLORIDE 40 MG: 50 INJECTION INTRAMUSCULAR; INTRAVENOUS at 14:30

## 2023-08-21 RX ADMIN — FENTANYL CITRATE 50 MCG: 50 INJECTION, SOLUTION INTRAMUSCULAR; INTRAVENOUS at 14:27

## 2023-08-21 RX ADMIN — MIDAZOLAM 2 MG: 1 INJECTION INTRAMUSCULAR; INTRAVENOUS at 14:24

## 2023-08-21 RX ADMIN — CEFAZOLIN SODIUM 2000 MG: 2 SOLUTION INTRAVENOUS at 14:24

## 2023-08-21 NOTE — ANESTHESIA PREPROCEDURE EVALUATION
Procedure:  EXCISION SUBCUTANEOUS MASS RIGHT BACK (3CM) (Right: Back)    Relevant Problems   GI/HEPATIC   (+) Gastroesophageal reflux disease without esophagitis      HEMATOLOGY   (+) Iron deficiency anemia   (+) Microcytic anemia      MUSCULOSKELETAL   (+) Chronic bilateral low back pain   (+) DDD (degenerative disc disease), lumbar   (+) Sacroiliitis (HCC)      NEURO/PSYCH   (+) Chronic bilateral low back pain      PULMONARY   (+) YESSICA (obstructive sleep apnea)        Physical Exam    Airway    Mallampati score: II  TM Distance: >3 FB  Neck ROM: full     Dental   No notable dental hx     Cardiovascular      Pulmonary      Other Findings        Anesthesia Plan  ASA Score- 2     Anesthesia Type- IV sedation with anesthesia with ASA Monitors. Additional Monitors:   Airway Plan:           Plan Factors-Exercise tolerance (METS): >4 METS. Chart reviewed. Patient summary reviewed. Patient is not a current smoker. Obstructive sleep apnea risk education given perioperatively. Induction- intravenous. Postoperative Plan-     Informed Consent- Anesthetic plan and risks discussed with patient. I personally reviewed this patient with the CRNA. Discussed and agreed on the Anesthesia Plan with the CRNA. Dipti Castillo

## 2023-08-21 NOTE — ANESTHESIA POSTPROCEDURE EVALUATION
Post-Op Assessment Note    CV Status:  Stable  Pain Score: 0    Pain management: adequate     Mental Status:  Alert and awake   Hydration Status:  Euvolemic   PONV Controlled:  Controlled   Airway Patency:  Patent   Two or more mitigation strategies used for obstructive sleep apnea   Post Op Vitals Reviewed: Yes      Staff: CRNA         No notable events documented.     BP   144/67   Temp 97.4   Pulse 77   Resp 14   SpO2 98

## 2023-08-21 NOTE — H&P
Assessment/Plan: She has a mass present subcutaneously over her back which is causing problems. She is here for elective excision of the same under IV sedation. I explained the procedure to her again. She had already signed the consent to my office. No problem-specific Assessment & Plan notes found for this encounter.         Diagnoses and all orders for this visit:     Mass of subcutaneous tissue of back     Lipoma of torso  -     Ambulatory Referral to General Surgery            Subjective:       Patient ID: Ángel Blanco is a 43 y.o. female.     75-year-old female patient came to my office with complaints of a mass on her right upper back. She says she noticed it about a year ago. She tells me that it causes her discomfort and is sited above her bra line. No complaints of sudden change in size. No complaints of drainage. No complaints of any pins-and-needles or neurological problems. Patient has history of laparoscopic appendectomy done 2 years ago. She has allergy to gluten.        The following portions of the patient's history were reviewed and updated as appropriate:   She  has a past medical history of Abnormal Pap smear of cervix, Celiac disease, Depression, GERD without esophagitis, Heartburn, Hiatal hernia, Hypertension, and Vitamin D deficiency.   She        Patient Active Problem List     Diagnosis Date Noted   • Lipoma of torso 06/30/2023   • Iron deficiency anemia 06/20/2023   • Hypoferremia 06/19/2023   • Paraspinal soft tissue mass 06/19/2023   • Skin lesion of back 05/17/2023   • Paranasal sinus disease 03/14/2023   • Allergic rhinitis 03/14/2023   • Left shoulder pain 03/08/2023   • Left arm pain 03/08/2023   • Somatic dysfunction of spine, cervical 03/08/2023   • Arm somatic dysfunction 03/08/2023   • Somatic dysfunction of thoracic region 03/08/2023   • Biceps tendinopathy, left 02/16/2023   • Memory loss or impairment 02/07/2023   • Cervical disc disorder with radiculopathy of mid-cervical region     • Chronic bilateral low back pain 12/19/2022   • Bipolar 1 disorder, mixed ([address]) 11/15/2022   • Lipids abnormal 04/13/2022   • Fatigue 03/20/2022   • Sacroiliitis ([address])     • Encounter for gynecological examination without abnormal finding 09/22/2021   • DDD (degenerative disc disease), lumbar     • Sleep-disordered breathing     • Breathing difficulty 07/01/2021   • YESSICA (obstructive sleep apnea) 07/01/2021   • Elevated blood pressure reading 06/01/2021   • Visual changes 02/16/2021   • S/P laparoscopic appendectomy 02/10/2021   • Celiac disease     • Neck pain 08/14/2020   • Spasm of back muscles 08/14/2020   • Joint pain 02/03/2020   • Encounter for screening mammogram for malignant neoplasm of breast 12/20/2019   • Gastroesophageal reflux disease without esophagitis 07/15/2019   • Microcytic anemia 07/15/2019   • Pain of upper abdomen 07/15/2019   • ASCUS with positive high risk HPV cervical 06/21/2018   • Atypical glandular cells of undetermined significance (TERRI) on cervical Pap smear 06/21/2018      She  has a past surgical history that includes Tubal ligation and pr laparoscopic appendectomy (N/A, 1/23/2021). Her family history includes Arthritis in her mother; Cervical cancer in her mother; Diabetes in her maternal grandmother; Fibromyalgia in her mother; Heart disease in her brother, father, and maternal grandmother; Hypertension in her maternal grandmother; Kidney disease in her paternal grandfather; Mental illness in her father; No Known Problems in her brother, daughter, maternal grandfather, paternal grandmother, son, son, son, and son; Stroke in her father. She  reports that she has quit smoking. She has been exposed to tobacco smoke. She has never used smokeless tobacco. She reports current alcohol use. She reports that she does not use drugs.          Current Outpatient Medications   Medication Sig Dispense Refill   • cetirizine (ZyrTEC) 10 mg tablet Take 1 tablet (10 mg total) by mouth daily 90 tablet 3   • cholecalciferol (VITAMIN D3) 400 units tablet Take 400 Units by mouth daily       • Coenzyme Q10 600 MG CHEW Chew       • cyanocobalamin (VITAMIN B-12) 500 MCG tablet Take 500 mcg by mouth daily       • fluticasone (FLONASE) 50 mcg/act nasal spray 1 spray into each nostril daily 16 g 3   • Magnesium 250 MG TABS Take 250 mg by mouth in the morning OTC       • meclizine (ANTIVERT) 25 mg tablet Take 1 tablet (25 mg total) by mouth every 8 (eight) hours as needed for dizziness 10 tablet 0   • omeprazole (PriLOSEC) 40 MG capsule TAKE 1 CAPSULE(40 MG) BY MOUTH DAILY 30 capsule 5   • Sodium Fluoride 1.1 % PSTE Take 1 Film by mouth daily at bedtime Use once per day at bedtime. Brush, spit out. Don't rinse. Let sit on teeth. Generic brand OK 51 g 3   • ascorbic acid (VITAMIN C) 250 mg tablet Take 1 tablet (250 mg total) by mouth 3 (three) times a day with meals (Patient not taking: Reported on 7/6/2023) 90 tablet 2   • ferrous gluconate (FERGON) 240 (27 FE) MG tablet Take 1 tablet (240 mg total) by mouth 3 (three) times a day with meals Always take with Vit C at same time.  (Patient not taking: Reported on 7/6/2023) 90 tablet 2      No current facility-administered medications for this visit.           Current Outpatient Medications on File Prior to Visit   Medication Sig   • cetirizine (ZyrTEC) 10 mg tablet Take 1 tablet (10 mg total) by mouth daily   • cholecalciferol (VITAMIN D3) 400 units tablet Take 400 Units by mouth daily   • Coenzyme Q10 600 MG CHEW Chew   • cyanocobalamin (VITAMIN B-12) 500 MCG tablet Take 500 mcg by mouth daily   • fluticasone (FLONASE) 50 mcg/act nasal spray 1 spray into each nostril daily   • Magnesium 250 MG TABS Take 250 mg by mouth in the morning OTC   • meclizine (ANTIVERT) 25 mg tablet Take 1 tablet (25 mg total) by mouth every 8 (eight) hours as needed for dizziness   • omeprazole (PriLOSEC) 40 MG capsule TAKE 1 CAPSULE(40 MG) BY MOUTH DAILY   • Sodium Fluoride 1.1 % PSTE Take 1 Film by mouth daily at bedtime Use once per day at bedtime. Brush, spit out. Don't rinse. Let sit on teeth. Generic brand OK   • ascorbic acid (VITAMIN C) 250 mg tablet Take 1 tablet (250 mg total) by mouth 3 (three) times a day with meals (Patient not taking: Reported on 7/6/2023)   • ferrous gluconate (FERGON) 240 (27 FE) MG tablet Take 1 tablet (240 mg total) by mouth 3 (three) times a day with meals Always take with Vit C at same time. (Patient not taking: Reported on 7/6/2023)      No current facility-administered medications on file prior to visit.      She is allergic to gluten meal - food allergy. .     Review of Systems   Constitutional: Negative. HENT: Negative. Eyes: Negative. Respiratory: Negative. Cardiovascular: Negative. Gastrointestinal: Negative. Endocrine: Negative. Genitourinary: Negative. Musculoskeletal: Negative. Skin: Negative. Allergic/Immunologic: Negative. Neurological: Negative. Hematological: Negative. Psychiatric/Behavioral: Negative.           Objective:        /76 (BP Location: Left arm, Patient Position: Sitting, Cuff Size: Standard)   Pulse 78   Temp 97.5 °F (36.4 °C) (Tympanic)   Resp 18   Ht 5' 4" (1.626 m)   Wt 72.6 kg (160 lb)   LMP 06/12/2023   SpO2 98%   BMI 27.46 kg/m²             Physical Exam  Vitals reviewed. Constitutional:       Appearance: Normal appearance. HENT:      Head: Normocephalic. Nose: Nose normal.   Eyes:      Pupils: Pupils are equal, round, and reactive to light. Cardiovascular:      Rate and Rhythm: Normal rate and regular rhythm. Pulses: Normal pulses. Heart sounds: Normal heart sounds. Pulmonary:      Effort: Pulmonary effort is normal.      Breath sounds: Normal breath sounds. Abdominal:      General: Bowel sounds are normal.      Palpations: Abdomen is soft. Musculoskeletal:         General: Normal range of motion.       Cervical back: Normal range of motion. Back:    Skin:     General: Skin is warm. Capillary Refill: Capillary refill takes less than 2 seconds. Neurological:      General: No focal deficit present. Mental Status: She is alert and oriented to person, place, and time.

## 2023-08-21 NOTE — DISCHARGE INSTR - AVS FIRST PAGE
Postoperative Care Instructions      1. General: You may feel pulling sensations around the wound or funny aches and pains around the incisions. This is normal. Even minor surgery is a change in your body and this is your body's reaction to it. If you have had abdominal surgery, it may help to support the incision with a small pillow or blanket for comfort when moving or coughing. 2. Wound care: The glue over the incisions will fall off over the next week or two. If you have staples or stitches, they will be removed by the physician at your follow up appointment. 3. Showering: You may shower. Please do not soak wound in standing water such as a bath, hot tub, pool, lake, etc. Do not scrub or use exfoliants on the surgical wounds. 4. Activity: You may go up and down stairs, walk as much as you are comfortable, but walk at least 3 times each day. If you have had abdominal surgery, do not perform any strenuous exercise or lift anything heavier than 15 pounds for at least 4 weeks, unless cleared by your physician. 5. Diet: You may resume your regular diet. Please drink lots of water. 6. Medications: Resume all of your previous medications, unless told otherwise by the doctor. A good option for pain control is to start with acetaminophen (Tylenol) 650mg and ibuprofen (Advil) 600mg and alternate taking them every 3 hours. If this is not sufficient, you make take the narcotic pain medicine as prescribed. You do not need to take the narcotic pain medication unless you are having significant pain and discomfort. Please take the narcotic medication with food. Ensure that you do not take more than 4000 mg of Tylenol per day. 7. Driving: You will need someone to drive you home on the day of surgery. Do not drive or make any important decisions while on narcotic pain medication. Generally, you may drive 48 hours after you've stopped taking all narcotic pain medications.  Generally, you may drive when you are off all narcotic pain medications, and you can turn in your seat comfortably to check your blind spot and area able to slam on the brakes while driving if needed. 8. Upset Stomach: You may take Maalox, Tums, or similar items for an upset stomach. If your narcotic pain medication causes an upset stomach, do not take it on an empty stomach. Try taking it with at least some crackers or toast.     9. Constipation: Patients often experience constipation after surgery. We recommend starting an over-the-counter medication for this, such as Metamucil, Senokot, Colace, milk of magnesia, etc. You may stop taking these medications a couple days after your last dose of narcotic medication. If you experience significant nausea or vomiting after abdominal surgery, call the office before trying any of these medications. 10. Call the office: If you are experiencing any of the following: fevers above 101.5°, significant nausea or vomiting, if the wound develops drainage and/or excessive redness around the wound, or if you have significant diarrhea or other worsening symptoms.     11. Pain: A prescription for narcotic pain medication will be sent to your pharmacy upon discharge from the hospital.

## 2023-08-21 NOTE — OP NOTE
OPERATIVE REPORT  PATIENT NAME: Benjamin Leigh    :  1980  MRN: [id number]  Pt Location: [facility] OR ROOM 02    SURGERY DATE: 2023    Surgeon(s) and Role: Celi Farrell MD - Primary     * Monty Buck PA-C - Assisting    Preop Diagnosis:  Mass of subcutaneous tissue of back [R22.2]    Post-Op Diagnosis Codes:     * Mass of subcutaneous tissue of back [R22.2]    Procedure(s):  Right - EXCISION SUBCUTANEOUS MASS RIGHT BACK 5cm x 4 cm    Specimen(s):  ID Type Source Tests Collected by Time Destination   1 : SUBCUTANEOUS MASS RIGHT BACK Tissue Subcutaneous tissue TISSUE EXAM Celi Farrell MD 2023 1449        Estimated Blood Loss:   Minimal    Drains:  * No LDAs found *    Anesthesia Type:   IV Sedation with Anesthesia    Operative Indications: Mass of subcutaneous tissue of back [R22.2]      Operative Findings:  5 cm x 4 cm subcutaneous mass back. Complications:   None    Procedure and Technique:  The patient was brought to the operating room. She was placed in prone position. IV sedation was given by anesthesia team.  Parts prepped and draped in standard fashion. Timeout performed. Patient received preoperative IV antibiotic. Half percent Marcaine with epinephrine was given in the area marked. A transverse incision was made and deepened through the subcutaneous tissue. The mass was dissected from the surrounding tissue using electrocautery. It was excised in its entirety and sent for pathology. Irrigation of the cavity was performed. Hemostasis was ensured using electrocautery. The incision was closed in 2 layers the deeper layer was subcutaneous which was closed using 3-0 Vicryl interrupted. The skin was then closed using 4-0 Monocryl subcuticular. Exofin was applied. Patient was transferred to the recovery after being reversed from anesthesia under stable condition. I was present for the entire procedure., A qualified resident physician was not available.  and A physician assistant was required during the procedure for retraction, tissue handling, dissection and suturing.     Patient Disposition:  PACU         SIGNATURE: Russell Mojica MD  DATE: August 21, 2023  TIME: 3:01 PM

## 2023-08-23 DIAGNOSIS — J30.9 ALLERGIC RHINITIS, UNSPECIFIED SEASONALITY, UNSPECIFIED TRIGGER: ICD-10-CM

## 2023-08-23 DIAGNOSIS — J34.9 PARANASAL SINUS DISEASE: ICD-10-CM

## 2023-08-23 RX ORDER — CETIRIZINE HYDROCHLORIDE 10 MG/1
10 TABLET ORAL DAILY
Qty: 90 TABLET | Refills: 3 | Status: CANCELLED | OUTPATIENT
Start: 2023-08-23

## 2023-08-23 RX ORDER — FLUTICASONE PROPIONATE 50 MCG
1 SPRAY, SUSPENSION (ML) NASAL DAILY
Qty: 16 G | Refills: 3 | Status: CANCELLED | OUTPATIENT
Start: 2023-08-23

## 2023-08-24 PROCEDURE — 88305 TISSUE EXAM BY PATHOLOGIST: CPT | Performed by: PATHOLOGY

## 2023-08-28 DIAGNOSIS — J30.9 ALLERGIC RHINITIS, UNSPECIFIED SEASONALITY, UNSPECIFIED TRIGGER: ICD-10-CM

## 2023-08-28 DIAGNOSIS — J34.9 PARANASAL SINUS DISEASE: ICD-10-CM

## 2023-08-29 ENCOUNTER — TELEPHONE (OUTPATIENT)
Dept: NEUROLOGY | Facility: CLINIC | Age: 43
End: 2023-08-29

## 2023-08-29 RX ORDER — CETIRIZINE HYDROCHLORIDE 10 MG/1
10 TABLET ORAL DAILY
Qty: 90 TABLET | Refills: 5 | Status: SHIPPED | OUTPATIENT
Start: 2023-08-29

## 2023-08-29 RX ORDER — FLUTICASONE PROPIONATE 50 MCG
1 SPRAY, SUSPENSION (ML) NASAL DAILY
Qty: 16 G | Refills: 5 | Status: SHIPPED | OUTPATIENT
Start: 2023-08-29

## 2023-08-30 ENCOUNTER — OFFICE VISIT (OUTPATIENT)
Dept: SURGERY | Facility: CLINIC | Age: 43
End: 2023-08-30

## 2023-08-30 VITALS
BODY MASS INDEX: 27.53 KG/M2 | HEART RATE: 74 BPM | HEIGHT: 64 IN | OXYGEN SATURATION: 98 % | RESPIRATION RATE: 18 BRPM | TEMPERATURE: 97.7 F

## 2023-08-30 DIAGNOSIS — D17.1 LIPOMA OF TORSO: Primary | ICD-10-CM

## 2023-08-30 PROCEDURE — 99024 POSTOP FOLLOW-UP VISIT: CPT | Performed by: SURGERY

## 2023-08-30 NOTE — PROGRESS NOTES
30-year-old female patient who is status post excision of subcutaneous mass from her back. She has some burning around the incision site. No drainage. No swelling. No fever. No redness. The patient is alert awake oriented. Vitals are stable. Incision healthy. Surgical glue is present. There is no erythema. I explained to the patient that the pathology showed it to be a lipoma. The infection is healing well. Follow-up with me as needed.

## 2023-09-05 ENCOUNTER — OFFICE VISIT (OUTPATIENT)
Dept: NEUROLOGY | Facility: CLINIC | Age: 43
End: 2023-09-05
Payer: MEDICARE

## 2023-09-05 ENCOUNTER — TELEPHONE (OUTPATIENT)
Dept: NEUROLOGY | Facility: CLINIC | Age: 43
End: 2023-09-05

## 2023-09-05 VITALS
SYSTOLIC BLOOD PRESSURE: 120 MMHG | BODY MASS INDEX: 28 KG/M2 | DIASTOLIC BLOOD PRESSURE: 70 MMHG | HEIGHT: 64 IN | HEART RATE: 70 BPM | WEIGHT: 164 LBS

## 2023-09-05 DIAGNOSIS — R41.3 MEMORY LOSS OR IMPAIRMENT: Primary | ICD-10-CM

## 2023-09-05 PROCEDURE — 99214 OFFICE O/P EST MOD 30 MIN: CPT | Performed by: PSYCHIATRY & NEUROLOGY
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
## 2023-09-05 NOTE — TELEPHONE ENCOUNTER
Pt with issue trying to get formal neurocogntive evaluation done. Per pt, insurance does not cover [facility]. Pt has also contacted her insurance company for other providers. She has called several on her list and they too have told pt unable to take her insurance. Can you please see if you can look into getting pt formal neurocogntive testing that is covered by her insurance plan to avoid large out of pocket expense.
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
## 2023-09-06 NOTE — TELEPHONE ENCOUNTER
ROSSY sent the following message to Dr. Dominguez Peers to see if would be able to offer neuropsych testing for this patient:    "Hi Dr. Avinash Keith,     I have a 37year old patient who is in need of neuropsych testing due to memory loss. She lives in [facility]). She has [address] ([place]) and her mental health benefits are through [employer]. Are you accepting new patients for neuropsych testing with that insurance? If accepting, please let me know the best way to send the referral to you. Please advise. Thanks in advance. Best Regards,    ROSSY Julien   Zev Vanegas [facility]  [address] [address], [address] [facility], [address] [place] [address]  [phone] - phone  [phone] - fax   [email or web address]. org"    Awaiting response.

## 2023-09-07 DIAGNOSIS — T81.41XA INFECTION OF SUPERFICIAL INCISIONAL SURGICAL SITE AFTER PROCEDURE, INITIAL ENCOUNTER: Primary | ICD-10-CM

## 2023-09-07 RX ORDER — AMOXICILLIN AND CLAVULANATE POTASSIUM 875; 125 MG/1; MG/1
1 TABLET, FILM COATED ORAL EVERY 12 HOURS SCHEDULED
Qty: 14 TABLET | Refills: 0 | Status: SHIPPED | OUTPATIENT
Start: 2023-09-07 | End: 2023-09-14

## 2023-09-07 NOTE — TELEPHONE ENCOUNTER
MSW had yet to receive emailed response from Dr. Ricardo Brito, so MSW attempted to reach him at [phone]. No answer. Unable to leave a message as his mailbox is full. MSW will await response via email (message was sent with high importance on 9/6).

## 2023-09-11 ENCOUNTER — OFFICE VISIT (OUTPATIENT)
Dept: SURGERY | Facility: CLINIC | Age: 43
End: 2023-09-11

## 2023-09-11 VITALS
WEIGHT: 161.6 LBS | BODY MASS INDEX: 27.59 KG/M2 | OXYGEN SATURATION: 98 % | TEMPERATURE: 98 F | HEART RATE: 85 BPM | SYSTOLIC BLOOD PRESSURE: 114 MMHG | DIASTOLIC BLOOD PRESSURE: 70 MMHG | HEIGHT: 64 IN

## 2023-09-11 DIAGNOSIS — L76.34 POSTOPERATIVE SEROMA OF SKIN AFTER NON-DERMATOLOGIC PROCEDURE: Primary | ICD-10-CM

## 2023-09-11 PROCEDURE — 99024 POSTOP FOLLOW-UP VISIT: CPT | Performed by: SURGERY

## 2023-09-11 NOTE — TELEPHONE ENCOUNTER
MSW received the following response from Dr. Federico Jacobsen on 9/7 after hours:    "Selina Brian Jey Giles,  Unfortunately, I cannot accommodate this referral at present. Have a great weekend! Cleelmo Herb"    MSW will need to continue to explore other options for neuropsych testing.

## 2023-09-11 NOTE — PROGRESS NOTES
Assessment/Plan: I reassured the patient and explained to her that either the seroma will drain spontaneously or it will get reabsorbed in 2 to 3 months time. There is no infection. She should just complete the course of antibiotics. Follow-up with me as needed. No problem-specific Assessment & Plan notes found for this encounter. Diagnoses and all orders for this visit:    Postoperative seroma of skin after non-dermatologic procedure          Subjective:      Patient ID: Yodit Crespo is a 37 y.o. female. 80-year-old female patient who is 3-week status post excision of a lipoma from her back. She called my office last week with concerns that she has a boggy swelling at the incision site and the incision was looking red. No fever. No drainage. She was started on oral antibiotics and has come here for follow-up today. No new complaints. The following portions of the patient's history were reviewed and updated as appropriate: allergies, current medications, past family history, past medical history, past social history, past surgical history and problem list.    Review of Systems   All other systems reviewed and are negative. Objective:      /70   Pulse 85   Temp 98 °F (36.7 °C)   Ht 5' 4" (1.626 m)   Wt 73.3 kg (161 lb 9.6 oz)   SpO2 98%   BMI 27.74 kg/m²          Physical Exam  Constitutional:       Appearance: Normal appearance. Skin:     Comments: The incision looks healthy. There is no signs of infection. She has a subcutaneous seroma. It is nontender. The skin over the seroma is healthy. Neurological:      Mental Status: She is alert.

## 2023-09-12 ENCOUNTER — EVALUATION (OUTPATIENT)
Dept: SPEECH THERAPY | Facility: CLINIC | Age: 43
End: 2023-09-12
Payer: MEDICARE

## 2023-09-12 DIAGNOSIS — R41.3 MEMORY LOSS OR IMPAIRMENT: ICD-10-CM

## 2023-09-12 DIAGNOSIS — R48.8 OTHER SYMBOLIC DYSFUNCTIONS: Primary | ICD-10-CM

## 2023-09-12 PROCEDURE — 96125 COGNITIVE TEST BY HC PRO: CPT

## 2023-09-13 ENCOUNTER — APPOINTMENT (OUTPATIENT)
Dept: LAB | Facility: CLINIC | Age: 43
End: 2023-09-13
Payer: MEDICARE

## 2023-09-13 DIAGNOSIS — D50.9 MICROCYTIC ANEMIA: ICD-10-CM

## 2023-09-13 DIAGNOSIS — D50.9 IRON DEFICIENCY ANEMIA, UNSPECIFIED IRON DEFICIENCY ANEMIA TYPE: ICD-10-CM

## 2023-09-13 LAB
ALBUMIN SERPL BCP-MCNC: 4 G/DL (ref 3.5–5)
ALP SERPL-CCNC: 55 U/L (ref 34–104)
ALT SERPL W P-5'-P-CCNC: 25 U/L (ref 7–52)
ANION GAP SERPL CALCULATED.3IONS-SCNC: 5 MMOL/L
AST SERPL W P-5'-P-CCNC: 19 U/L (ref 13–39)
BASOPHILS # BLD AUTO: 0.05 THOUSANDS/ÂΜL (ref 0–0.1)
BASOPHILS NFR BLD AUTO: 1 % (ref 0–1)
BILIRUB SERPL-MCNC: 0.52 MG/DL (ref 0.2–1)
BUN SERPL-MCNC: 10 MG/DL (ref 5–25)
CALCIUM SERPL-MCNC: 8.9 MG/DL (ref 8.4–10.2)
CHLORIDE SERPL-SCNC: 103 MMOL/L (ref 96–108)
CO2 SERPL-SCNC: 28 MMOL/L (ref 21–32)
CREAT SERPL-MCNC: 0.83 MG/DL (ref 0.6–1.3)
EOSINOPHIL # BLD AUTO: 0.17 THOUSAND/ÂΜL (ref 0–0.61)
EOSINOPHIL NFR BLD AUTO: 3 % (ref 0–6)
ERYTHROCYTE [DISTWIDTH] IN BLOOD BY AUTOMATED COUNT: 15.8 % (ref 11.6–15.1)
FERRITIN SERPL-MCNC: 93 NG/ML (ref 11–307)
FOLATE SERPL-MCNC: >22.3 NG/ML
GFR SERPL CREATININE-BSD FRML MDRD: 86 ML/MIN/1.73SQ M
GLUCOSE P FAST SERPL-MCNC: 88 MG/DL (ref 65–99)
HCT VFR BLD AUTO: 39.5 % (ref 34.8–46.1)
HGB BLD-MCNC: 13 G/DL (ref 11.5–15.4)
IMM GRANULOCYTES # BLD AUTO: 0.02 THOUSAND/UL (ref 0–0.2)
IMM GRANULOCYTES NFR BLD AUTO: 0 % (ref 0–2)
IRON SATN MFR SERPL: 24 % (ref 15–50)
IRON SERPL-MCNC: 80 UG/DL (ref 50–212)
LYMPHOCYTES # BLD AUTO: 2.09 THOUSANDS/ÂΜL (ref 0.6–4.47)
LYMPHOCYTES NFR BLD AUTO: 38 % (ref 14–44)
MCH RBC QN AUTO: 28.8 PG (ref 26.8–34.3)
MCHC RBC AUTO-ENTMCNC: 32.9 G/DL (ref 31.4–37.4)
MCV RBC AUTO: 88 FL (ref 82–98)
MONOCYTES # BLD AUTO: 0.35 THOUSAND/ÂΜL (ref 0.17–1.22)
MONOCYTES NFR BLD AUTO: 6 % (ref 4–12)
NEUTROPHILS # BLD AUTO: 2.78 THOUSANDS/ÂΜL (ref 1.85–7.62)
NEUTS SEG NFR BLD AUTO: 52 % (ref 43–75)
NRBC BLD AUTO-RTO: 0 /100 WBCS
PLATELET # BLD AUTO: 251 THOUSANDS/UL (ref 149–390)
PMV BLD AUTO: 10.3 FL (ref 8.9–12.7)
POTASSIUM SERPL-SCNC: 3.8 MMOL/L (ref 3.5–5.3)
PROT SERPL-MCNC: 7.1 G/DL (ref 6.4–8.4)
RBC # BLD AUTO: 4.51 MILLION/UL (ref 3.81–5.12)
SODIUM SERPL-SCNC: 136 MMOL/L (ref 135–147)
TIBC SERPL-MCNC: 329 UG/DL (ref 250–450)
UIBC SERPL-MCNC: 249 UG/DL (ref 155–355)
VIT B12 SERPL-MCNC: 900 PG/ML (ref 180–914)
WBC # BLD AUTO: 5.46 THOUSAND/UL (ref 4.31–10.16)

## 2023-09-13 PROCEDURE — 83540 ASSAY OF IRON: CPT

## 2023-09-13 PROCEDURE — 83918 ORGANIC ACIDS TOTAL QUANT: CPT

## 2023-09-13 PROCEDURE — 85025 COMPLETE CBC W/AUTO DIFF WBC: CPT

## 2023-09-13 PROCEDURE — 83550 IRON BINDING TEST: CPT

## 2023-09-13 PROCEDURE — 36415 COLL VENOUS BLD VENIPUNCTURE: CPT

## 2023-09-13 PROCEDURE — 82746 ASSAY OF FOLIC ACID SERUM: CPT

## 2023-09-13 PROCEDURE — 82728 ASSAY OF FERRITIN: CPT

## 2023-09-13 PROCEDURE — 82607 VITAMIN B-12: CPT

## 2023-09-13 NOTE — TELEPHONE ENCOUNTER
MSW will contact [facility] and [address] [place] [address] Neuropsychology to see if they can offer neuropsych testing for a patient with [employer] from [facility].

## 2023-09-14 NOTE — TELEPHONE ENCOUNTER
MSW phoned [address] [place] [address] Neuropsychology at [phone] and spoke with Dimple Lower- she advised that they are not accepting referrals for memory deficits as they have an waitlist. eBrenice Rangel stated that they do make exceptions to take referrals for [place] neuropsych testing and pre-op clearances as they realize there is need for those services. MSW phoned [facility] at [phone] and spoke with Geena Tejada - they do accept [facility], but she is not sure if they can accept [place] [facility]. Geena Tejada stated that she would need to inquire with her boss and will get back to this writer. ROSSY will await callback.

## 2023-09-15 NOTE — TELEPHONE ENCOUNTER
MSW received the following response from Andre at [facility] this date:    "Hi Parveen Ramos, it's Andre at [facility]- I checked with my boss - we would defer that referral due to location and that they do not take [address] anymore. He said that you should be able to find a neuropsychologist a lot closer. If you have any questions, feel free to give us a call. Phone number is [phone]. Thank you."    MSW will need to continued to expand search area. MSW phoned the following neuropsychology offices with the following results to inquire if they can offer neuropsych testing for patients with [facility]:    [facility]) Neuropsychology  [phone]  *spoke with Huy Sharif - they do not accept [facility]  [phone]  *Spoke with Moshe Brambila - they only accept referrals from [facility]. They have a waitlist of 400+ patients. [facility]  [phone]  *No answer. MSW left a message requesting callback. Awaiting same. [facility]   [phone]  *Spoke with Ariel Mohan - they are not "board certified" so they cannot offer neuropsych testing for [place] patients. [facility]  [phone]  *Spoke with Farnaz Gao - she advised that [place] will not pay for their services. [facility]  [address] [facility], [address] [facility]  *No answer. MSW left a message requesting callback. Awaiting same. MSW reached out to [employer], Practice Administrator, to inquire about availability of neuropsych testing at [facility]. Mari directed this writer to reach out to [facility]. MSW reached out to [facility] as directed and was told that Dr. Ignacio Vaughn is non-par with [facility], but that she is par with [address]. MSW inquired if billing for [facility] patients goes through medical or mental health [facility]).  Remigio Garcia stated that she is trying to get this clarified - that the [facility]. [facility] rachid have asked for supervisors in Psych and Neurology to discuss this. MSW will follow.

## 2023-09-18 LAB — METHYLMALONATE SERPL-SCNC: 58 NMOL/L (ref 0–378)

## 2023-09-20 ENCOUNTER — OFFICE VISIT (OUTPATIENT)
Dept: HEMATOLOGY ONCOLOGY | Facility: CLINIC | Age: 43
End: 2023-09-20
Payer: MEDICARE

## 2023-09-20 VITALS
SYSTOLIC BLOOD PRESSURE: 122 MMHG | WEIGHT: 163 LBS | BODY MASS INDEX: 27.83 KG/M2 | RESPIRATION RATE: 16 BRPM | TEMPERATURE: 97.3 F | HEART RATE: 79 BPM | OXYGEN SATURATION: 99 % | HEIGHT: 64 IN | DIASTOLIC BLOOD PRESSURE: 78 MMHG

## 2023-09-20 DIAGNOSIS — K90.0 CELIAC DISEASE: ICD-10-CM

## 2023-09-20 DIAGNOSIS — D50.9 MICROCYTIC ANEMIA: Primary | ICD-10-CM

## 2023-09-20 DIAGNOSIS — D50.9 IRON DEFICIENCY ANEMIA, UNSPECIFIED IRON DEFICIENCY ANEMIA TYPE: ICD-10-CM

## 2023-09-20 PROCEDURE — 99214 OFFICE O/P EST MOD 30 MIN: CPT

## 2023-09-20 NOTE — PROGRESS NOTES
[place] [id number]  Hematology Ambulatory Follow-Up  Sean Perkins, 1980, [id number]  9/20/2023    Assessment/Plan:  1. Microcytic anemia  2. Iron deficiency anemia, unspecified iron deficiency anemia type  3. Celiac disease  Ms. Claudia Valadez is a 41-year-old female seen in follow-up/transfer of care for iron deficiency anemia. She was originally seen in consultation in June 2023 with iron saturation of 6%, ferritin 11, hemoglobin 12.3 with an MCV of 78. She was taking oral iron at the time but unable to tolerate due to GI upset therefore she received 4 doses of IV Venofer 300 mg. She did tolerate this well without any side effects. She has noted an improvement in her symptoms if not resolution. She is no longer chewing ice. Her fatigue is much improved, and she has no shortness of breath currently. Most recent labs demonstrate adequate supplementation with a hemoglobin of 13, MCV 88, ferritin 93, iron saturation 24%, and serum iron of 80. We discussed that her iron deficiency is likely multifactorial due to celiac disease as well as monthly menstrual cycle. She has been referred to gastroenterology for further work-up of this malabsorption as well as colon cancer screening with colonoscopy and further evaluation for the anemia. We will continue to monitor her labs every 3 months prior to a follow-up in 6 months. She knows to call the office with new or worsening symptoms for sooner evaluation prior to this follow-up. Since she is undergoing work-up for IVF I suggested her start a prenatal vitamin or blood builder supplement in hopes to maintain her iron levels prior to pregnancy. We discussed that IV iron is not recommended during the first trimester and we would like to monitor her labs closely if she were to become pregnant.   She will let our office know if this is happening sooner than her follow-up for closer monitoring.    - CBC and differential; Future  - Iron Panel (Includes Ferritin, Iron Sat%, Iron, and TIBC); Future  - CBC and differential; Future  - Iron Panel (Includes Ferritin, Iron Sat%, Iron, and TIBC); Future  - Folate; Future  - Vitamin B12; Future  - Methylmalonic acid, serum; Future      The patient is scheduled for follow-up in approximately 6 months. Patient voiced agreement and understanding to the above. Patient knows to call the Hematology/Oncology office with any questions and concerns regarding the above. Barrier(s) to care: None  The patient is able to self care.  ------------------------------------------------------------------------------------------------------    Chief Complaint   Patient presents with   • Follow-up       History of present illness:   Mirian Feliz is a 58-year-old female with past medical history of celiac disease, GERD, vitamin D deficiency, hypertension, hiatal hernia, and abnormal Pap smear. She is seen in follow-up/transfer of care from [facility] for iron deficiency anemia. She was originally seen in consultation in June 2023 with worsening fatigue, dizziness, and lightheadedness. She also had symptoms of pica with ice chewing and restless legs. She did not have significant anemia associate with this but did have microcytosis. Iron saturation was low at 6% with an serum iron of 26, ferritin of 11.     6/17/2023: Hemoglobin 12.3, MCV 78, platelets 267, WBC 8.20   Ferritin 11, iron saturation 6%, TIBC 427, serum iron 26  IV Venofer 300 mg x4: 7/6-7/26 9/13/2023: Hemoglobin 13, MCV 88, platelets 983, WBC 1.49   Ferritin 93, iron saturation 24%, TIBC 329, serum iron 80    Interval history: She received 4 doses of IV Venofer 300 mg and tolerated this well without significant side effects. A few weeks after her last infusion she was feeling less fatigued.   She has also noted some improvement in her other symptoms as well as including no longer chewing ice, and shortness of breath. She is currently going to [facility] for IVF evaluation. 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 She will email days with reports. Review of Systems   Constitutional: Positive for fatigue (improving). Negative for activity change, chills, diaphoresis, fever and unexpected weight change. HENT: Negative for trouble swallowing and voice change. Eyes: Negative for photophobia and visual disturbance. Respiratory: Negative for cough, chest tightness and shortness of breath (improved). Cardiovascular: Negative for chest pain, palpitations and leg swelling. Gastrointestinal: Negative for abdominal distention, abdominal pain, anal bleeding, blood in stool, constipation, diarrhea, nausea and vomiting. Endocrine: Negative for cold intolerance and heat intolerance. Genitourinary: Negative for dysuria, hematuria, menstrual problem and urgency. Musculoskeletal: Negative for arthralgias and gait problem. Skin: Negative for pallor and rash. Neurological: Positive for headaches (chronic). Negative for dizziness (vertigo improved), light-headedness and numbness. Hematological: Negative for adenopathy. Bruises/bleeds easily. Psychiatric/Behavioral: Negative for confusion and sleep disturbance.        Patient Active Problem List   Diagnosis   • ASCUS with positive high risk HPV cervical   • Atypical glandular cells of undetermined significance (TERRI) on cervical Pap smear   • Gastroesophageal reflux disease without esophagitis   • Microcytic anemia   • Pain of upper abdomen   • Encounter for screening mammogram for malignant neoplasm of breast   • Joint pain   • Neck pain   • Spasm of back muscles   • Celiac disease   • S/P laparoscopic appendectomy   • Visual changes   • Elevated blood pressure reading   • Breathing difficulty   • YESSICA (obstructive sleep apnea)   • Sleep-disordered breathing   • DDD (degenerative disc disease), lumbar   • Encounter for gynecological examination without abnormal finding   • Sacroiliitis ([address])   • Fatigue   • Lipids abnormal   • Bipolar 1 disorder, mixed (HCC)   • Chronic bilateral low back pain   • Cervical disc disorder with radiculopathy of mid-cervical region   • Memory loss or impairment   • Biceps tendinopathy, left   • Left shoulder pain   • Left arm pain   • Somatic dysfunction of spine, cervical   • Arm somatic dysfunction   • Somatic dysfunction of thoracic region   • Paranasal sinus disease   • Allergic rhinitis   • Skin lesion of back   • Hypoferremia   • Paraspinal soft tissue mass   • Iron deficiency anemia   • Lipoma of torso       Past Medical History:   Diagnosis Date   • Abnormal Pap smear of cervix    • Anemia     had iron infusions to treat   • Celiac disease    • Depression    • GERD (gastroesophageal reflux disease)    • GERD without esophagitis    • Heartburn    • Hiatal hernia    • Hypertension    • Sleep apnea     borderline + per sleep study test   • Vitamin D deficiency        Past Surgical History:   Procedure Laterality Date   • APPENDECTOMY  2021   • EGD N/A    • NH EXC B9 LESION MRGN XCP SK TG T/A/L 3.1-4.0 CM Right 08/21/2023    Procedure: EXCISION SUBCUTANEOUS MASS RIGHT BACK (3CM);   Surgeon: Junior Brock MD;  Location:  MAIN OR;  Service: General   • NH LAPAROSCOPIC APPENDECTOMY N/A 01/23/2021    Procedure: APPENDECTOMY LAPAROSCOPIC;  Surgeon: Marcial Olivas DO;  Location:  MAIN OR;  Service: General   • TUBAL LIGATION     • WISDOM TOOTH EXTRACTION N/A 2005       Family History   Problem Relation Age of Onset   • Arthritis Mother    • Cervical cancer Mother    • Fibromyalgia Mother    • Mental illness Father    • Stroke Father    • Heart disease Father    • Heart disease Maternal Grandmother    • Hypertension Maternal Grandmother    • Diabetes Maternal Grandmother    • No Known Problems Daughter    • No Known Problems Maternal Grandfather • No Known Problems Paternal Grandmother    • Kidney disease Paternal Grandfather    • No Known Problems Brother    • No Known Problems Son    • Heart disease Brother    • No Known Problems Son    • No Known Problems Son    • No Known Problems Son        Social History     Socioeconomic History   • Marital status: /Civil Union     Spouse name: None   • Number of children: 5   • Years of education: None   • Highest education level: None   Occupational History   • None   Tobacco Use   • Smoking status: Former     Packs/day: 1.00     Years: 15.00     Total pack years: 15.00     Types: Cigarettes     Quit date: 2016     Years since quittin.1     Passive exposure: Past   • Smokeless tobacco: Never   Vaping Use   • Vaping Use: Never used   Substance and Sexual Activity   • Alcohol use: Yes     Comment: Occasionally   • Drug use: No   • Sexual activity: Yes     Partners: Male     Birth control/protection: Female Sterilization, Other     Comment: Tubes removed   Other Topics Concern   • None   Social History Narrative   • None     Social Determinants of Health     Financial Resource Strain: Low Risk  (2021)    Overall Financial Resource Strain (CARDIA)    • Difficulty of Paying Living Expenses: Not hard at all   Food Insecurity: No Food Insecurity (2021)    Hunger Vital Sign    • Worried About Running Out of Food in the Last Year: Never true    • Ran Out of Food in the Last Year: Never true   Transportation Needs: No Transportation Needs (2021)    PRAPARE - Transportation    • Lack of Transportation (Medical): No    • Lack of Transportation (Non-Medical):  No   Physical Activity: Not on file   Stress: No Stress Concern Present (2021)    [facility]    • Feeling of Stress : Not at all   Social Connections: Not on file   Intimate Partner Violence: Not At Risk (2021)    Humiliation, Afraid, Rape, and Kick questionnaire • Fear of Current or Ex-Partner: No    • Emotionally Abused: No    • Physically Abused: No    • Sexually Abused: No   Housing Stability: Low Risk  (9/22/2021)    Housing Stability Vital Sign    • Unable to Pay for Housing in the Last Year: No    • Number of Places Lived in the Last Year: 1    • Unstable Housing in the Last Year: No         Current Outpatient Medications:   •  ascorbic acid (VITAMIN C) 250 mg tablet, Take 1 tablet (250 mg total) by mouth 3 (three) times a day with meals (Patient not taking: Reported on 7/6/2023), Disp: 90 tablet, Rfl: 2  •  cetirizine (ZyrTEC) 10 mg tablet, Take 1 tablet (10 mg total) by mouth daily, Disp: 90 tablet, Rfl: 5  •  cholecalciferol (VITAMIN D3) 400 units tablet, Take 400 Units by mouth daily, Disp: , Rfl:   •  Coenzyme Q10 600 MG CHEW, Chew, Disp: , Rfl:   •  cyanocobalamin (VITAMIN B-12) 500 MCG tablet, Take 500 mcg by mouth daily, Disp: , Rfl:   •  ferrous gluconate (FERGON) 240 (27 FE) MG tablet, Take 1 tablet (240 mg total) by mouth 3 (three) times a day with meals Always take with Vit C at same time. (Patient not taking: Reported on 7/6/2023), Disp: 90 tablet, Rfl: 2  •  fluticasone (FLONASE) 50 mcg/act nasal spray, 1 spray into each nostril daily, Disp: 16 g, Rfl: 5  •  Magnesium 250 MG TABS, Take 250 mg by mouth in the morning OTC, Disp: , Rfl:   •  meclizine (ANTIVERT) 25 mg tablet, Take 1 tablet (25 mg total) by mouth every 8 (eight) hours as needed for dizziness, Disp: 10 tablet, Rfl: 0  •  omeprazole (PriLOSEC) 40 MG capsule, TAKE 1 CAPSULE(40 MG) BY MOUTH DAILY, Disp: 30 capsule, Rfl: 5  •  Sodium Fluoride 1.1 % PSTE, Take 1 Film by mouth daily at bedtime Use once per day at bedtime. Brush, spit out. Don't rinse. Let sit on teeth.  Generic brand OK, Disp: 51 g, Rfl: 3    Allergies   Allergen Reactions   • Gluten Meal - Food Allergy Abdominal Pain   • Seasonal Ic [Octacosanol] Swelling     Severe swelling       Objective:  /78 (BP Location: Left arm, Patient Position: Sitting, Cuff Size: Standard)   Pulse 79   Temp (!) 97.3 °F (36.3 °C) (Temporal)   Resp 16   Ht 5' 4" (1.626 m)   Wt 73.9 kg (163 lb)   SpO2 99%   BMI 27.98 kg/m²    Physical Exam  Constitutional:       General: She is not in acute distress. Appearance: Normal appearance. She is not ill-appearing. HENT:      Head: Normocephalic and atraumatic. Eyes:      Extraocular Movements: Extraocular movements intact. Conjunctiva/sclera: Conjunctivae normal.   Cardiovascular:      Rate and Rhythm: Normal rate. Pulses: Normal pulses. Pulmonary:      Effort: Pulmonary effort is normal. No respiratory distress. Abdominal:      General: Bowel sounds are normal. There is no distension. Tenderness: There is no abdominal tenderness. Musculoskeletal:         General: Normal range of motion. Cervical back: Normal range of motion. No tenderness. Right lower leg: No edema. Left lower leg: No edema. Lymphadenopathy:      Cervical: No cervical adenopathy. Skin:     General: Skin is warm and dry. Capillary Refill: Capillary refill takes less than 2 seconds. Coloration: Skin is not jaundiced or pale. Neurological:      General: No focal deficit present. Mental Status: She is alert and oriented to person, place, and time. Mental status is at baseline. Motor: No weakness. Gait: Gait normal.   Psychiatric:         Mood and Affect: Mood normal.         Behavior: Behavior normal.         Thought Content:  Thought content normal.         Judgment: Judgment normal.         Result Review  Labs:  Appointment on 09/13/2023   Component Date Value Ref Range Status   • WBC 09/13/2023 5.46  4.31 - 10.16 Thousand/uL Final   • RBC 09/13/2023 4.51  3.81 - 5.12 Million/uL Final   • Hemoglobin 09/13/2023 13.0  11.5 - 15.4 g/dL Final   • Hematocrit 09/13/2023 39.5  34.8 - 46.1 % Final   • MCV 09/13/2023 88  82 - 98 fL Final   • MCH 09/13/2023 28.8  26.8 - 34.3 pg Final   • MCHC 09/13/2023 32.9  31.4 - 37.4 g/dL Final   • RDW 09/13/2023 15.8 (H)  11.6 - 15.1 % Final   • MPV 09/13/2023 10.3  8.9 - 12.7 fL Final   • Platelets 15/56/4589 251  149 - 390 Thousands/uL Final   • nRBC 09/13/2023 0  /100 WBCs Final   • Neutrophils Relative 09/13/2023 52  43 - 75 % Final   • Immat GRANS % 09/13/2023 0  0 - 2 % Final   • Lymphocytes Relative 09/13/2023 38  14 - 44 % Final   • Monocytes Relative 09/13/2023 6  4 - 12 % Final   • Eosinophils Relative 09/13/2023 3  0 - 6 % Final   • Basophils Relative 09/13/2023 1  0 - 1 % Final   • Neutrophils Absolute 09/13/2023 2.78  1.85 - 7.62 Thousands/µL Final   • Immature Grans Absolute 09/13/2023 0.02  0.00 - 0.20 Thousand/uL Final   • Lymphocytes Absolute 09/13/2023 2.09  0.60 - 4.47 Thousands/µL Final   • Monocytes Absolute 09/13/2023 0.35  0.17 - 1.22 Thousand/µL Final   • Eosinophils Absolute 09/13/2023 0.17  0.00 - 0.61 Thousand/µL Final   • Basophils Absolute 09/13/2023 0.05  0.00 - 0.10 Thousands/µL Final   • Vitamin B-12 09/13/2023 900  180 - 914 pg/mL Final   • Methylmalonic Acid, S 09/13/2023 58  0 - 378 nmol/L Final   • Folate 09/13/2023 >22.3  >5.9 ng/mL Final    The World Health Organization has determined deficient folate concentrations are considered to be <4.0 ng/mL. • Iron Saturation 09/13/2023 24  15 - 50 % Final   • TIBC 09/13/2023 329  250 - 450 ug/dL Final   • Iron 09/13/2023 80  50 - 212 ug/dL Final    Patients treated with metal-binding drugs (ie. Deferoxamine) may have depressed iron values.    • UIBC 09/13/2023 249  155 - 355 ug/dL Final   • Ferritin 09/13/2023 93  11 - 307 ng/mL Final   Admission on 08/21/2023, Discharged on 08/21/2023   Component Date Value Ref Range Status   • Case Report 08/21/2023    Final                    Value:Surgical Pathology Report                         Case: [id number]                                   Authorizing Provider:  Ren Leary MD          Collected:           08/21/2023 1448 Ordering Location:     [address] [place] [address]   Received:            08/21/2023 [facility]                                     Operating Room                                                               Pathologist:           Carlos Albarado MD                                                           Specimen:    Subcutaneous tissue, SUBCUTANEOUS MASS RIGHT BACK                                         • Final Diagnosis 08/21/2023    Final                    Value: This result contains rich text formatting which cannot be displayed here. • Additional Information 08/21/2023    Final                    Value: This result contains rich text formatting which cannot be displayed here. • Gross Description 08/21/2023    Final                    Value: This result contains rich text formatting which cannot be displayed here. • Clinical Information 08/21/2023    Final                    Value:3.8 x 4 CM       Imaging:   No relevant imaging to review    Please note: This report has been generated by a voice recognition software system. Therefore there may be syntax, spelling, and/or grammatical errors. Please call if you have any questions.

## 2023-09-26 DIAGNOSIS — J34.9 PARANASAL SINUS DISEASE: ICD-10-CM

## 2023-09-26 DIAGNOSIS — J30.9 ALLERGIC RHINITIS, UNSPECIFIED SEASONALITY, UNSPECIFIED TRIGGER: ICD-10-CM

## 2023-09-26 RX ORDER — CETIRIZINE HYDROCHLORIDE 10 MG/1
10 TABLET ORAL DAILY
Qty: 90 TABLET | Refills: 3 | Status: SHIPPED | OUTPATIENT
Start: 2023-09-26

## 2023-10-10 DIAGNOSIS — K21.9 GASTROESOPHAGEAL REFLUX DISEASE WITHOUT ESOPHAGITIS: ICD-10-CM

## 2023-10-10 RX ORDER — OMEPRAZOLE 40 MG/1
40 CAPSULE, DELAYED RELEASE ORAL DAILY
Qty: 30 CAPSULE | Refills: 1 | OUTPATIENT
Start: 2023-10-10

## 2023-10-11 DIAGNOSIS — J34.9 PARANASAL SINUS DISEASE: ICD-10-CM

## 2023-10-11 DIAGNOSIS — J30.9 ALLERGIC RHINITIS, UNSPECIFIED SEASONALITY, UNSPECIFIED TRIGGER: ICD-10-CM

## 2023-10-11 RX ORDER — FLUTICASONE PROPIONATE 50 MCG
1 SPRAY, SUSPENSION (ML) NASAL DAILY
Qty: 16 G | Refills: 5 | Status: CANCELLED | OUTPATIENT
Start: 2023-10-11

## 2023-10-12 DIAGNOSIS — J34.9 PARANASAL SINUS DISEASE: ICD-10-CM

## 2023-10-12 DIAGNOSIS — J30.9 ALLERGIC RHINITIS, UNSPECIFIED SEASONALITY, UNSPECIFIED TRIGGER: ICD-10-CM

## 2023-10-12 RX ORDER — FLUTICASONE PROPIONATE 50 MCG
1 SPRAY, SUSPENSION (ML) NASAL DAILY
Qty: 16 G | Refills: 5 | Status: SHIPPED | OUTPATIENT
Start: 2023-10-12

## 2023-10-23 ENCOUNTER — TELEPHONE (OUTPATIENT)
Dept: PSYCHIATRY | Facility: CLINIC | Age: 43
End: 2023-10-23

## 2023-10-23 ENCOUNTER — TELEPHONE (OUTPATIENT)
Dept: FAMILY MEDICINE CLINIC | Facility: CLINIC | Age: 43
End: 2023-10-23

## 2023-10-23 NOTE — TELEPHONE ENCOUNTER
Encounter for forms    Please refer to the following information:       Type of Form: Physician Verification form    Date of Visit (if applicable): 5/03/0255    Doctor: Dr. Scott Driscoll     Fax number: [phone]    Patient phone number: [phone]     Original in Dr. Scott Driscoll folder to be completed.

## 2023-10-26 DIAGNOSIS — J34.9 PARANASAL SINUS DISEASE: ICD-10-CM

## 2023-10-26 DIAGNOSIS — J30.9 ALLERGIC RHINITIS, UNSPECIFIED SEASONALITY, UNSPECIFIED TRIGGER: ICD-10-CM

## 2023-10-26 RX ORDER — CETIRIZINE HYDROCHLORIDE 10 MG/1
10 TABLET ORAL DAILY
Qty: 90 TABLET | Refills: 3 | Status: SHIPPED | OUTPATIENT
Start: 2023-10-26

## 2023-10-30 ENCOUNTER — TELEPHONE (OUTPATIENT)
Dept: PSYCHIATRY | Facility: CLINIC | Age: 43
End: 2023-10-30

## 2023-11-06 ENCOUNTER — TELEPHONE (OUTPATIENT)
Age: 43
End: 2023-11-06

## 2023-11-06 NOTE — TELEPHONE ENCOUNTER
Pt requesting another neck inj. She has pain of the back of the neck and into the top of the left shoulder. Pain is sharp, dull and squeezing. Pain level 5-6/10. Her last neck injection helped up until the beginning of Oct.   Last BEAN was 7/13/23. Pls review and advise.

## 2023-11-06 NOTE — TELEPHONE ENCOUNTER
Caller: Zola Najjar PT    Doctor: Dr Soy Austin    Reason for call: Pt called in to schedule an epidural injection     Call back#: [phone]

## 2023-11-07 ENCOUNTER — TELEPHONE (OUTPATIENT)
Dept: PAIN MEDICINE | Facility: CLINIC | Age: 43
End: 2023-11-07

## 2023-11-07 DIAGNOSIS — K21.9 GASTROESOPHAGEAL REFLUX DISEASE WITHOUT ESOPHAGITIS: ICD-10-CM

## 2023-11-07 RX ORDER — OMEPRAZOLE 40 MG/1
40 CAPSULE, DELAYED RELEASE ORAL DAILY
Qty: 30 CAPSULE | Refills: 0 | Status: SHIPPED | OUTPATIENT
Start: 2023-11-07

## 2023-11-07 NOTE — TELEPHONE ENCOUNTER
Patient scheduled for BEAN 11/16/23, she reports she continues to do home exercise plan 2-3 times per week since her last procedure 7/13/23.

## 2023-11-16 ENCOUNTER — HOSPITAL ENCOUNTER (OUTPATIENT)
Dept: RADIOLOGY | Facility: CLINIC | Age: 43
Discharge: HOME | End: 2023-11-16
Payer: MEDICARE

## 2023-11-16 VITALS
RESPIRATION RATE: 20 BRPM | OXYGEN SATURATION: 98 % | HEART RATE: 78 BPM | SYSTOLIC BLOOD PRESSURE: 145 MMHG | TEMPERATURE: 98.7 F | DIASTOLIC BLOOD PRESSURE: 89 MMHG

## 2023-11-16 DIAGNOSIS — M54.12 CERVICAL RADICULOPATHY: ICD-10-CM

## 2023-11-16 PROCEDURE — 62321 NJX INTERLAMINAR CRV/THRC: CPT | Performed by: ANESTHESIOLOGY

## 2023-11-16 RX ORDER — METHYLPREDNISOLONE ACETATE 80 MG/ML
80 INJECTION, SUSPENSION INTRA-ARTICULAR; INTRALESIONAL; INTRAMUSCULAR; PARENTERAL; SOFT TISSUE ONCE
Status: COMPLETED | OUTPATIENT
Start: 2023-11-16 | End: 2023-11-16

## 2023-11-16 RX ADMIN — METHYLPREDNISOLONE ACETATE 80 MG: 80 INJECTION, SUSPENSION INTRA-ARTICULAR; INTRALESIONAL; INTRAMUSCULAR; PARENTERAL; SOFT TISSUE at 14:46

## 2023-11-16 RX ADMIN — IOHEXOL 1 ML: 300 INJECTION, SOLUTION INTRAVENOUS at 14:46

## 2023-11-16 NOTE — DISCHARGE INSTR - LAB
Epidural Steroid Injection   WHAT YOU NEED TO KNOW:   An epidural steroid injection (MADINA) is a procedure to inject steroid medicine into the epidural space. The epidural space is between your spinal cord and vertebrae. Steroids reduce inflammation and fluid buildup in your spine that may be causing pain. You may be given pain medicine along with the steroids. ACTIVITY  Do not drive or operate machinery today. No strenuous activity today - bending, lifting, etc.  You may resume normal activites starting tomorrow - start slowly and as tolerated. You may shower today, but no tub baths or hot tubs. You may have numbness for several hours from the local anesthetic. Please use caution and common sense, especially with weight-bearing activities. CARE OF THE INJECTION SITE  If you have soreness or pain, apply ice to the area today (20 minutes on/20 minutes off). Starting tomorrow, you may use warm, moist heat or ice if needed. You may have an increase or change in your discomfort for 36-48 hours after your treatment. Apply ice and continue with any pain medication you have been prescribed. Notify the [facility] if you have any of the following: redness, drainage, swelling, headache, stiff neck or fever above 100°F.    SPECIAL INSTRUCTIONS  Our office will contact you in approximately 7 days for a progress report. MEDICATIONS  Continue to take all routine medications. Our office may have instructed you to hold some medications. As no general anesthesia was used in today's procedure, you should not experience any side effects related to anesthesia. If you are diabetic, the steroids used in today's injection may temporarily increase your blood sugar levels after the first few days after your injection. Please keep a close eye on your sugars and alert the doctor who manages your diabetes if your sugars are significantly high from your baseline or you are symptomatic.      If you have a problem specifically related to your procedure, please call our office at [phone]. Problems not related to your procedure should be directed to your primary care physician.

## 2023-11-16 NOTE — H&P
History of Present Illness: The patient is a 37 y.o. female who presents with complaints of neck and arm pain is here today for cervical epidural steroid injection    Past Medical History:   Diagnosis Date    Abnormal Pap smear of cervix     Anemia     had iron infusions to treat    Celiac disease     Depression     GERD (gastroesophageal reflux disease)     GERD without esophagitis     Heartburn     Hiatal hernia     Hypertension     Sleep apnea     borderline + per sleep study test    Vitamin D deficiency        Past Surgical History:   Procedure Laterality Date    APPENDECTOMY  2021    EGD N/A     AK EXC B9 LESION MRGN XCP SK TG T/A/L 3.1-4.0 CM Right 08/21/2023    Procedure: EXCISION SUBCUTANEOUS MASS RIGHT BACK (3CM); Surgeon: Karla Tinajero MD;  Location: [facility] MAIN OR;  Service: General    AK LAPAROSCOPIC APPENDECTOMY N/A 01/23/2021    Procedure: APPENDECTOMY LAPAROSCOPIC;  Surgeon: Nils Perez DO;  Location: [facility] OR;  Service: General    TUBAL LIGATION      WISDOM TOOTH EXTRACTION N/A 2005         Current Outpatient Medications:     ascorbic acid (VITAMIN C) 250 mg tablet, Take 1 tablet (250 mg total) by mouth 3 (three) times a day with meals (Patient not taking: Reported on 7/6/2023), Disp: 90 tablet, Rfl: 2    cetirizine (ZyrTEC) 10 mg tablet, Take 1 tablet (10 mg total) by mouth daily, Disp: 90 tablet, Rfl: 3    cholecalciferol (VITAMIN D3) 400 units tablet, Take 400 Units by mouth daily, Disp: , Rfl:     Coenzyme Q10 600 MG CHEW, Chew, Disp: , Rfl:     cyanocobalamin (VITAMIN B-12) 500 MCG tablet, Take 500 mcg by mouth daily, Disp: , Rfl:     ferrous gluconate (FERGON) 240 (27 FE) MG tablet, Take 1 tablet (240 mg total) by mouth 3 (three) times a day with meals Always take with Vit C at same time.  (Patient not taking: Reported on 7/6/2023), Disp: 90 tablet, Rfl: 2    fluticasone (FLONASE) 50 mcg/act nasal spray, 1 spray into each nostril daily, Disp: 16 g, Rfl: 5    Magnesium 250 MG TABS, Take 250 mg by mouth in the morning OTC, Disp: , Rfl:     meclizine (ANTIVERT) 25 mg tablet, Take 1 tablet (25 mg total) by mouth every 8 (eight) hours as needed for dizziness, Disp: 10 tablet, Rfl: 0    omeprazole (PriLOSEC) 40 MG capsule, Take 1 capsule (40 mg total) by mouth daily, Disp: 30 capsule, Rfl: 0    Sodium Fluoride 1.1 % PSTE, Take 1 Film by mouth daily at bedtime Use once per day at bedtime. Brush, spit out. Don't rinse. Let sit on teeth. Generic brand OK, Disp: 51 g, Rfl: 3    Current Facility-Administered Medications:     iohexol (OMNIPAQUE) 300 mg/mL injection 1 mL, 1 mL, Epidural, Once, Rommel Owens MD    methylPREDNISolone acetate (DEPO-MEDROL) injection 80 mg, 80 mg, Epidural, Once, Rommel Owens MD    Allergies   Allergen Reactions    Gluten Meal - Food Allergy Abdominal Pain    Seasonal Ic [Octacosanol] Swelling     Severe swelling       Physical Exam:   Vitals:    11/16/23 1418   BP: 137/92   Pulse: 77   Resp: 18   Temp: 98.7 °F (37.1 °C)   SpO2: 98%     General: Awake, Alert, Oriented x 3, Mood and affect appropriate  Respiratory: Respirations even and unlabored  Cardiovascular: Peripheral pulses intact; no edema  Musculoskeletal Exam: Neck and arm pain    ASA Score: 3    Patient/Chart Verification  Patient ID Verified: Verbal  ID Band Applied: No  Consents Confirmed: Procedural, To be obtained in the Pre-Procedure area  H&P( within 30 days) Verified: To be obtained in the Pre-Procedure area  Allergies Reviewed: Yes  Anticoag/NSAID held?: Yes (Ibuprofen LD 11/14, FQ aware.)  Currently on antibiotics?: No  Pregnancy denied?: Yes    Assessment:   1.  Cervical radiculopathy        Plan: BEAN

## 2023-11-22 ENCOUNTER — TELEPHONE (OUTPATIENT)
Dept: PAIN MEDICINE | Facility: CLINIC | Age: 43
End: 2023-11-22

## 2023-11-23 NOTE — TELEPHONE ENCOUNTER
Patient called and stated she needed a neuro psych exam, writer transferred caller to neuro psych for assistance JORDYN

## 2023-11-26 DIAGNOSIS — J34.9 PARANASAL SINUS DISEASE: ICD-10-CM

## 2023-11-26 DIAGNOSIS — J30.9 ALLERGIC RHINITIS, UNSPECIFIED SEASONALITY, UNSPECIFIED TRIGGER: ICD-10-CM

## 2023-11-27 RX ORDER — CETIRIZINE HYDROCHLORIDE 10 MG/1
10 TABLET ORAL DAILY
Qty: 90 TABLET | Refills: 0 | Status: SHIPPED | OUTPATIENT
Start: 2023-11-27

## 2023-12-06 DIAGNOSIS — J30.9 ALLERGIC RHINITIS, UNSPECIFIED SEASONALITY, UNSPECIFIED TRIGGER: ICD-10-CM

## 2023-12-06 DIAGNOSIS — J34.9 PARANASAL SINUS DISEASE: ICD-10-CM

## 2023-12-06 RX ORDER — FLUTICASONE PROPIONATE 50 MCG
1 SPRAY, SUSPENSION (ML) NASAL DAILY
Qty: 16 G | Refills: 0 | Status: SHIPPED | OUTPATIENT
Start: 2023-12-06

## 2023-12-10 DIAGNOSIS — K21.9 GASTROESOPHAGEAL REFLUX DISEASE WITHOUT ESOPHAGITIS: ICD-10-CM

## 2023-12-11 RX ORDER — OMEPRAZOLE 40 MG/1
40 CAPSULE, DELAYED RELEASE ORAL DAILY
Qty: 30 CAPSULE | Refills: 1 | Status: SHIPPED | OUTPATIENT
Start: 2023-12-11

## 2023-12-20 ENCOUNTER — APPOINTMENT (OUTPATIENT)
Dept: LAB | Facility: CLINIC | Age: 43
End: 2023-12-20
Payer: MEDICARE

## 2023-12-20 DIAGNOSIS — K90.0 CELIAC DISEASE: ICD-10-CM

## 2023-12-20 DIAGNOSIS — D50.9 IRON DEFICIENCY ANEMIA, UNSPECIFIED IRON DEFICIENCY ANEMIA TYPE: ICD-10-CM

## 2023-12-20 LAB
BASOPHILS # BLD AUTO: 0.04 THOUSANDS/ÂΜL (ref 0–0.1)
BASOPHILS NFR BLD AUTO: 1 % (ref 0–1)
EOSINOPHIL # BLD AUTO: 0.24 THOUSAND/ÂΜL (ref 0–0.61)
EOSINOPHIL NFR BLD AUTO: 3 % (ref 0–6)
ERYTHROCYTE [DISTWIDTH] IN BLOOD BY AUTOMATED COUNT: 12 % (ref 11.6–15.1)
FERRITIN SERPL-MCNC: 54 NG/ML (ref 11–307)
HCT VFR BLD AUTO: 42.4 % (ref 34.8–46.1)
HGB BLD-MCNC: 14 G/DL (ref 11.5–15.4)
IMM GRANULOCYTES # BLD AUTO: 0.02 THOUSAND/UL (ref 0–0.2)
IMM GRANULOCYTES NFR BLD AUTO: 0 % (ref 0–2)
IRON SATN MFR SERPL: 8 % (ref 15–50)
IRON SERPL-MCNC: 29 UG/DL (ref 50–212)
LYMPHOCYTES # BLD AUTO: 2.72 THOUSANDS/ÂΜL (ref 0.6–4.47)
LYMPHOCYTES NFR BLD AUTO: 37 % (ref 14–44)
MCH RBC QN AUTO: 29.9 PG (ref 26.8–34.3)
MCHC RBC AUTO-ENTMCNC: 33 G/DL (ref 31.4–37.4)
MCV RBC AUTO: 90 FL (ref 82–98)
MONOCYTES # BLD AUTO: 0.45 THOUSAND/ÂΜL (ref 0.17–1.22)
MONOCYTES NFR BLD AUTO: 6 % (ref 4–12)
NEUTROPHILS # BLD AUTO: 3.82 THOUSANDS/ÂΜL (ref 1.85–7.62)
NEUTS SEG NFR BLD AUTO: 53 % (ref 43–75)
NRBC BLD AUTO-RTO: 0 /100 WBCS
PLATELET # BLD AUTO: 235 THOUSANDS/UL (ref 149–390)
PMV BLD AUTO: 10.7 FL (ref 8.9–12.7)
RBC # BLD AUTO: 4.69 MILLION/UL (ref 3.81–5.12)
TIBC SERPL-MCNC: 347 UG/DL (ref 250–450)
UIBC SERPL-MCNC: 318 UG/DL (ref 155–355)
WBC # BLD AUTO: 7.29 THOUSAND/UL (ref 4.31–10.16)

## 2023-12-20 PROCEDURE — 83550 IRON BINDING TEST: CPT

## 2023-12-20 PROCEDURE — 36415 COLL VENOUS BLD VENIPUNCTURE: CPT

## 2023-12-20 PROCEDURE — 83540 ASSAY OF IRON: CPT

## 2023-12-20 PROCEDURE — 85025 COMPLETE CBC W/AUTO DIFF WBC: CPT

## 2023-12-20 PROCEDURE — 82728 ASSAY OF FERRITIN: CPT

## 2023-12-21 DIAGNOSIS — D50.9 IRON DEFICIENCY ANEMIA, UNSPECIFIED IRON DEFICIENCY ANEMIA TYPE: ICD-10-CM

## 2023-12-21 DIAGNOSIS — D50.9 MICROCYTIC ANEMIA: Primary | ICD-10-CM

## 2023-12-22 ENCOUNTER — OFFICE VISIT (OUTPATIENT)
Dept: FAMILY MEDICINE CLINIC | Facility: CLINIC | Age: 43
End: 2023-12-22

## 2023-12-22 VITALS
BODY MASS INDEX: 27.14 KG/M2 | DIASTOLIC BLOOD PRESSURE: 90 MMHG | OXYGEN SATURATION: 100 % | WEIGHT: 159 LBS | SYSTOLIC BLOOD PRESSURE: 110 MMHG | TEMPERATURE: 97.5 F | HEIGHT: 64 IN | HEART RATE: 89 BPM

## 2023-12-22 DIAGNOSIS — Z00.00 WELL ADULT EXAM: Primary | ICD-10-CM

## 2023-12-22 DIAGNOSIS — Z12.31 ENCOUNTER FOR SCREENING MAMMOGRAM FOR MALIGNANT NEOPLASM OF BREAST: ICD-10-CM

## 2023-12-22 DIAGNOSIS — R53.83 FATIGUE, UNSPECIFIED TYPE: ICD-10-CM

## 2023-12-22 NOTE — ASSESSMENT & PLAN NOTE
-chronic, worsening  -patient experiencing increased fatigue in the past month  -hx of MAGED and Vit D deficiency  -discussed iron panel and CBC results in office today, follows with hematology, results c/w persistent iron deficiency  -will recheck Vit D levels for completion

## 2023-12-22 NOTE — PROGRESS NOTES
ADULT ANNUAL PHYSICAL  [facility]    NAME: Esha Moreira  AGE: 43 y.o. SEX: female  : 1980     DATE: 2023     Assessment and Plan:     Problem List Items Addressed This Visit       Encounter for screening mammogram for malignant neoplasm of breast    Relevant Orders    Mammo screening bilateral w 3d & cad    Fatigue     -chronic, worsening  -patient experiencing increased fatigue in the past month  -hx of MAGED and Vit D deficiency  -discussed iron panel and CBC results in office today, follows with hematology, results c/w persistent iron deficiency  -will recheck Vit D levels for completion         Relevant Orders    Vitamin D 25 hydroxy (Completed)     Other Visit Diagnoses       Well adult exam    -  Primary            Immunizations and preventive care screenings were discussed with patient today. Appropriate education was printed on patient's after visit summary.    Counseling:  Alcohol/drug use: discussed moderation in alcohol intake, the recommendations for healthy alcohol use, and avoidance of illicit drug use.  Dental Health: discussed importance of regular tooth brushing, flossing, and dental visits.  Injury prevention: discussed safety/seat belts, safety helmets, smoke detectors, carbon dioxide detectors, and smoking near bedding or upholstery.  Sexual health: discussed sexually transmitted diseases, partner selection, use of condoms, avoidance of unintended pregnancy, and contraceptive alternatives.  Exercise: the importance of regular exercise/physical activity was discussed. Recommend exercise 3-5 times per week for at least 30 minutes.          Return in about 6 months (around 2024) for follow up.     Chief Complaint:     Chief Complaint   Patient presents with    Physical Exam     Fatigue and craving ice       History of Present Illness:     Adult Annual Physical   Patient here for a comprehensive physical exam. The  patient reports concerns about fatigue and craving for ice. Patient has a history of MAGED and had iron infusions in July which helped with her fatigue. As of last month she has been feeling more fatigue and her ice cravings started last week. Patient had labs for MAGED yesterday. Discussed results with patient in office today. Patient also discussed the results with her hematologist Dr. Lopez who recommended holding off on infusions at this time. .    Diet and Physical Activity  Diet/Nutrition: well balanced diet, limited junk food, consuming 3-5 servings of fruits/vegetables daily, and adequate fiber intake.   Exercise:  physical therapy exercises 2-3 times per week, cardio limited by back pain but sometimes walks on the treadmill .      Depression Screening  PHQ-2/9 Depression Screening           General Health  Sleep: sleeps poorly, gets 4-6 hours of sleep on average, and unrefreshing sleep. Patient tries melatonin which helps calm her brain down but often has trouble falling and staying asleep due to pain and her bipolar disorder.  Hearing: normal - bilateral.  Vision: no vision problems, goes for regular eye exams, and most recent eye exam <1 year ago.   Dental: regular dental visits, brushes teeth twice daily, and flosses teeth occasionally.       /GYN Health  Follows with gynecology? yes   Patient is: premenopausal  Last menstrual period: 11/25/2023  Contraceptive method:  tubal removal .    Advanced Care Planning  Do you have an advanced directive? no  Do you have a durable medical power of ? no     Review of Systems:     Review of Systems   Constitutional:  Negative for chills and fever.   HENT:  Positive for postnasal drip. Negative for congestion and sinus pressure.    Eyes:  Negative for visual disturbance.   Respiratory:  Negative for chest tightness and shortness of breath.    Cardiovascular:  Negative for chest pain and leg swelling.   Gastrointestinal:  Negative for abdominal pain, blood in  stool and constipation.   Genitourinary:  Negative for difficulty urinating and dysuria.   Musculoskeletal:  Positive for back pain and neck pain.   Skin:  Negative for rash.   Neurological:  Positive for headaches. Negative for dizziness and light-headedness.      Past Medical History:     Past Medical History:   Diagnosis Date    Abnormal Pap smear of cervix     Anemia     had iron infusions to treat    Celiac disease     Depression     GERD (gastroesophageal reflux disease)     GERD without esophagitis     Heartburn     Hiatal hernia     Hypertension     Sleep apnea     borderline + per sleep study test    Vitamin D deficiency       Past Surgical History:     Past Surgical History:   Procedure Laterality Date    APPENDECTOMY      EGD N/A     WY EXC B9 LESION MRGN XCP SK TG T/A/L 3.1-4.0 CM Right 2023    Procedure: EXCISION SUBCUTANEOUS MASS RIGHT BACK (3CM);  Surgeon: Raúl Roman MD;  Location: [facility];  Service: General    WY LAPAROSCOPIC APPENDECTOMY N/A 2021    Procedure: APPENDECTOMY LAPAROSCOPIC;  Surgeon: Toan Mccoy DO;  Location: [facility];  Service: General    TUBAL LIGATION      WISDOM TOOTH EXTRACTION N/A       Social History:     Social History     Socioeconomic History    Marital status: /Civil Union     Spouse name: None    Number of children: 5    Years of education: None    Highest education level: None   Occupational History    None   Tobacco Use    Smoking status: Former     Current packs/day: 0.00     Average packs/day: 1 pack/day for 15.0 years (15.0 ttl pk-yrs)     Types: Cigarettes     Start date: 2001     Quit date: 2016     Years since quittin.4     Passive exposure: Past    Smokeless tobacco: Never   Vaping Use    Vaping status: Never Used   Substance and Sexual Activity    Alcohol use: Yes     Comment: Occasionally    Drug use: No    Sexual activity: Yes     Partners: Male     Birth control/protection: Female Sterilization, Other      Comment: Tubes removed   Other Topics Concern    None   Social History Narrative    None     Social Determinants of Health     Financial Resource Strain: Low Risk  (9/22/2021)    Overall Financial Resource Strain (CARDIA)     Difficulty of Paying Living Expenses: Not hard at all   Food Insecurity: No Food Insecurity (9/22/2021)    Hunger Vital Sign     Worried About Running Out of Food in the Last Year: Never true     Ran Out of Food in the Last Year: Never true   Transportation Needs: No Transportation Needs (9/22/2021)    PRAPARE - Transportation     Lack of Transportation (Medical): No     Lack of Transportation (Non-Medical): No   Physical Activity: Not on file   Stress: No Stress Concern Present (9/22/2021)    [place] [employer] - Occupational Stress Questionnaire     Feeling of Stress : Not at all   Social Connections: Not on file   Intimate Partner Violence: Not At Risk (9/22/2021)    Humiliation, Afraid, Rape, and Kick questionnaire     Fear of Current or Ex-Partner: No     Emotionally Abused: No     Physically Abused: No     Sexually Abused: No   Housing Stability: Low Risk  (9/22/2021)    Housing Stability Vital Sign     Unable to Pay for Housing in the Last Year: No     Number of Places Lived in the Last Year: 1     Unstable Housing in the Last Year: No      Family History:     Family History   Problem Relation Age of Onset    Arthritis Mother     Cervical cancer Mother     Fibromyalgia Mother     Mental illness Father     Stroke Father     Heart disease Father     Heart disease Maternal Grandmother     Hypertension Maternal Grandmother     Diabetes Maternal Grandmother     No Known Problems Daughter     No Known Problems Maternal Grandfather     No Known Problems Paternal Grandmother     Kidney disease Paternal Grandfather     No Known Problems Brother     No Known Problems Son     Heart disease Brother     No Known Problems Son     No Known Problems Son     No Known Problems Son   "     Current Medications:     Current Outpatient Medications   Medication Sig Dispense Refill    cholecalciferol (VITAMIN D3) 400 units tablet Take 400 Units by mouth daily      Coenzyme Q10 600 MG CHEW Chew      cyanocobalamin (VITAMIN B-12) 500 MCG tablet Take 500 mcg by mouth daily      fluticasone (FLONASE) 50 mcg/act nasal spray 1 spray into each nostril daily 16 g 0    omeprazole (PriLOSEC) 40 MG capsule Take 1 capsule (40 mg total) by mouth daily 30 capsule 1    Sodium Fluoride 1.1 % PSTE Take 1 Film by mouth daily at bedtime Use once per day at bedtime. Brush, spit out. Don't rinse. Let sit on teeth. Generic brand OK 51 g 3    ascorbic acid (VITAMIN C) 250 mg tablet Take 1 tablet (250 mg total) by mouth 3 (three) times a day with meals (Patient not taking: Reported on 7/6/2023) 90 tablet 2    cetirizine (ZyrTEC) 10 mg tablet Take 1 tablet (10 mg total) by mouth daily 90 tablet 0    ferrous gluconate (FERGON) 240 (27 FE) MG tablet Take 1 tablet (240 mg total) by mouth 3 (three) times a day with meals Always take with Vit C at same time. (Patient not taking: Reported on 7/6/2023) 90 tablet 2    Magnesium 250 MG TABS Take 250 mg by mouth in the morning OTC (Patient not taking: Reported on 12/22/2023)      meclizine (ANTIVERT) 25 mg tablet Take 1 tablet (25 mg total) by mouth every 8 (eight) hours as needed for dizziness (Patient not taking: Reported on 12/22/2023) 10 tablet 0     No current facility-administered medications for this visit.      Allergies:     Allergies   Allergen Reactions    Gluten Meal - Food Allergy Abdominal Pain    Seasonal Ic [Octacosanol] Swelling     Severe swelling      Physical Exam:     /90 (BP Location: Right arm, Patient Position: Sitting, Cuff Size: Standard)   Pulse 89   Temp 97.5 °F (36.4 °C) (Tympanic)   Ht 5' 4\" (1.626 m)   Wt 72.1 kg (159 lb)   SpO2 100%   BMI 27.29 kg/m²     Physical Exam  Constitutional:       General: She is not in acute distress.     " Appearance: Normal appearance. She is not ill-appearing or toxic-appearing.   HENT:      Head: Normocephalic and atraumatic.      Right Ear: Tympanic membrane and external ear normal. There is no impacted cerumen.      Left Ear: Tympanic membrane and external ear normal. There is no impacted cerumen.      Nose: Nose normal.      Mouth/Throat:      Mouth: Mucous membranes are moist.      Pharynx: Oropharynx is clear.   Eyes:      Conjunctiva/sclera: Conjunctivae normal.      Pupils: Pupils are equal, round, and reactive to light.   Neck:      Comments: Pain with flexion  Cardiovascular:      Rate and Rhythm: Normal rate and regular rhythm.      Pulses: Normal pulses.      Heart sounds: Normal heart sounds.   Pulmonary:      Effort: Pulmonary effort is normal.      Breath sounds: Normal breath sounds.   Abdominal:      General: Bowel sounds are normal.      Palpations: Abdomen is soft.      Tenderness: There is no abdominal tenderness.   Musculoskeletal:         General: Normal range of motion.      Lumbar back: Tenderness present.   Skin:     General: Skin is warm and dry.      Capillary Refill: Capillary refill takes less than 2 seconds.   Neurological:      General: No focal deficit present.      Mental Status: She is alert. Mental status is at baseline.          Naz Saavedra MD  [facility]

## 2023-12-26 DIAGNOSIS — J30.9 ALLERGIC RHINITIS, UNSPECIFIED SEASONALITY, UNSPECIFIED TRIGGER: ICD-10-CM

## 2023-12-26 DIAGNOSIS — J34.9 PARANASAL SINUS DISEASE: ICD-10-CM

## 2023-12-27 ENCOUNTER — APPOINTMENT (OUTPATIENT)
Dept: LAB | Facility: CLINIC | Age: 43
End: 2023-12-27
Payer: MEDICARE

## 2023-12-27 DIAGNOSIS — D50.9 IRON DEFICIENCY ANEMIA, UNSPECIFIED IRON DEFICIENCY ANEMIA TYPE: ICD-10-CM

## 2023-12-27 DIAGNOSIS — D50.9 MICROCYTIC ANEMIA: ICD-10-CM

## 2023-12-27 DIAGNOSIS — R53.83 FATIGUE, UNSPECIFIED TYPE: ICD-10-CM

## 2023-12-27 LAB
25(OH)D3 SERPL-MCNC: 61.1 NG/ML (ref 30–100)
BASOPHILS # BLD AUTO: 0.04 THOUSANDS/ÂΜL (ref 0–0.1)
BASOPHILS NFR BLD AUTO: 1 % (ref 0–1)
EOSINOPHIL # BLD AUTO: 0.28 THOUSAND/ÂΜL (ref 0–0.61)
EOSINOPHIL NFR BLD AUTO: 5 % (ref 0–6)
ERYTHROCYTE [DISTWIDTH] IN BLOOD BY AUTOMATED COUNT: 11.9 % (ref 11.6–15.1)
FERRITIN SERPL-MCNC: 51 NG/ML (ref 11–307)
HCT VFR BLD AUTO: 41.2 % (ref 34.8–46.1)
HGB BLD-MCNC: 13.6 G/DL (ref 11.5–15.4)
IMM GRANULOCYTES # BLD AUTO: 0.01 THOUSAND/UL (ref 0–0.2)
IMM GRANULOCYTES NFR BLD AUTO: 0 % (ref 0–2)
IRON SATN MFR SERPL: 35 % (ref 15–50)
IRON SERPL-MCNC: 118 UG/DL (ref 50–212)
LYMPHOCYTES # BLD AUTO: 2.51 THOUSANDS/ÂΜL (ref 0.6–4.47)
LYMPHOCYTES NFR BLD AUTO: 46 % (ref 14–44)
MCH RBC QN AUTO: 29.8 PG (ref 26.8–34.3)
MCHC RBC AUTO-ENTMCNC: 33 G/DL (ref 31.4–37.4)
MCV RBC AUTO: 90 FL (ref 82–98)
MONOCYTES # BLD AUTO: 0.34 THOUSAND/ÂΜL (ref 0.17–1.22)
MONOCYTES NFR BLD AUTO: 6 % (ref 4–12)
NEUTROPHILS # BLD AUTO: 2.27 THOUSANDS/ÂΜL (ref 1.85–7.62)
NEUTS SEG NFR BLD AUTO: 42 % (ref 43–75)
NRBC BLD AUTO-RTO: 0 /100 WBCS
PLATELET # BLD AUTO: 262 THOUSANDS/UL (ref 149–390)
PMV BLD AUTO: 10.2 FL (ref 8.9–12.7)
RBC # BLD AUTO: 4.57 MILLION/UL (ref 3.81–5.12)
TIBC SERPL-MCNC: 340 UG/DL (ref 250–450)
UIBC SERPL-MCNC: 222 UG/DL (ref 155–355)
WBC # BLD AUTO: 5.45 THOUSAND/UL (ref 4.31–10.16)

## 2023-12-27 PROCEDURE — 36415 COLL VENOUS BLD VENIPUNCTURE: CPT

## 2023-12-27 PROCEDURE — 83550 IRON BINDING TEST: CPT

## 2023-12-27 PROCEDURE — 85025 COMPLETE CBC W/AUTO DIFF WBC: CPT

## 2023-12-27 PROCEDURE — 83540 ASSAY OF IRON: CPT

## 2023-12-27 PROCEDURE — 82306 VITAMIN D 25 HYDROXY: CPT

## 2023-12-27 PROCEDURE — 82728 ASSAY OF FERRITIN: CPT

## 2023-12-27 RX ORDER — CETIRIZINE HYDROCHLORIDE 10 MG/1
10 TABLET ORAL DAILY
Qty: 90 TABLET | Refills: 0 | Status: SHIPPED | OUTPATIENT
Start: 2023-12-27

## 2024-01-27 DIAGNOSIS — J30.9 ALLERGIC RHINITIS, UNSPECIFIED SEASONALITY, UNSPECIFIED TRIGGER: ICD-10-CM

## 2024-01-27 DIAGNOSIS — J34.9 PARANASAL SINUS DISEASE: ICD-10-CM

## 2024-01-29 RX ORDER — CETIRIZINE HYDROCHLORIDE 10 MG/1
10 TABLET ORAL DAILY
Qty: 90 TABLET | Refills: 0 | Status: SHIPPED | OUTPATIENT
Start: 2024-01-29

## 2024-01-29 RX ORDER — FLUTICASONE PROPIONATE 50 MCG
1 SPRAY, SUSPENSION (ML) NASAL DAILY
Qty: 16 G | Refills: 0 | Status: SHIPPED | OUTPATIENT
Start: 2024-01-29

## 2024-02-06 ENCOUNTER — ANNUAL EXAM (OUTPATIENT)
Dept: OBGYN CLINIC | Facility: CLINIC | Age: 44
End: 2024-02-06

## 2024-02-06 ENCOUNTER — PATIENT OUTREACH (OUTPATIENT)
Dept: OBGYN CLINIC | Facility: CLINIC | Age: 44
End: 2024-02-06

## 2024-02-06 VITALS
RESPIRATION RATE: 18 BRPM | DIASTOLIC BLOOD PRESSURE: 95 MMHG | BODY MASS INDEX: 27.14 KG/M2 | SYSTOLIC BLOOD PRESSURE: 140 MMHG | HEART RATE: 77 BPM | WEIGHT: 159 LBS | HEIGHT: 64 IN

## 2024-02-06 DIAGNOSIS — F32.A DEPRESSION, UNSPECIFIED DEPRESSION TYPE: Primary | ICD-10-CM

## 2024-02-06 DIAGNOSIS — Z01.419 ENCOUNTER FOR GYNECOLOGICAL EXAMINATION WITHOUT ABNORMAL FINDING: ICD-10-CM

## 2024-02-06 PROCEDURE — 99396 PREV VISIT EST AGE 40-64: CPT | Performed by: NURSE PRACTITIONER

## 2024-02-06 PROCEDURE — G0476 HPV COMBO ASSAY CA SCREEN: HCPCS | Performed by: NURSE PRACTITIONER

## 2024-02-06 PROCEDURE — G0145 SCR C/V CYTO,THINLAYER,RESCR: HCPCS | Performed by: NURSE PRACTITIONER

## 2024-02-06 RX ORDER — DIAZEPAM 5 MG/1
TABLET ORAL
COMMUNITY
Start: 2023-12-19
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
## 2024-02-06 NOTE — PROGRESS NOTES
ASSESSMENT & PLAN: Esha Moreira is a 43 y.o.  with normal gynecologic exam.    1.  Routine well woman exam done today  2.  Pap and HPV:  The patient's last pap and hpv was 2019.    It was normal.    Pap and cotesting  WAS done today.    Current ASCCP Guidelines reviewed. Next pap 2029.  3.  Mammogram ordered and managed by her PCP  4. The following were reviewed in today's visit: breast self exam, mammography screening ordered, adequate intake of calcium and vitamin D, exercise, and healthy diet  5. Gardisil vaccine in women up to age 45 discussed and information was provided.    6. Recommend pt to f/u with her PCP for her BP. Has also f/u with hematology. To use Nsaids for her cramps for now    Depression Screening Follow-up Plan: Patient's depression screening was positive with a PHQ-2 score of 4. Their PHQ-9 score was 18. Patient assessed for underlying major depression. They have no active suicidal ideations. Brief counseling provided and recommend additional follow-up/re-evaluation next office visit.  To meet wit SWCM today.     BMI Counseling: Body mass index is 27.29 kg/m². The BMI normal.     CC:  Annual Gynecologic Examination    HPI: Esha Moreira is a 43 y.o.  who presents for annual gynecologic examination. Get cramps, may be interested in contraception for this, would be happy not to get a period.  Hx of tubal.  Reviewed options, pt to RTO to further discuss.   Health Maintenance:    She wears her seatbelt routinely.    She does perform regular monthly self breast exams.    She feels safe at home.     Patient Active Problem List   Diagnosis    ASCUS with positive high risk HPV cervical    Atypical glandular cells of undetermined significance (TERRI) on cervical Pap smear    Gastroesophageal reflux disease without esophagitis    Microcytic anemia    Pain of upper abdomen    Encounter for screening mammogram for malignant neoplasm of breast    Joint pain    Neck pain     Spasm of back muscles    Celiac disease    S/P laparoscopic appendectomy    Visual changes    Elevated blood pressure reading    Breathing difficulty    YESSICA (obstructive sleep apnea)    Sleep-disordered breathing    DDD (degenerative disc disease), lumbar    Encounter for gynecological examination without abnormal finding    Sacroiliitis (HCC)    Fatigue    Lipids abnormal    Bipolar 1 disorder, mixed (HCC)    Chronic bilateral low back pain    Cervical disc disorder with radiculopathy of mid-cervical region    Memory loss or impairment    Biceps tendinopathy, left    Left shoulder pain    Left arm pain    Somatic dysfunction of spine, cervical    Arm somatic dysfunction    Somatic dysfunction of thoracic region    Paranasal sinus disease    Allergic rhinitis    Skin lesion of back    Hypoferremia    Paraspinal soft tissue mass    Iron deficiency anemia    Lipoma of torso    Cervical radiculopathy       Past Medical History:   Diagnosis Date    Abnormal Pap smear of cervix     Anemia     had iron infusions to treat    Celiac disease     Depression     GERD (gastroesophageal reflux disease)     GERD without esophagitis     Heartburn     Hiatal hernia     Hypertension     Microcytic anemia 07/15/2019    Sleep apnea     borderline + per sleep study test    Vitamin D deficiency        Past Surgical History:   Procedure Laterality Date    APPENDECTOMY      EGD N/A     PA EXC B9 LESION MRGN XCP SK TG T/A/L 3.1-4.0 CM Right 2023    Procedure: EXCISION SUBCUTANEOUS MASS RIGHT BACK (3CM);  Surgeon: Raúl Roman MD;  Location: [facility];  Service: General    PA LAPAROSCOPIC APPENDECTOMY N/A 2021    Procedure: APPENDECTOMY LAPAROSCOPIC;  Surgeon: Toan Mccoy DO;  [facility]: [facility];  Service: General    TUBAL LIGATION      WISDOM TOOTH EXTRACTION N/A        Past OB/Gyn History:  OB History          8    Para   5    Term   5            AB   2    Living   5         SAB   1    IAB         Ectopic        Multiple        Live Births   5                  Pt does not have menstrual issues. Monthly x7 days, gets cramping.  History of sexually transmitted infection: No.  History of abnormal pap smears: No .    Patient is currently sexually active.  heterosexual. The current method of family planning is tubal ligation.    Family History   Problem Relation Age of Onset    Arthritis Mother     Cervical cancer Mother     Fibromyalgia Mother     Mental illness Father     Stroke Father     Heart disease Father     Heart disease Maternal Grandmother     Hypertension Maternal Grandmother     Diabetes Maternal Grandmother     No Known Problems Daughter     No Known Problems Maternal Grandfather     No Known Problems Paternal Grandmother     Kidney disease Paternal Grandfather     No Known Problems Brother     No Known Problems Son     Heart disease Brother     No Known Problems Son     No Known Problems Son     No Known Problems Son        Social History:  Social History     Socioeconomic History    Marital status: /Civil Union     Spouse name: Not on file    Number of children: 5    Years of education: Not on file    Highest education level: Not on file   Occupational History    Not on file   Tobacco Use    Smoking status: Former     Current packs/day: 0.00     Average packs/day: 1 pack/day for 15.0 years (15.0 ttl pk-yrs)     Types: Cigarettes     Start date: 2001     Quit date: 2016     Years since quittin.5     Passive exposure: Past    Smokeless tobacco: Never   Vaping Use    Vaping status: Never Used   Substance and Sexual Activity    Alcohol use: Yes     Comment: Occasionally    Drug use: No    Sexual activity: Yes     Partners: Male     Birth control/protection: Female Sterilization, Other     Comment: Tubes removed   Other Topics Concern    Not on file   Social History Narrative    Not on file     Social Determinants of Health     Financial Resource Strain: Low Risk  (2024)     Overall Financial Resource Strain (CARDIA)     Difficulty of Paying Living Expenses: Not hard at all   Food Insecurity: No Food Insecurity (2/6/2024)    Hunger Vital Sign     Worried About Running Out of Food in the Last Year: Never true     Ran Out of Food in the Last Year: Never true   Transportation Needs: No Transportation Needs (2/6/2024)    PRAPARE - Transportation     Lack of Transportation (Medical): No     Lack of Transportation (Non-Medical): No   Physical Activity: Not on file   Stress: No Stress Concern Present (9/22/2021)    [place] of Occupational Health - Occupational Stress Questionnaire     Feeling of Stress : Not at all   Social Connections: Not on file   Intimate Partner Violence: Not At Risk (9/22/2021)    Humiliation, Afraid, Rape, and Kick questionnaire     Fear of Current or Ex-Partner: No     Emotionally Abused: No     Physically Abused: No     Sexually Abused: No   Housing Stability: Low Risk  (2/6/2024)    Housing Stability Vital Sign     Unable to Pay for Housing in the Last Year: No     Number of Places Lived in the Last Year: 1     Unstable Housing in the Last Year: No     Presently lives with family.  Patient is .  Patient is currently employed   Allergies   Allergen Reactions    Gluten Meal - Food Allergy Abdominal Pain    Seasonal Ic [Octacosanol] Swelling     Severe swelling       Current Outpatient Medications:     cetirizine (ZyrTEC) 10 mg tablet, Take 1 tablet (10 mg total) by mouth daily, Disp: 90 tablet, Rfl: 0    cholecalciferol (VITAMIN D3) 400 units tablet, Take 400 Units by mouth daily, Disp: , Rfl:     Coenzyme Q10 600 MG CHEW, Chew, Disp: , Rfl:     cyanocobalamin (VITAMIN B-12) 500 MCG tablet, Take 500 mcg by mouth daily, Disp: , Rfl:     fluticasone (FLONASE) 50 mcg/act nasal spray, 1 spray into each nostril daily, Disp: 16 g, Rfl: 0    omeprazole (PriLOSEC) 40 MG capsule, Take 1 capsule (40 mg total) by mouth daily, Disp: 30 capsule, Rfl: 1    Sodium  Fluoride 1.1 % PSTE, Take 1 Film by mouth daily at bedtime Use once per day at bedtime. Brush, spit out. Don't rinse. Let sit on teeth. Generic brand OK, Disp: 51 g, Rfl: 3    ascorbic acid (VITAMIN C) 250 mg tablet, Take 1 tablet (250 mg total) by mouth 3 (three) times a day with meals (Patient not taking: Reported on 7/6/2023), Disp: 90 tablet, Rfl: 2    diazepam (VALIUM) 5 mg tablet, , Disp: , Rfl:     ferrous gluconate (FERGON) 240 (27 FE) MG tablet, Take 1 tablet (240 mg total) by mouth 3 (three) times a day with meals Always take with Vit C at same time. (Patient not taking: Reported on 7/6/2023), Disp: 90 tablet, Rfl: 2    Magnesium 250 MG TABS, Take 250 mg by mouth in the morning OTC (Patient not taking: Reported on 12/22/2023), Disp: , Rfl:     meclizine (ANTIVERT) 25 mg tablet, Take 1 tablet (25 mg total) by mouth every 8 (eight) hours as needed for dizziness (Patient not taking: Reported on 12/22/2023), Disp: 10 tablet, Rfl: 0    Review of Systems:    Review of Systems  Constitutional :no fever, feels well, no tiredness, no recent weight gain or loss  ENT: no ear ache, no loss of hearing, no nosebleeds or nasal discharge, no sore throat or hoarseness.  Cardiovascular: no complaints of slow or fast heart beat, no chest pain, no palpitations, no leg claudication or lower extremity edema.  Respiratory: no complaints of shortness of shortness of breath, no MANDUJANO  Breasts:no complaints of breast pain, breast lump, or nipple discharge  Gastrointestinal: no complaints of abdominal pain, constipation, nausea, vomiting, or diarrhea or bloody stools  Genitourinary : no complaints of dysuria, incontinence, pelvic pain, no dysmenorrhea, vaginal discharge or abnormal vaginal bleeding and as noted in HPI.  Musculoskeletal: no complaints of arthralgia, no myalgia, no joint swelling or stiffness, no limb pain or swelling.  Integumentary: no complaints of skin rash or lesion, itching or dry skin  Neurological: no complaints  "of headache, no confusion, no numbness or tingling, no dizziness or fainting    Objective      /95 (BP Location: Right arm, Patient Position: Sitting, Cuff Size: Adult)   Pulse 77   Resp 18   Ht 5' 4\" (1.626 m)   Wt 72.1 kg (159 lb)   LMP 01/22/2024 (Exact Date)   BMI 27.29 kg/m²     General:   appears stated age, cooperative, alert normal mood and affect   Neck: normal, supple,trachea midline, no masses   Heart: regular rate and rhythm, S1, S2 normal, no murmur, click, rub or gallop   Lungs: clear to auscultation bilaterally   Breasts: normal appearance, no masses or tenderness, Inspection negative, No nipple retraction or dimpling, No nipple discharge or bleeding, No axillary or supraclavicular adenopathy, Normal to palpation without dominant masses, Taught monthly breast self examination   Abdomen: soft, non-tender, without masses or organomegaly   Vulva: normal female genitalia, Bartholin's, Urethra, [place] normal   Vagina: normal vagina, no discharge, exudate, lesion, or erythema   Urethra: normal   Cervix: Normal, no discharge. PAP done. Nontender.   Uterus: normal size, contour, position, consistency, mobility, non-tender and anteverted   Adnexa: normal adnexa and no mass, fullness, tenderness   Lymphatic palpation of lymph nodes in neck, axilla, groin and/or other locations: no lymphadenopathy or masses noted   Skin normal skin turgor and no rashes.   Psychiatric orientation to person, place, and time: normal. mood and affect: normal         "

## 2024-02-06 NOTE — PROGRESS NOTES
PERIODIC EXAM, ADULT PROPHY   REVIEWED MED HX: meds, allergies, health changes reviewed in [facility]. All consents signed.  CHIEF CONCERN:  none   PAIN SCALE:  0  ASA CLASS:  I  PLAQUE:  mild      CALCULUS:      light    BLEEDING:  light     STAIN :   none    ORAL HYGIENE:     fair    PERIO:     Hand scaled, polished and flossed. Used Cavitron.     Oral Hygiene Instruction:  recommended brushing 2 x daily for 2 minutes MIN, recommended flossing daily, reviewed dietary precautions.  Dispensed: toothbrush, toothpaste and floss    Visual and Tactile Intraoral/ Extraoral evaluation: Oral and Oropharyngeal cancer evaluation. No findings     Dr. Mckeon / Ame  exam=   Reviewed with patient clinical and radiographic findings and patient verbalized understanding. All questions and concerns addressed.     REFERRALS: no referrals needed    CARIES FINDINGS:        TREATMENT  PLAN :   1)     Next Recall: 6 month recall     Last BWX: 6/22/23  Last Panorex/ FMX :

## 2024-02-07 ENCOUNTER — OFFICE VISIT (OUTPATIENT)
Dept: DENTISTRY | Facility: CLINIC | Age: 44
End: 2024-02-07

## 2024-02-07 VITALS — SYSTOLIC BLOOD PRESSURE: 144 MMHG | HEART RATE: 66 BPM | DIASTOLIC BLOOD PRESSURE: 86 MMHG

## 2024-02-07 DIAGNOSIS — Z01.20 ENCOUNTER FOR DENTAL EXAM AND CLEANING W/O ABNORMAL FINDINGS: Primary | ICD-10-CM

## 2024-02-07 PROCEDURE — D0120 PERIODIC ORAL EVALUATION - ESTABLISHED PATIENT: HCPCS

## 2024-02-07 PROCEDURE — D1110 PROPHYLAXIS - ADULT: HCPCS

## 2024-02-07 NOTE — DENTAL PROCEDURE DETAILS
"Esha Moreira presents for a Periodic exam. Verbal consent for treatment given in addition to the forms.     Reviewed health history - Patient is ASA I  Consents signed: Yes     Perio: Normal  Pain Scale: 0  Caries Assessment: Low  Radiographs: None          PERIODIC EXAM, ADULT PROPHY   REVIEWED MED HX: meds, allergies, health changes reviewed in [facility]. All consents signed.  CHIEF CONCERN: pt reports UR where filling done 2023 still if biting too hard/ slight jolt \" feels like something xtra in there\"   ----> UR+ UL some recession present. Pt reports sensitivity in area. Asks if anything can be done  PAIN SCALE:  0  ASA CLASS:  I  PLAQUE:  mild      CALCULUS:      light    BLEEDING:  light     STAIN :   none    PERIO: gingiva appear healthy    Hand scaled, polished and flossed. Used Cavitron. Gingival recession present    Dispensed: toothbrush, sensitive toothpaste and floss    Visual and Tactile Intraoral/ Extraoral evaluation: Oral and Oropharyngeal cancer evaluation. No findings     Dr. Mckeon  exam=   Reviewed with patient clinical and radiographic findings and patient verbalized understanding. All questions and concerns addressed. Several areas of deep recession. Dr MOISE checked occlusion.    REFERRALS: no referrals needed       TREATMENT  PLAN :   1) 12- B  2) consult warner for gingival graft 24, 25 area (advanced recession).     Next Recall: 6 month recall     Last BWX: 6/22/23  Last Panorex/ FMX : 3/23/22    "

## 2024-02-09 ENCOUNTER — TELEPHONE (OUTPATIENT)
Dept: FAMILY MEDICINE CLINIC | Facility: CLINIC | Age: 44
End: 2024-02-09

## 2024-02-09 NOTE — TELEPHONE ENCOUNTER
Patient called in with concern of foot and ankle swelling for the past at least 3 days.  surgery history August 2023 not a smoker,no new activity re running. Patient does not recall any trauma or injury.  The area is not reddened pain is noted if touching or extending foot. I suggested she go to urgent care she did not feel this was necessary. I offered Monday am but this was not suitable to her schedule, she did set up an appointment for Tuesday.

## 2024-02-13 LAB
LAB AP GYN PRIMARY INTERPRETATION: NORMAL
Lab: NORMAL

## 2024-02-14 ENCOUNTER — OFFICE VISIT (OUTPATIENT)
Dept: RHEUMATOLOGY | Facility: CLINIC | Age: 44
End: 2024-02-14

## 2024-02-14 ENCOUNTER — APPOINTMENT (OUTPATIENT)
Dept: LAB | Facility: CLINIC | Age: 44
End: 2024-02-14
Payer: MEDICARE

## 2024-02-14 VITALS
SYSTOLIC BLOOD PRESSURE: 138 MMHG | WEIGHT: 160 LBS | HEIGHT: 64 IN | BODY MASS INDEX: 27.31 KG/M2 | OXYGEN SATURATION: 99 % | HEART RATE: 81 BPM | DIASTOLIC BLOOD PRESSURE: 88 MMHG

## 2024-02-14 DIAGNOSIS — M19.90 ARTHRITIS: Primary | ICD-10-CM

## 2024-02-14 DIAGNOSIS — R79.89 LOW VITAMIN D LEVEL: ICD-10-CM

## 2024-02-14 DIAGNOSIS — M54.12 CERVICAL RADICULOPATHY: ICD-10-CM

## 2024-02-14 DIAGNOSIS — M25.50 ARTHRALGIA, UNSPECIFIED JOINT: ICD-10-CM

## 2024-02-14 LAB
25(OH)D3 SERPL-MCNC: 76.5 NG/ML (ref 30–100)
CRP SERPL QL: 6.2 MG/L
ERYTHROCYTE [SEDIMENTATION RATE] IN BLOOD: 21 MM/HOUR (ref 0–19)
TSH SERPL DL<=0.05 MIU/L-ACNC: 2.13 UIU/ML (ref 0.45–4.5)

## 2024-02-14 PROCEDURE — 36415 COLL VENOUS BLD VENIPUNCTURE: CPT | Performed by: INTERNAL MEDICINE

## 2024-02-14 PROCEDURE — 86800 THYROGLOBULIN ANTIBODY: CPT | Performed by: INTERNAL MEDICINE

## 2024-02-14 PROCEDURE — 86140 C-REACTIVE PROTEIN: CPT | Performed by: INTERNAL MEDICINE

## 2024-02-14 PROCEDURE — 86376 MICROSOMAL ANTIBODY EACH: CPT | Performed by: INTERNAL MEDICINE

## 2024-02-14 PROCEDURE — 82306 VITAMIN D 25 HYDROXY: CPT | Performed by: INTERNAL MEDICINE

## 2024-02-14 PROCEDURE — 84443 ASSAY THYROID STIM HORMONE: CPT | Performed by: INTERNAL MEDICINE

## 2024-02-14 PROCEDURE — 85652 RBC SED RATE AUTOMATED: CPT | Performed by: INTERNAL MEDICINE

## 2024-02-14 RX ORDER — THIAMINE MONONITRATE, RIBOFLAVIN, PYRIDOXINE HYDROCHLORIDE, CYANOCOBALAMIN, ASCORBIC ACID, NIACIN, FOLIC ACID, FERROUS FUMARATE, CALCIUM CARBONATE, CHOLECALCIFEROL, VITAMIN E ACETATE, POTASSIUM IODIDE, ZINC OXIDE, CHOLINE BITARTRATE, AND DOCONEXENT
KIT
COMMUNITY

## 2024-02-14 RX ORDER — DICLOFENAC SODIUM 75 MG/1
75 TABLET, DELAYED RELEASE ORAL 2 TIMES DAILY
Qty: 60 TABLET | Refills: 6 | Status: SHIPPED | OUTPATIENT
Start: 2024-02-14

## 2024-02-14 NOTE — PATIENT INSTRUCTIONS
Do labs  Try diclofenac gel or pill twice a day as needed for wrist pain, take with food  Hand therapy referral made    Return to clinic as needed
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
## 2024-02-15 ENCOUNTER — EVALUATION (OUTPATIENT)
Dept: OCCUPATIONAL THERAPY | Facility: CLINIC | Age: 44
End: 2024-02-15
Payer: MEDICARE

## 2024-02-15 ENCOUNTER — TELEPHONE (OUTPATIENT)
Dept: OBGYN CLINIC | Facility: CLINIC | Age: 44
End: 2024-02-15

## 2024-02-15 DIAGNOSIS — M25.532 LEFT WRIST PAIN: Primary | ICD-10-CM

## 2024-02-15 DIAGNOSIS — M25.531 RIGHT WRIST PAIN: ICD-10-CM

## 2024-02-15 LAB
THYROGLOB AB SERPL-ACNC: <1 IU/ML (ref 0–0.9)
THYROPEROXIDASE AB SERPL-ACNC: <9 IU/ML (ref 0–34)

## 2024-02-15 PROCEDURE — 97165 OT EVAL LOW COMPLEX 30 MIN: CPT

## 2024-02-15 NOTE — TELEPHONE ENCOUNTER
----- Message from YU Niño sent at 2/14/2024 10:43 PM EST -----  Please inform pt that her pap and HPV were both negative.  Thank You.

## 2024-02-15 NOTE — PROGRESS NOTES
OT Evaluation     Today's date: 2/15/2024  Patient name: Esha Moreira  : 1980  MRN: [id number]  Referring provider: Ludy Geller MD  Dx:   Encounter Diagnosis     ICD-10-CM    1. Left wrist pain  M25.532       2. Right wrist pain  M25.531                      Assessment  Assessment details: Patient presents to OP OT hand therapy services due to a diagnosis of b/l wrist pain with worst symptoms in the left. Patient symptoms have been inconsistently present present for 4 years but recently worsened around a month ago without resolution. Patient had initial appointment with rheumatology on 24 where topical Voltaren was recommended to reduce pain.  Patient presents with increased pain in b/l wrists during general AROM with worse symptoms in the L. Wrist ROM is WNL. No Edema at the wrist crease. /pinch strength is decreased as compared to the contralateral side. Point tenderness located over the anatomical snuffbox. Scaphoid and lunate tender to palpation. Grande's test is positive bilaterally. Laxity noted in the b/l midcarpal joints.  Patient was instructed to begin bracing at nighttime to decrease overall symptoms. Patient was provided a custom HEP and instructed on how to complete it. Patient would benefit from skill OT services to decrease pain and increase overall function. See below for a more detailed assessment.   Impairments: abnormal or restricted ROM, activity intolerance, impaired physical strength, pain with function and weight-bearing intolerance    Symptom irritability: low  Goals  STG:    Patient will report overall decreased pain by 1-2 grades in 4 weeks    Patient will increase  strength by >10 lbs in 4 weeks    Patient will increase lateral pinch by >3 lbs in 4 weeks    LTG:    Patient will report resolution of pain in the 1st dorsal compartment in 8 weeks or discharge    Patient will display  strength WNL compared to the contralateral side in 8 weeks or  "discharge    Patient will display pinch strength WNL compared to the contralateral side in 8 weeks or discharge      Plan  Plan details: Patient would benefit from skilled OP OT hand therapy services 2x a week for 8 weeks to decrease pain, increase strength, and return to full functional status.   Patient would benefit from: OT eval and custom splinting  Referral necessary: No  Planned modality interventions: TENS, thermotherapy: hydrocollator packs and ultrasound  Planned therapy interventions: compression, functional ROM exercises, fine motor coordination training, home exercise program, IASTM, joint mobilization, kinesiology taping, manual therapy, Gordon taping, nerve gliding, orthotic management and training, orthotic fitting/training, patient education, strengthening, stretching, therapeutic activities and therapeutic exercise  Frequency: 2x week  Duration in visits: 10  Plan of Care beginning date: 2/15/2024  Plan of Care expiration date: 4/15/2024        Subjective Evaluation    History of Present Illness  Mechanism of injury: Mechanism: Unknown    Subjective:  \"Both of my wrists hurt but my left wrist is definitely worse\" \"My left wrist doesn't feel weak but it feels like it just quits sometimes. \"4 years ago this all kind of started but it would go away\". \"I was going to PT for neck and lower back which was around 3.5 years ago\". \"I've had neck pain since then\". \"The neck pain hasn't changed\". 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 \"I got the Voltaren yesterday and I'll try it\". \"I got blood work yesterday but the results are not in yet\". \"Wrist braces do help\".           Recurrent probem    Quality of life: good    Patient Goals  Patient goals for therapy: decreased pain and increased strength    Pain  Current pain rating: 3  At best pain rating: 3  At worst pain ratin (R: 4)  Location: Left Radial wrist, R volar wrist  Quality: dull ache  Relieving factors: " rest, relaxation and support  Aggravating factors: lifting (Push ups, wrist extension, pushing up out of a chair)  Progression: worsening    Social Support    Employment status: not working  Hand dominance: right    Treatments  Current treatment: occupational therapy      Objective     Tenderness     Left Wrist/Hand   Tenderness in the scaphoid.     Right Wrist/Hand   Tenderness in the scaphoid.     Additional Tenderness Details  Point Tenderness over b/l anatomical snuffbox  Scaphoid tender to palpation    Neurological Testing     Sensation     Wrist/Hand   Left   Intact: light touch    Right   Intact: light touch    Active Range of Motion     Left Wrist   Normal active range of motion    Right Wrist   Normal active range of motion    Left Thumb     Opposition: Full opposition    Right Thumb   Opposition: Full opposition    Passive Range of Motion     Additional Passive Range of Motion Details  B/L Full composite fist    Strength/Myotome Testing     Left Wrist/Hand   Normal wrist strength     (2nd hand position)     Trial 1: 62.6    Thumb Strength  Key/Lateral Pinch     Trial 1: 15  Tip/Two-Point Pinch     Trial 1: 13  Palmar/Three-Point Pinch     Trial 1: 16    Right Wrist/Hand   Normal wrist strength     (2nd hand position)     Trial 1: 80.4    Thumb Strength   Key/Lateral Pinch     Trial 1: 17  Tip/Two-Point Pinch     Trial 1: 11  Palmar/Three-Point Pinch     Trial 1: 15    Tests     Left Wrist/Hand   Positive Tinel's sign (medial nerve) and Grande's.   Negative CMC grind, CMC lever test positive, CMC shoulder sign, left carpal compression test, ECU synergy test positive and scapholunate shear.     Right Wrist/Hand   Positive CMC lever test positive, Tinel's sign (medial nerve) and Grande's.   Negative CMC grind, CMC shoulder sign, right carpal compression test, ECU synergy test positive and scapholunate shear.     Additional Tests Details  Crepitus noted in B/l wrists during all planes of motion.        Swelling     Left Wrist/Hand   Circumference wrist: 14.5 cm    Right Wrist/Hand   Circumference wrist: 14.5 cm             Precautions: Universal      Manuals 2/15            IASTM             Taping                                       Neuro Re-Ed                                                                                                        Ther Ex             HEP Wrist AROM    Tendon glide    FCR iso    APL Iso            Med ball rolls             Wrist ROM             Wrist Maze             Wrist PRE             TB on wall             Wrist ISO FCR             APL iso                          Ther Activity             keypegs                                                                 Modalities             P

## 2024-02-16 ENCOUNTER — OFFICE VISIT (OUTPATIENT)
Dept: FAMILY MEDICINE CLINIC | Facility: CLINIC | Age: 44
End: 2024-02-16
Payer: MEDICARE

## 2024-02-16 VITALS
SYSTOLIC BLOOD PRESSURE: 122 MMHG | OXYGEN SATURATION: 100 % | HEART RATE: 94 BPM | DIASTOLIC BLOOD PRESSURE: 80 MMHG | RESPIRATION RATE: 18 BRPM | BODY MASS INDEX: 27.39 KG/M2 | HEIGHT: 64 IN | TEMPERATURE: 96.7 F | WEIGHT: 160.4 LBS

## 2024-02-16 DIAGNOSIS — M25.572 ACUTE LEFT ANKLE PAIN: Primary | ICD-10-CM

## 2024-02-16 PROCEDURE — 99213 OFFICE O/P EST LOW 20 MIN: CPT

## 2024-02-16 NOTE — ASSESSMENT & PLAN NOTE
Consistent with anterior ankle impingement. Ddx including but not limited to anterior tibial tendinopathy, other ankle sprain. Counseled patient to continue RICE therapy and FUIC in 3 weeks prn.

## 2024-02-16 NOTE — PROGRESS NOTES
Name: Esha Moreira      : 1980      MRN: [id number]  Encounter Provider: Naz Saavedra MD  Encounter Date: 2024   Encounter department: [facility]    Assessment & Plan     1. Acute left ankle pain  Assessment & Plan:  Consistent with anterior ankle impingement. Ddx including but not limited to anterior tibial tendinopathy, other ankle sprain. Counseled patient to continue RICE therapy and FUIC in 3 weeks prn.             Subjective      Pt presenting with 3-week hx Left ankle pain. Notably improved with topical voltaren gel and rest. Worse with plantarflexion and palpation. About a week ago patient noted that anterior ankle was swollen compared to right ankle, however at this point the swelling has gone down. No other symptoms.    Ankle Pain   The incident occurred more than 1 week ago. The incident occurred at home. There was no injury mechanism. The pain is present in the left ankle. The quality of the pain is described as aching and stabbing. The pain is at a severity of 5/10. The pain is moderate. The pain has been Improving since onset. Pertinent negatives include no inability to bear weight, loss of motion, loss of sensation, muscle weakness, numbness or tingling. She reports no foreign bodies present. The symptoms are aggravated by movement and palpation. She has tried acetaminophen and NSAIDs for the symptoms. The treatment provided mild relief.     Review of Systems   Constitutional:  Negative for chills and fever.   HENT:  Negative for ear pain and sore throat.    Eyes:  Negative for pain and visual disturbance.   Respiratory:  Negative for cough and shortness of breath.    Cardiovascular:  Negative for chest pain and palpitations.   Gastrointestinal:  Negative for abdominal pain and vomiting.   Genitourinary:  Negative for dysuria and hematuria.   Musculoskeletal:  Positive for arthralgias and joint swelling. Negative for back pain.   Skin:  Negative  for color change and rash.   Neurological:  Negative for tingling, seizures, syncope and numbness.       Current Outpatient Medications on File Prior to Visit   Medication Sig    cetirizine (ZyrTEC) 10 mg tablet Take 1 tablet (10 mg total) by mouth daily    cholecalciferol (VITAMIN D3) 400 units tablet Take 400 Units by mouth daily    Coenzyme Q10 600 MG CHEW Chew    cyanocobalamin (VITAMIN B-12) 500 MCG tablet Take 500 mcg by mouth daily    diclofenac (VOLTAREN) 75 mg EC tablet Take 1 tablet (75 mg total) by mouth 2 (two) times a day As needed for joint pain, take with food    Diclofenac Sodium (VOLTAREN) 1 % Apply 2 g topically 4 (four) times a day    fluticasone (FLONASE) 50 mcg/act nasal spray 1 spray into each nostril daily    omeprazole (PriLOSEC) 40 MG capsule Take 1 capsule (40 mg total) by mouth daily    Prenatal-FeFum-FA-DHA w/o A (Prenatal + DHA) 27-1 & 250 MG THPK Take by mouth    ascorbic acid (VITAMIN C) 250 mg tablet Take 1 tablet (250 mg total) by mouth 3 (three) times a day with meals (Patient not taking: Reported on 7/6/2023)    diazepam (VALIUM) 5 mg tablet  (Patient not taking: Reported on 2/6/2024)    ferrous gluconate (FERGON) 240 (27 FE) MG tablet Take 1 tablet (240 mg total) by mouth 3 (three) times a day with meals Always take with Vit C at same time. (Patient not taking: Reported on 7/6/2023)    Magnesium 250 MG TABS Take 250 mg by mouth in the morning OTC (Patient not taking: Reported on 12/22/2023)    meclizine (ANTIVERT) 25 mg tablet Take 1 tablet (25 mg total) by mouth every 8 (eight) hours as needed for dizziness (Patient not taking: Reported on 12/22/2023)    Sodium Fluoride 1.1 % PSTE Take 1 Film by mouth daily at bedtime Use once per day at bedtime. Brush, spit out. Don't rinse. Let sit on teeth. Generic brand OK       Objective     /80 (BP Location: Right arm, Patient Position: Sitting, Cuff Size: Large)   Pulse 94   Temp (!) 96.7 °F (35.9 °C) (Tympanic)   Resp 18   Ht 5'  "4\" (1.626 m)   Wt 72.8 kg (160 lb 6.4 oz)   LMP 01/22/2024 (Exact Date)   SpO2 100%   BMI 27.53 kg/m²     Physical Exam  Constitutional:       General: She is not in acute distress.     Appearance: Normal appearance. She is not ill-appearing.   HENT:      Head: Normocephalic and atraumatic.      Right Ear: Tympanic membrane and external ear normal.      Left Ear: Tympanic membrane and external ear normal.      Mouth/Throat:      Mouth: Mucous membranes are moist.      Pharynx: Oropharynx is clear. No oropharyngeal exudate.   Eyes:      Extraocular Movements: Extraocular movements intact.      Pupils: Pupils are equal, round, and reactive to light.   Cardiovascular:      Rate and Rhythm: Normal rate and regular rhythm.      Pulses: Normal pulses.      Heart sounds: Normal heart sounds.   Pulmonary:      Effort: Pulmonary effort is normal.      Breath sounds: Normal breath sounds. No wheezing, rhonchi or rales.   Abdominal:      General: Abdomen is flat. Bowel sounds are normal. There is no distension.      Palpations: Abdomen is soft.      Tenderness: There is no abdominal tenderness.   Musculoskeletal:         General: Swelling (mild soft tissue swelling of anterior L ankle) and tenderness present.   Skin:     General: Skin is warm and dry.      Capillary Refill: Capillary refill takes less than 2 seconds.   Neurological:      General: No focal deficit present.      Mental Status: She is alert and oriented to person, place, and time.      Motor: No weakness.      Gait: Gait normal.   Psychiatric:         Mood and Affect: Mood normal.         Behavior: Behavior normal.       Naz Saavedra MD    "

## 2024-02-21 ENCOUNTER — OFFICE VISIT (OUTPATIENT)
Dept: OCCUPATIONAL THERAPY | Facility: CLINIC | Age: 44
End: 2024-02-21
Payer: MEDICARE

## 2024-02-21 DIAGNOSIS — M25.531 RIGHT WRIST PAIN: ICD-10-CM

## 2024-02-21 DIAGNOSIS — M25.532 LEFT WRIST PAIN: Primary | ICD-10-CM

## 2024-02-21 PROBLEM — Z01.419 ENCOUNTER FOR GYNECOLOGICAL EXAMINATION WITHOUT ABNORMAL FINDING: Status: RESOLVED | Noted: 2021-09-22 | Resolved: 2024-02-21

## 2024-02-21 PROCEDURE — 97140 MANUAL THERAPY 1/> REGIONS: CPT

## 2024-02-21 PROCEDURE — 97110 THERAPEUTIC EXERCISES: CPT

## 2024-02-21 PROCEDURE — 97530 THERAPEUTIC ACTIVITIES: CPT

## 2024-02-21 NOTE — PROGRESS NOTES
Daily Note     Today's date: 2024  Patient name: Esha Moreira  : 1980  MRN: [id number]  Referring provider: Ludy Geller MD  Dx:   Encounter Diagnosis     ICD-10-CM    1. Left wrist pain  M25.532       2. Right wrist pain  M25.531                      Subjective: They hurt (L>R)      Objective: See treatment diary below      Assessment: Tolerated treatment fair. Wrist stretching and ROM ex increase discomfort. She is doing a HEP for her neck, and the push-ups/WB is what aggravates the wrists the most. Discussed modifying to do with a fist/wrist neutral. Patient demonstrated fatigue post treatment and would benefit from continued OT      Plan: Progress treatment as tolerated.       Precautions: Universal      Manuals 2/15 2/21           IASTM  10'           Taping  L                                     Neuro Re-Ed                                                                                                        Ther Ex             HEP Wrist AROM    Tendon glide    FCR iso    APL Iso            Med ball rolls  1' each hand           Wrist PROM  5'           Wrist Maze  5x           Wrist PRE             TB on wall  5x each           Wrist ISO FCR             APL iso             Flexbar on table  Y 10x           Ther Activity             keypegs  Transl in/opp out                                                               Modalities             MHP  5'

## 2024-02-23 ENCOUNTER — OFFICE VISIT (OUTPATIENT)
Dept: OCCUPATIONAL THERAPY | Facility: CLINIC | Age: 44
End: 2024-02-23
Payer: MEDICARE

## 2024-02-23 DIAGNOSIS — M25.531 RIGHT WRIST PAIN: Primary | ICD-10-CM

## 2024-02-23 DIAGNOSIS — M25.532 LEFT WRIST PAIN: ICD-10-CM

## 2024-02-23 PROCEDURE — 97110 THERAPEUTIC EXERCISES: CPT

## 2024-02-23 PROCEDURE — 97140 MANUAL THERAPY 1/> REGIONS: CPT

## 2024-02-23 NOTE — PROGRESS NOTES
Daily Note     Today's date: 2024  Patient name: Esha Moreira  : 1980  MRN: [id number]  Referring provider: Ludy Geller MD  Dx:   Encounter Diagnosis     ICD-10-CM    1. Right wrist pain  M25.531       2. Left wrist pain  M25.532                      Subjective: Weightbearing really seems to hurt it      Objective: See treatment diary below      Assessment: Tolerated treatment fair. Patient reported increased pain on this date as compared to prior sessions. As a result therapist shortened session and reduced exercises to prevent further irritation.     Plan: Progress treatment as tolerated.       Precautions: Universal      Manuals 2/15 2/21 2/23          IASTM  10' 10'          Taping  L L                                    Neuro Re-Ed                                                                                                        Ther Ex             HEP Wrist AROM    Tendon glide    FCR iso    APL Iso            Med ball rolls  1' each hand 2'          Wrist PROM  5' held          Wrist Maze  5x 5x          Wrist PRE   Dowel x 20          TB on wall  5x each 5x          Wrist ISO FCR             APL iso             Flexbar on table  Y 10x Y 10x          Ther Activity             keypegs  Transl in/opp out x1                                                              Modalities             MHP  5' 5'

## 2024-02-25 DIAGNOSIS — R79.82 ELEVATED C-REACTIVE PROTEIN (CRP): ICD-10-CM

## 2024-02-25 DIAGNOSIS — M25.50 ARTHRALGIA, UNSPECIFIED JOINT: Primary | ICD-10-CM

## 2024-02-26 DIAGNOSIS — J30.9 ALLERGIC RHINITIS, UNSPECIFIED SEASONALITY, UNSPECIFIED TRIGGER: ICD-10-CM

## 2024-02-26 DIAGNOSIS — J34.9 PARANASAL SINUS DISEASE: ICD-10-CM

## 2024-02-27 RX ORDER — CETIRIZINE HYDROCHLORIDE 10 MG/1
10 TABLET ORAL DAILY
Qty: 90 TABLET | Refills: 0 | Status: SHIPPED | OUTPATIENT
Start: 2024-02-27

## 2024-02-28 ENCOUNTER — TELEPHONE (OUTPATIENT)
Dept: PSYCHIATRY | Facility: CLINIC | Age: 44
End: 2024-02-28

## 2024-02-28 ENCOUNTER — OFFICE VISIT (OUTPATIENT)
Dept: OCCUPATIONAL THERAPY | Facility: CLINIC | Age: 44
End: 2024-02-28
Payer: MEDICARE

## 2024-02-28 DIAGNOSIS — M25.532 LEFT WRIST PAIN: Primary | ICD-10-CM

## 2024-02-28 PROCEDURE — 97110 THERAPEUTIC EXERCISES: CPT

## 2024-02-28 PROCEDURE — 97140 MANUAL THERAPY 1/> REGIONS: CPT

## 2024-02-28 NOTE — TELEPHONE ENCOUNTER
"Behavioral Health Outpatient Intake Questions    Referred By   : Self    Please advise interviewee that they need to answer all questions truthfully to allow for best care, and any misrepresentations of information may affect their ability to be seen at this clinic   => Was this discussed? Yes     If Minor Child (under age 18)    Who is/are the legal guardian(s) of the child?     Is there a custody agreement? No     If \"YES\"- Custody orders must be obtained prior to scheduling the first appointment  In addition, Consent to Treatment must be signed by all legal guardians prior to scheduling the first appointment    If \"NO\"- Consent to Treatment must be signed by all legal guardians prior to scheduling the first appointment    Behavioral Health Outpatient Intake History -     Presenting Problem (in patient's own words):   Depression, Bipolar    Are there any communication barriers for this patient?     No                                               If yes, please describe barriers:   If there is a unique situation, please refer to Arian Levine for final determination.    Are you taking any psychiatric medications? No     If \"YES\" -What are they      If \"YES\" -Who prescribes?     Has the Patient previously received outpatient Talk Therapy or Medication Management from [facility]  No        If \"YES\"- When, Where and with Whom?         If \"NO\" -Has Patient received these services elsewhere?       If \"YES\" -When, Where, and with Whom?    Has the Patient abused alcohol or other substances in the last 6 months ? No  No concerns of substance abuse are reported.     If \"YES\" -What substance, How much, How often?     If illegal substance: Refer to [facility] (for DON) or [facility] Offices.   If Alcohol in excess of 10 drinks per week:  Refer to [facility] (for DON) or [facility] Offices    Legal History-     Is this treatment court ordered? No   If \"yes \"send to :  Talk Therapy : Send to Arian Whitley" "Abner for final determination   Med Management: Send to Dr Ramirez for final determination     Has the Patient been convicted of a felony?  No   If \"Yes\" send to -When, What?  Talk Therapy : Send to Arian Hamilton/Lucy Levine for final determination   Med Management: Send to Dr Ramirez for final determination     ACCEPTED as a patient Yes  If \"Yes\" Appointment Date:     Referred Elsewhere? No  If “Yes” - (Where? Ex: [facility], [facility], [facility], [facility], etc.)       Name of Insurance Co:[facility]  Insurance ID#  [id number]  Insurance Phone # [phone]  If ins is primary or secondary? Primary    "

## 2024-02-28 NOTE — PROGRESS NOTES
"Daily Note     Today's date: 2024  Patient name: Esha Moreira  : 1980  MRN: [id number]  Referring provider: Ludy Geller MD  Dx:   Encounter Diagnosis     ICD-10-CM    1. Left wrist pain  M25.532                      Subjective: \"The braces help but once I use them it comes back\". \"It hasn't changed a whole lot yet\".     Objective: See treatment diary below      Assessment: Ultrasound scheduled for 3/18/24. Patient reports that bracing has made a difference in the pain in the morning. Patient displays improved activity tolerance during therapy.    Plan: Progress treatment as tolerated.       Precautions: Universal      Manuals 2/15 2/21 2/23 2/28         IASTM  10' 10' 10'         Taping  L L L                                   Neuro Re-Ed                                                                                                        Ther Ex             HEP Wrist AROM    Tendon glide    FCR iso    APL Iso            Med ball rolls  1' each hand 2' 2'         Wrist PROM  5' held          Wrist Maze  5x 5x 3x b/l         Wrist PRE   Dowel x 20          TB on wall  5x each 5x 5x         Wrist ISO FCR             APL iso             Flexbar on table  Y 10x Y 10x Y 10x    Y F/E x 20         Ther Activity             keypegs  Transl in/opp out x1 x1                                                             Modalities             MHP  5' 5' 5'                           "

## 2024-03-01 ENCOUNTER — OFFICE VISIT (OUTPATIENT)
Dept: OCCUPATIONAL THERAPY | Facility: CLINIC | Age: 44
End: 2024-03-01
Payer: MEDICARE

## 2024-03-01 DIAGNOSIS — M25.532 LEFT WRIST PAIN: Primary | ICD-10-CM

## 2024-03-01 DIAGNOSIS — M25.531 RIGHT WRIST PAIN: ICD-10-CM

## 2024-03-01 PROCEDURE — 97140 MANUAL THERAPY 1/> REGIONS: CPT

## 2024-03-01 PROCEDURE — 97110 THERAPEUTIC EXERCISES: CPT

## 2024-03-01 PROCEDURE — 97530 THERAPEUTIC ACTIVITIES: CPT

## 2024-03-01 NOTE — PROGRESS NOTES
"Daily Note     Today's date: 3/1/2024  Patient name: Esha Moreira  : 1980  MRN: [id number]  Referring provider: Ludy Geller MD  Dx:   Encounter Diagnosis     ICD-10-CM    1. Left wrist pain  M25.532       2. Right wrist pain  M25.531                        Subjective: \"I have some soreness in my wrists but left is worse than the right side\".      Objective: See treatment diary below      Assessment: Ultrasound scheduled for 3/18/24. Patient displays improved activity tolerance during therapy able to perform increased resistive exercises.    Plan: Progress treatment as tolerated.       Precautions: Universal      Manuals 2/15 2/21 2/23 2/28 3/1        IASTM  10' 10' 10' 10'        Taping  L L L L                                  Neuro Re-Ed                                                                                                        Ther Ex             HEP Wrist AROM    Tendon glide    FCR iso    APL Iso            Med ball rolls  1' each hand 2' 2' 2'        Wrist PROM  5' held          Wrist Maze  5x 5x 3x b/l 3x        Wrist PRE   Dowel x 20  2# x10 b/l        TB on wall  5x each 5x 5x         Wrist ISO FCR             APL iso             Flexbar on table  Y 10x Y 10x Y 10x    Y F/E x 20 Y 10x    Y F/E  x20        Ther Activity             keypegs  Transl in/opp out x1 x1 x1                                                            Modalities             MHP  5' 5' 5' 5'                          "

## 2024-03-06 ENCOUNTER — APPOINTMENT (OUTPATIENT)
Dept: OCCUPATIONAL THERAPY | Facility: CLINIC | Age: 44
End: 2024-03-06
Payer: MEDICARE

## 2024-03-07 ENCOUNTER — SOCIAL WORK (OUTPATIENT)
Dept: BEHAVIORAL/MENTAL HEALTH CLINIC | Facility: CLINIC | Age: 44
End: 2024-03-07

## 2024-03-07 ENCOUNTER — PATIENT OUTREACH (OUTPATIENT)
Dept: OBGYN CLINIC | Facility: CLINIC | Age: 44
End: 2024-03-07

## 2024-03-07 DIAGNOSIS — F31.60 BIPOLAR 1 DISORDER, MIXED (HCC): Primary | ICD-10-CM

## 2024-03-07 NOTE — PROGRESS NOTES
REJI PARRA attempted to call pt. Pt did not answer the call. REJI PARRA left a voicemail for a return call.    REJI PARRA received a return call. Pt reports that the previous intake was canceled due to not accepting insurance. Pt reports that the initial wait list she was on contacted her and she did her intake earlier today. Pt reports to have her next appointment in two weeks. REJI PARRA will f/u with pt in two weeks to see how therapy is going. Pt denies any additional concern at this time.
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
## 2024-03-08 ENCOUNTER — APPOINTMENT (OUTPATIENT)
Dept: OCCUPATIONAL THERAPY | Facility: CLINIC | Age: 44
End: 2024-03-08
Payer: MEDICARE

## 2024-03-12 ENCOUNTER — OFFICE VISIT (OUTPATIENT)
Dept: DENTISTRY | Facility: CLINIC | Age: 44
End: 2024-03-12

## 2024-03-12 VITALS — HEART RATE: 71 BPM | DIASTOLIC BLOOD PRESSURE: 92 MMHG | SYSTOLIC BLOOD PRESSURE: 141 MMHG

## 2024-03-12 DIAGNOSIS — J34.9 PARANASAL SINUS DISEASE: ICD-10-CM

## 2024-03-12 DIAGNOSIS — J30.9 ALLERGIC RHINITIS, UNSPECIFIED SEASONALITY, UNSPECIFIED TRIGGER: ICD-10-CM

## 2024-03-12 DIAGNOSIS — Q38.6 BROAD ATTACHMENT OF LABIAL FRENUM: Primary | ICD-10-CM

## 2024-03-12 PROCEDURE — D0191 ASSESSMENT OF A PATIENT: HCPCS

## 2024-03-12 PROCEDURE — D0220 INTRAORAL - PERIAPICAL FIRST RADIOGRAPHIC IMAGE: HCPCS

## 2024-03-12 NOTE — DENTAL PROCEDURE DETAILS
#24 and #25 BRIGITTE Perio Consult    PMH Reviewed, Former smoker 1 packX15 years, Sig for: GERD, Microcytic Anemia, Celiac Disease, YESSICA, DDD, Arthritis, and Bipolar Type 1. No changes.    [place] patient referred for BRIGITTE on the buccal of tooth #24 and #25. Patient has noticed since a teenager the BRIGITTE and has generalized sensitivity to cold. Patient denied any lip ring.     PA taken. Vitality tested WNL. PDL #24 is widened. 30% bone loss on tooth #24 and #25. -3 mm and -2.5 mm of recession buccally. Perio probings WNL (1 mm and 2 mm)    Dr Rosas recommends 2 stage procedure with a free gingival graft and frenum removal for first procedure. Patient will wear essex vacuform. Second stage will be coronal repositioning of the flap. 50% coverage is most likely.    All questions answered.    Attending: Dr Rosas    NV: #12 B Resin and Silgimix Impression of Upper  NV2: #24/#25 free gingival graft at [facility] (if patient decides to continue with graft after discussing finances)

## 2024-03-13 ENCOUNTER — APPOINTMENT (OUTPATIENT)
Dept: LAB | Facility: CLINIC | Age: 44
End: 2024-03-13
Payer: MEDICARE

## 2024-03-13 ENCOUNTER — APPOINTMENT (OUTPATIENT)
Dept: OCCUPATIONAL THERAPY | Facility: CLINIC | Age: 44
End: 2024-03-13
Payer: MEDICARE

## 2024-03-13 DIAGNOSIS — K90.0 CELIAC DISEASE: ICD-10-CM

## 2024-03-13 DIAGNOSIS — D50.9 IRON DEFICIENCY ANEMIA, UNSPECIFIED IRON DEFICIENCY ANEMIA TYPE: ICD-10-CM

## 2024-03-13 LAB
BASOPHILS # BLD AUTO: 0.05 THOUSANDS/ÂΜL (ref 0–0.1)
BASOPHILS NFR BLD AUTO: 1 % (ref 0–1)
EOSINOPHIL # BLD AUTO: 0.36 THOUSAND/ÂΜL (ref 0–0.61)
EOSINOPHIL NFR BLD AUTO: 6 % (ref 0–6)
ERYTHROCYTE [DISTWIDTH] IN BLOOD BY AUTOMATED COUNT: 11.9 % (ref 11.6–15.1)
FERRITIN SERPL-MCNC: 13 NG/ML (ref 11–307)
FOLATE SERPL-MCNC: >22.3 NG/ML
HCT VFR BLD AUTO: 40.4 % (ref 34.8–46.1)
HGB BLD-MCNC: 13.3 G/DL (ref 11.5–15.4)
IMM GRANULOCYTES # BLD AUTO: 0.03 THOUSAND/UL (ref 0–0.2)
IMM GRANULOCYTES NFR BLD AUTO: 1 % (ref 0–2)
IRON SATN MFR SERPL: 5 % (ref 15–50)
IRON SERPL-MCNC: 17 UG/DL (ref 50–212)
LYMPHOCYTES # BLD AUTO: 2.51 THOUSANDS/ÂΜL (ref 0.6–4.47)
LYMPHOCYTES NFR BLD AUTO: 42 % (ref 14–44)
MCH RBC QN AUTO: 28.7 PG (ref 26.8–34.3)
MCHC RBC AUTO-ENTMCNC: 32.9 G/DL (ref 31.4–37.4)
MCV RBC AUTO: 87 FL (ref 82–98)
MONOCYTES # BLD AUTO: 0.47 THOUSAND/ÂΜL (ref 0.17–1.22)
MONOCYTES NFR BLD AUTO: 8 % (ref 4–12)
NEUTROPHILS # BLD AUTO: 2.55 THOUSANDS/ÂΜL (ref 1.85–7.62)
NEUTS SEG NFR BLD AUTO: 42 % (ref 43–75)
NRBC BLD AUTO-RTO: 0 /100 WBCS
PLATELET # BLD AUTO: 258 THOUSANDS/UL (ref 149–390)
PMV BLD AUTO: 10.4 FL (ref 8.9–12.7)
RBC # BLD AUTO: 4.63 MILLION/UL (ref 3.81–5.12)
TIBC SERPL-MCNC: 368 UG/DL (ref 250–450)
UIBC SERPL-MCNC: 351 UG/DL (ref 155–355)
VIT B12 SERPL-MCNC: 969 PG/ML (ref 180–914)
WBC # BLD AUTO: 5.97 THOUSAND/UL (ref 4.31–10.16)

## 2024-03-13 PROCEDURE — 82746 ASSAY OF FOLIC ACID SERUM: CPT

## 2024-03-13 PROCEDURE — 83918 ORGANIC ACIDS TOTAL QUANT: CPT

## 2024-03-13 PROCEDURE — 82728 ASSAY OF FERRITIN: CPT

## 2024-03-13 PROCEDURE — 83540 ASSAY OF IRON: CPT

## 2024-03-13 PROCEDURE — 83550 IRON BINDING TEST: CPT

## 2024-03-13 PROCEDURE — 36415 COLL VENOUS BLD VENIPUNCTURE: CPT

## 2024-03-13 PROCEDURE — 82607 VITAMIN B-12: CPT

## 2024-03-13 PROCEDURE — 85025 COMPLETE CBC W/AUTO DIFF WBC: CPT

## 2024-03-13 RX ORDER — FLUTICASONE PROPIONATE 50 MCG
1 SPRAY, SUSPENSION (ML) NASAL DAILY
Qty: 16 G | Refills: 0 | Status: SHIPPED | OUTPATIENT
Start: 2024-03-13 | End: 2024-03-21 | Stop reason: SDUPTHER

## 2024-03-14 ENCOUNTER — OFFICE VISIT (OUTPATIENT)
Dept: DENTISTRY | Facility: CLINIC | Age: 44
End: 2024-03-14

## 2024-03-14 VITALS — HEART RATE: 111 BPM | SYSTOLIC BLOOD PRESSURE: 150 MMHG | DIASTOLIC BLOOD PRESSURE: 91 MMHG

## 2024-03-14 DIAGNOSIS — K03.2 ABFRACTION: Primary | ICD-10-CM

## 2024-03-14 PROCEDURE — D2391 RESIN-BASED COMPOSITE - 1 SURFACE, POSTERIOR: HCPCS | Performed by: DENTIST

## 2024-03-14 NOTE — DENTAL PROCEDURE DETAILS
Patient presents for a dental restoration and verbally consents for treatment:  Reviewed health history-  Pt is ASA type III  Treatment consents signed: Yes  Perio: Gingivitis  Pain Scale: 0  Caries Assessment: Medium    Radiographs: Films are current  Oral Hygiene instruction reviewed and given  Hygiene recall visits recommended to the patient    Patient agrees with the abfraction lesion on #12 and the proposed treatment plan for the resin restoration:  Tooth ##12  Dental Anesthesia:  1.2 Carpule 2% Lidocaine 1:100K epi infiltrations. Cord placed.  Material:   Etch Ivoclar bond and resin   Shade: Shade A1. Cord removed. Polished and flossed.     Qian requested Silgimix for upper arch for perio surgery so impressions taken today and will send it over to  at [facility].     Prognosis is Good.   Referrals Needed: No    Next visit: Qian for graft surgery     Elva

## 2024-03-15 ENCOUNTER — TELEPHONE (OUTPATIENT)
Age: 44
End: 2024-03-15

## 2024-03-15 ENCOUNTER — APPOINTMENT (OUTPATIENT)
Dept: OCCUPATIONAL THERAPY | Facility: CLINIC | Age: 44
End: 2024-03-15
Payer: MEDICARE

## 2024-03-15 NOTE — TELEPHONE ENCOUNTER
Caller: librado    Doctor: germania    Reason for call: pt would like to get an injection for her neck.    Call back#: [phone]

## 2024-03-18 ENCOUNTER — HOSPITAL ENCOUNTER (OUTPATIENT)
Dept: RADIOLOGY | Facility: HOSPITAL | Age: 44
Discharge: HOME | End: 2024-03-18
Attending: INTERNAL MEDICINE
Payer: MEDICARE

## 2024-03-18 ENCOUNTER — TELEPHONE (OUTPATIENT)
Dept: PAIN MEDICINE | Facility: CLINIC | Age: 44
End: 2024-03-18

## 2024-03-18 DIAGNOSIS — R79.82 ELEVATED C-REACTIVE PROTEIN (CRP): ICD-10-CM

## 2024-03-18 DIAGNOSIS — M25.50 ARTHRALGIA, UNSPECIFIED JOINT: ICD-10-CM

## 2024-03-18 LAB — METHYLMALONATE SERPL-SCNC: 73 NMOL/L (ref 0–378)

## 2024-03-18 PROCEDURE — 76882 US LMTD JT/FCL EVL NVASC XTR: CPT

## 2024-03-18 NOTE — TELEPHONE ENCOUNTER
Patient schedule for repeat BEAN.  Patient reports 60-70% relief for at least 3 month.  Her last procedure was 11/16/23.  She has continued her home exercise plan 3 times per week since her last procedure.

## 2024-03-18 NOTE — TELEPHONE ENCOUNTER
Pt asking for repeat neck injection. She has neck pain, head pressure and headaches. Pain 5-6/10. Symptoms the same as prior to her last neck inj. The pain started coming back a month ago.   Last BEAN was 11/16/23.

## 2024-03-19 ENCOUNTER — APPOINTMENT (OUTPATIENT)
Dept: OCCUPATIONAL THERAPY | Facility: CLINIC | Age: 44
End: 2024-03-19
Payer: MEDICARE

## 2024-03-20 ENCOUNTER — TELEPHONE (OUTPATIENT)
Dept: RHEUMATOLOGY | Facility: CLINIC | Age: 44
End: 2024-03-20

## 2024-03-20 ENCOUNTER — TELEPHONE (OUTPATIENT)
Dept: HEMATOLOGY ONCOLOGY | Facility: CLINIC | Age: 44
End: 2024-03-20

## 2024-03-20 ENCOUNTER — TELEMEDICINE (OUTPATIENT)
Dept: HEMATOLOGY ONCOLOGY | Facility: CLINIC | Age: 44
End: 2024-03-20
Payer: MEDICARE

## 2024-03-20 DIAGNOSIS — D50.9 IRON DEFICIENCY ANEMIA, UNSPECIFIED IRON DEFICIENCY ANEMIA TYPE: ICD-10-CM

## 2024-03-20 DIAGNOSIS — E61.1 HYPOFERREMIA: Primary | ICD-10-CM

## 2024-03-20 DIAGNOSIS — K90.0 CELIAC DISEASE: ICD-10-CM

## 2024-03-20 PROCEDURE — 99214 OFFICE O/P EST MOD 30 MIN: CPT

## 2024-03-20 RX ORDER — SODIUM CHLORIDE 9 MG/ML
20 INJECTION, SOLUTION INTRAVENOUS ONCE
OUTPATIENT
Start: 2024-04-01

## 2024-03-20 NOTE — TELEPHONE ENCOUNTER
Reached out to patient and was able to get them seen by the provider, Patient will also be put on the cancel list for [facility] as that location is the most convenient  for the patient.

## 2024-03-20 NOTE — TELEPHONE ENCOUNTER
Left detailed message with all dates, times, and locations. Can view on [employer]. Gave my Teams # to call back to confirm.

## 2024-03-20 NOTE — PROGRESS NOTES
Virtual Regular Visit    Verification of patient location:    Patient is located at Home in the following state in which I hold an active license [place]      Assessment/Plan:  1. Hypoferremia  2. Celiac disease  3. Iron deficiency anemia, unspecified iron deficiency anemia type  Ms. Moreira is a 43-year-old female seen in follow-up for iron deficiency anemia.  She was originally seen in consultation in June 2023 with an iron saturation of 6%, hemoglobin 12.3, MCV 78.  She received IV unit for 300 mg x 4 doses and tolerated this well and with improvement in her symptoms.  We discussed that with her most recent set of labs her iron has down trended with an iron saturation of 5%, ferritin of 13 serum iron of 17.  No anemia present though.  B12 and folate are adequate.  She also has her symptoms of iron deficiency returning including fatigue and ice cravings.  She is also had some increased shortness of breath and headaches.  Discussed that we could do a short trial of oral iron again but previously did not tolerate for more than a few weeks due to GI upset.  Since she has responded well to IV iron in the past we will proceed with additional dosing of iron.  She will receive 4 doses of IV Venofer 300 mg which she previously tolerated well without any premedications.  She did have a slight headache after the infusions previously I suggested taking Tylenol with Benadryl if this were to be the case and to call the office next morning for the addition of premeds prior to the subsequent infusions.  She will repeat labs in 6 months prior to follow-up in the office or virtually.  She knows to call the with new or worsening symptoms for sooner evaluation.        Reason for visit is   Chief Complaint   Patient presents with    Virtual Regular Visit          Encounter provider YU Duffy    Provider [facility] [address] [facility] [address] [place]  [id number]      Recent Visits  No visits were found meeting these conditions.  Showing recent visits within past 7 days and meeting all other requirements  Today's Visits  Date Type Provider Dept   03/20/24 Telemedicine YU Duffy  [facility]   Showing today's visits and meeting all other requirements  Future Appointments  No visits were found meeting these conditions.  Showing future appointments within next 150 days and meeting all other requirements       The patient was identified by name and date of birth. Esha Moreira was informed that this is a telemedicine visit and that the visit is being conducted through the Epic Embedded platform. She agrees to proceed..  My office door was closed. No one else was in the room.  She acknowledged consent and understanding of privacy and security of the video platform. The patient has agreed to participate and understands they can discontinue the visit at any time.    Patient is aware this is a billable service.     Subjective  Esha Moreira is a 43 y.o. female ith past medical history of celiac disease, GERD, vitamin D deficiency, hypertension, hiatal hernia, and abnormal Pap smear.  She is seen in follow-up from [facility] for iron deficiency anemia.  She was originally seen in consultation in June 2023 with worsening fatigue, dizziness, and lightheadedness.  She also had symptoms of pica with ice chewing and restless legs.  She did not have significant anemia associate with this but did have microcytosis.  Iron saturation was low at 6% with an serum iron of 26, ferritin of 11.      6/17/2023: Hemoglobin 12.3, MCV 78, platelets 281, WBC 5.84              Ferritin 11, iron saturation 6%, TIBC 427, serum iron 26  IV Venofer 300 mg x4: 7/6-7/26 9/13/2023: Hemoglobin 13, MCV 88, platelets 251, WBC 5.46              Ferritin 93, iron saturation 24%, TIBC 329, serum iron 80  3/13/2024: Hemoglobin 13.3, MCV 87, platelets 258, WBC  5.97   Ferritin 13, iron saturation 5%, TIBC 368, serum iron 17   B12 969, folate >22.3      She has been taking B12 supplement every other day as well as a multivitamin with iron (prenatal) daily.  She has noted some return of fatigue, ice craving, shortness of breath, and headaches.  She is also noticed that she is bruising a little bit more easily.  Discussed that folate and B12 are adequate but ferritin is downtrending to 13 with an iron saturation of 5%        Past Medical History:   Diagnosis Date    Abnormal Pap smear of cervix     Anemia     had iron infusions to treat    Celiac disease     Depression     GERD (gastroesophageal reflux disease)     GERD without esophagitis     Heartburn     Hiatal hernia     Hypertension     Microcytic anemia 07/15/2019    Sleep apnea     borderline + per sleep study test    Vitamin D deficiency        Past Surgical History:   Procedure Laterality Date    APPENDECTOMY  2021    EGD N/A     NY EXC B9 LESION MRGN XCP SK TG T/A/L 3.1-4.0 CM Right 08/21/2023    Procedure: EXCISION SUBCUTANEOUS MASS RIGHT BACK (3CM);  Surgeon: Raúl Roman MD;  Location: [facility];  Service: General    NY LAPAROSCOPIC APPENDECTOMY N/A 01/23/2021    Procedure: APPENDECTOMY LAPAROSCOPIC;  Surgeon: Toan Mccoy DO;  Location: [facility];  Service: General    TUBAL LIGATION      WISDOM TOOTH EXTRACTION N/A 2005       Current Outpatient Medications   Medication Sig Dispense Refill    cetirizine (ZyrTEC) 10 mg tablet Take 1 tablet (10 mg total) by mouth daily 90 tablet 0    cholecalciferol (VITAMIN D3) 400 units tablet Take 400 Units by mouth daily      Coenzyme Q10 600 MG CHEW Chew      cyanocobalamin (VITAMIN B-12) 500 MCG tablet Take 500 mcg by mouth daily      diazepam (VALIUM) 5 mg tablet  (Patient not taking: Reported on 2/6/2024)      diclofenac (VOLTAREN) 75 mg EC tablet Take 1 tablet (75 mg total) by mouth 2 (two) times a day As needed for joint pain, take with food 60 tablet 6     Diclofenac Sodium (VOLTAREN) 1 % Apply 2 g topically 4 (four) times a day 100 g 6    fluticasone (FLONASE) 50 mcg/act nasal spray 1 spray into each nostril daily 16 g 0    Magnesium 250 MG TABS Take 250 mg by mouth in the morning OTC (Patient not taking: Reported on 12/22/2023)      meclizine (ANTIVERT) 25 mg tablet Take 1 tablet (25 mg total) by mouth every 8 (eight) hours as needed for dizziness (Patient not taking: Reported on 12/22/2023) 10 tablet 0    omeprazole (PriLOSEC) 40 MG capsule Take 1 capsule (40 mg total) by mouth daily 30 capsule 1    Prenatal-FeFum-FA-DHA w/o A (Prenatal + DHA) 27-1 & 250 MG THPK Take by mouth      Sodium Fluoride 1.1 % PSTE Take 1 Film by mouth daily at bedtime Use once per day at bedtime. Brush, spit out. Don't rinse. Let sit on teeth. Generic brand OK 51 g 3     No current facility-administered medications for this visit.        Allergies   Allergen Reactions    Gluten Meal - Food Allergy Abdominal Pain    Seasonal Ic [Octacosanol] Swelling     Severe swelling       Review of Systems   Constitutional:  Positive for fatigue. Negative for activity change, chills, diaphoresis, fever and unexpected weight change.        Ice chewing    HENT:  Negative for trouble swallowing and voice change.    Eyes:  Negative for photophobia and visual disturbance.   Respiratory:  Positive for shortness of breath. Negative for cough and chest tightness.    Cardiovascular:  Negative for chest pain, palpitations and leg swelling.   Gastrointestinal:  Negative for abdominal distention, abdominal pain, anal bleeding, blood in stool, constipation, diarrhea, nausea and vomiting.   Endocrine: Negative for cold intolerance and heat intolerance.   Genitourinary:  Negative for dysuria, hematuria, menstrual problem and urgency.   Musculoskeletal:  Negative for arthralgias and gait problem.   Skin:  Negative for pallor and rash.   Neurological:  Positive for headaches. Negative for dizziness, light-headedness  and numbness.   Hematological:  Negative for adenopathy. Bruises/bleeds easily.   Psychiatric/Behavioral:  Negative for confusion and sleep disturbance.        Video Exam    There were no vitals filed for this visit.    Physical Exam  Constitutional:       General: She is not in acute distress.     Appearance: Normal appearance. She is not ill-appearing.   Eyes:      Extraocular Movements: Extraocular movements intact.   Pulmonary:      Effort: Pulmonary effort is normal. No respiratory distress.   Musculoskeletal:      Cervical back: Normal range of motion.   Skin:     Coloration: Skin is not jaundiced or pale.   Neurological:      General: No focal deficit present.      Mental Status: She is alert and oriented to person, place, and time. Mental status is at baseline.   Psychiatric:         Mood and Affect: Mood normal.         Behavior: Behavior normal.         Thought Content: Thought content normal.         Judgment: Judgment normal.          Visit Time  Total Visit Duration: 30 minutes

## 2024-03-21 ENCOUNTER — APPOINTMENT (OUTPATIENT)
Dept: OCCUPATIONAL THERAPY | Facility: CLINIC | Age: 44
End: 2024-03-21
Payer: MEDICARE

## 2024-03-21 ENCOUNTER — PATIENT OUTREACH (OUTPATIENT)
Dept: OBGYN CLINIC | Facility: CLINIC | Age: 44
End: 2024-03-21

## 2024-03-21 ENCOUNTER — SOCIAL WORK (OUTPATIENT)
Dept: BEHAVIORAL/MENTAL HEALTH CLINIC | Facility: CLINIC | Age: 44
End: 2024-03-21

## 2024-03-21 DIAGNOSIS — F31.60 BIPOLAR 1 DISORDER, MIXED (HCC): Primary | ICD-10-CM

## 2024-03-21 DIAGNOSIS — J30.9 ALLERGIC RHINITIS, UNSPECIFIED SEASONALITY, UNSPECIFIED TRIGGER: ICD-10-CM

## 2024-03-21 DIAGNOSIS — J34.9 PARANASAL SINUS DISEASE: ICD-10-CM

## 2024-03-21 RX ORDER — FLUTICASONE PROPIONATE 50 MCG
2 SPRAY, SUSPENSION (ML) NASAL DAILY
Qty: 16 G | Refills: 0 | Status: SHIPPED | OUTPATIENT
Start: 2024-03-21
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
## 2024-03-21 NOTE — BH TREATMENT PLAN
Outpatient Behavioral Health Psychotherapy Treatment Plan    Esha Moreira  1980     Date of Initial Psychotherapy Assessment: 3/7/24   Date of Current Treatment Plan: 03/21/24  Treatment Plan Target Date: 9/17/24  Treatment Plan Expiration Date: 9/17/24    Diagnosis:   1. Bipolar 1 disorder, mixed (HCC)            Area(s) of Need: stress, depression, anger    Long Term Goal 1 (in the client's own words): To try to not be stressed about things as much and find more coping skills for depression    Stage of Change: Contemplation    Target Date for completion: TBD     Anticipated therapeutic modalities: CBT, solution focused, supportive therapy, mindfulness     People identified to complete this goal: Esha      Objective 1: (identify the means of measuring success in meeting the objective): Describe current and past experiences with stress and depression including the impact in functioning and attempts to resolve       Objective 2: (identify the means of measuring success in meeting the objective): Identify and replace thoughts and beliefs that increase stress and depression and implement behavioral strategies to overcome the overthinking and depression    Long Term Goal 2 (in the client's own words): to manager anger    Stage of Change: Contemplation    Target Date for completion: TBD     Anticipated therapeutic modalities: CBT, solution focused, supportive therapy     People identified to complete this goal: Esha      Objective 1: (identify the means of measuring success in meeting the objective): Client will identify a list of anger triggers and how cognitive, physiological, and affective factors into play that produce anger       Objective 2: (identify the means of measuring success in meeting the objective): Learn and implement calming and coping strategies as part of an overall approach to managing anger.      I am currently under the care of a [facility] psychiatric provider: no    My [facility]  "psychiatric provider is: n/a    I am currently taking psychiatric medications: No    I feel that I will be ready for discharge from mental health care when I reach the following (measurable goal/objective): pt said \"once I get a handle on everything and can deal with everything better than I am now\"     For children and adults who have a legal guardian:   Has there been any change to custody orders and/or guardianship status? NA. If yes, attach updated documentation.    I have created my Crisis Plan and have been offered a copy of this plan    Behavioral Health Treatment Plan [facility]: Diagnosis and Treatment Plan explained to Esha Moreira acknowledges an understanding of their diagnosis. Esha Moreira agrees to this treatment plan.    I have been offered a copy of this Treatment Plan. yes        "

## 2024-03-21 NOTE — PROGRESS NOTES
REJI PARRA contacted PT to f/u.     PT reports that today she had her first meeting her therapist from [facility] and that she completed a treatment plan setting goals for herself. PT reports additional concerns for physical body aches and getting news about having arthritis in her wrist. PT reports to be feeling depressed but believed biweekly therapy will be helpful.     REJI PARRA provided PT with contact information and encouraged PT to contact REJI PARRA for additional support. REJI PARRA will otherwise close this referral at this time.
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
## 2024-03-26 DIAGNOSIS — J34.9 PARANASAL SINUS DISEASE: ICD-10-CM

## 2024-03-26 DIAGNOSIS — J30.9 ALLERGIC RHINITIS, UNSPECIFIED SEASONALITY, UNSPECIFIED TRIGGER: ICD-10-CM

## 2024-03-27 ENCOUNTER — OFFICE VISIT (OUTPATIENT)
Dept: OCCUPATIONAL THERAPY | Facility: CLINIC | Age: 44
End: 2024-03-27
Payer: MEDICARE

## 2024-03-27 ENCOUNTER — TELEPHONE (OUTPATIENT)
Age: 44
End: 2024-03-27

## 2024-03-27 DIAGNOSIS — M25.532 LEFT WRIST PAIN: Primary | ICD-10-CM

## 2024-03-27 DIAGNOSIS — M25.531 RIGHT WRIST PAIN: ICD-10-CM

## 2024-03-27 PROCEDURE — 97140 MANUAL THERAPY 1/> REGIONS: CPT

## 2024-03-27 PROCEDURE — 97110 THERAPEUTIC EXERCISES: CPT

## 2024-03-27 RX ORDER — CETIRIZINE HYDROCHLORIDE 10 MG/1
10 TABLET ORAL DAILY
Qty: 90 TABLET | Refills: 0 | Status: SHIPPED | OUTPATIENT
Start: 2024-03-27

## 2024-03-27 NOTE — TELEPHONE ENCOUNTER
Incoming call:    Patient inquiring if Rheumatology treats ganglion cysts or should she see ortho. States she has a hx of arthritis.       All questions answered. No further needs at the moment.

## 2024-03-27 NOTE — PROGRESS NOTES
"Daily Note     Today's date: 3/27/2024  Patient name: Esha Moreira  : 1980  MRN: [id number]  Referring provider: Ludy Geller MD  Dx:   Encounter Diagnosis     ICD-10-CM    1. Left wrist pain  M25.532       2. Right wrist pain  M25.531               Start Time: 1105  Stop Time: 1140  Total time in clinic (min): 35 minutes    Subjective: Patient offered no change in function      Objective: See treatment diary below      Assessment:  \"The tape doesn't really stay on so it doesn't do much.\" Patient received ultrasound results and was told she has two cysts in her left wrist. She is scheduled for follow up with physician on 24. Patient wishes to put therapy on hold at this time until after she sees physician. Strengthening exercises not performed with left side at this time secondary to increased pain with use.      Plan: Progress treatment as tolerated.       Precautions: Universal      Manuals 2/15 2/21 2/23 2/28 3/1 3/27       IASTM  10' 10' 10' 10' 10'       Taping  L L L L                                  Neuro Re-Ed                                                                                                        Ther Ex             HEP Wrist AROM    Tendon glide    FCR iso    APL Iso            Med ball rolls  1' each hand 2' 2' 2'        Wrist PROM  5' held          Wrist Maze  5x 5x 3x b/l 3x 3x b/l       Wrist PRE   Dowel x 20  2# x10 b/l 2# x10 R only       TB on wall  5x each 5x 5x         Wrist ISO FCR             APL iso             Flexbar on table  Y 10x Y 10x Y 10x    Y F/E x 20 Y 10x    Y F/E  x20 Y 10x R only       Ther Activity             keypegs  Transl in/opp out x1 x1 x1 x1 b/l                                                           Modalities             MHP  5' 5' 5' 5' 5'                       "

## 2024-03-28 NOTE — TELEPHONE ENCOUNTER
She should see Ortho, specifically hand surgery to discuss what interventions can be done for the cysts themselves. They can potentially go away on their own with the arthritis getting under control.

## 2024-03-29 NOTE — TELEPHONE ENCOUNTER
Relayed message to patient and gave them the central scheduling phone number and advised should there be any issues to send a [employer] message or to call the office.

## 2024-04-03 ENCOUNTER — HOSPITAL ENCOUNTER (OUTPATIENT)
Dept: INFUSION CENTER | Facility: HOSPITAL | Age: 44
Discharge: HOME | End: 2024-04-03
Attending: INTERNAL MEDICINE
Payer: MEDICARE

## 2024-04-03 VITALS
SYSTOLIC BLOOD PRESSURE: 142 MMHG | DIASTOLIC BLOOD PRESSURE: 70 MMHG | TEMPERATURE: 97 F | RESPIRATION RATE: 16 BRPM | HEART RATE: 80 BPM

## 2024-04-03 DIAGNOSIS — E61.1 HYPOFERREMIA: Primary | ICD-10-CM

## 2024-04-03 DIAGNOSIS — K21.9 GASTROESOPHAGEAL REFLUX DISEASE WITHOUT ESOPHAGITIS: ICD-10-CM

## 2024-04-03 PROCEDURE — 96365 THER/PROPH/DIAG IV INF INIT: CPT

## 2024-04-03 PROCEDURE — 96366 THER/PROPH/DIAG IV INF ADDON: CPT

## 2024-04-03 RX ORDER — OMEPRAZOLE 40 MG/1
40 CAPSULE, DELAYED RELEASE ORAL DAILY
Qty: 30 CAPSULE | Refills: 1 | Status: SHIPPED | OUTPATIENT
Start: 2024-04-03

## 2024-04-03 RX ORDER — SODIUM CHLORIDE 9 MG/ML
20 INJECTION, SOLUTION INTRAVENOUS ONCE
Status: COMPLETED | OUTPATIENT
Start: 2024-04-03 | End: 2024-04-03

## 2024-04-03 RX ORDER — SODIUM CHLORIDE 9 MG/ML
20 INJECTION, SOLUTION INTRAVENOUS ONCE
Status: CANCELLED | OUTPATIENT
Start: 2024-04-10

## 2024-04-03 RX ADMIN — IRON SUCROSE 300 MG: 20 INJECTION, SOLUTION INTRAVENOUS at 09:39

## 2024-04-03 RX ADMIN — SODIUM CHLORIDE 20 ML/HR: 0.9 INJECTION, SOLUTION INTRAVENOUS at 09:39

## 2024-04-03 NOTE — PROGRESS NOTES
Esha Moreira  tolerated treatment well with no complications.      Esha Moreira is aware of future appt on 4/11 @ 0900.    AVS printed and given to Esha Moreira:  Yes

## 2024-04-04 ENCOUNTER — HOSPITAL ENCOUNTER (OUTPATIENT)
Dept: RADIOLOGY | Facility: CLINIC | Age: 44
End: 2024-04-04
Payer: MEDICARE

## 2024-04-04 ENCOUNTER — SOCIAL WORK (OUTPATIENT)
Dept: BEHAVIORAL/MENTAL HEALTH CLINIC | Facility: CLINIC | Age: 44
End: 2024-04-04

## 2024-04-04 VITALS
OXYGEN SATURATION: 99 % | SYSTOLIC BLOOD PRESSURE: 154 MMHG | RESPIRATION RATE: 18 BRPM | TEMPERATURE: 97.7 F | HEART RATE: 75 BPM | DIASTOLIC BLOOD PRESSURE: 82 MMHG

## 2024-04-04 DIAGNOSIS — M54.12 CERVICAL RADICULOPATHY: ICD-10-CM

## 2024-04-04 DIAGNOSIS — F31.60 BIPOLAR 1 DISORDER, MIXED (HCC): Primary | ICD-10-CM

## 2024-04-04 PROCEDURE — 62321 NJX INTERLAMINAR CRV/THRC: CPT | Performed by: ANESTHESIOLOGY

## 2024-04-04 RX ORDER — METHYLPREDNISOLONE ACETATE 80 MG/ML
80 INJECTION, SUSPENSION INTRA-ARTICULAR; INTRALESIONAL; INTRAMUSCULAR; PARENTERAL; SOFT TISSUE ONCE
Status: COMPLETED | OUTPATIENT
Start: 2024-04-04 | End: 2024-04-04

## 2024-04-04 RX ADMIN — METHYLPREDNISOLONE ACETATE 80 MG: 80 INJECTION, SUSPENSION INTRA-ARTICULAR; INTRALESIONAL; INTRAMUSCULAR; PARENTERAL; SOFT TISSUE at 15:39

## 2024-04-04 RX ADMIN — IOHEXOL 1 ML: 300 INJECTION, SOLUTION INTRAVENOUS at 15:39
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
## 2024-04-04 NOTE — H&P
History of Present Illness: The patient is a 43 y.o. female who presents with complaints of neck and arm pain is here today for cervical epidural steroid injection    Past Medical History:   Diagnosis Date    Abnormal Pap smear of cervix     Anemia     had iron infusions to treat    Celiac disease     Depression     GERD (gastroesophageal reflux disease)     GERD without esophagitis     Heartburn     Hiatal hernia     Hypertension     Microcytic anemia 07/15/2019    Sleep apnea     borderline + per sleep study test    Vitamin D deficiency        Past Surgical History:   Procedure Laterality Date    APPENDECTOMY  2021    EGD N/A     IA EXC B9 LESION MRGN XCP SK TG T/A/L 3.1-4.0 CM Right 08/21/2023    Procedure: EXCISION SUBCUTANEOUS MASS RIGHT BACK (3CM);  Surgeon: Raúl Roman MD;  Location: [facility];  Service: General    IA LAPAROSCOPIC APPENDECTOMY N/A 01/23/2021    Procedure: APPENDECTOMY LAPAROSCOPIC;  Surgeon: Toan Mccoy DO;  Location: [facility];  Service: General    TUBAL LIGATION      WISDOM TOOTH EXTRACTION N/A 2005         Current Outpatient Medications:     cetirizine (ZyrTEC) 10 mg tablet, Take 1 tablet (10 mg total) by mouth daily, Disp: 90 tablet, Rfl: 0    cholecalciferol (VITAMIN D3) 400 units tablet, Take 400 Units by mouth daily, Disp: , Rfl:     Coenzyme Q10 600 MG CHEW, Chew, Disp: , Rfl:     cyanocobalamin (VITAMIN B-12) 500 MCG tablet, Take 500 mcg by mouth daily, Disp: , Rfl:     diazepam (VALIUM) 5 mg tablet, , Disp: , Rfl:     diclofenac (VOLTAREN) 75 mg EC tablet, Take 1 tablet (75 mg total) by mouth 2 (two) times a day As needed for joint pain, take with food, Disp: 60 tablet, Rfl: 6    Diclofenac Sodium (VOLTAREN) 1 %, Apply 2 g topically 4 (four) times a day, Disp: 100 g, Rfl: 6    fluticasone (FLONASE) 50 mcg/act nasal spray, 2 sprays into each nostril daily, Disp: 16 g, Rfl: 0    Magnesium 250 MG TABS, Take 250 mg by mouth in the morning OTC (Patient not taking: Reported on  12/22/2023), Disp: , Rfl:     meclizine (ANTIVERT) 25 mg tablet, Take 1 tablet (25 mg total) by mouth every 8 (eight) hours as needed for dizziness (Patient not taking: Reported on 12/22/2023), Disp: 10 tablet, Rfl: 0    omeprazole (PriLOSEC) 40 MG capsule, TAKE 1 CAPSULE(40 MG) BY MOUTH DAILY, Disp: 30 capsule, Rfl: 1    Prenatal-FeFum-FA-DHA w/o A (Prenatal + DHA) 27-1 & 250 MG THPK, Take by mouth, Disp: , Rfl:     Sodium Fluoride 1.1 % PSTE, Take 1 Film by mouth daily at bedtime Use once per day at bedtime. Brush, spit out. Don't rinse. Let sit on teeth. Generic brand OK, Disp: 51 g, Rfl: 3    Current Facility-Administered Medications:     iohexol (OMNIPAQUE) 300 mg/mL injection 1 mL, 1 mL, Epidural, Once, Jerod Parnell MD    methylPREDNISolone acetate (DEPO-MEDROL) injection 80 mg, 80 mg, Epidural, Once, Jerod Parnell MD    Allergies   Allergen Reactions    Gluten Meal - Food Allergy Abdominal Pain    Seasonal Ic [Octacosanol] Swelling     Severe swelling       Physical Exam:   Vitals:    04/04/24 1531   BP: 145/87   Pulse: 75   Resp: 20   Temp: 97.7 °F (36.5 °C)   SpO2: 99%     General: Awake, Alert, Oriented x 3, Mood and affect appropriate  Respiratory: Respirations even and unlabored  Cardiovascular: Peripheral pulses intact; no edema  Musculoskeletal Exam: Neck and arm pain    ASA Score: 3    Patient/Chart Verification  Patient ID Verified: Verbal  ID Band Applied: No  Consents Confirmed: Procedural, To be obtained in the Pre-Procedure area  Interval H&P(within 24 hr) Complete (required for Outpatients and Surgery Admit only): To be obtained in the Pre-Procedure area  Allergies Reviewed: Yes  Anticoag/NSAID held?: NA  Currently on antibiotics?: No    Assessment:   1. Cervical radiculopathy        Plan: BEAN

## 2024-04-04 NOTE — DISCHARGE INSTR - LAB
Epidural Steroid Injection   WHAT YOU NEED TO KNOW:   An epidural steroid injection (MADINA) is a procedure to inject steroid medicine into the epidural space. The epidural space is between your spinal cord and vertebrae. Steroids reduce inflammation and fluid buildup in your spine that may be causing pain. You may be given pain medicine along with the steroids.          ACTIVITY  Do not drive or operate machinery today.  No strenuous activity today - bending, lifting, etc.  You may resume normal activites starting tomorrow - start slowly and as tolerated.  You may shower today, but no tub baths or hot tubs.  You may have numbness for several hours from the local anesthetic. Please use caution and common sense, especially with weight-bearing activities.    CARE OF THE INJECTION SITE  If you have soreness or pain, apply ice to the area today (20 minutes on/20 minutes off).  Starting tomorrow, you may use warm, moist heat or ice if needed.  You may have an increase or change in your discomfort for 36-48 hours after your treatment.  Apply ice and continue with any pain medication you have been prescribed.  Notify the [facility] if you have any of the following: redness, drainage, swelling, headache, stiff neck or fever above 100°F.    SPECIAL INSTRUCTIONS  Our office will contact you in approximately 7 days for a progress report.    MEDICATIONS  Continue to take all routine medications.  Our office may have instructed you to hold some medications.    As no general anesthesia was used in today's procedure, you should not experience any side effects related to anesthesia.     If you are diabetic, the steroids used in today's injection may temporarily increase your blood sugar levels after the first few days after your injection. Please keep a close eye on your sugars and alert the doctor who manages your diabetes if your sugars are significantly high from your baseline or you are symptomatic.     If you have a  problem specifically related to your procedure, please call our office at [phone].  Problems not related to your procedure should be directed to your primary care physician.

## 2024-04-11 ENCOUNTER — TELEPHONE (OUTPATIENT)
Dept: PAIN MEDICINE | Facility: CLINIC | Age: 44
End: 2024-04-11

## 2024-04-11 ENCOUNTER — HOSPITAL ENCOUNTER (OUTPATIENT)
Dept: INFUSION CENTER | Facility: HOSPITAL | Age: 44
Discharge: HOME | End: 2024-04-11
Attending: INTERNAL MEDICINE
Payer: MEDICARE

## 2024-04-11 VITALS
TEMPERATURE: 97.6 F | DIASTOLIC BLOOD PRESSURE: 87 MMHG | SYSTOLIC BLOOD PRESSURE: 127 MMHG | HEART RATE: 72 BPM | RESPIRATION RATE: 18 BRPM

## 2024-04-11 DIAGNOSIS — E61.1 HYPOFERREMIA: Primary | ICD-10-CM

## 2024-04-11 PROCEDURE — 96365 THER/PROPH/DIAG IV INF INIT: CPT

## 2024-04-11 RX ORDER — SODIUM CHLORIDE 9 MG/ML
20 INJECTION, SOLUTION INTRAVENOUS ONCE
Status: COMPLETED | OUTPATIENT
Start: 2024-04-11 | End: 2024-04-11

## 2024-04-11 RX ORDER — SODIUM CHLORIDE 9 MG/ML
20 INJECTION, SOLUTION INTRAVENOUS ONCE
Status: CANCELLED | OUTPATIENT
Start: 2024-04-18

## 2024-04-11 RX ADMIN — SODIUM CHLORIDE 20 ML/HR: 0.9 INJECTION, SOLUTION INTRAVENOUS at 09:25

## 2024-04-11 RX ADMIN — IRON SUCROSE 300 MG: 20 INJECTION, SOLUTION INTRAVENOUS at 09:25

## 2024-04-11 NOTE — PROGRESS NOTES
Esha Moreira  tolerated treatment well with no complications.      Esha Moreira is aware of future appt on 04/18/24 at 11am.     AVS printed and given to Esha Moreira:  No (Declined by Esha Moreira)

## 2024-04-16 ENCOUNTER — TELEPHONE (OUTPATIENT)
Age: 44
End: 2024-04-16

## 2024-04-17 DIAGNOSIS — J30.9 ALLERGIC RHINITIS, UNSPECIFIED SEASONALITY, UNSPECIFIED TRIGGER: ICD-10-CM

## 2024-04-17 DIAGNOSIS — J34.9 PARANASAL SINUS DISEASE: ICD-10-CM

## 2024-04-17 RX ORDER — FLUTICASONE PROPIONATE 50 MCG
2 SPRAY, SUSPENSION (ML) NASAL DAILY
Qty: 16 G | Refills: 0 | Status: SHIPPED | OUTPATIENT
Start: 2024-04-17

## 2024-04-18 ENCOUNTER — HOSPITAL ENCOUNTER (OUTPATIENT)
Dept: INFUSION CENTER | Facility: CLINIC | Age: 44
Discharge: HOME | End: 2024-04-18
Payer: MEDICARE

## 2024-04-18 VITALS
RESPIRATION RATE: 18 BRPM | HEART RATE: 68 BPM | OXYGEN SATURATION: 99 % | SYSTOLIC BLOOD PRESSURE: 125 MMHG | TEMPERATURE: 96.6 F | DIASTOLIC BLOOD PRESSURE: 84 MMHG

## 2024-04-18 DIAGNOSIS — E61.1 HYPOFERREMIA: Primary | ICD-10-CM

## 2024-04-18 PROCEDURE — 96365 THER/PROPH/DIAG IV INF INIT: CPT

## 2024-04-18 RX ORDER — SODIUM CHLORIDE 9 MG/ML
20 INJECTION, SOLUTION INTRAVENOUS ONCE
Status: CANCELLED | OUTPATIENT
Start: 2024-04-25

## 2024-04-18 RX ORDER — SODIUM CHLORIDE 9 MG/ML
20 INJECTION, SOLUTION INTRAVENOUS ONCE
Status: COMPLETED | OUTPATIENT
Start: 2024-04-18 | End: 2024-04-18

## 2024-04-18 RX ADMIN — IRON SUCROSE 300 MG: 20 INJECTION, SOLUTION INTRAVENOUS at 11:11

## 2024-04-18 RX ADMIN — SODIUM CHLORIDE 20 ML/HR: 0.9 INJECTION, SOLUTION INTRAVENOUS at 11:08

## 2024-04-19 ENCOUNTER — SOCIAL WORK (OUTPATIENT)
Dept: BEHAVIORAL/MENTAL HEALTH CLINIC | Facility: CLINIC | Age: 44
End: 2024-04-19

## 2024-04-19 ENCOUNTER — TELEMEDICINE (OUTPATIENT)
Dept: RHEUMATOLOGY | Facility: CLINIC | Age: 44
End: 2024-04-19
Payer: MEDICARE

## 2024-04-19 DIAGNOSIS — F31.60 BIPOLAR 1 DISORDER, MIXED (HCC): Primary | ICD-10-CM

## 2024-04-19 DIAGNOSIS — Z79.899 HIGH RISK MEDICATION USE: ICD-10-CM

## 2024-04-19 DIAGNOSIS — M06.00 SERONEGATIVE RHEUMATOID ARTHRITIS (HCC): Primary | ICD-10-CM

## 2024-04-19 PROCEDURE — 99214 OFFICE O/P EST MOD 30 MIN: CPT | Performed by: INTERNAL MEDICINE

## 2024-04-19 RX ORDER — NAPROXEN 500 MG/1
500 TABLET ORAL 2 TIMES DAILY WITH MEALS
Qty: 60 TABLET | Refills: 6 | Status: SHIPPED | OUTPATIENT
Start: 2024-04-19

## 2024-04-19 RX ORDER — HYDROXYCHLOROQUINE SULFATE 200 MG/1
300 TABLET, FILM COATED ORAL DAILY
Qty: 45 TABLET | Refills: 6 | Status: SHIPPED | OUTPATIENT
Start: 2024-04-19 | End: 2024-05-14 | Stop reason: SDUPTHER

## 2024-04-19 RX ORDER — PREDNISONE 5 MG/1
TABLET ORAL
Qty: 70 TABLET | Refills: 0 | Status: SHIPPED | OUTPATIENT
Start: 2024-04-19

## 2024-04-19 NOTE — PATIENT INSTRUCTIONS
Start hydroxychloroquine one and a half tabs daily; get regular eye exams  Take prednisone taper  Can take naproxen twice a day as needed for joint pain, take with food  Do labs before next visit    Return to clinic 11/14/24 at 10am at [place]

## 2024-04-19 NOTE — PROGRESS NOTES
Virtual Regular Visit    Verification of patient location:    Patient is located at Home in the following state in which I hold an active license [place]      Assessment/Plan:  42yo female with newly diagnosed seronegative rheumatoid arthritis based on hands and wrist [facility] ultrasound.  Her symptoms are uncontrolled, and she needs to start DMARD therapy.  Her disease was thoroughly discussed with the patient. Patient's rheumatologic disease(s) threaten long-term function if not appropriately treated.    Start hydroxychloroquine one and a half tabs daily; get regular eye exams  Take prednisone taper  Can take naproxen twice a day as needed for joint pain, take with food  Do labs before next visit    Problem List Items Addressed This Visit    None  Visit Diagnoses       Seronegative rheumatoid arthritis (HCC)    -  Primary    Relevant Medications    naproxen (Naprosyn) 500 mg tablet    hydroxychloroquine (PLAQUENIL) 200 mg tablet    predniSONE 5 mg tablet    Other Relevant Orders    Sedimentation rate, automated    C-reactive protein    High risk medication use              High risk medication use - Benefits and risks of hydroxychloroquine, including but not limited to retinal toxicity, corneal deposits, gastrointestinal side effects, and headaches were discussed with the patient. The need for a regular eye exam to monitor for ocular toxicity while on this medication was also explained to the patient.     Return to clinic 11/14/24 at 10am at [place]        Reason for visit is follow-up of newly diagnosed seronegative RA  Chief Complaint   Patient presents with   • Virtual Regular Visit          Encounter provider Ludy Geller MD    Provider located at [facility] [place] [facility] [address] [place] [id number] [phone]      Recent Visits  No visits were found meeting these conditions.  Showing recent visits within past 7 days and meeting all other  requirements  Today's Visits  Date Type Provider Dept   04/19/24 Telemedicine Ludy Geller MD  [facility]   Showing today's visits and meeting all other requirements  Future Appointments  No visits were found meeting these conditions.  Showing future appointments within next 150 days and meeting all other requirements       The patient was identified by name and date of birth. Esha Moreira was informed that this is a telemedicine visit and that the visit is being conducted through the Epic Embedded platform. She agrees to proceed..  My office door was closed. No one else was in the room.  She acknowledged consent and understanding of privacy and security of the video platform. The patient has agreed to participate and understands they can discontinue the visit at any time.    Patient is aware this is a billable service.     Subjective/HPI  Esha Moreira is a 43 y.o. female who presents for follow-up of newly diagnosed seronegative Rheumatoid arthritis, confirmed on hand and wirst ultrasound. She has a hand surgeon appointment to address her wrist cysts. Left wrist has the cyst. Right wrist is just painful.  Voltaren gel only helps for a few minutes.    Past Medical History:   Diagnosis Date   • Abnormal Pap smear of cervix    • Anemia     had iron infusions to treat   • Celiac disease    • Depression    • GERD (gastroesophageal reflux disease)    • GERD without esophagitis    • Heartburn    • Hiatal hernia    • Hypertension    • Microcytic anemia 07/15/2019   • Sleep apnea     borderline + per sleep study test   • Vitamin D deficiency        Past Surgical History:   Procedure Laterality Date   • APPENDECTOMY  2021   • EGD N/A    • RI EXC B9 LESION MRGN XCP SK TG T/A/L 3.1-4.0 CM Right 08/21/2023    Procedure: EXCISION SUBCUTANEOUS MASS RIGHT BACK (3CM);  Surgeon: Raúl Roman MD;  Location:  MAIN OR;  Service: General   • RI LAPAROSCOPIC APPENDECTOMY N/A 01/23/2021    Procedure:  APPENDECTOMY LAPAROSCOPIC;  Surgeon: Toan Mccoy DO;  Location: [facility];  Service: General   • TUBAL LIGATION     • WISDOM TOOTH EXTRACTION N/A 2005       Current Outpatient Medications   Medication Sig Dispense Refill   • hydroxychloroquine (PLAQUENIL) 200 mg tablet Take 1.5 tablets (300 mg total) by mouth in the morning 45 tablet 6   • naproxen (Naprosyn) 500 mg tablet Take 1 tablet (500 mg total) by mouth 2 (two) times a day with meals As needed for joint pain 60 tablet 6   • predniSONE 5 mg tablet 4 tabs x7 days, then 3 tabs x7 days, then 2 tabs x7 days, then 1 tab x7 days, then stop. 70 tablet 0   • cetirizine (ZyrTEC) 10 mg tablet Take 1 tablet (10 mg total) by mouth daily 90 tablet 0   • cholecalciferol (VITAMIN D3) 400 units tablet Take 400 Units by mouth daily     • Coenzyme Q10 600 MG CHEW Chew     • cyanocobalamin (VITAMIN B-12) 500 MCG tablet Take 500 mcg by mouth daily     • diazepam (VALIUM) 5 mg tablet  (Patient not taking: Reported on 2/6/2024)     • fluticasone (FLONASE) 50 mcg/act nasal spray SHAKE LIQUID AND USE 2 SPRAYS IN EACH NOSTRIL DAILY 16 g 0   • Magnesium 250 MG TABS Take 250 mg by mouth in the morning OTC (Patient not taking: Reported on 12/22/2023)     • meclizine (ANTIVERT) 25 mg tablet Take 1 tablet (25 mg total) by mouth every 8 (eight) hours as needed for dizziness (Patient not taking: Reported on 12/22/2023) 10 tablet 0   • omeprazole (PriLOSEC) 40 MG capsule TAKE 1 CAPSULE(40 MG) BY MOUTH DAILY 30 capsule 1   • Prenatal-FeFum-FA-DHA w/o A (Prenatal + DHA) 27-1 & 250 MG THPK Take by mouth     • Sodium Fluoride 1.1 % PSTE Take 1 Film by mouth daily at bedtime Use once per day at bedtime. Brush, spit out. Don't rinse. Let sit on teeth. Generic brand OK 51 g 3     No current facility-administered medications for this visit.        Allergies   Allergen Reactions   • Gluten Meal - Food Allergy Abdominal Pain   • Seasonal Ic [Octacosanol] Swelling     Severe swelling       Review of  Systems   Musculoskeletal:  Positive for arthralgias and joint swelling.   Skin:  Negative for rash.     Video Exam    There were no vitals filed for this visit.    Physical Exam  Constitutional:       General: She is not in acute distress.  HENT:      Head: Normocephalic and atraumatic.   Eyes:      Conjunctiva/sclera: Conjunctivae normal.   Pulmonary:      Effort: Pulmonary effort is normal. No respiratory distress.   Musculoskeletal:         General: Swelling present.      Cervical back: Neck supple.      Comments: Wrist swelling observed   Skin:     Coloration: Skin is not pale.   Neurological:      Mental Status: She is alert. Mental status is at baseline.   Psychiatric:         Mood and Affect: Mood normal.         Behavior: Behavior normal.        Visit Time  Total Visit Duration: 26 minutes

## 2024-04-19 NOTE — LETTER
May 2, 2024     Patient: Esha Moreira  YOB: 1980      To Whom it May Concern:    Esha Moreira is under my professional care. Esha has a diagnosis of seronegative Rheumatoid arthritis, for which she is on hydroxychloroquine.    If you have any questions or concerns, please don't hesitate to call.         Sincerely,          Ludy Geller MD

## 2024-04-25 ENCOUNTER — HOSPITAL ENCOUNTER (OUTPATIENT)
Dept: INFUSION CENTER | Facility: CLINIC | Age: 44
Discharge: HOME | End: 2024-04-25
Payer: MEDICARE

## 2024-04-25 DIAGNOSIS — E61.1 HYPOFERREMIA: Primary | ICD-10-CM

## 2024-04-25 PROCEDURE — 96365 THER/PROPH/DIAG IV INF INIT: CPT

## 2024-04-25 RX ORDER — SODIUM CHLORIDE 9 MG/ML
20 INJECTION, SOLUTION INTRAVENOUS ONCE
Status: CANCELLED | OUTPATIENT
Start: 2024-04-25

## 2024-04-25 RX ORDER — SODIUM CHLORIDE 9 MG/ML
20 INJECTION, SOLUTION INTRAVENOUS ONCE
Status: COMPLETED | OUTPATIENT
Start: 2024-04-25 | End: 2024-04-25

## 2024-04-25 RX ADMIN — IRON SUCROSE 300 MG: 20 INJECTION, SOLUTION INTRAVENOUS at 11:18

## 2024-04-25 RX ADMIN — SODIUM CHLORIDE 20 ML/HR: 0.9 INJECTION, SOLUTION INTRAVENOUS at 11:15

## 2024-04-25 NOTE — PROGRESS NOTES
Received for venofer. No complaints offered. IV initiated without issue. Meds given per md order.

## 2024-04-25 NOTE — PROGRESS NOTES
Treatment complete, no complaints offered. Pt has no further appts scheduled. Will follow up with MD in September. AVS declined.

## 2024-04-30 ENCOUNTER — APPOINTMENT (OUTPATIENT)
Dept: RADIOLOGY | Facility: AMBULARY SURGERY CENTER | Age: 44
End: 2024-04-30
Attending: STUDENT IN AN ORGANIZED HEALTH CARE EDUCATION/TRAINING PROGRAM
Payer: MEDICARE

## 2024-04-30 ENCOUNTER — OFFICE VISIT (OUTPATIENT)
Dept: OBGYN CLINIC | Facility: CLINIC | Age: 44
End: 2024-04-30
Payer: MEDICARE

## 2024-04-30 VITALS
DIASTOLIC BLOOD PRESSURE: 80 MMHG | SYSTOLIC BLOOD PRESSURE: 131 MMHG | BODY MASS INDEX: 26.98 KG/M2 | WEIGHT: 158 LBS | HEART RATE: 83 BPM | HEIGHT: 64 IN

## 2024-04-30 DIAGNOSIS — M67.40 GANGLION CYST: ICD-10-CM

## 2024-04-30 DIAGNOSIS — M67.40 GANGLION CYST: Primary | ICD-10-CM

## 2024-04-30 PROCEDURE — 99214 OFFICE O/P EST MOD 30 MIN: CPT | Performed by: STUDENT IN AN ORGANIZED HEALTH CARE EDUCATION/TRAINING PROGRAM

## 2024-04-30 PROCEDURE — 73110 X-RAY EXAM OF WRIST: CPT

## 2024-04-30 NOTE — PROGRESS NOTES
ORTHOPAEDIC HAND, WRIST, AND ELBOW OFFICE  VISIT      ASSESSMENT/PLAN:      Diagnoses and all orders for this visit:    Ganglion cyst  -     XR wrist 3+ vw left; Future  -     US msk guidance; Future          43 y.o. female with left wrist volar ganglion cyst   Treatment options and expected outcomes were discussed.  The patient verbalized understanding of exam findings and treatment plan.   The US was reviewed in the office today which conform volar left wrist ganglion cysts.  Discussed non operative versus surgical intervention.  The patient was given the opportunity to ask questions.  Questions were answered to the patient's satisfaction.  The patient decided to move forward with non operative treatment options.  A referral was provided for a US guided volar wrist aspiration.  Discussed recurrence rate with both non operative and surgical intervention.  We did discuss possible surgical intervention if the cst returns.  She may follow up with me as needed.      Follow Up:  PRN       To Do Next Visit:         Discussions:  Ganglion Cysts: The anatomy and physiology of the ganglion was discussed with the patient today in the office.  Fluid leaking out of the joint surface typically creates a small sac, which can enlarge and cause pain or limitation of motion.  Treatment options include observation, aspiration, or surgical incision were discussed with the patient today.  Observation typically lead to resolution and approximately 10% of patients, aspiration results in resolution of approximately 50% of patients, and surgical excision leads to resolution in approximately 97% of patients.  After discussion with the patient today, the patient voiced understanding of all treatment options.       Carlitos Negron MD  Attending, Orthopaedic Surgery  Hand, Wrist, and Elbow Surgery  [facility]    ______________________________________________________________________________________________    CHIEF  COMPLAINT:  Chief Complaint   Patient presents with   • Left Wrist - Cyst, Pain       SUBJECTIVE:  Patient is a 43 y.o. ambidextrous female who presents today for evaluation and treatment of left wrist mass. The patient notes a mass to the volar aspect of her wrist. She states she noticed this over the past few months. She states she did have pain in this area intermittently for years. She notes increased pain with pressure and doing push-ups. She did have ultrasounds which confirm ganglion cyst. She does have a history of RA.      Occupation: currently unemployed was a  and      I have personally reviewed all the relevant PMH, PSH, SH, FH, Medications and allergies      PAST MEDICAL HISTORY:  Past Medical History:   Diagnosis Date   • Abnormal Pap smear of cervix    • Anemia     had iron infusions to treat   • Celiac disease    • Depression    • GERD (gastroesophageal reflux disease)    • GERD without esophagitis    • Heartburn    • Hiatal hernia    • Hypertension    • Microcytic anemia 07/15/2019   • Sleep apnea     borderline + per sleep study test   • Vitamin D deficiency        PAST SURGICAL HISTORY:  Past Surgical History:   Procedure Laterality Date   • APPENDECTOMY  2021   • EGD N/A    • SC EXC B9 LESION MRGN XCP SK TG T/A/L 3.1-4.0 CM Right 08/21/2023    Procedure: EXCISION SUBCUTANEOUS MASS RIGHT BACK (3CM);  Surgeon: Raúl Roman MD;  Location:  MAIN OR;  Service: General   • SC LAPAROSCOPIC APPENDECTOMY N/A 01/23/2021    Procedure: APPENDECTOMY LAPAROSCOPIC;  Surgeon: Toan Mccoy DO;  Location:  MAIN OR;  Service: General   • TUBAL LIGATION     • WISDOM TOOTH EXTRACTION N/A 2005       FAMILY HISTORY:  Family History   Problem Relation Age of Onset   • Arthritis Mother    • Cervical cancer Mother    • Fibromyalgia Mother    • Mental illness Father    • Stroke Father    • Heart disease Father    • Heart disease Maternal Grandmother    • Hypertension Maternal Grandmother    •  Diabetes Maternal Grandmother    • No Known Problems Daughter    • No Known Problems Maternal Grandfather    • No Known Problems Paternal Grandmother    • Kidney disease Paternal Grandfather    • No Known Problems Brother    • No Known Problems Son    • Heart disease Brother    • No Known Problems Son    • No Known Problems Son    • No Known Problems Son        SOCIAL HISTORY:  Social History     Tobacco Use   • Smoking status: Former     Current packs/day: 0.00     Average packs/day: 1 pack/day for 15.0 years (15.0 ttl pk-yrs)     Types: Cigarettes     Start date: 2001     Quit date: 2016     Years since quittin.7     Passive exposure: Past   • Smokeless tobacco: Never   Vaping Use   • Vaping status: Never Used   Substance Use Topics   • Alcohol use: Yes     Comment: Occasionally   • Drug use: No       MEDICATIONS:    Current Outpatient Medications:   •  cetirizine (ZyrTEC) 10 mg tablet, Take 1 tablet (10 mg total) by mouth daily, Disp: 90 tablet, Rfl: 0  •  cholecalciferol (VITAMIN D3) 400 units tablet, Take 400 Units by mouth daily, Disp: , Rfl:   •  fluticasone (FLONASE) 50 mcg/act nasal spray, SHAKE LIQUID AND USE 2 SPRAYS IN EACH NOSTRIL DAILY, Disp: 16 g, Rfl: 0  •  hydroxychloroquine (PLAQUENIL) 200 mg tablet, Take 1.5 tablets (300 mg total) by mouth in the morning, Disp: 45 tablet, Rfl: 6  •  naproxen (Naprosyn) 500 mg tablet, Take 1 tablet (500 mg total) by mouth 2 (two) times a day with meals As needed for joint pain, Disp: 60 tablet, Rfl: 6  •  omeprazole (PriLOSEC) 40 MG capsule, TAKE 1 CAPSULE(40 MG) BY MOUTH DAILY, Disp: 30 capsule, Rfl: 1  •  predniSONE 5 mg tablet, 4 tabs x7 days, then 3 tabs x7 days, then 2 tabs x7 days, then 1 tab x7 days, then stop., Disp: 70 tablet, Rfl: 0  •  Sodium Fluoride 1.1 % PSTE, Take 1 Film by mouth daily at bedtime Use once per day at bedtime. Brush, spit out. Don't rinse. Let sit on teeth. Generic brand OK, Disp: 51 g, Rfl: 3  •  meclizine (ANTIVERT) 25  "mg tablet, Take 1 tablet (25 mg total) by mouth every 8 (eight) hours as needed for dizziness (Patient not taking: Reported on 12/22/2023), Disp: 10 tablet, Rfl: 0    ALLERGIES:  Allergies   Allergen Reactions   • Gluten Meal - Food Allergy Abdominal Pain   • Seasonal Ic [Octacosanol] Swelling     Severe swelling           REVIEW OF SYSTEMS:  Musculoskeletal:        As noted in HPI.   All other systems reviewed and are negative.    VITALS:  Vitals:    04/30/24 1455   BP: 131/80   Pulse: 83       LABS:  HgA1c: No results found for: \"HGBA1C\"  BMP:   Lab Results   Component Value Date    CALCIUM 8.9 09/13/2023    K 3.8 09/13/2023    CO2 28 09/13/2023     09/13/2023    BUN 10 09/13/2023    CREATININE 0.83 09/13/2023       _____________________________________________________  PHYSICAL EXAMINATION:  General: Well developed and well nourished, alert & oriented x 3, appears comfortable  Psychiatric: Normal  HEENT: Normocephalic, Atraumatic Trachea Midline, No torticollis  Pulmonary: No audible wheezing or respiratory distress   Abdomen/GI: Non tender, non distended   Cardiovascular: No pitting edema, 2+ radial pulse   Skin: No Erythema, No Lacerations, No Fluctuation, No Ulcerations  Neurovascular: Sensation Intact to the Median, Ulnar, Radial Nerve, Motor Intact to the Median, Ulnar, Radial Nerve, and Pulses Intact  Musculoskeletal: Normal, except as noted in detailed exam and in HPI.      MUSCULOSKELETAL EXAMINATION:  Left wrist  Volar ganglion cyst  Full fist  Full ROM  No erythema or warmth  Compartments soft  Brisk capillary refill    ___________________________________________________  STUDIES REVIEWED:  Xrays of the left wrist were reviewed and independently interpreted in PACS by Dr. Negron and demonstrate no osseous abnormalities. and Ultrasound report was reviewed and demonstrates a 0.6 x 0.4 x 0.6 cm cystic ganglion along the volar radial aspect of the wrist. Immediately adjacent to this is a smaller " 0.4 x 0.5 x 0.5 cm cystic ganglion.         PROCEDURES PERFORMED:  Procedures  No Procedures performed today    _____________________________________________________      Scribe Attestation    I,:  Angi Goodwin MA am acting as a scribe while in the presence of the attending physician.:       I,:  Carlitos Negron MD personally performed the services described in this documentation    as scribed in my presence.:

## 2024-05-14 DIAGNOSIS — M06.00 SERONEGATIVE RHEUMATOID ARTHRITIS (HCC): ICD-10-CM

## 2024-05-14 RX ORDER — HYDROXYCHLOROQUINE SULFATE 200 MG/1
300 TABLET, FILM COATED ORAL DAILY
Qty: 45 TABLET | Refills: 6 | Status: SHIPPED | OUTPATIENT
Start: 2024-05-14 | End: 2024-12-10

## 2024-05-15 NOTE — TELEPHONE ENCOUNTER
Pt was supposed to be scheduled for follow-up with me on 11/14 at 10am at [place] but I don't see that scheduled for some reason, and slot is now taken. Can you call and get this patient schedule for to as close to this date and time as possible?

## 2024-05-20 NOTE — TELEPHONE ENCOUNTER
Called and Left message for patient to schedule Nov visit with Dr. Geller at [facility] can offer 11/7 @ 8:30am

## 2024-05-23 ENCOUNTER — TELEPHONE (OUTPATIENT)
Dept: NON INVASIVE DIAGNOSTICS | Facility: HOSPITAL | Age: 44
End: 2024-05-23

## 2024-05-23 NOTE — TELEPHONE ENCOUNTER
Attempted to call patient to discuss upcoming procedure x 2. Message left to discuss upcoming left wrist ganglion cyst aspiration at [facility]. Pre procedure instructions including diet, taking own medications and need for  reviewed for  patient. Patient instructed that she may eat normally and take medications as usual before the procedure. Procedure and post procedure expectations and instructions reviewed with the patient.  Reminded of the location, date and time of the expected procedure. Name and contact number provided in case patient has any further questions.

## 2024-06-07 DIAGNOSIS — K21.9 GASTROESOPHAGEAL REFLUX DISEASE WITHOUT ESOPHAGITIS: ICD-10-CM

## 2024-06-07 RX ORDER — OMEPRAZOLE 40 MG/1
40 CAPSULE, DELAYED RELEASE ORAL DAILY
Qty: 90 CAPSULE | Refills: 1 | Status: SHIPPED | OUTPATIENT
Start: 2024-06-07

## 2024-06-07 NOTE — TELEPHONE ENCOUNTER
Medication: omeprazole (PriLOSEC) 40 MG capsule     Dose/Frequency: TAKE 1 CAPSULE(40 MG) BY MOUTH DAILY     Quantity: 30 Caps    Pharmacy:  [id number] - [address]      Office:   [x] PCP/Provider -   [] Speciality/Provider -     Does the patient have enough for 3 days?   [] Yes   [x] No - Send as HP to POD

## 2024-06-10 ENCOUNTER — OFFICE VISIT (OUTPATIENT)
Dept: DENTISTRY | Facility: CLINIC | Age: 44
End: 2024-06-10

## 2024-06-10 VITALS — HEART RATE: 75 BPM | DIASTOLIC BLOOD PRESSURE: 86 MMHG | SYSTOLIC BLOOD PRESSURE: 136 MMHG

## 2024-06-10 DIAGNOSIS — K06.010 GINGIVAL RECESSION, LOCALIZED: Primary | ICD-10-CM

## 2024-06-10 PROCEDURE — D4278: HCPCS

## 2024-06-10 RX ORDER — CHLORHEXIDINE GLUCONATE ORAL RINSE 1.2 MG/ML
15 SOLUTION DENTAL 2 TIMES DAILY
Qty: 120 ML | Refills: 0 | Status: SHIPPED | OUTPATIENT
Start: 2024-06-10

## 2024-06-10 RX ORDER — HYDROCODONE BITARTRATE AND ACETAMINOPHEN 5; 325 MG/1; MG/1
1 TABLET ORAL EVERY 6 HOURS PRN
Qty: 6 TABLET | Refills: 0 | Status: SHIPPED | OUTPATIENT
Start: 2024-06-10

## 2024-06-10 RX ORDER — AMOXICILLIN 500 MG/1
500 CAPSULE ORAL EVERY 8 HOURS SCHEDULED
Qty: 21 CAPSULE | Refills: 0 | Status: SHIPPED | OUTPATIENT
Start: 2024-06-10 | End: 2024-06-17

## 2024-06-10 NOTE — DENTAL PROCEDURE DETAILS
Ginigival Graft #24 and #25 and Frenum Removal    PMH reviewed, No changes, ASA II. PMH includes GERD, micorcytic anemia, celiac disease, YESSICA, DD, Arthrititis, and Bipolar Type I.     Patient presents for two stage ginigival graft. Today will be gingival graft and frenum release. Second stage will be coronal repositioning of the flap in 6 months. 50% coverage is most likely.     20% benzocaine placed for a minute. 3 carpules of 2% lidocaine 1:100 000 epinephrine and 1 carpule of 4% septocaine 1:100 000 epinephrine given via infiltrations on mandible and left palate.    Partial thickness flap harvested from UL palate. Adipose tissue removed. Tissue kept hydrated with sterile instruments. Frenum buccal to #24 and #25 released with partial thickness flap. Sutured harvested graft with 5-0 PG3 vicryl sutures.    Reviewed post op instructions. Vacuform given to patient. Meds sent to pharmacy. Reviewed with patient not to use chlorhexidine until 3 days later.    Pictures:                      Attending: Dr Rosas    NV: One Week Post Op of Free Ginigval Graft

## 2024-06-17 ENCOUNTER — OFFICE VISIT (OUTPATIENT)
Dept: DENTISTRY | Facility: CLINIC | Age: 44
End: 2024-06-17

## 2024-06-17 DIAGNOSIS — Z09 FOLLOW-UP EXAMINATION: Primary | ICD-10-CM

## 2024-06-17 PROCEDURE — D0191 ASSESSMENT OF A PATIENT: HCPCS

## 2024-06-17 NOTE — DENTAL PROCEDURE DETAILS
Pt presents to clinic for 1 week post op follow up after free gingival graft surgery.     PMH, no changes pt is ASA 2.    Pt reports minimal pain at this visit, however was in lots of pain a few days after surgery. Pt self adjusted essix retainer for palatal coverage due to sharp edge and reports it fits with comfort. Pt states she feels bruising in mandibular anterior, but is able to eat foods and is functioning well. Confirmed pt took all medications as prescribed.    Examined palatal wound site, noted good coagulation and fibrous healing. Mandibular anterior graft area is stable with gentle pulling of lip and noted lots of fibrin healing present. Informed pt that the white will start to become pink over time as gingiva accepts graft. Informed pt that we will not remove sutures today and re-assess in 3 weeks and to not pull on her lip excessively.    A picture of the graft was taken and uploaded into chart for 1 week healing.  POI reviewed, pt left satisfied and ambulatory    NV: 4 week post op follow up and suture removal     Attending: Alison

## 2024-06-25 DIAGNOSIS — J30.9 ALLERGIC RHINITIS, UNSPECIFIED SEASONALITY, UNSPECIFIED TRIGGER: ICD-10-CM

## 2024-06-25 DIAGNOSIS — J34.9 PARANASAL SINUS DISEASE: ICD-10-CM

## 2024-06-25 RX ORDER — CETIRIZINE HYDROCHLORIDE 10 MG/1
TABLET ORAL
Qty: 90 TABLET | Refills: 1 | Status: SHIPPED | OUTPATIENT
Start: 2024-06-25

## 2024-07-10 ENCOUNTER — OFFICE VISIT (OUTPATIENT)
Dept: DENTISTRY | Facility: CLINIC | Age: 44
End: 2024-07-10

## 2024-07-10 VITALS — DIASTOLIC BLOOD PRESSURE: 78 MMHG | SYSTOLIC BLOOD PRESSURE: 121 MMHG | HEART RATE: 63 BPM

## 2024-07-10 DIAGNOSIS — T14.90XD HEALING WOUND: Primary | ICD-10-CM

## 2024-07-10 PROCEDURE — D0140 LIMITED ORAL EVALUATION - PROBLEM FOCUSED: HCPCS | Performed by: DENTIST

## 2024-07-10 PROCEDURE — WIS7000 SUTURE REMOVAL: Performed by: DENTIST

## 2024-07-10 NOTE — PROGRESS NOTES
Limited exam, sutuer removal  No change to medical history.  Site 24, 25  Healing well. Adv to keep nice & clean.  Aslo being examined by dr. [place] ( periodontist)  N/v 2nd stage surgery after 4 months.  Robert barriga/ [facility]

## 2024-07-11 ENCOUNTER — OFFICE VISIT (OUTPATIENT)
Dept: FAMILY MEDICINE CLINIC | Facility: CLINIC | Age: 44
End: 2024-07-11

## 2024-07-11 VITALS
DIASTOLIC BLOOD PRESSURE: 84 MMHG | OXYGEN SATURATION: 98 % | BODY MASS INDEX: 27.12 KG/M2 | HEIGHT: 64 IN | TEMPERATURE: 98.2 F | HEART RATE: 58 BPM | SYSTOLIC BLOOD PRESSURE: 120 MMHG

## 2024-07-11 DIAGNOSIS — H66.001 NON-RECURRENT ACUTE SUPPURATIVE OTITIS MEDIA OF RIGHT EAR WITHOUT SPONTANEOUS RUPTURE OF TYMPANIC MEMBRANE: Primary | ICD-10-CM

## 2024-07-11 PROBLEM — H92.03 EAR PAIN, BILATERAL: Status: ACTIVE | Noted: 2024-07-11

## 2024-07-11 RX ORDER — AMOXICILLIN AND CLAVULANATE POTASSIUM 875; 125 MG/1; MG/1
1 TABLET, FILM COATED ORAL EVERY 12 HOURS SCHEDULED
Qty: 20 TABLET | Refills: 0 | Status: SHIPPED | OUTPATIENT
Start: 2024-07-11 | End: 2024-07-21

## 2024-07-11 NOTE — ASSESSMENT & PLAN NOTE
AOM noted on exam  Right-sided effusion with associated TM bulging and erythema    Plan:  Start 10-day course of Augmentin  Recommendation to continue Flonase and zyrtec  Reviewed OMT eustachian tube drainage technique  Continue supportive care

## 2024-07-11 NOTE — PROGRESS NOTES
"Ambulatory Visit  Name: Esha Moreira      : 1980      MRN: [id number]  Encounter Provider: Tova Tejeda DO  Encounter Date: 2024   Encounter department: [facility]    Assessment & Plan   1. Non-recurrent acute suppurative otitis media of right ear without spontaneous rupture of tympanic membrane  Assessment & Plan:  AOM noted on exam  Right-sided effusion with associated TM bulging and erythema    Plan:  Start 10-day course of Augmentin  Recommendation to continue Flonase and zyrtec  Reviewed OMT eustachian tube drainage technique  Continue supportive care       Orders:  -     amoxicillin-clavulanate (AUGMENTIN) 875-125 mg per tablet; Take 1 tablet by mouth every 12 (twelve) hours for 10 days       History of Present Illness     Ms. Moreira is a 44 yo female who has pain in both ears, right worse than left. Describes the sensation as throbbing in both ears with onset approximately 1 week ago. No history of ear infections, denies recent travel, and denies recent swimming activities. In addition, she reports a sore throat that started yesterday. She has been using OTC ear drops that have not been helping and nothing for the sore throat. No pain radiation from the ear but describes \"tightness\" around the ear. Denies any associated pain when eating or talking. Denies any jaw locking episodes. Denies hearing loss or any hearing changes in either ear.     Of note, she does not have insurance -  recently lost his job so they may be able to qualify for something now but they currently have no coverage.     S/p gingival graft and frenum remover on 6/10/24. Last follow up with dentistry was yesterday, 7/10/24.      Review of Systems   Constitutional:  Negative for chills and fever.   HENT:  Positive for ear pain, postnasal drip (Allergies) and sore throat. Negative for congestion, ear discharge, nosebleeds, rhinorrhea, sinus pain and tinnitus.    Eyes:  Negative for " "pain and visual disturbance.   Respiratory:  Positive for cough.    Cardiovascular:  Negative for chest pain and palpitations.   Gastrointestinal:  Negative for abdominal pain, constipation, diarrhea, nausea and vomiting.   Genitourinary:  Negative for dysuria and hematuria.   Musculoskeletal:  Positive for back pain (Chronic - rheumatoid arthritis). Negative for arthralgias.   Skin:  Negative for color change and rash.   Allergic/Immunologic: Positive for environmental allergies.   Neurological:  Negative for dizziness, seizures, syncope and light-headedness.   Psychiatric/Behavioral:  Positive for sleep disturbance (Chronic - been going on for many years since she was diagnosed with bipolar).    All other systems reviewed and are negative.    Medical History Reviewed by provider this encounter:  Tobacco  Allergies  Meds  Problems  Med Hx  Surg Hx  Fam Hx       Objective     /84 (BP Location: Right arm, Patient Position: Sitting, Cuff Size: Large)   Pulse 58   Temp 98.2 °F (36.8 °C) (Tympanic)   Ht 5' 4\" (1.626 m)   SpO2 98%   BMI 27.12 kg/m²     Physical Exam  Vitals reviewed.   Constitutional:       General: She is not in acute distress.  HENT:      Head: Normocephalic and atraumatic.      Ears:      Comments: Bilateral middle ear effusions  - serous on left  - cloudy on right with slight bulging and associated erythema of TM     Nose: Congestion present.      Mouth/Throat:      Mouth: Mucous membranes are moist.      Pharynx: Oropharynx is clear. Posterior oropharyngeal erythema present. No oropharyngeal exudate.   Eyes:      General:         Right eye: No discharge.         Left eye: No discharge.      Conjunctiva/sclera: Conjunctivae normal.   Cardiovascular:      Rate and Rhythm: Normal rate and regular rhythm.      Heart sounds: Normal heart sounds. No murmur heard.  Pulmonary:      Effort: No respiratory distress.      Breath sounds: Normal breath sounds. No wheezing, rhonchi or rales. "   Abdominal:      General: Bowel sounds are normal. There is no distension.      Tenderness: There is no abdominal tenderness.   Lymphadenopathy:      Cervical: No cervical adenopathy.   Skin:     General: Skin is warm and dry.   Neurological:      Mental Status: She is alert.       Administrative Statements   I have spent a total time of 30 minutes in caring for this patient on the day of the visit/encounter including Risks and benefits of tx options, Instructions for management, Patient and family education, Documenting in the medical record, Reviewing / ordering tests, medicine, procedures  , Obtaining or reviewing history  , and Communicating with other healthcare professionals .

## 2024-07-20 DIAGNOSIS — J34.9 PARANASAL SINUS DISEASE: ICD-10-CM

## 2024-07-20 DIAGNOSIS — J30.9 ALLERGIC RHINITIS, UNSPECIFIED SEASONALITY, UNSPECIFIED TRIGGER: ICD-10-CM

## 2024-07-20 RX ORDER — FLUTICASONE PROPIONATE 50 MCG
2 SPRAY, SUSPENSION (ML) NASAL DAILY
Qty: 16 G | Refills: 1 | Status: SHIPPED | OUTPATIENT
Start: 2024-07-20

## 2024-08-05 DIAGNOSIS — K21.9 GASTROESOPHAGEAL REFLUX DISEASE WITHOUT ESOPHAGITIS: ICD-10-CM

## 2024-08-05 RX ORDER — OMEPRAZOLE 40 MG/1
40 CAPSULE, DELAYED RELEASE ORAL DAILY
Qty: 30 CAPSULE | Refills: 5 | Status: SHIPPED | OUTPATIENT
Start: 2024-08-05

## 2024-08-10 PROBLEM — H66.001 NON-RECURRENT ACUTE SUPPURATIVE OTITIS MEDIA OF RIGHT EAR WITHOUT SPONTANEOUS RUPTURE OF TYMPANIC MEMBRANE: Status: RESOLVED | Noted: 2024-07-11 | Resolved: 2024-08-10

## 2024-08-19 NOTE — PROGRESS NOTES
PERIODIC EXAM, ADULT PROPHY , 4 BWX   REVIEWED MED HX: meds, allergies, health changes reviewed in [facility]. All consents signed.  CHIEF CONCERN: no dental pain or concerns  PAIN SCALE:  0  ASA CLASS:  I  PLAQUE:  {Plaque Level:00009}  CALCULUS:  {None light moderate heavy:12161}  BLEEDING:   {None light moderate heavy:13758}  STAIN :   {None light moderate heavy:48955}      PERIO:     Hygiene Procedures:  Scaled, Polished, Flossed and Used Cavitron    Oral Hygiene Instruction: Brushing Minimum 2x daily for 2 minutes, daily flossing and Reviewed dietary precautions    Dispensed: Toothbrush, Toothpaste, and Floss    Visual and Tactile Intraoral/ Extraoral evaluation: Oral and Oropharyngeal cancer evaluation. No findings     {Exam:72593}   Reviewed with patient clinical and radiographic findings and patient verbalized understanding. All questions and concerns addressed.     REFERRALS: none    CARIES FINDINGS:        TREATMENT  PLAN :   NV:     Next Recall: 6 month recall     Last BWX: 8/21/24  Last Panorex/ FMX : 3/23/22

## 2024-08-21 ENCOUNTER — OFFICE VISIT (OUTPATIENT)
Dept: DENTISTRY | Facility: CLINIC | Age: 44
End: 2024-08-21

## 2024-08-21 VITALS — DIASTOLIC BLOOD PRESSURE: 75 MMHG | SYSTOLIC BLOOD PRESSURE: 135 MMHG | HEART RATE: 75 BPM

## 2024-08-21 DIAGNOSIS — Z01.20 ENCOUNTER FOR DENTAL EXAM AND CLEANING W/O ABNORMAL FINDINGS: Primary | ICD-10-CM

## 2024-08-21 PROCEDURE — D0274 BITEWINGS - 4 RADIOGRAPHIC IMAGES: HCPCS

## 2024-08-21 PROCEDURE — D1110 PROPHYLAXIS - ADULT: HCPCS

## 2024-08-21 PROCEDURE — D0120 PERIODIC ORAL EVALUATION - ESTABLISHED PATIENT: HCPCS | Performed by: DENTIST

## 2024-08-21 NOTE — DENTAL PROCEDURE DETAILS
PERIODIC EXAM, ADULT PROPHY , 4 BWX   REVIEWED MED HX: meds, allergies, health changes reviewed in [facility]. All consents signed.  CHIEF CONCERN: no dental pain or concerns  PAIN SCALE:  0  ASA CLASS:  I  PLAQUE:  mild  CALCULUS:  light  BLEEDING:   light  STAIN :   light      PERIO: GINGIVA APPEAR STABLE    Hygiene Procedures:  Scaled, Polished, Flossed and Used Cavitron    Oral Hygiene Instruction: Brushing Minimum 2x daily for 2 minutes, daily flossing and Reviewed dietary precautions    Dispensed: Toothbrush, Toothpaste, and Floss    Visual and Tactile Intraoral/ Extraoral evaluation: Oral and Oropharyngeal cancer evaluation. No findings     Dr. Mccloud   Reviewed with patient clinical and radiographic findings and patient verbalized understanding. All questions and concerns addressed.     REFERRALS: none    CARIES FINDINGS: 4-mo       TREATMENT  PLAN :   NV: 4-mo    Next Recall: 6 month recall     Last BWX: 8/21/24  Last Panorex/ FMX : 3/23/22

## 2024-08-26 ENCOUNTER — TELEPHONE (OUTPATIENT)
Dept: PSYCHIATRY | Facility: CLINIC | Age: 44
End: 2024-08-26

## 2024-08-26 NOTE — TELEPHONE ENCOUNTER
Writer contacted patient to inform them their provider is no longer at the practice and to offer scheduling with a new provider. Patient stated she currently does not have active insurance and would like to remain on the list for a MARY apt.

## 2024-09-07 DIAGNOSIS — M06.00 SERONEGATIVE RHEUMATOID ARTHRITIS (HCC): ICD-10-CM

## 2024-09-09 RX ORDER — HYDROXYCHLOROQUINE SULFATE 200 MG/1
TABLET, FILM COATED ORAL
Qty: 135 TABLET | Refills: 1 | Status: SHIPPED | OUTPATIENT
Start: 2024-09-09 | End: 2025-01-22

## 2024-09-23 NOTE — TELEPHONE ENCOUNTER
Patient discontinued her formal physical therapy on 3/21/23 with an HEP  She continues to do her home exercise plan on a daily basis  Quality 431: Preventive Care And Screening: Unhealthy Alcohol Use - Screening: Patient not identified as an unhealthy alcohol user when screened for unhealthy alcohol use using a systematic screening method Quality 130: Documentation Of Current Medications In The Medical Record: Current Medications Documented Detail Level: Detailed Quality 128: Preventive Care And Screening: Body Mass Index (Bmi) Screening And Follow-Up Plan: BMI is documented within normal parameters and no follow-up plan is required. Quality 226: Preventive Care And Screening: Tobacco Use: Screening And Cessation Intervention: Patient screened for tobacco use and is an ex/non-smoker

## 2024-09-27 DIAGNOSIS — J34.9 PARANASAL SINUS DISEASE: ICD-10-CM

## 2024-09-27 DIAGNOSIS — J30.9 ALLERGIC RHINITIS, UNSPECIFIED SEASONALITY, UNSPECIFIED TRIGGER: ICD-10-CM

## 2024-09-27 RX ORDER — CETIRIZINE HYDROCHLORIDE 10 MG/1
10 TABLET ORAL DAILY
Qty: 90 TABLET | Refills: 1 | Status: SHIPPED | OUTPATIENT
Start: 2024-09-27

## 2024-10-03 ENCOUNTER — TELEPHONE (OUTPATIENT)
Dept: PSYCHIATRY | Facility: CLINIC | Age: 44
End: 2024-10-03

## 2024-10-03 NOTE — TELEPHONE ENCOUNTER
Writer contacted patient regarding scheduling MARY appt. Patient stated that she should be getting her insurance back in a few weeks and will call the office back to schedule at that time. Patient to remain to MARY wait list.

## 2024-10-09 ENCOUNTER — TELEPHONE (OUTPATIENT)
Dept: OBGYN CLINIC | Facility: HOSPITAL | Age: 44
End: 2024-10-09

## 2024-10-09 ENCOUNTER — OFFICE VISIT (OUTPATIENT)
Dept: DENTISTRY | Facility: CLINIC | Age: 44
End: 2024-10-09

## 2024-10-09 VITALS — DIASTOLIC BLOOD PRESSURE: 78 MMHG | SYSTOLIC BLOOD PRESSURE: 118 MMHG | HEART RATE: 76 BPM

## 2024-10-09 DIAGNOSIS — K02.62 DENTAL CARIES EXTENDING INTO DENTIN: Primary | ICD-10-CM

## 2024-10-09 PROCEDURE — D2392 RESIN-BASED COMPOSITE - 2 SURFACES, POSTERIOR: HCPCS | Performed by: DENTIST

## 2024-10-09 NOTE — DENTAL PROCEDURE DETAILS
Patient presents for a dental restoration and verbally consents for treatment:  Reviewed health history-  Pt is ASA type II  Treatment consents signed: Yes  Perio: plaque  Pain Scale:0  Caries Assessment: moderate  Radiographs: Films are current  Oral Hygiene instruction reviewed and given  Hygiene recall visits recommended to the patient      Patient agrees with the diagnosis of Caries and the proposed treatment plan for the resin restoration:  Tooth #4-MO  Discussed with patient need for RCT if pulp exposure occurs or in the future if pulp is inflamed. Pt understands and consents.    Dental Anesthesia: 1 Carpule 2% Lidocaine 1:100K epi infiltrations.  Excavated caries with high speed and round bur on slow speed.  Material:  Selective etch and rinsed. Ivoclar ariza placed and EvoCeram resin placed and cured.  Shade: A2  Polished with finishing burs and Enhance. Occlusion adjusted and contact good and adequate.   Gave post op instructions to avoid chewing till numbness goes away.    All questions answered. Pt left satisfied and in good health.    Prognosis is Good.   Referrals Needed: No      Next visit: recall     Elva

## 2024-10-09 NOTE — TELEPHONE ENCOUNTER
Caller: Esha    Doctor: Jamil    Reason for call: Patient was trying to schedule her US Guided Aspiration of Cyst on her hand. Do you do this in the office or she is to call central Scheduling?  She has a FU on 10/14/24, I advised if it is done in office this may need to be moved, because I know these are scheduled special.    Please call to advise.     Call back#: [phone]

## 2024-10-11 ENCOUNTER — TELEPHONE (OUTPATIENT)
Dept: PAIN MEDICINE | Facility: CLINIC | Age: 44
End: 2024-10-11

## 2024-10-11 NOTE — TELEPHONE ENCOUNTER
Caller: Esha     Doctor: Parmjit    Reason for call: patient is ready to schedule next neck injection please advise last one 4/4/2024    Call back#: [phone]

## 2024-10-11 NOTE — TELEPHONE ENCOUNTER
S/w Pt. Pt requested repeat injection.  Injection type: C7-T1 interlaminar epidural steroid injection   Last injection date: 04/04/2024    Last office visit:  06/06/2023  Where is pain located: Back of Neck  Is the pain the same area as before: Yes   Current pain level: 6/10   How much % of relief did the last injection provide: 60%  Duration of relief from last injection: 3 months.     Blood thinners/NSAIDS? No  Diabetes? No.  Please advise if ok to repeat BEAN.

## 2024-10-11 NOTE — TELEPHONE ENCOUNTER
Caller: patient    Doctor: EMMY    Reason for call: Calling back  Transfer to RN    Call back#:

## 2024-10-14 NOTE — TELEPHONE ENCOUNTER
Left message for patient to contact the scheduling office at [phone] to schedule their procedure.

## 2024-10-14 NOTE — TELEPHONE ENCOUNTER
Scheduled patient for procedure 11/13/24, however, she will need a re-eval prior for updated clinicals and documention of HEP.  She is scheduled for an appt with YU 11/4/24

## 2024-10-21 ENCOUNTER — OFFICE VISIT (OUTPATIENT)
Dept: DENTISTRY | Facility: CLINIC | Age: 44
End: 2024-10-21

## 2024-10-21 DIAGNOSIS — Z01.21 ENCOUNTER FOR DENTAL EXAMINATION AND CLEANING WITH ABNORMAL FINDINGS: Primary | ICD-10-CM

## 2024-10-21 PROCEDURE — D0191 ASSESSMENT OF A PATIENT: HCPCS

## 2024-10-21 NOTE — DENTAL PROCEDURE DETAILS
"Patient Assessment 3-month Post-Op    Esha Moreira 44 y.o. female presents with self to [facility] for assessment since patient's gingival graft surgery and frenum removal 6/10/24.   PMH reviewed, no changes, ASA II.   All consents up-to-date.     Chief complaint:  \"I am supposed to be getting another gingival flap surgery for my lower teeth.\"    Subjective history:    Date of surgery: 6/10/24 with suture removal 7/10/24.    Region:  Teeth #24 and #25 .    Objective clinical findings:   Oral cancer screening: normal.  Intraoral exam:  Pt presents with white/discolored gingival graft on buccal of #24 and #25 . Graft is not yet fully integrated with tissue. Healing needs another 3 months before second stage surgery. Images taken today to monitor improvement.     Assessment:  Pt will need second stage surgery of #24 and #25. Completing tx right now is not advised as per consult with Dr. NY, as tissue still needs to fully heal and integrate, as displayed by white discoloration of the graft. Pt will be ready for surgery in 3 more months (January 2024).     Plan:   2 hour appointment for second stage of free grafting surgery sites #24 and #25. Second stage surgery consists of coronal repositioning of the tissue/graft.     Referral(s): None needed.  Rx: None.    Patient dismissed ambulatory and alert.    NV: Second stage free gingival grafting procedure January 2024 with Dr. Swan.    Attending: Dr. Rosas examined pt.              "

## 2024-10-25 ENCOUNTER — TELEPHONE (OUTPATIENT)
Dept: PSYCHIATRY | Facility: CLINIC | Age: 44
End: 2024-10-25

## 2024-10-25 NOTE — TELEPHONE ENCOUNTER
3rd outreach attempt. Called and left message for patient to return a call to [phone] regarding MARY appt for Therapy and following up on insurance activation. Please reach out to [facility] for assistance.

## 2024-10-25 NOTE — TELEPHONE ENCOUNTER
Patient returned call and scheduled MARY appt for 11/11 9AM and two more for 11/25 4PM and 12/9 4PM in office.

## 2024-10-26 DIAGNOSIS — J34.9 PARANASAL SINUS DISEASE: ICD-10-CM

## 2024-10-26 DIAGNOSIS — J30.9 ALLERGIC RHINITIS, UNSPECIFIED SEASONALITY, UNSPECIFIED TRIGGER: ICD-10-CM

## 2024-10-28 RX ORDER — FLUTICASONE PROPIONATE 50 MCG
SPRAY, SUSPENSION (ML) NASAL
Qty: 48 G | Refills: 1 | Status: SHIPPED | OUTPATIENT
Start: 2024-10-28 | End: 2024-11-07

## 2024-11-01 ENCOUNTER — TELEPHONE (OUTPATIENT)
Age: 44
End: 2024-11-01

## 2024-11-01 ENCOUNTER — APPOINTMENT (OUTPATIENT)
Dept: LAB | Facility: CLINIC | Age: 44
End: 2024-11-01
Payer: MEDICARE

## 2024-11-01 DIAGNOSIS — D50.9 IRON DEFICIENCY ANEMIA, UNSPECIFIED IRON DEFICIENCY ANEMIA TYPE: Primary | ICD-10-CM

## 2024-11-01 DIAGNOSIS — D50.9 IRON DEFICIENCY ANEMIA, UNSPECIFIED IRON DEFICIENCY ANEMIA TYPE: ICD-10-CM

## 2024-11-01 LAB
BASOPHILS # BLD AUTO: 0.03 THOUSANDS/ÂΜL (ref 0–0.1)
BASOPHILS NFR BLD AUTO: 1 % (ref 0–1)
CRP SERPL QL: 18.7 MG/L
EOSINOPHIL # BLD AUTO: 0.22 THOUSAND/ÂΜL (ref 0–0.61)
EOSINOPHIL NFR BLD AUTO: 5 % (ref 0–6)
ERYTHROCYTE [DISTWIDTH] IN BLOOD BY AUTOMATED COUNT: 11.7 % (ref 11.6–15.1)
ERYTHROCYTE [SEDIMENTATION RATE] IN BLOOD: 20 MM/HOUR (ref 0–19)
FERRITIN SERPL-MCNC: 82 NG/ML (ref 11–307)
FOLATE SERPL-MCNC: >22.3 NG/ML
HCT VFR BLD AUTO: 38.8 % (ref 34.8–46.1)
HGB BLD-MCNC: 12.9 G/DL (ref 11.5–15.4)
IMM GRANULOCYTES # BLD AUTO: 0.01 THOUSAND/UL (ref 0–0.2)
IMM GRANULOCYTES NFR BLD AUTO: 0 % (ref 0–2)
IRON SATN MFR SERPL: 31 % (ref 15–50)
IRON SERPL-MCNC: 94 UG/DL (ref 50–212)
LYMPHOCYTES # BLD AUTO: 1.54 THOUSANDS/ÂΜL (ref 0.6–4.47)
LYMPHOCYTES NFR BLD AUTO: 33 % (ref 14–44)
MCH RBC QN AUTO: 30 PG (ref 26.8–34.3)
MCHC RBC AUTO-ENTMCNC: 33.2 G/DL (ref 31.4–37.4)
MCV RBC AUTO: 90 FL (ref 82–98)
MONOCYTES # BLD AUTO: 0.38 THOUSAND/ÂΜL (ref 0.17–1.22)
MONOCYTES NFR BLD AUTO: 8 % (ref 4–12)
NEUTROPHILS # BLD AUTO: 2.55 THOUSANDS/ÂΜL (ref 1.85–7.62)
NEUTS SEG NFR BLD AUTO: 53 % (ref 43–75)
NRBC BLD AUTO-RTO: 0 /100 WBCS
PLATELET # BLD AUTO: 251 THOUSANDS/UL (ref 149–390)
PMV BLD AUTO: 10.7 FL (ref 8.9–12.7)
RBC # BLD AUTO: 4.3 MILLION/UL (ref 3.81–5.12)
TIBC SERPL-MCNC: 307 UG/DL (ref 250–450)
UIBC SERPL-MCNC: 213 UG/DL (ref 155–355)
VIT B12 SERPL-MCNC: 878 PG/ML (ref 180–914)
WBC # BLD AUTO: 4.73 THOUSAND/UL (ref 4.31–10.16)

## 2024-11-01 PROCEDURE — 82728 ASSAY OF FERRITIN: CPT

## 2024-11-01 PROCEDURE — 83540 ASSAY OF IRON: CPT

## 2024-11-01 PROCEDURE — 82746 ASSAY OF FOLIC ACID SERUM: CPT

## 2024-11-01 PROCEDURE — 83550 IRON BINDING TEST: CPT

## 2024-11-01 PROCEDURE — 85025 COMPLETE CBC W/AUTO DIFF WBC: CPT

## 2024-11-01 PROCEDURE — 82607 VITAMIN B-12: CPT

## 2024-11-01 NOTE — TELEPHONE ENCOUNTER
Patient needs order for labs.  She has an appt with Rosamaria Murphy 11/5/24 @ 3:30pm virtual visit.  Patient is tere from Christianne Lopez.  Patient would like a call when the orders are entered.

## 2024-11-04 ENCOUNTER — OFFICE VISIT (OUTPATIENT)
Dept: PAIN MEDICINE | Facility: CLINIC | Age: 44
End: 2024-11-04
Payer: MEDICARE

## 2024-11-04 VITALS
HEART RATE: 77 BPM | WEIGHT: 160 LBS | RESPIRATION RATE: 16 BRPM | SYSTOLIC BLOOD PRESSURE: 109 MMHG | HEIGHT: 64 IN | BODY MASS INDEX: 27.31 KG/M2 | DIASTOLIC BLOOD PRESSURE: 82 MMHG

## 2024-11-04 DIAGNOSIS — M79.18 MYOFASCIAL PAIN SYNDROME: ICD-10-CM

## 2024-11-04 DIAGNOSIS — M54.12 CERVICAL RADICULOPATHY: Primary | ICD-10-CM

## 2024-11-04 DIAGNOSIS — G89.4 CHRONIC PAIN SYNDROME: ICD-10-CM

## 2024-11-04 PROCEDURE — 99214 OFFICE O/P EST MOD 30 MIN: CPT

## 2024-11-04 NOTE — PROGRESS NOTES
Assessment:  1. Cervical radiculopathy    2. Chronic pain syndrome    3. Myofascial pain syndrome        Plan:  The patient is a 44-year-old female with a history of chronic pain secondary to neck pain, cervical radiculopathy and myofascial pain who presents to the office with worsening neck pain and pain that radiates into bilateral shoulders, right worse than left.    At this time, I did instruct patient to follow-up with the cervical epidural steroid injection which is currently scheduled for 11/13/2024, as the patient is experiencing worsening neck pain due to cervical radiculopathy.  She typically receives greater than 50% relief of her neck pain following these epidural injections.    My impressions and treatment recommendations were discussed in detail with the patient who verbalized understanding and had no further questions.  Discharge instructions were provided. I personally saw and examined the patient and I agree with the above discussed plan of care.    No orders of the defined types were placed in this encounter.    No orders of the defined types were placed in this encounter.      History of Present Illness:  Esha Moreira is a 44 y.o. female with a history of chronic pain secondary to neck pain, cervical radiculopathy and myofascial pain.  She was last seen on 4/4/2024 where she underwent a cervical epidural steroid injection which provided her greater than 50% relief of her pain.  She presents to the office with worsening neck pain and pain that radiates into bilateral shoulders, right worse than left.  She is currently scheduled for a repeat cervical epidural steroid injection on 11/13/2024.    She states her pain is worse since the last office visit and constant.  She rates quality of her pain as dull/aching, sharp, pressure-like and is currently rating her pain a 6-7/10 on a numeric scale.    She is not currently taking anything for pain, as she has tried all the over-the-counter remedies  without much relief.  She states the cervical epidural injections help with manage her pain.    I have personally reviewed and/or updated the patient's past medical history, past surgical history, family history, social history, current medications, allergies, and vital signs today.     Review of Systems   Respiratory:  Negative for shortness of breath.    Cardiovascular:  Negative for chest pain.   Gastrointestinal:  Negative for constipation, diarrhea, nausea and vomiting.   Musculoskeletal:  Positive for gait problem, neck pain and neck stiffness. Negative for arthralgias, joint swelling and myalgias.        Decreased Range Of Motion   Skin:  Negative for rash.   Neurological:  Negative for dizziness, seizures and weakness.   All other systems reviewed and are negative.      Patient Active Problem List   Diagnosis    ASCUS with positive high risk HPV cervical    Atypical glandular cells of undetermined significance (TERRI) on cervical Pap smear    Gastroesophageal reflux disease without esophagitis    Microcytic anemia    Pain of upper abdomen    Encounter for screening mammogram for malignant neoplasm of breast    Joint pain    Neck pain    Spasm of back muscles    Celiac disease    S/P laparoscopic appendectomy    Visual changes    Elevated blood pressure reading    Breathing difficulty    YESSICA (obstructive sleep apnea)    Sleep-disordered breathing    DDD (degenerative disc disease), lumbar    Sacroiliitis (HCC)    Fatigue    Lipids abnormal    Bipolar 1 disorder, mixed (HCC)    Chronic bilateral low back pain    Cervical disc disorder with radiculopathy of mid-cervical region    Memory loss or impairment    Biceps tendinopathy, left    Left shoulder pain    Left arm pain    Somatic dysfunction of spine, cervical    Arm somatic dysfunction    Somatic dysfunction of thoracic region    Paranasal sinus disease    Allergic rhinitis    Skin lesion of back    Hypoferremia    Paraspinal soft tissue mass    Iron  deficiency anemia    Lipoma of torso    Cervical radiculopathy    Arthritis    Low vitamin D level    Acute left ankle pain    Ear pain, bilateral       Past Medical History:   Diagnosis Date    Abnormal Pap smear of cervix     Anemia     had iron infusions to treat    Celiac disease     Depression     GERD (gastroesophageal reflux disease)     GERD without esophagitis     Heartburn     Hiatal hernia     Hypertension     Microcytic anemia 07/15/2019    Sleep apnea     borderline + per sleep study test    Vitamin D deficiency        Past Surgical History:   Procedure Laterality Date    APPENDECTOMY  2021    EGD N/A     RI EXC B9 LESION MRGN XCP SK TG T/A/L 3.1-4.0 CM Right 08/21/2023    Procedure: EXCISION SUBCUTANEOUS MASS RIGHT BACK (3CM);  Surgeon: Raúl Roman MD;  Location: [facility];  Service: General    RI LAPAROSCOPIC APPENDECTOMY N/A 01/23/2021    Procedure: APPENDECTOMY LAPAROSCOPIC;  Surgeon: Toan Mccoy DO;  Location: [facility];  Service: General    TUBAL LIGATION      WISDOM TOOTH EXTRACTION N/A 2005       Family History   Problem Relation Age of Onset    Arthritis Mother     Cervical cancer Mother     Fibromyalgia Mother     Mental illness Father     Stroke Father     Heart disease Father     Heart disease Maternal Grandmother     Hypertension Maternal Grandmother     Diabetes Maternal Grandmother     No Known Problems Daughter     No Known Problems Maternal Grandfather     No Known Problems Paternal Grandmother     Kidney disease Paternal Grandfather     No Known Problems Brother     No Known Problems Son     Heart disease Brother     No Known Problems Son     No Known Problems Son     No Known Problems Son        Social History     Occupational History    Not on file   Tobacco Use    Smoking status: Former     Current packs/day: 0.00     Average packs/day: 1 pack/day for 15.0 years (15.0 ttl pk-yrs)     Types: Cigarettes     Start date: 7/20/2001     Quit date: 7/20/2016     Years since  quittin.2     Passive exposure: Past    Smokeless tobacco: Never   Vaping Use    Vaping status: Never Used   Substance and Sexual Activity    Alcohol use: Yes     Comment: Occasionally    Drug use: No    Sexual activity: Yes     Partners: Male     Birth control/protection: Female Sterilization, Other     Comment: Tubes removed       Current Outpatient Medications on File Prior to Visit   Medication Sig    cetirizine (ZyrTEC) 10 mg tablet Take 1 tablet (10 mg total) by mouth daily    cholecalciferol (VITAMIN D3) 400 units tablet Take 400 Units by mouth daily    fluticasone (FLONASE) 50 mcg/act nasal spray SHAKE LIQUID AND USE 2 SPRAYS IN EACH NOSTRIL ONCE DAILY.    hydroxychloroquine (PLAQUENIL) 200 mg tablet TAKE 1 AND 1/2 TABLETS(300 MG) BY MOUTH IN THE MORNING    naproxen (Naprosyn) 500 mg tablet Take 1 tablet (500 mg total) by mouth 2 (two) times a day with meals As needed for joint pain    omeprazole (PriLOSEC) 40 MG capsule TAKE 1 CAPSULE(40 MG) BY MOUTH DAILY    Sodium Fluoride 1.1 % PSTE Take 1 Film by mouth daily at bedtime Use once per day at bedtime. Brush, spit out. Don't rinse. Let sit on teeth. Generic brand OK    chlorhexidine (PERIDEX) 0.12 % solution Apply 15 mL to the mouth or throat 2 (two) times a day Start 3 days after procedure (Patient not taking: Reported on 2024)    HYDROcodone-acetaminophen (Norco) 5-325 mg per tablet Take 1 tablet by mouth every 6 (six) hours as needed for pain Max Daily Amount: 4 tablets (Patient not taking: Reported on 2024)    meclizine (ANTIVERT) 25 mg tablet Take 1 tablet (25 mg total) by mouth every 8 (eight) hours as needed for dizziness (Patient not taking: Reported on 2023)    predniSONE 5 mg tablet 4 tabs x7 days, then 3 tabs x7 days, then 2 tabs x7 days, then 1 tab x7 days, then stop. (Patient not taking: Reported on 2024)     No current facility-administered medications on file prior to visit.       Allergies   Allergen Reactions     "Gluten Meal - Food Allergy Abdominal Pain    Seasonal Ic [Octacosanol] Swelling     Severe swelling       Physical Exam:    /82   Pulse 77   Resp 16   Ht 5' 4\" (1.626 m)   Wt 72.6 kg (160 lb)   BMI 27.46 kg/m²     Constitutional:normal, well developed, well nourished, alert, in no distress and non-toxic and no overt pain behavior.  Eyes:anicteric  HEENT:grossly intact  Neck:supple, symmetric, trachea midline and no masses   Pulmonary:even and unlabored  Cardiovascular:No edema or pitting edema present  Skin:Normal without rashes or lesions and well hydrated  Psychiatric:Mood and affect appropriate  Neurologic:Cranial Nerves II-XII grossly intact  Musculoskeletal:normal    Cervical Spine Exam    Appearance:  Normal lordosis  Palpation/Tenderness:  left cervical paraspinal tenderness  right cervical paraspinal tenderness  left trapezium tenderness  right trapezium tenderness  Sensory:  no sensory deficits noted  Range of Motion:  Flexion:  Minimally limited  with pain  Extension:  Minimally limited  with pain  Rotation - Left:  Minimally limited  with pain  Rotation - Right:  Minimally limited  with pain  Motor Strength:  Left Arm Flexion  5/5  Left Arm Extension  5/5  Right Arm Flexion  5/5  Right Arm Extension  5/5  Left    5/5  Right   5/5      Imaging    "

## 2024-11-05 ENCOUNTER — TELEMEDICINE (OUTPATIENT)
Dept: HEMATOLOGY ONCOLOGY | Facility: CLINIC | Age: 44
End: 2024-11-05
Payer: MEDICARE

## 2024-11-05 DIAGNOSIS — D50.9 IRON DEFICIENCY ANEMIA, UNSPECIFIED IRON DEFICIENCY ANEMIA TYPE: Primary | ICD-10-CM

## 2024-11-05 PROCEDURE — 99212 OFFICE O/P EST SF 10 MIN: CPT

## 2024-11-05 NOTE — PROGRESS NOTES
Virtual Regular Visit  Name: Esha Moreira      : 1980      MRN: [id number]  Encounter Provider: YU Palomares  Encounter Date: 2024   Encounter department[facility]    Verification of patient location:    Patient is located at Home in the following state in which I hold an active license [place]    Assessment & Plan  Iron deficiency anemia, unspecified iron deficiency anemia type    Orders:    CBC and differential; Future    Iron Panel (Includes Ferritin, Iron Sat%, Iron, and TIBC); Future    44-year-old female with history of iron deficiency anemia.  Reviewed she responded fairly well and is holding onto oral iron since her last infusion 2024.  Recommend she continue taking her blood builder supplement daily.  In addition, patient can Rosamaria iron rich foods to her diet this fall.    Will see patient back in the office in 6 months with labs prior (CBCD, iron panel).  She is agreeable with this plan.  She is aware to contact us for any additional questions/concerns or worsening symptoms.    Encounter provider YU Palomares    The patient was identified by name and date of birth. Esha Moreira was informed that this is a telemedicine visit and that the visit is being conducted through the Epic Embedded platform. She agrees to proceed..  My office door was closed. No one else was in the room.  She acknowledged consent and understanding of privacy and security of the video platform. The patient has agreed to participate and understands they can discontinue the visit at any time.    Patient is aware this is a billable service.     History of Present Illness   Esha Moreira is a 43 y.o. female ith past medical history of celiac disease, GERD, vitamin D deficiency, hypertension, hiatal hernia, and abnormal Pap smear.   Patient has been following with hematology since 2023 follow-up multiple providers, Marilee Hernandez PA-C and Christianne Lopez,  [facility].  She was last seen virtually on 3/20/2024.  Patient is status post IV iron treatments.    Treatment:  6/17/2023: Hemoglobin 12.3, MCV 78, platelets 281, WBC 5.84              Ferritin 11, iron saturation 6%, TIBC 427, serum iron 26  IV Venofer 300 mg x4: 7/6/2023 to 7/26/2023 9/13/2023: Hemoglobin 13, MCV 88, platelets 251, WBC 5.46              Ferritin 93, iron saturation 24%, TIBC 329, serum iron 80  3/13/2024: Hemoglobin 13.3, MCV 87, platelets 258, WBC 5.97              Ferritin 13, iron saturation 5%, TIBC 368, serum iron 17              B12 969, folate >22.3  IV Venofer 300 mg x 4 doses = 4/3/2024 to 4/25/2024      Interval History: Patient presents for follow-up of her iron deficiency.  She is a transfer of care from YU Bee.  Patient has PMH significant for celiac disease, GERD, hypertension, hiatal hernia.  Patient was last seen in March 2024 and is status post IV iron treatments, Venofer 300 mg x 4 doses (4/3/2024 to 4/25/2024).      Patient reports she is doing well overall.  She has intermittent fatigue and shortness of breath.  Continues to have RLS and increased frequency of headaches.  Denies any pica, dizziness, lightheadedness, cold intolerance.  No CP or palpitations.  Patient denies abnormal bleeding: epistaxis, gingival bleeding, hematuria, dark tarry stools.    Patient reports a monthly menstrual cycle, last about 5 to 7 days with 2 to 3 days being on the heavier side having to change her pad/tampon every 2 hours.    Labs:  11/1/2024: Hgb 12.9, MCV 90, WBC 4.73, platelets 251,000, folate >22.3, vitamin B12 878, serum iron 94, iron saturation 31%, ferritin 82      Visit Time  I spent 15 minutes in chart review, counseling, coordination of care, and documentation.     Total Visit Duration: 15

## 2024-11-07 ENCOUNTER — HOSPITAL ENCOUNTER (OUTPATIENT)
Dept: RADIOLOGY | Facility: HOSPITAL | Age: 44
Discharge: HOME | End: 2024-11-07
Payer: MEDICARE

## 2024-11-07 ENCOUNTER — OFFICE VISIT (OUTPATIENT)
Dept: RHEUMATOLOGY | Facility: CLINIC | Age: 44
End: 2024-11-07
Payer: MEDICARE

## 2024-11-07 VITALS
WEIGHT: 159 LBS | TEMPERATURE: 97.2 F | HEART RATE: 73 BPM | BODY MASS INDEX: 27.14 KG/M2 | HEIGHT: 64 IN | OXYGEN SATURATION: 100 % | DIASTOLIC BLOOD PRESSURE: 70 MMHG | SYSTOLIC BLOOD PRESSURE: 110 MMHG

## 2024-11-07 DIAGNOSIS — M19.90 INFLAMMATORY ARTHRITIS: ICD-10-CM

## 2024-11-07 DIAGNOSIS — M06.00 SERONEGATIVE RHEUMATOID ARTHRITIS (HCC): Primary | ICD-10-CM

## 2024-11-07 DIAGNOSIS — I73.00 RAYNAUD'S DISEASE WITHOUT GANGRENE: ICD-10-CM

## 2024-11-07 DIAGNOSIS — M06.00 SERONEGATIVE RHEUMATOID ARTHRITIS (HCC): ICD-10-CM

## 2024-11-07 DIAGNOSIS — Z79.899 HIGH RISK MEDICATION USE: ICD-10-CM

## 2024-11-07 PROCEDURE — 99215 OFFICE O/P EST HI 40 MIN: CPT | Performed by: INTERNAL MEDICINE

## 2024-11-07 PROCEDURE — 73562 X-RAY EXAM OF KNEE 3: CPT

## 2024-11-07 RX ORDER — FLUTICASONE PROPIONATE 50 MCG
SPRAY, SUSPENSION (ML) NASAL
COMMUNITY
Start: 2024-09-17

## 2024-11-07 RX ORDER — AMLODIPINE BESYLATE 2.5 MG/1
2.5 TABLET ORAL DAILY
Qty: 30 TABLET | Refills: 6 | Status: SHIPPED | OUTPATIENT
Start: 2024-11-07

## 2024-11-07 RX ORDER — METHOTREXATE 2.5 MG/1
TABLET ORAL
Qty: 25 TABLET | Refills: 6 | Status: SHIPPED | OUTPATIENT
Start: 2024-11-07

## 2024-11-07 RX ORDER — FOLIC ACID 1 MG/1
1 TABLET ORAL DAILY
Qty: 30 TABLET | Refills: 6 | Status: SHIPPED | OUTPATIENT
Start: 2024-11-07

## 2024-11-07 RX ORDER — HYDROXYCHLOROQUINE SULFATE 200 MG/1
300 TABLET, FILM COATED ORAL DAILY
Qty: 135 TABLET | Refills: 2 | Status: SHIPPED | OUTPATIENT
Start: 2024-11-07 | End: 2025-08-04

## 2024-11-07 NOTE — PATIENT INSTRUCTIONS
Continue hydroxychloroquine one and a half tabs daily; get regular eye exams  Add methotrexate 5 tabs once a week  Start folic acid daily  Can take amlodipine 2.5mg daily at bedtime during the winter months for Raynaud's symptoms  Do labs in a month, then every 3 months  Do knee x-rays  Continue naproxen twice a day as needed for joint pain, take with food    Return to clinic in 6 months

## 2024-11-07 NOTE — PROGRESS NOTES
Ambulatory Visit  Name: Esha Moreira      : 1980      MRN: [id number]  Encounter Provider: Ludy Geller MD  Encounter Date: 2024   Encounter department: [facility] [place]    Assessment & Plan  Seronegative rheumatoid arthritis (HCC)  Esha Moreira is a 44 year old female who presents for follow-up of seronegative RA based on hands and wrist MS[facility] ultrasound.  Her symptoms remains uncontrolled however, and still progressing after starting DMARD therapy with hydroxychloroquine.  Currently tolerating well.  Most recent inflammatory markers have increased despite starting HCQ however no recent illness or infections she is not back at her baseline.  Regards to joint pain on the left knee this seems to be more related to OA rather than related to RA. Would benefit from addition of methotrexate. Patient's rheumatologic disease(s) threaten long-term function if not appropriately managed.    Plan  Given symptoms are still uncontrolled and not back at baseline and she is already on max dose on hydroxychloroquine we will start patient on methotrexate in conjunction with folic acid.  Discussed in detail side effects and risks related to it.  No further pregnancies plan as patient has tubal ligation.    Patient was started on 5 tablets once a week of methotrexate, folic acid daily  Continue Plaquenil as taken, remind regular eye exam  Will order bilateral knee x-rays as below to monitor for RA versus OA  Patient discussed to get labs monitor 1 month starting medication and then every 3 months.  Given symptoms of Raynaud's and history we will start patient on amlodipine to be taken daily this can be taken at bedtime given no history of hypertension.    Patient should return to clinic 6 to 9 months  Orders:    XR knee 3 vw left non injury; Future    XR knee 3 vw right non injury; Future    HLA-B27 antigen    CBC and differential; Standing    Comprehensive metabolic panel; Standing     C-reactive protein; Standing    Sedimentation rate, automated; Standing    methotrexate 2.5 MG tablet; 5 tablet po weekly (take all 5 tablets on the same day every week)    folic acid (FOLVITE) 1 mg tablet; Take 1 tablet (1 mg total) by mouth daily    amLODIPine (NORVASC) 2.5 mg tablet; Take 1 tablet (2.5 mg total) by mouth daily    hydroxychloroquine (PLAQUENIL) 200 mg tablet; Take 1.5 tablets (300 mg total) by mouth daily    Inflammatory arthritis  Inflammatory arthritis suspect in the setting of seronegative RA however knee pain symptoms seems to be related to OA as no joint stiffness and pain seems to worsen with physical activity.  Rest of plan as above  Orders:    XR knee 3 vw left non injury; Future    XR knee 3 vw right non injury; Future    HLA-B27 antigen    Raynaud's disease without gangrene         High risk medication use  Benefits and risks of hydroxychloroquine, including but not limited to retinal toxicity, corneal deposits, gastrointestinal side effects, and headaches were discussed with the patient. The need for a regular eye exam to monitor for ocular toxicity while on this medication was also explained to the patient.          History of Present Illness     Esha Moreira is a 44 y.o. female who presents for follow-up seronegative RA hands and wrist found on MSK ultrasound.  Since last visit patient was started on Plaquenil in conjunction with a steroid taper which she has completed.      Patient states that her joint pain especially in the wrist and hands with associated intermittent swelling has improved since starting and Plaquenil now rates that 4/10 where previously she was rated 7/10.  Does endorse that the left bothers her more than the right however wonders if this is related to the cyst that needs to be drained.  Currently has an appointment for ganglion cyst drainage in the upcoming week with orthopedic.    Patient also states that has bilateral knee pain that has been  For a while  "now left more than right this seems to be associated with swelling that is intermittent most recent flare over the past 2 weeks.  This is not related to any trauma or injury.  Endorsed that pain is not associated with morning stiffness pain is worse with physical activities.  In the past did received joint injection on the left knee with some improvement in symptoms.  When does have pain patient takes naproxen as needed with mild improvement.    Otherwise has tolerated Plaquenil well no acute issues no GI upset.  She does takes naproxen daily at bedtime for her pain and symptoms have not required take medication during the day.     Denies any dry eyes, dry mouth, mouth sores or ulcerations no nosebleeding.  Does endorse discoloration of left first index during the winter months however no other fingers or toes involvement.  There is renal family history in family.  No hematuria or foamy urine.  Denies any recent traveling or recent infections or illnesses.      Review of Systems   Constitutional:  Negative for chills and fever.   HENT:  Negative for ear pain and sore throat.    Eyes:  Negative for pain and visual disturbance.   Respiratory:  Negative for cough and shortness of breath.    Cardiovascular:  Negative for chest pain and palpitations.   Gastrointestinal:  Negative for abdominal pain and vomiting.   Genitourinary:  Negative for dysuria and hematuria.   Musculoskeletal:  Positive for arthralgias, joint swelling and neck pain. Negative for back pain.   Skin:  Positive for color change. Negative for rash.   Neurological:  Negative for seizures and syncope.   All other systems reviewed and are negative.          Objective     /70   Pulse 73   Temp (!) 97.2 °F (36.2 °C)   Ht 5' 4\" (1.626 m)   Wt 72.1 kg (159 lb)   SpO2 100%   BMI 27.29 kg/m²     Physical Exam  Vitals and nursing note reviewed.   Constitutional:       General: She is not in acute distress.     Appearance: She is well-developed. "   HENT:      Head: Normocephalic and atraumatic.   Eyes:      Conjunctiva/sclera: Conjunctivae normal.   Cardiovascular:      Rate and Rhythm: Normal rate and regular rhythm.      Heart sounds: No murmur heard.  Pulmonary:      Effort: Pulmonary effort is normal. No respiratory distress.      Breath sounds: Normal breath sounds.   Abdominal:      Palpations: Abdomen is soft.      Tenderness: There is no abdominal tenderness.   Musculoskeletal:         General: Tenderness present. No swelling.      Cervical back: Neck supple.      Right lower leg: No edema.      Left lower leg: No edema.      Comments: Tenderness to palpation bilateral wrist left more than right.  Ganglion cyst present in the left wrist.    Tender to palpation left knee.  No swelling erythema noted.  No effusion.   Skin:     General: Skin is warm and dry.      Capillary Refill: Capillary refill takes less than 2 seconds.   Neurological:      Mental Status: She is alert.   Psychiatric:         Mood and Affect: Mood normal.

## 2024-11-11 ENCOUNTER — OFFICE VISIT (OUTPATIENT)
Dept: BEHAVIORAL/MENTAL HEALTH CLINIC | Facility: CLINIC | Age: 44
End: 2024-11-11

## 2024-11-11 DIAGNOSIS — F31.60 BIPOLAR 1 DISORDER, MIXED (HCC): Primary | ICD-10-CM

## 2024-11-11 NOTE — BH TREATMENT PLAN
"Outpatient Behavioral Health Psychotherapy Treatment Plan    Esha Moreira  1980     Date of Initial Psychotherapy Assessment: 11/11/2024    Date of Current Treatment Plan: 11/11/24  Treatment Plan Target Date: 05/10/2025  Treatment Plan Expiration Date: 05/10/2025    Diagnosis:   1. Bipolar 1 disorder, mixed (HCC)            Area(s) of Need: Decrease of depression cycles.     Long Term Goal 1 (in the client's own words): \"Not to have the depression cycles so often\"    Stage of Change: Action    Target Date for completion: 05/10/2025     Anticipated therapeutic modalities: CBT, Solution Focused Therapy, Client Centered Therapy.     People identified to complete this goal: Client and therapist.       Objective 1: (identify the means of measuring success in meeting the objective): Report feeling happier/ better overall mood 5/7 days/ week.      Objective 2: (identify the means of measuring success in meeting the objective): Celebrating little successes each day using positve self-talk and or journaling 5/7 days/ week.      Long Term Goal 2 (in the client's own words): Manage how I deal with my pain.     Stage of Change: Action    Target Date for completion: 5/10/2025     Anticipated therapeutic modalities: CBT, CCT, SFT     People identified to complete this goal: Client and therapist.      Objective 1: (identify the means of measuring success in meeting the objective): Complete physical therapy activities at home 5/7 days/ week      Objective 2: (identify the means of measuring success in meeting the objective): Explore and resolve thoughts and feeling that arise as a result of physical pain.           I am currently under the care of a [facility] psychiatric provider: no    My . [facility] psychiatric provider is: N/A    I am currently taking psychiatric medications: No    I feel that I will be ready for discharge from mental health care when I reach the following (measurable goal/objective): When I reach all " goals in my treatment plan.     For children and adults who have a legal guardian:   Has there been any change to custody orders and/or guardianship status? NA. If yes, attach updated documentation.    I have updated my Crisis Plan and have been offered a copy of this plan    Behavioral Health Treatment Plan [facility]: Diagnosis and Treatment Plan explained to Esha Moreira acknowledges an understanding of their diagnosis. Esha Moreira agrees to this treatment plan.    I have been offered a copy of this Treatment Plan. yes
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
## 2024-11-13 ENCOUNTER — HOSPITAL ENCOUNTER (OUTPATIENT)
Dept: RADIOLOGY | Facility: CLINIC | Age: 44
Discharge: HOME | End: 2024-11-13
Payer: MEDICARE

## 2024-11-13 VITALS
SYSTOLIC BLOOD PRESSURE: 118 MMHG | OXYGEN SATURATION: 98 % | TEMPERATURE: 97.7 F | RESPIRATION RATE: 20 BRPM | HEART RATE: 72 BPM | DIASTOLIC BLOOD PRESSURE: 67 MMHG

## 2024-11-13 DIAGNOSIS — M54.12 CERVICAL RADICULOPATHY: ICD-10-CM

## 2024-11-13 PROCEDURE — 62321 NJX INTERLAMINAR CRV/THRC: CPT | Performed by: ANESTHESIOLOGY

## 2024-11-13 RX ORDER — METHYLPREDNISOLONE ACETATE 80 MG/ML
80 INJECTION, SUSPENSION INTRA-ARTICULAR; INTRALESIONAL; INTRAMUSCULAR; PARENTERAL; SOFT TISSUE ONCE
Status: COMPLETED | OUTPATIENT
Start: 2024-11-13 | End: 2024-11-13

## 2024-11-13 RX ADMIN — METHYLPREDNISOLONE ACETATE 80 MG: 80 INJECTION, SUSPENSION INTRA-ARTICULAR; INTRALESIONAL; INTRAMUSCULAR; SOFT TISSUE at 10:06

## 2024-11-13 RX ADMIN — IOHEXOL 1 ML: 300 INJECTION, SOLUTION INTRAVENOUS at 10:06

## 2024-11-13 NOTE — DISCHARGE INSTR - LAB
Epidural Steroid Injection   WHAT YOU NEED TO KNOW:   An epidural steroid injection (MADINA) is a procedure to inject steroid medicine into the epidural space. The epidural space is between your spinal cord and vertebrae. Steroids reduce inflammation and fluid buildup in your spine that may be causing pain. You may be given pain medicine along with the steroids.          ACTIVITY  Do not drive or operate machinery today.  No strenuous activity today - bending, lifting, etc.  You may resume normal activites starting tomorrow - start slowly and as tolerated.  You may shower today, but no tub baths or hot tubs.  You may have numbness for several hours from the local anesthetic. Please use caution and common sense, especially with weight-bearing activities.    CARE OF THE INJECTION SITE  If you have soreness or pain, apply ice to the area today (20 minutes on/20 minutes off).  Starting tomorrow, you may use warm, moist heat or ice if needed.  You may have an increase or change in your discomfort for 36-48 hours after your treatment.  Apply ice and continue with any pain medication you have been prescribed.  Notify the [facility] if you have any of the following: redness, drainage, swelling, headache, stiff neck or fever above 100°F.    SPECIAL INSTRUCTIONS  Our office will contact you in approximately 14 days for a progress report.    MEDICATIONS  Continue to take all routine medications.  Our office may have instructed you to hold some medications.    As no general anesthesia was used in today's procedure, you should not experience any side effects related to anesthesia.     If you are diabetic, the steroids used in today's injection may temporarily increase your blood sugar levels after the first few days after your injection. Please keep a close eye on your sugars and alert the doctor who manages your diabetes if your sugars are significantly high from your baseline or you are symptomatic.     If you have a  problem specifically related to your procedure, please call our office at [phone].  Problems not related to your procedure should be directed to your primary care physician.

## 2024-11-13 NOTE — H&P
History of Present Illness: The patient is a 44 y.o. female who presents with complaints of neck pain is here today for cervical epidural steroid injection    Past Medical History:   Diagnosis Date    Abnormal Pap smear of cervix     Anemia     had iron infusions to treat    Celiac disease     Depression     GERD (gastroesophageal reflux disease)     GERD without esophagitis     Heartburn     Hiatal hernia     Hypertension     Microcytic anemia 07/15/2019    Sleep apnea     borderline + per sleep study test    Vitamin D deficiency        Past Surgical History:   Procedure Laterality Date    APPENDECTOMY  2021    EGD N/A     WI EXC B9 LESION MRGN XCP SK TG T/A/L 3.1-4.0 CM Right 08/21/2023    Procedure: EXCISION SUBCUTANEOUS MASS RIGHT BACK (3CM);  Surgeon: Raúl Roman MD;  Location: [facility] MAIN OR;  Service: General    WI LAPAROSCOPIC APPENDECTOMY N/A 01/23/2021    Procedure: APPENDECTOMY LAPAROSCOPIC;  Surgeon: Toan Mccoy DO;  Location: [facility];  Service: General    TUBAL LIGATION      WISDOM TOOTH EXTRACTION N/A 2005         Current Outpatient Medications:     amLODIPine (NORVASC) 2.5 mg tablet, Take 1 tablet (2.5 mg total) by mouth daily, Disp: 30 tablet, Rfl: 6    cetirizine (ZyrTEC) 10 mg tablet, Take 1 tablet (10 mg total) by mouth daily, Disp: 90 tablet, Rfl: 1    chlorhexidine (PERIDEX) 0.12 % solution, Apply 15 mL to the mouth or throat 2 (two) times a day Start 3 days after procedure (Patient not taking: Reported on 7/11/2024), Disp: 120 mL, Rfl: 0    cholecalciferol (VITAMIN D3) 400 units tablet, Take 400 Units by mouth daily, Disp: , Rfl:     fluticasone (FLONASE) 50 mcg/act nasal spray, SHAKE LIQUID AND USE 2 SPRAYS IN EACH NOSTRIL ONCE DAILY., Disp: , Rfl:     folic acid (FOLVITE) 1 mg tablet, Take 1 tablet (1 mg total) by mouth daily, Disp: 30 tablet, Rfl: 6    HYDROcodone-acetaminophen (Norco) 5-325 mg per tablet, Take 1 tablet by mouth every 6 (six) hours as needed for pain Max Daily Amount: 4  tablets (Patient not taking: Reported on 7/11/2024), Disp: 6 tablet, Rfl: 0    hydroxychloroquine (PLAQUENIL) 200 mg tablet, Take 1.5 tablets (300 mg total) by mouth daily, Disp: 135 tablet, Rfl: 2    meclizine (ANTIVERT) 25 mg tablet, Take 1 tablet (25 mg total) by mouth every 8 (eight) hours as needed for dizziness (Patient not taking: Reported on 12/22/2023), Disp: 10 tablet, Rfl: 0    methotrexate 2.5 MG tablet, 5 tablet po weekly (take all 5 tablets on the same day every week), Disp: 25 tablet, Rfl: 6    naproxen (Naprosyn) 500 mg tablet, Take 1 tablet (500 mg total) by mouth 2 (two) times a day with meals As needed for joint pain, Disp: 60 tablet, Rfl: 6    omeprazole (PriLOSEC) 40 MG capsule, TAKE 1 CAPSULE(40 MG) BY MOUTH DAILY, Disp: 30 capsule, Rfl: 5    Sodium Fluoride 1.1 % PSTE, Take 1 Film by mouth daily at bedtime Use once per day at bedtime. Brush, spit out. Don't rinse. Let sit on teeth. Generic brand OK, Disp: 51 g, Rfl: 3    Current Facility-Administered Medications:     iohexol (OMNIPAQUE) 300 mg/mL injection 1 mL, 1 mL, Epidural, Once, Jerod Parnell MD    methylPREDNISolone acetate (DEPO-MEDROL) injection 80 mg, 80 mg, Epidural, Once, Jerod Parnell MD    Allergies   Allergen Reactions    Gluten Meal - Food Allergy Abdominal Pain and Other (See Comments)    Seasonal Ic [Octacosanol] Swelling     Severe swelling       Physical Exam:   Vitals:    11/13/24 0952   BP: 130/78   Pulse: 72   Resp: 20   Temp: 97.7 °F (36.5 °C)   SpO2: 98%     General: Awake, Alert, Oriented x 3, Mood and affect appropriate  Respiratory: Respirations even and unlabored  Cardiovascular: Peripheral pulses intact; no edema  Musculoskeletal Exam: Neck pain    ASA Score: 3    Patient/Chart Verification  Patient ID Verified: Verbal  Consents Confirmed: To be obtained in the Pre-Procedure area  Interval H&P(within 24 hr) Complete (required for Outpatients and Surgery Admit only): To be obtained in the Procedural  area  Allergies Reviewed: Yes  Anticoag/NSAID held?: Yes (no naproxen for 4 days)  Currently on antibiotics?: No  Pregnancy denied?: Yes    Assessment:   1. Cervical radiculopathy        Plan: BEAN

## 2024-11-14 ENCOUNTER — HOSPITAL ENCOUNTER (OUTPATIENT)
Dept: ULTRASOUND IMAGING | Facility: HOSPITAL | Age: 44
Discharge: HOME | End: 2024-11-14
Attending: STUDENT IN AN ORGANIZED HEALTH CARE EDUCATION/TRAINING PROGRAM
Payer: MEDICARE

## 2024-11-14 DIAGNOSIS — M67.40 GANGLION CYST: ICD-10-CM

## 2024-11-14 PROCEDURE — 20604 DRAIN/INJ JOINT/BURSA W/US: CPT

## 2024-11-22 DIAGNOSIS — M06.00 SERONEGATIVE RHEUMATOID ARTHRITIS (HCC): ICD-10-CM

## 2024-11-22 RX ORDER — NAPROXEN 500 MG/1
TABLET ORAL
Qty: 60 TABLET | Refills: 5 | Status: SHIPPED | OUTPATIENT
Start: 2024-11-22

## 2024-11-24 PROBLEM — M06.00 SERONEGATIVE RHEUMATOID ARTHRITIS (HCC): Status: ACTIVE | Noted: 2024-11-24

## 2024-11-24 PROBLEM — Z79.899 HIGH RISK MEDICATION USE: Status: ACTIVE | Noted: 2024-11-24

## 2024-11-24 NOTE — ASSESSMENT & PLAN NOTE
Benefits and risks of hydroxychloroquine, including but not limited to retinal toxicity, corneal deposits, gastrointestinal side effects, and headaches were discussed with the patient. The need for a regular eye exam to monitor for ocular toxicity while on this medication was also explained to the patient.

## 2024-11-24 NOTE — ASSESSMENT & PLAN NOTE
Esha Moreira is a 44 year old female who presents for follow-up of seronegative RA based on hands and wrist [facility] ultrasound.  Her symptoms remains uncontrolled however, and still progressing after starting DMARD therapy with hydroxychloroquine.  Currently tolerating well.  Most recent inflammatory markers have increased despite starting HCQ however no recent illness or infections she is not back at her baseline.  Regards to joint pain on the left knee this seems to be more related to OA rather than related to RA. Would benefit from addition of methotrexate. Patient's rheumatologic disease(s) threaten long-term function if not appropriately managed.    Plan  Given symptoms are still uncontrolled and not back at baseline and she is already on max dose on hydroxychloroquine we will start patient on methotrexate in conjunction with folic acid.  Discussed in detail side effects and risks related to it.  No further pregnancies plan as patient has tubal ligation.    Patient was started on 5 tablets once a week of methotrexate, folic acid daily  Continue Plaquenil as taken, remind regular eye exam  Will order bilateral knee x-rays as below to monitor for RA versus OA  Patient discussed to get labs monitor 1 month starting medication and then every 3 months.  Given symptoms of Raynaud's and history we will start patient on amlodipine to be taken daily this can be taken at bedtime given no history of hypertension.    Patient should return to clinic 6 to 9 months  Orders:    XR knee 3 vw left non injury; Future    XR knee 3 vw right non injury; Future    HLA-B27 antigen    CBC and differential; Standing    Comprehensive metabolic panel; Standing    C-reactive protein; Standing    Sedimentation rate, automated; Standing    methotrexate 2.5 MG tablet; 5 tablet po weekly (take all 5 tablets on the same day every week)    folic acid (FOLVITE) 1 mg tablet; Take 1 tablet (1 mg total) by mouth daily    amLODIPine (NORVASC) 2.5 mg  tablet; Take 1 tablet (2.5 mg total) by mouth daily    hydroxychloroquine (PLAQUENIL) 200 mg tablet; Take 1.5 tablets (300 mg total) by mouth daily

## 2024-11-25 ENCOUNTER — SOCIAL WORK (OUTPATIENT)
Dept: BEHAVIORAL/MENTAL HEALTH CLINIC | Facility: CLINIC | Age: 44
End: 2024-11-25

## 2024-11-25 DIAGNOSIS — F31.60 BIPOLAR 1 DISORDER, MIXED (HCC): Primary | ICD-10-CM

## 2024-11-25 NOTE — PSYCH
"Behavioral Health Psychotherapy Progress Note    Psychotherapy Provided: Individual Psychotherapy     1. Bipolar 1 disorder, mixed (HCC)            Goals addressed in session: Goal 1 and Goal 2     DATA: Client met with clinician and discussed family history, including but not limited to previous marriage and relationship with her children. Reviewed emotional responses to the different relationships, acceptance of self and growth since obtaining her diagnosis and gaining a better understanding of self.   During this session, this clinician used the following therapeutic modalities: Cognitive Behavioral Therapy    Substance Abuse was not addressed during this session. If the client is diagnosed with a co-occurring substance use disorder, please indicate any changes in the frequency or amount of use: N/A. Stage of change for addressing substance use diagnoses: No substance use/Not applicable    ASSESSMENT:  Esha Moreira presents with a Euthymic/ normal mood.     her affect is Normal range and intensity, which is congruent, with her mood and the content of the session. The client has made progress on their goals.     Esha Moreira presents with a none risk of suicide, none risk of self-harm, and none risk of harm to others.    For any risk assessment that surpasses a \"low\" rating, a safety plan must be developed.    A safety plan was indicated: no  If yes, describe in detail N/A    PLAN: Between sessions, Esah Moreira will utilize learned skills between sessions. At the next session, the therapist will use Cognitive Behavioral Therapy to address episodes of depression and management of it's symptoms.    Behavioral Health Treatment Plan and Discharge Planning: Esha Moreira is aware of and agrees to continue to work on their treatment plan. They have identified and are working toward their discharge goals. yes    Visit start and stop times:    11/25/24  Start Time: 1600  Stop Time: 1658  Total Visit " Time: 58 minutes

## 2024-12-09 ENCOUNTER — TELEPHONE (OUTPATIENT)
Age: 44
End: 2024-12-09

## 2024-12-09 NOTE — TELEPHONE ENCOUNTER
Patient is calling regarding cancelling an appointment.    Date/Time: 12/9 @4pm    Reason: got back hurt     Patient was rescheduled: YES [x] NO []  If yes, when was Patient reschedule for: 12/16 @11am    Patient requesting call back to reschedule: YES [] NO [x]

## 2024-12-10 DIAGNOSIS — M06.00 SERONEGATIVE RHEUMATOID ARTHRITIS (HCC): ICD-10-CM

## 2024-12-10 RX ORDER — METHOTREXATE 2.5 MG/1
TABLET ORAL
Qty: 25 TABLET | Refills: 6 | Status: SHIPPED | OUTPATIENT
Start: 2024-12-10

## 2024-12-10 RX ORDER — FOLIC ACID 1 MG/1
1 TABLET ORAL DAILY
Qty: 30 TABLET | Refills: 6 | Status: SHIPPED | OUTPATIENT
Start: 2024-12-10

## 2024-12-10 RX ORDER — NAPROXEN 500 MG/1
500 TABLET ORAL 2 TIMES DAILY WITH MEALS
Qty: 60 TABLET | Refills: 6 | Status: SHIPPED | OUTPATIENT
Start: 2024-12-10

## 2024-12-10 RX ORDER — HYDROXYCHLOROQUINE SULFATE 200 MG/1
300 TABLET, FILM COATED ORAL DAILY
Qty: 135 TABLET | Refills: 2 | Status: SHIPPED | OUTPATIENT
Start: 2024-12-10 | End: 2025-09-06

## 2024-12-10 RX ORDER — AMLODIPINE BESYLATE 2.5 MG/1
2.5 TABLET ORAL DAILY
Qty: 30 TABLET | Refills: 6 | Status: SHIPPED | OUTPATIENT
Start: 2024-12-10

## 2024-12-15 ENCOUNTER — TELEPHONE (OUTPATIENT)
Dept: OTHER | Facility: OTHER | Age: 44
End: 2024-12-15

## 2024-12-15 NOTE — TELEPHONE ENCOUNTER
Patient calling to cancel tomorrow's (12/16)appointment.    Reports no need to reschedule as she has appt for the following week already booked.

## 2024-12-23 ENCOUNTER — SOCIAL WORK (OUTPATIENT)
Dept: BEHAVIORAL/MENTAL HEALTH CLINIC | Facility: CLINIC | Age: 44
End: 2024-12-23
Payer: COMMERCIAL

## 2024-12-23 DIAGNOSIS — F31.60 BIPOLAR 1 DISORDER, MIXED (HCC): Primary | ICD-10-CM

## 2024-12-23 PROCEDURE — 90834 PSYTX W PT 45 MINUTES: CPT | Performed by: COUNSELOR

## 2024-12-23 NOTE — PSYCH
"Behavioral Health Psychotherapy Progress Note    Psychotherapy Provided: Individual Psychotherapy     1. Bipolar 1 disorder, mixed (HCC)            Goals addressed in session: Goal 1 and Goal 2     DATA: Clinician and client completed in office session. Discussed perception and communication style with paramour. Client explained they often think of the negative things that occur to them, clinician challenged client to partner the negative with a positive. Clinician assisted client in-vivo to practice this, client was successful. Explored possible problem solving in regard to paramour's pattern of buying sneakers vs managing bills, client will discuss possible solution of a separate sneaker account with paramour and practicing open communication with paramour in regards to client's feelings of telling paramour no when they do not have finances available.   During this session, this clinician used the following therapeutic modalities: Cognitive Behavioral Therapy    Substance Abuse was not addressed during this session. If the client is diagnosed with a co-occurring substance use disorder, please indicate any changes in the frequency or amount of use: N/A. Stage of change for addressing substance use diagnoses: No substance use/Not applicable    ASSESSMENT:  Esha Moreira presents with a Euthymic/ normal mood. her affect is Normal range and intensity, which is congruent, with her mood and the content of the session. The client has made progress on their goals.  Client presents in active stage of change. Client appears to be open to challenges in counseling as well as psychoeducation. Esha Moreira presents with a none risk of suicide, none risk of self-harm, and none risk of harm to others.    For any risk assessment that surpasses a \"low\" rating, a safety plan must be developed.    A safety plan was indicated: no  If yes, describe in detail N/A    PLAN: Between sessions, Esha Moreira will communicate " "feelings with paramour in regard to spending money on sneakers and having to tell him no when money is low. Client will also acknowledge a positive thing when they think of a negative thing, IE: \"yes and\". At the next session, the therapist will use Cognitive Behavioral Therapy to address goals.    Behavioral Health Treatment Plan and Discharge Planning: Esha PIMENTEL Harish is aware of and agrees to continue to work on their treatment plan. They have identified and are working toward their discharge goals. yes    Depression Follow-up Plan Completed: Not applicable    Visit start and stop times:    12/23/24  Start Time: 1602  Stop Time: 1654  Total Visit Time: 52 minutes  "

## 2024-12-27 DIAGNOSIS — J34.9 PARANASAL SINUS DISEASE: ICD-10-CM

## 2024-12-27 DIAGNOSIS — J30.9 ALLERGIC RHINITIS, UNSPECIFIED SEASONALITY, UNSPECIFIED TRIGGER: ICD-10-CM

## 2024-12-27 RX ORDER — CETIRIZINE HYDROCHLORIDE 10 MG/1
10 TABLET ORAL DAILY
Qty: 90 TABLET | Refills: 0 | Status: CANCELLED | OUTPATIENT
Start: 2024-12-27

## 2024-12-28 DIAGNOSIS — J30.9 ALLERGIC RHINITIS, UNSPECIFIED SEASONALITY, UNSPECIFIED TRIGGER: Primary | ICD-10-CM

## 2024-12-30 RX ORDER — FLUTICASONE PROPIONATE 50 MCG
1 SPRAY, SUSPENSION (ML) NASAL DAILY
Qty: 48 G | Refills: 0 | Status: SHIPPED | OUTPATIENT
Start: 2024-12-30

## 2024-12-31 DIAGNOSIS — J30.9 ALLERGIC RHINITIS, UNSPECIFIED SEASONALITY, UNSPECIFIED TRIGGER: ICD-10-CM

## 2024-12-31 DIAGNOSIS — J34.9 PARANASAL SINUS DISEASE: ICD-10-CM

## 2024-12-31 RX ORDER — CETIRIZINE HYDROCHLORIDE 10 MG/1
10 TABLET ORAL DAILY
Qty: 30 TABLET | Refills: 5 | Status: SHIPPED | OUTPATIENT
Start: 2024-12-31

## 2025-01-06 ENCOUNTER — TELEPHONE (OUTPATIENT)
Dept: BEHAVIORAL/MENTAL HEALTH CLINIC | Facility: CLINIC | Age: 45
End: 2025-01-06

## 2025-01-06 NOTE — TELEPHONE ENCOUNTER
Clinician contacted client and asked if client is still planning on attending today's session due to weather, client declined and asked to reschedule. Rescheduled for one week from today for 6pm. Clinician attempted to call client back and offer virtual for today but client did not answer.

## 2025-01-13 ENCOUNTER — TELEMEDICINE (OUTPATIENT)
Dept: BEHAVIORAL/MENTAL HEALTH CLINIC | Facility: CLINIC | Age: 45
End: 2025-01-13
Payer: COMMERCIAL

## 2025-01-13 DIAGNOSIS — F31.60 BIPOLAR 1 DISORDER, MIXED (HCC): Primary | ICD-10-CM

## 2025-01-13 PROCEDURE — 90837 PSYTX W PT 60 MINUTES: CPT | Performed by: COUNSELOR
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
## 2025-01-20 ENCOUNTER — TELEMEDICINE (OUTPATIENT)
Dept: BEHAVIORAL/MENTAL HEALTH CLINIC | Facility: CLINIC | Age: 45
End: 2025-01-20
Payer: COMMERCIAL

## 2025-01-20 DIAGNOSIS — F31.60 BIPOLAR 1 DISORDER, MIXED (HCC): Primary | ICD-10-CM

## 2025-01-20 PROCEDURE — 90837 PSYTX W PT 60 MINUTES: CPT | Performed by: COUNSELOR

## 2025-01-20 NOTE — PSYCH
Behavioral Health Psychotherapy Progress Note    Psychotherapy Provided: Individual Psychotherapy     1. Bipolar 1 disorder, mixed (HCC)            Goals addressed in session: Goal 1 and Goal 2     DATA: Telemedicine consent    Patient: Esha Moreira  Provider: Jared Grady LPC  Provider located at [facility] [place]  [facility] [place]  [address]  [place] [address] [phone]    The patient was identified by name and date of birth. Esha Moreira was informed that this is a telemedicine visit and that the visit is being conducted through the Epic Embedded platform. She agrees to proceed..  My office door was closed. No one else was in the room.  She acknowledged consent and understanding of privacy and security of the video platform. The patient has agreed to participate and understands they can discontinue the visit at any time.    Patient is aware this is a billable service.     I spent 53 minutes with the patient during this visit.    Clinician and client explored client's anxieties triggered by political climate. Explored coping skills and self-care, client advised preparation makes client feel secure, discussed the benefits of planning for worst case scenrios without letting it overwhelm. Explored this through Socratic questioning. Discussed how client manages emotional response to not being with her children for 5 years and how that effects client's relationship with their children currently.     During this session, this clinician used the following therapeutic modalities: Cognitive Behavioral Therapy    Substance Abuse was not addressed during this session. If the client is diagnosed with a co-occurring substance use disorder, please indicate any changes in the frequency or amount of use: N/A. Stage of change for addressing substance use diagnoses: No substance use/Not applicable    ASSESSMENT:  Esha Moreira presents with a  "Euthymic/ normal and Anxious mood.     her affect is Normal range and intensity, which is congruent, with her mood and the content of the session. The client has made progress on their goals.    Client continues to benefit from counseling. Client is in a marriage with an individual not born in the [place] and this makes client nervous due to politics. Client would benefit from continued counseling. Esha Moreira presents with a none risk of suicide, none risk of self-harm, and none risk of harm to others.    For any risk assessment that surpasses a \"low\" rating, a safety plan must be developed.    A safety plan was indicated: no  If yes, describe in detail N/A    PLAN: Between sessions, Esha Moreira will start planning a contingency plan in the event of her  not being available anymore. At the next session, the therapist will use Cognitive Behavioral Therapy to address emotional regulation.    Behavioral Health Treatment Plan and Discharge Planning: Esha Moreira is aware of and agrees to continue to work on their treatment plan. They have identified and are working toward their discharge goals. yes    Depression Follow-up Plan Completed: Not applicable    Visit start and stop times:    01/20/25  Start Time: 1600  Stop Time: 1653  Total Visit Time: 53 minutes  "

## 2025-01-23 ENCOUNTER — OFFICE VISIT (OUTPATIENT)
Dept: DENTISTRY | Facility: CLINIC | Age: 45
End: 2025-01-23

## 2025-01-23 VITALS — SYSTOLIC BLOOD PRESSURE: 129 MMHG | DIASTOLIC BLOOD PRESSURE: 84 MMHG | HEART RATE: 69 BPM

## 2025-01-23 DIAGNOSIS — K06.010 GINGIVAL RECESSION, LOCALIZED: Primary | ICD-10-CM

## 2025-01-23 PROCEDURE — WIS1000 RESIDENT TEACHING CASE

## 2025-01-23 NOTE — PROGRESS NOTES
Procedure Details  [id number] - RESIDENT TEACHING CASE    Second Stage Surgery Free Gingival Graft Surgery       Ginigival Graft #24 and #25 and Frenum Removal on 6/10/24.      PMH reviewed, No changes, ASA II. PMH includes GERD, micorcytic anemia, celiac disease, YESSICA, DD, Arthrititis, and Bipolar Type I.      Patient presents for second stage of ginigival graft surgery. Second stage consisting of coronal repositioning of the flap 6 mo post-op surgery.      20% benzocaine placed for a minute. 2 carpules of 4% septocaine 1:100 000 epinephrine given via infiltrations on mandible buccal and lingual infiltrations.      Buccal of #23-#26 full thickness flap created. Image attached in Media. Cementum removed with hand scalers. Flap coronally repositioned and sutured with 5-0 PG3 vicryl sutures.     Reviewed post op instructions.Reviewed with patient not to use chlorhexidine    NV: Follow-up in 2 weeks.

## 2025-01-28 DIAGNOSIS — J34.9 PARANASAL SINUS DISEASE: ICD-10-CM

## 2025-01-28 DIAGNOSIS — J30.9 ALLERGIC RHINITIS, UNSPECIFIED SEASONALITY, UNSPECIFIED TRIGGER: ICD-10-CM

## 2025-01-28 RX ORDER — CETIRIZINE HYDROCHLORIDE 10 MG/1
10 TABLET ORAL DAILY
Qty: 30 TABLET | Refills: 5 | Status: SHIPPED | OUTPATIENT
Start: 2025-01-28

## 2025-02-02 DIAGNOSIS — K21.9 GASTROESOPHAGEAL REFLUX DISEASE WITHOUT ESOPHAGITIS: ICD-10-CM

## 2025-02-03 ENCOUNTER — SOCIAL WORK (OUTPATIENT)
Dept: BEHAVIORAL/MENTAL HEALTH CLINIC | Facility: CLINIC | Age: 45
End: 2025-02-03
Payer: COMMERCIAL

## 2025-02-03 DIAGNOSIS — F31.60 BIPOLAR 1 DISORDER, MIXED (HCC): Primary | ICD-10-CM

## 2025-02-03 PROCEDURE — 90837 PSYTX W PT 60 MINUTES: CPT | Performed by: COUNSELOR

## 2025-02-03 RX ORDER — OMEPRAZOLE 40 MG/1
40 CAPSULE, DELAYED RELEASE ORAL DAILY
Qty: 90 CAPSULE | Refills: 1 | Status: SHIPPED | OUTPATIENT
Start: 2025-02-03

## 2025-02-03 NOTE — PSYCH
"Behavioral Health Psychotherapy Progress Note    Psychotherapy Provided: Individual Psychotherapy     1. Bipolar 1 disorder, mixed (HCC)            Goals addressed in session: Goal 1 and Goal 2     DATA: Clinician completed in office session with client. Discussed the death of her uncle, reviewed grieving process and discussed any family traditions on acknowledging death. Client advised they do not have one, reviewed client's on traditions, and explored ones they can try. Client spoke of emotion regulation as client fears letting themself feel \"too happy\" or \"too sad\", as they believe it can lead to a deep depression. Explored this with client and discussed coping skills and encouraged her to share her coping skills with paramour. Reviewed client's homework, client created a plan with paramour should paramour be unavailable, client report minimal improvement in  distress as there is no way to know what will or wont happen, clinician acknowledged.   During this session, this clinician used the following therapeutic modalities: Cognitive Behavioral Therapy    Substance Abuse was not addressed during this session. If the client is diagnosed with a co-occurring substance use disorder, please indicate any changes in the frequency or amount of use: N/A. Stage of change for addressing substance use diagnoses: No substance use/Not applicable    ASSESSMENT:  Esha Moreira presents with a Euthymic/ normal mood.     her affect is Normal range and intensity, which is congruent, with her mood and the content of the session. The client has made progress on their goals.    Client continues to work on self, client has experienced multiple levels of loss and would benefit from continued counseling to strengthen coping skills. Esha Moreira presents with a none risk of suicide, none risk of self-harm, and none risk of harm to others.    For any risk assessment that surpasses a \"low\" rating, a safety plan must be " developed.    A safety plan was indicated: no  If yes, describe in detail N/A    PLAN: Between sessions, Esha Moreira will share coping skills with paramour. At the next session, the therapist will use Cognitive Behavioral Therapy to address goals.    Behavioral Health Treatment Plan and Discharge Planning: Esha Moreira is aware of and agrees to continue to work on their treatment plan. They have identified and are working toward their discharge goals. yes    Depression Follow-up Plan Completed: Not applicable    Visit start and stop times:    02/03/25  Start Time: 1601  Stop Time: 1655  Total Visit Time: 54 minutes   105

## 2025-02-06 ENCOUNTER — OFFICE VISIT (OUTPATIENT)
Dept: DENTISTRY | Facility: CLINIC | Age: 45
End: 2025-02-06

## 2025-02-06 DIAGNOSIS — S09.90XD: Primary | ICD-10-CM

## 2025-02-06 PROCEDURE — D0191 ASSESSMENT OF A PATIENT: HCPCS

## 2025-02-06 NOTE — PROGRESS NOTES
Procedure Details   - ASSESSMENT OF A PATIENT    Pt presents to clinic for 2 week post op follow up after second stage free gingival graft surgery.      PMH, no changes pt is ASA 2.     Pt reports minimal pain at this visit but she feels bruising in mandibular anterior. She is able to eat foods and is functioning well. Confirmed pt took all medications as prescribed.     Mandibular anterior graft area is stable with gentle pulling of lip and noted lots of fibrin healing present. Proper keratinized tissue present on buccal. Informed pt that we will not remove last suture today but let it naturally exfoliate and re-assess in 4-6 weeks and to not pull on her lip excessively.     A picture of the graft was taken and uploaded into media for 2 week healing.  POI reviewed, pt left satisfied and ambulatory     NV: 1-2 month follow-up     Attending: Dr. CHRISTIAN examined pt

## 2025-02-10 ENCOUNTER — TELEPHONE (OUTPATIENT)
Age: 45
End: 2025-02-10

## 2025-02-10 NOTE — TELEPHONE ENCOUNTER
Called patient in response to [employer] message received. She is requesting lab orders to see where her counts are. States she has been feeling fatigued and tired for about a month. She doesn't fall asleep but said she is sitting around a lot more too tired to do anything. She reports she sometimes has a restful sleep but has struggled with sleeping well each night on and off. Denies shortness of breath except with really exerting herself. She reports occasionally feeling off balance. She feels like she takes a step and steps off to the side a little bit. Denies leg weakness, dizziness, or feeling like she is going to faint. States she did have vertigo at one point but this doesn't feel like that. States she feels fatigued like she did when she was anemic. I advised her to also discuss symptoms with PCP and to report to ED if she experiences shortness of breath, feeling like she's going to faint, dizziness, or worsening balance issues. She verbalized an understanding.

## 2025-02-17 ENCOUNTER — SOCIAL WORK (OUTPATIENT)
Dept: BEHAVIORAL/MENTAL HEALTH CLINIC | Facility: CLINIC | Age: 45
End: 2025-02-17
Payer: COMMERCIAL

## 2025-02-17 DIAGNOSIS — F31.60 BIPOLAR 1 DISORDER, MIXED (HCC): Primary | ICD-10-CM

## 2025-02-17 PROCEDURE — 90834 PSYTX W PT 45 MINUTES: CPT | Performed by: COUNSELOR

## 2025-02-17 NOTE — PSYCH
"Behavioral Health Psychotherapy Progress Note    Psychotherapy Provided: Individual Psychotherapy     1. Bipolar 1 disorder, mixed (HCC)            Goals addressed in session: Goal 1 and Goal 2     DATA: Clinician completed in office session with client. Discussed client's relationship with client's daughter, as client explained she has been teaching her how to drive and this has given them time to get to know one another. Client explained that her daughter disclosed something to her and we explored how this made client feel, as this was a show of trust in their relationship, client advised they felt \"honored and like we are finally getting somewhere\". Continued to discuss her relationship with her children and process the upcoming birth of two new grandchildren.   During this session, this clinician used the following therapeutic modalities: Cognitive Behavioral Therapy    Substance Abuse was not addressed during this session. If the client is diagnosed with a co-occurring substance use disorder, please indicate any changes in the frequency or amount of use: N/A. Stage of change for addressing substance use diagnoses: No substance use/Not applicable    ASSESSMENT:  Esha Moreira presents with a Euthymic/ normal mood.     her affect is Normal range and intensity, which is congruent, with her mood and the content of the session. The client has made progress on their goals. Esha Moreira presents with a none risk of suicide, none risk of self-harm, and none risk of harm to others.    For any risk assessment that surpasses a \"low\" rating, a safety plan must be developed.    A safety plan was indicated: no  If yes, describe in detail N/A    PLAN: Between sessions, Esha Moreira will continue to build her relationship with her children and utilize learned skills as appropriate. At the next session, the therapist will use Cognitive Behavioral Therapy to address goals.    Behavioral Health Treatment Plan and " Discharge Planning: Esha LOREN Moreira is aware of and agrees to continue to work on their treatment plan. They have identified and are working toward their discharge goals. yes    Depression Follow-up Plan Completed: Not applicable    Visit start and stop times:    02/17/25  Start Time: 1605  Stop Time: 1657  Total Visit Time: 52 minutes

## 2025-02-25 NOTE — PROGRESS NOTES
PERIODIC EXAM, ADULT PROPHY , (no xrays due )   REVIEWED MED HX: meds, allergies, health changes reviewed in Psychiatric. All consents signed.  CHIEF CONCERN: no dental pain or concerns  PAIN SCALE:  0  ASA CLASS:  II  PLAQUE:  mild  CALCULUS:  light  BLEEDING:   light  STAIN :   none      PERIO: gums appear stable    Hygiene Procedures:  Scaled, Polished, Flossed and Used Cavitron    Oral Hygiene Instruction: Brushing Minimum 2x daily for 2 minutes, daily flossing    Dispensed: Toothbrush, Toothpaste, Floss    Visual and Tactile Intraoral/ Extraoral evaluation: Oral and Oropharyngeal cancer evaluation. No findings     Dr. Esquivel   Reviewed with patient clinical and radiographic findings and patient verbalized understanding. All questions and concerns addressed.   - recession 3mm 23-26 recession noted  REFERRALS: none    CARIES FINDINGS: no decay noted    Next Recall: 6 month recall     Last BWX: 8/21/24  Last Panorex/ FMX :

## 2025-02-26 ENCOUNTER — OFFICE VISIT (OUTPATIENT)
Dept: DENTISTRY | Facility: CLINIC | Age: 45
End: 2025-02-26

## 2025-02-26 VITALS — SYSTOLIC BLOOD PRESSURE: 133 MMHG | HEART RATE: 72 BPM | DIASTOLIC BLOOD PRESSURE: 79 MMHG

## 2025-02-26 DIAGNOSIS — Z01.20 ENCOUNTER FOR DENTAL EXAM AND CLEANING W/O ABNORMAL FINDINGS: Primary | ICD-10-CM

## 2025-02-26 PROCEDURE — D1110 PROPHYLAXIS - ADULT: HCPCS

## 2025-02-26 PROCEDURE — D0120 PERIODIC ORAL EVALUATION - ESTABLISHED PATIENT: HCPCS

## 2025-02-28 DIAGNOSIS — J30.9 ALLERGIC RHINITIS, UNSPECIFIED SEASONALITY, UNSPECIFIED TRIGGER: ICD-10-CM

## 2025-02-28 DIAGNOSIS — J34.9 PARANASAL SINUS DISEASE: ICD-10-CM

## 2025-02-28 RX ORDER — CETIRIZINE HYDROCHLORIDE 10 MG/1
10 TABLET ORAL DAILY
Qty: 30 TABLET | Refills: 5 | Status: SHIPPED | OUTPATIENT
Start: 2025-02-28

## 2025-03-03 ENCOUNTER — SOCIAL WORK (OUTPATIENT)
Dept: BEHAVIORAL/MENTAL HEALTH CLINIC | Facility: CLINIC | Age: 45
End: 2025-03-03
Payer: COMMERCIAL

## 2025-03-03 DIAGNOSIS — F31.60 BIPOLAR 1 DISORDER, MIXED (HCC): Primary | ICD-10-CM

## 2025-03-03 PROCEDURE — 90837 PSYTX W PT 60 MINUTES: CPT | Performed by: COUNSELOR
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
## 2025-03-07 ENCOUNTER — APPOINTMENT (OUTPATIENT)
Dept: LAB | Facility: CLINIC | Age: 45
End: 2025-03-07
Payer: MEDICARE

## 2025-03-07 DIAGNOSIS — M06.00 SERONEGATIVE RHEUMATOID ARTHRITIS (HCC): ICD-10-CM

## 2025-03-07 DIAGNOSIS — D50.9 IRON DEFICIENCY ANEMIA, UNSPECIFIED IRON DEFICIENCY ANEMIA TYPE: ICD-10-CM

## 2025-03-07 LAB
ALBUMIN SERPL BCG-MCNC: 4.5 G/DL (ref 3.5–5)
ALP SERPL-CCNC: 61 U/L (ref 34–104)
ALT SERPL W P-5'-P-CCNC: 16 U/L (ref 7–52)
ANION GAP SERPL CALCULATED.3IONS-SCNC: 6 MMOL/L (ref 4–13)
AST SERPL W P-5'-P-CCNC: 19 U/L (ref 13–39)
BASOPHILS # BLD AUTO: 0.04 THOUSANDS/ÂΜL (ref 0–0.1)
BASOPHILS NFR BLD AUTO: 1 % (ref 0–1)
BILIRUB SERPL-MCNC: 0.39 MG/DL (ref 0.2–1)
BUN SERPL-MCNC: 18 MG/DL (ref 5–25)
CALCIUM SERPL-MCNC: 9.3 MG/DL (ref 8.4–10.2)
CHLORIDE SERPL-SCNC: 102 MMOL/L (ref 96–108)
CO2 SERPL-SCNC: 28 MMOL/L (ref 21–32)
CREAT SERPL-MCNC: 0.96 MG/DL (ref 0.6–1.3)
CRP SERPL QL: 2.3 MG/L
EOSINOPHIL # BLD AUTO: 0.15 THOUSAND/ÂΜL (ref 0–0.61)
EOSINOPHIL NFR BLD AUTO: 3 % (ref 0–6)
ERYTHROCYTE [DISTWIDTH] IN BLOOD BY AUTOMATED COUNT: 11.8 % (ref 11.6–15.1)
ERYTHROCYTE [SEDIMENTATION RATE] IN BLOOD: 8 MM/HOUR (ref 0–19)
FERRITIN SERPL-MCNC: 28 NG/ML (ref 11–307)
GFR SERPL CREATININE-BSD FRML MDRD: 72 ML/MIN/1.73SQ M
GLUCOSE SERPL-MCNC: 82 MG/DL (ref 65–140)
HCT VFR BLD AUTO: 38.5 % (ref 34.8–46.1)
HGB BLD-MCNC: 12.9 G/DL (ref 11.5–15.4)
IMM GRANULOCYTES # BLD AUTO: 0.02 THOUSAND/UL (ref 0–0.2)
IMM GRANULOCYTES NFR BLD AUTO: 0 % (ref 0–2)
IRON SATN MFR SERPL: 40 % (ref 15–50)
IRON SERPL-MCNC: 165 UG/DL (ref 50–212)
LYMPHOCYTES # BLD AUTO: 1.65 THOUSANDS/ÂΜL (ref 0.6–4.47)
LYMPHOCYTES NFR BLD AUTO: 32 % (ref 14–44)
MCH RBC QN AUTO: 29.5 PG (ref 26.8–34.3)
MCHC RBC AUTO-ENTMCNC: 33.5 G/DL (ref 31.4–37.4)
MCV RBC AUTO: 88 FL (ref 82–98)
MONOCYTES # BLD AUTO: 0.34 THOUSAND/ÂΜL (ref 0.17–1.22)
MONOCYTES NFR BLD AUTO: 7 % (ref 4–12)
NEUTROPHILS # BLD AUTO: 3.02 THOUSANDS/ÂΜL (ref 1.85–7.62)
NEUTS SEG NFR BLD AUTO: 57 % (ref 43–75)
NRBC BLD AUTO-RTO: 0 /100 WBCS
PLATELET # BLD AUTO: 244 THOUSANDS/UL (ref 149–390)
PMV BLD AUTO: 10.7 FL (ref 8.9–12.7)
POTASSIUM SERPL-SCNC: 4.1 MMOL/L (ref 3.5–5.3)
PROT SERPL-MCNC: 7.2 G/DL (ref 6.4–8.4)
RBC # BLD AUTO: 4.38 MILLION/UL (ref 3.81–5.12)
SODIUM SERPL-SCNC: 136 MMOL/L (ref 135–147)
TIBC SERPL-MCNC: 414.4 UG/DL (ref 250–450)
TRANSFERRIN SERPL-MCNC: 296 MG/DL (ref 203–362)
UIBC SERPL-MCNC: 249 UG/DL (ref 155–355)
WBC # BLD AUTO: 5.22 THOUSAND/UL (ref 4.31–10.16)

## 2025-03-07 PROCEDURE — 86140 C-REACTIVE PROTEIN: CPT

## 2025-03-07 PROCEDURE — 36415 COLL VENOUS BLD VENIPUNCTURE: CPT

## 2025-03-07 PROCEDURE — 85025 COMPLETE CBC W/AUTO DIFF WBC: CPT

## 2025-03-07 PROCEDURE — 82728 ASSAY OF FERRITIN: CPT

## 2025-03-07 PROCEDURE — 83550 IRON BINDING TEST: CPT

## 2025-03-07 PROCEDURE — 80053 COMPREHEN METABOLIC PANEL: CPT

## 2025-03-07 PROCEDURE — 85652 RBC SED RATE AUTOMATED: CPT

## 2025-03-07 PROCEDURE — 83540 ASSAY OF IRON: CPT

## 2025-03-17 ENCOUNTER — SOCIAL WORK (OUTPATIENT)
Dept: BEHAVIORAL/MENTAL HEALTH CLINIC | Facility: CLINIC | Age: 45
End: 2025-03-17
Payer: COMMERCIAL

## 2025-03-17 DIAGNOSIS — F31.60 BIPOLAR 1 DISORDER, MIXED (HCC): Primary | ICD-10-CM

## 2025-03-17 PROCEDURE — 90837 PSYTX W PT 60 MINUTES: CPT | Performed by: COUNSELOR
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
## 2025-03-21 ENCOUNTER — HOSPITAL ENCOUNTER (OUTPATIENT)
Facility: HOSPITAL | Age: 45
End: 2025-03-21
Payer: MEDICARE

## 2025-03-21 DIAGNOSIS — Z12.31 ENCOUNTER FOR SCREENING MAMMOGRAM FOR MALIGNANT NEOPLASM OF BREAST: ICD-10-CM

## 2025-03-21 PROCEDURE — 77067 SCR MAMMO BI INCL CAD: CPT

## 2025-03-21 PROCEDURE — 77063 BREAST TOMOSYNTHESIS BI: CPT

## 2025-03-31 ENCOUNTER — TELEPHONE (OUTPATIENT)
Dept: PAIN MEDICINE | Facility: CLINIC | Age: 45
End: 2025-03-31

## 2025-03-31 ENCOUNTER — SOCIAL WORK (OUTPATIENT)
Dept: BEHAVIORAL/MENTAL HEALTH CLINIC | Facility: CLINIC | Age: 45
End: 2025-03-31
Payer: COMMERCIAL

## 2025-03-31 DIAGNOSIS — F31.60 BIPOLAR 1 DISORDER, MIXED (HCC): Primary | ICD-10-CM

## 2025-03-31 PROCEDURE — 90834 PSYTX W PT 45 MINUTES: CPT | Performed by: COUNSELOR

## 2025-03-31 NOTE — TELEPHONE ENCOUNTER
Pt reports neck pain that radiates to both shoulders, pain 6/10. Pt's last BEAN was 11/13/25 and provided 50% relief for 1-2 monts and then the amt of relief dropped to 30%. No new symptoms.  Pt requesting a repeat neck inj.  LOVS was 11/4/24 w/ AS.  Pt denies taking blood thinners and denies being diabetic.

## 2025-03-31 NOTE — TELEPHONE ENCOUNTER
Caller: Son    Doctor: Dr. Parnell    Reason for call: patient is ready to schedule next procedure please advise, last procedure on 11/2024    Call back#: [phone]

## 2025-03-31 NOTE — PSYCH
"Behavioral Health Psychotherapy Progress Note    Psychotherapy Provided: Individual Psychotherapy     1. Bipolar 1 disorder, mixed (HCC)            Goals addressed in session: Goal 1 and Goal 2     DATA: Clinician and client discussed familial boundaries, such as with her ex and her children. One of client's children were recently terminated from their job and stopped talking to the family for a couple of days which causes chaos and panix in the family which resulted in stress for client as family members contacted her. Client was aware that her son was ok, as he notified her. Discussed coping skills and not taking on other's stress. Client also discussed some past trauma and would like to make that the main focus on next session, clinician agreed.   During this session, this clinician used the following therapeutic modalities: Cognitive Behavioral Therapy    Substance Abuse was not addressed during this session. If the client is diagnosed with a co-occurring substance use disorder, please indicate any changes in the frequency or amount of use: N/A. Stage of change for addressing substance use diagnoses: No substance use/Not applicable    ASSESSMENT:  Esha Moreira presents with a Euthymic/ normal mood.     her affect is Normal range and intensity and Bright, which is not congruent, with her mood and the content of the session. The client has made progress on their goals.    Client presented with a bright affect today, when discussing challenges from the past, including recent past she would discuss with a smile and laughing. Esha Moreira presents with a none risk of suicide, none risk of self-harm, and none risk of harm to others.    For any risk assessment that surpasses a \"low\" rating, a safety plan must be developed.    A safety plan was indicated: no  If yes, describe in detail N/A     PLAN: Between sessions, Esha Moreira will continue to utilize learned skills. At the next session, the therapist " will use Cognitive Behavioral Therapy to address goals.    Behavioral Health Treatment Plan and Discharge Planning: Esha Moreira is aware of and agrees to continue to work on their treatment plan. They have identified and are working toward their discharge past traumas. yes    Depression Follow-up Plan Completed: Not applicable    Visit start and stop times:    03/31/25  Start Time: 1604  Stop Time: 1655  Total Visit Time: 51 minutes

## 2025-04-03 ENCOUNTER — OFFICE VISIT (OUTPATIENT)
Dept: DENTISTRY | Facility: CLINIC | Age: 45
End: 2025-04-03

## 2025-04-03 VITALS — DIASTOLIC BLOOD PRESSURE: 82 MMHG | SYSTOLIC BLOOD PRESSURE: 127 MMHG | HEART RATE: 75 BPM

## 2025-04-03 DIAGNOSIS — K06.010 GINGIVAL RECESSION, LOCALIZED: Primary | ICD-10-CM

## 2025-04-03 PROCEDURE — D0191 ASSESSMENT OF A PATIENT: HCPCS

## 2025-04-03 NOTE — PROGRESS NOTES
Procedure Details   - ASSESSMENT OF A PATIENT       Pt presents to clinic for 2.5 month post op follow-up after second stage free gingival graft surgery.      PMH, no changes pt is ASA 2.     Pt reports no pain at this visit. She is able to eat foods and is functioning well. Confirmed pt took all medications as prescribed.     Mandibular anterior graft area is stable with gentle pulling of lip and loss of frenum attachment as needed. Proper keratinized tissue present on buccal. Informed pt that we will continue to monitor this area at 6 months post-op healing. Dr. NY consulted and examined pt--Dr. NY confirmed proper healing and increased root coverage from where patient began. Pt was content.      A picture of the graft was taken and uploaded into media.  POI reviewed, pt left satisfied and ambulatory     NV: Follow-up in July/August at 6 month post-op healing

## 2025-04-03 NOTE — TELEPHONE ENCOUNTER
4/2/25-Left message for patient to contact the scheduling office at [phone] to schedule their procedure.

## 2025-04-09 DIAGNOSIS — M06.00 SERONEGATIVE RHEUMATOID ARTHRITIS (HCC): ICD-10-CM

## 2025-04-09 RX ORDER — NAPROXEN 500 MG/1
500 TABLET ORAL 2 TIMES DAILY WITH MEALS
Qty: 60 TABLET | Refills: 5 | Status: SHIPPED | OUTPATIENT
Start: 2025-04-09

## 2025-04-11 ENCOUNTER — HOSPITAL ENCOUNTER (OUTPATIENT)
Dept: RADIOLOGY | Facility: CLINIC | Age: 45
End: 2025-04-11
Payer: MEDICARE

## 2025-04-11 VITALS
RESPIRATION RATE: 18 BRPM | OXYGEN SATURATION: 99 % | DIASTOLIC BLOOD PRESSURE: 75 MMHG | TEMPERATURE: 97.8 F | SYSTOLIC BLOOD PRESSURE: 121 MMHG | HEART RATE: 72 BPM

## 2025-04-11 DIAGNOSIS — M54.12 CERVICAL RADICULOPATHY: ICD-10-CM

## 2025-04-11 PROCEDURE — 62321 NJX INTERLAMINAR CRV/THRC: CPT | Performed by: ANESTHESIOLOGY

## 2025-04-11 RX ORDER — METHYLPREDNISOLONE ACETATE 80 MG/ML
80 INJECTION, SUSPENSION INTRA-ARTICULAR; INTRALESIONAL; INTRAMUSCULAR; PARENTERAL; SOFT TISSUE ONCE
Status: COMPLETED | OUTPATIENT
Start: 2025-04-11 | End: 2025-04-11

## 2025-04-11 RX ADMIN — METHYLPREDNISOLONE ACETATE 80 MG: 80 INJECTION, SUSPENSION INTRA-ARTICULAR; INTRALESIONAL; INTRAMUSCULAR; SOFT TISSUE at 11:10

## 2025-04-11 RX ADMIN — IOHEXOL 1 ML: 300 INJECTION, SOLUTION INTRAVENOUS at 11:09

## 2025-04-11 NOTE — H&P
History of Present Illness: The patient is a 44 y.o. female who presents with complaints of neck and arm pain and is here today for a cervical epidural steroid injection    Past Medical History:   Diagnosis Date    Abnormal Pap smear of cervix     Anemia     had iron infusions to treat    Celiac disease     Depression     GERD (gastroesophageal reflux disease)     GERD without esophagitis     Heartburn     Hiatal hernia     Hypertension     Microcytic anemia 07/15/2019    Sleep apnea     borderline + per sleep study test    Vitamin D deficiency        Past Surgical History:   Procedure Laterality Date    APPENDECTOMY  2021    EGD N/A     KY EXC B9 LESION MRGN XCP SK TG T/A/L 3.1-4.0 CM Right 08/21/2023    Procedure: EXCISION SUBCUTANEOUS MASS RIGHT BACK (3CM);  Surgeon: Raúl Roman MD;  Location: [facility];  Service: General    KY LAPAROSCOPIC APPENDECTOMY N/A 01/23/2021    Procedure: APPENDECTOMY LAPAROSCOPIC;  Surgeon: Toan Mccoy DO;  Location: [facility];  Service: General    TUBAL LIGATION      WISDOM TOOTH EXTRACTION N/A 2005         Current Outpatient Medications:     amLODIPine (NORVASC) 2.5 mg tablet, Take 1 tablet (2.5 mg total) by mouth daily, Disp: 30 tablet, Rfl: 6    cetirizine (ZyrTEC) 10 mg tablet, Take 1 tablet (10 mg total) by mouth daily, Disp: 30 tablet, Rfl: 5    chlorhexidine (PERIDEX) 0.12 % solution, Apply 15 mL to the mouth or throat 2 (two) times a day Start 3 days after procedure (Patient not taking: Reported on 7/11/2024), Disp: 120 mL, Rfl: 0    cholecalciferol (VITAMIN D3) 400 units tablet, Take 400 Units by mouth daily, Disp: , Rfl:     fluticasone (FLONASE) 50 mcg/act nasal spray, SHAKE LIQUID AND USE 1 SPRAY IN EACH NOSTRIL DAILY, Disp: 48 g, Rfl: 0    folic acid (FOLVITE) 1 mg tablet, Take 1 tablet (1 mg total) by mouth daily, Disp: 30 tablet, Rfl: 6    HYDROcodone-acetaminophen (Norco) 5-325 mg per tablet, Take 1 tablet by mouth every 6 (six) hours as needed for pain Max  Daily Amount: 4 tablets (Patient not taking: Reported on 7/11/2024), Disp: 6 tablet, Rfl: 0    hydroxychloroquine (PLAQUENIL) 200 mg tablet, Take 1.5 tablets (300 mg total) by mouth daily, Disp: 135 tablet, Rfl: 2    meclizine (ANTIVERT) 25 mg tablet, Take 1 tablet (25 mg total) by mouth every 8 (eight) hours as needed for dizziness (Patient not taking: Reported on 12/22/2023), Disp: 10 tablet, Rfl: 0    methotrexate 2.5 MG tablet, 5 tablet po weekly (take all 5 tablets on the same day every week), Disp: 25 tablet, Rfl: 6    naproxen (NAPROSYN) 500 mg tablet, Take 1 tablet (500 mg total) by mouth 2 (two) times a day with meals As needed for joint pain, Disp: 60 tablet, Rfl: 5    omeprazole (PriLOSEC) 40 MG capsule, TAKE 1 CAPSULE(40 MG) BY MOUTH DAILY, Disp: 90 capsule, Rfl: 1    Sodium Fluoride 1.1 % PSTE, Take 1 Film by mouth daily at bedtime Use once per day at bedtime. Brush, spit out. Don't rinse. Let sit on teeth. Generic brand OK, Disp: 51 g, Rfl: 3    Current Facility-Administered Medications:     iohexol (OMNIPAQUE) 300 mg/mL injection 1 mL, 1 mL, Epidural, Once, Jerod Parnell MD    methylPREDNISolone acetate (DEPO-MEDROL) injection 80 mg, 80 mg, Epidural, Once, Jerod Parnell MD    Allergies   Allergen Reactions    Gluten Meal - Food Allergy Abdominal Pain and Other (See Comments)    Seasonal Ic [Octacosanol] Swelling     Severe swelling       Physical Exam:   Vitals:    04/11/25 1051   BP: 112/76   Pulse: 66   Resp: 20   Temp: 97.8 °F (36.6 °C)   SpO2: 98%     General: Awake, Alert, Oriented x 3, Mood and affect appropriate  Respiratory: Respirations even and unlabored  Cardiovascular: Peripheral pulses intact; no edema  Musculoskeletal Exam: Neck and arm pain    ASA Score: 3    Patient/Chart Verification  Patient ID Verified: Verbal  ID Band Applied: No  Consents Confirmed: To be obtained in the Procedural area  Interval H&P(within 24 hr) Complete (required for Outpatients and Surgery Admit only): To  be obtained in the Procedural area  Allergies Reviewed: Yes  Anticoag/NSAID held?: No  Currently on antibiotics?: No  Pregnancy denied?: Yes    Assessment:   1. Cervical radiculopathy        Plan: [facility]

## 2025-04-11 NOTE — DISCHARGE INSTR - LAB
Epidural Steroid Injection   WHAT YOU NEED TO KNOW:   An epidural steroid injection (MADINA) is a procedure to inject steroid medicine into the epidural space. The epidural space is between your spinal cord and vertebrae. Steroids reduce inflammation and fluid buildup in your spine that may be causing pain. You may be given pain medicine along with the steroids.          ACTIVITY  Do not drive or operate machinery today.  No strenuous activity today - bending, lifting, etc.  You may resume normal activites starting tomorrow - start slowly and as tolerated.  You may shower today, but no tub baths or hot tubs.  You may have numbness for several hours from the local anesthetic. Please use caution and common sense, especially with weight-bearing activities.    CARE OF THE INJECTION SITE  If you have soreness or pain, apply ice to the area today (20 minutes on/20 minutes off).  Starting tomorrow, you may use warm, moist heat or ice if needed.  You may have an increase or change in your discomfort for 36-48 hours after your treatment.  Apply ice and continue with any pain medication you have been prescribed.  Notify the [facility] if you have any of the following: redness, drainage, swelling, headache, stiff neck or fever above 100°F.    SPECIAL INSTRUCTIONS  Our office will contact you in approximately 14 days for a progress report.    MEDICATIONS  Continue to take all routine medications.  Our office may have instructed you to hold some medications.    As no general anesthesia was used in today's procedure, you should not experience any side effects related to anesthesia.     If you are diabetic, the steroids used in today's injection may temporarily increase your blood sugar levels after the first few days after your injection. Please keep a close eye on your sugars and alert the doctor who manages your diabetes if your sugars are significantly high from your baseline or you are symptomatic.     If you have a  problem specifically related to your procedure, please call our office at [phone].  Problems not related to your procedure should be directed to your primary care physician.

## 2025-04-14 ENCOUNTER — SOCIAL WORK (OUTPATIENT)
Dept: BEHAVIORAL/MENTAL HEALTH CLINIC | Facility: CLINIC | Age: 45
End: 2025-04-14
Payer: COMMERCIAL

## 2025-04-14 DIAGNOSIS — F31.60 BIPOLAR 1 DISORDER, MIXED (HCC): Primary | ICD-10-CM

## 2025-04-14 PROCEDURE — 90834 PSYTX W PT 45 MINUTES: CPT | Performed by: COUNSELOR
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
## 2025-04-25 ENCOUNTER — TELEPHONE (OUTPATIENT)
Dept: RADIOLOGY | Facility: MEDICAL CENTER | Age: 45
End: 2025-04-25

## 2025-04-25 DIAGNOSIS — D50.9 IRON DEFICIENCY ANEMIA, UNSPECIFIED IRON DEFICIENCY ANEMIA TYPE: Primary | ICD-10-CM

## 2025-04-25 NOTE — TELEPHONE ENCOUNTER
Pt reports 60 % improvement post injection  Pain level  3-4/10.    Patient stating experiencing Nerve pain

## 2025-04-28 ENCOUNTER — SOCIAL WORK (OUTPATIENT)
Dept: BEHAVIORAL/MENTAL HEALTH CLINIC | Facility: CLINIC | Age: 45
End: 2025-04-28
Payer: COMMERCIAL

## 2025-04-28 DIAGNOSIS — F31.60 BIPOLAR 1 DISORDER, MIXED (HCC): Primary | ICD-10-CM

## 2025-04-28 PROCEDURE — 90837 PSYTX W PT 60 MINUTES: CPT | Performed by: COUNSELOR

## 2025-04-28 NOTE — PSYCH
"Behavioral Health Psychotherapy Progress Note    Psychotherapy Provided: Individual Psychotherapy     1. Bipolar 1 disorder, mixed (HCC)            Goals addressed in session: Goal 1 and Goal 2     DATA: Clinician and client reviewed and updated safety plan and treatment plan. Discussed new goals and objectives, client advised she finds herself struggling with anger. Clinician provided psychoeducation on anger, utilizing the Anger Iceberg, client responded well. Clinician requested client hand the information sheet provided to her on her refrigerator and when she finds she is having an episode of unexplained anger, to attempt to identify if she is feeling or felt any of the emotions listed below over the last two weeks. Clinician and client also discussed the child she had relinquished custody of; the child knows he is adopted and client reports he \"did not take it will\". Explored client's emotional response.   During this session, this clinician used the following therapeutic modalities: Cognitive Behavioral Therapy    Substance Abuse was not addressed during this session. If the client is diagnosed with a co-occurring substance use disorder, please indicate any changes in the frequency or amount of use: N/A. Stage of change for addressing substance use diagnoses: No substance use/Not applicable    ASSESSMENT:  Esha Moreira presents with a Euthymic/ normal mood.     her affect is Normal range and intensity, which is congruent, with her mood and the content of the session. The client has made progress on their goals.    Client remains in active stage of change; she is able to identify and manage topics she would like to discuss and evolve in. Esha Moreira presents with a none risk of suicide, none risk of self-harm, and none risk of harm to others.    For any risk assessment that surpasses a \"low\" rating, a safety plan must be developed.    A safety plan was indicated: no  If yes, describe in detail " N/A    PLAN: Between sessions, Esha Moreira will utilize Anger Iceberg. At the next session, the therapist will use Cognitive Behavioral Therapy to address anger.    Behavioral Health Treatment Plan and Discharge Planning: Esha Moreira is aware of and agrees to continue to work on their treatment plan. They have identified and are working toward their discharge goals. yes    Depression Follow-up Plan Completed: Not applicable    Visit start and stop times:    04/28/25  Start Time: 1602  Stop Time: 1702  Total Visit Time: 60 minutes

## 2025-04-28 NOTE — TELEPHONE ENCOUNTER
RN s/w pt who said it seems like each time after the neck injection she gets nerve pain of the left neck and it lasts for about a month and then gets better. This is nothing new, she confirmed it has happened after each injection.

## 2025-04-28 NOTE — TELEPHONE ENCOUNTER
Caller: Patient    Doctor: Dr. Parnell    Reason for call: patient returning call to nurse     Call back#: [phone]
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
## 2025-05-02 ENCOUNTER — TELEPHONE (OUTPATIENT)
Dept: HEMATOLOGY ONCOLOGY | Facility: CLINIC | Age: 45
End: 2025-05-02

## 2025-05-02 NOTE — TELEPHONE ENCOUNTER
Spoke with patient as a reminder to complete lab work prior to appointment with Rosamaria on 5/5. Stated the orders are in her chart and are non-fasting. Advised patient if she cannot complete labs, to call and we can reschedule appointment. Patient verbalized understanding and is in agreement with the plan.

## 2025-05-05 ENCOUNTER — APPOINTMENT (OUTPATIENT)
Dept: LAB | Facility: CLINIC | Age: 45
End: 2025-05-05
Payer: MEDICARE

## 2025-05-05 DIAGNOSIS — D50.9 IRON DEFICIENCY ANEMIA, UNSPECIFIED IRON DEFICIENCY ANEMIA TYPE: ICD-10-CM

## 2025-05-05 DIAGNOSIS — J30.9 ALLERGIC RHINITIS, UNSPECIFIED SEASONALITY, UNSPECIFIED TRIGGER: ICD-10-CM

## 2025-05-05 DIAGNOSIS — J34.9 PARANASAL SINUS DISEASE: ICD-10-CM

## 2025-05-05 LAB
BASOPHILS # BLD AUTO: 0.04 THOUSANDS/ÂΜL (ref 0–0.1)
BASOPHILS NFR BLD AUTO: 1 % (ref 0–1)
EOSINOPHIL # BLD AUTO: 0.12 THOUSAND/ÂΜL (ref 0–0.61)
EOSINOPHIL NFR BLD AUTO: 2 % (ref 0–6)
ERYTHROCYTE [DISTWIDTH] IN BLOOD BY AUTOMATED COUNT: 11.9 % (ref 11.6–15.1)
FERRITIN SERPL-MCNC: 26 NG/ML (ref 30–307)
HCT VFR BLD AUTO: 38.2 % (ref 34.8–46.1)
HGB BLD-MCNC: 12.8 G/DL (ref 11.5–15.4)
IMM GRANULOCYTES # BLD AUTO: 0.03 THOUSAND/UL (ref 0–0.2)
IMM GRANULOCYTES NFR BLD AUTO: 0 % (ref 0–2)
IRON SATN MFR SERPL: 21 % (ref 15–50)
IRON SERPL-MCNC: 78 UG/DL (ref 50–212)
LYMPHOCYTES # BLD AUTO: 1.58 THOUSANDS/ÂΜL (ref 0.6–4.47)
LYMPHOCYTES NFR BLD AUTO: 19 % (ref 14–44)
MCH RBC QN AUTO: 29.8 PG (ref 26.8–34.3)
MCHC RBC AUTO-ENTMCNC: 33.5 G/DL (ref 31.4–37.4)
MCV RBC AUTO: 89 FL (ref 82–98)
MONOCYTES # BLD AUTO: 0.45 THOUSAND/ÂΜL (ref 0.17–1.22)
MONOCYTES NFR BLD AUTO: 6 % (ref 4–12)
NEUTROPHILS # BLD AUTO: 5.92 THOUSANDS/ÂΜL (ref 1.85–7.62)
NEUTS SEG NFR BLD AUTO: 72 % (ref 43–75)
NRBC BLD AUTO-RTO: 0 /100 WBCS
PLATELET # BLD AUTO: 242 THOUSANDS/UL (ref 149–390)
PMV BLD AUTO: 10.5 FL (ref 8.9–12.7)
RBC # BLD AUTO: 4.3 MILLION/UL (ref 3.81–5.12)
TIBC SERPL-MCNC: 365.4 UG/DL (ref 250–450)
TRANSFERRIN SERPL-MCNC: 261 MG/DL (ref 203–362)
UIBC SERPL-MCNC: 287 UG/DL (ref 155–355)
WBC # BLD AUTO: 8.14 THOUSAND/UL (ref 4.31–10.16)

## 2025-05-05 PROCEDURE — 83540 ASSAY OF IRON: CPT

## 2025-05-05 PROCEDURE — 36415 COLL VENOUS BLD VENIPUNCTURE: CPT

## 2025-05-05 PROCEDURE — 82728 ASSAY OF FERRITIN: CPT

## 2025-05-05 PROCEDURE — 85025 COMPLETE CBC W/AUTO DIFF WBC: CPT

## 2025-05-05 PROCEDURE — 83550 IRON BINDING TEST: CPT

## 2025-05-05 RX ORDER — CETIRIZINE HYDROCHLORIDE 10 MG/1
10 TABLET ORAL DAILY
Qty: 30 TABLET | Refills: 0 | Status: SHIPPED | OUTPATIENT
Start: 2025-05-05 | End: 2025-05-08

## 2025-05-06 RX ORDER — FLUTICASONE PROPIONATE 50 MCG
1 SPRAY, SUSPENSION (ML) NASAL DAILY
Qty: 16 G | Refills: 0 | Status: SHIPPED | OUTPATIENT
Start: 2025-05-06 | End: 2025-05-08

## 2025-05-07 DIAGNOSIS — J34.9 PARANASAL SINUS DISEASE: ICD-10-CM

## 2025-05-07 DIAGNOSIS — J30.9 ALLERGIC RHINITIS, UNSPECIFIED SEASONALITY, UNSPECIFIED TRIGGER: ICD-10-CM

## 2025-05-07 DIAGNOSIS — K21.9 GASTROESOPHAGEAL REFLUX DISEASE WITHOUT ESOPHAGITIS: ICD-10-CM

## 2025-05-07 DIAGNOSIS — M06.00 SERONEGATIVE RHEUMATOID ARTHRITIS (HCC): ICD-10-CM

## 2025-05-07 RX ORDER — FOLIC ACID 1 MG/1
TABLET ORAL
Qty: 90 TABLET | Refills: 1 | Status: SHIPPED | OUTPATIENT
Start: 2025-05-07

## 2025-05-07 RX ORDER — AMLODIPINE BESYLATE 2.5 MG/1
TABLET ORAL
Qty: 90 TABLET | Refills: 1 | Status: SHIPPED | OUTPATIENT
Start: 2025-05-07

## 2025-05-07 RX ORDER — HYDROXYCHLOROQUINE SULFATE 200 MG/1
TABLET, FILM COATED ORAL
Qty: 135 TABLET | Refills: 1 | Status: SHIPPED | OUTPATIENT
Start: 2025-05-07 | End: 2025-05-09 | Stop reason: SDUPTHER

## 2025-05-07 RX ORDER — NAPROXEN 500 MG/1
TABLET ORAL
Qty: 180 TABLET | Refills: 1 | Status: SHIPPED | OUTPATIENT
Start: 2025-05-07

## 2025-05-08 RX ORDER — OMEPRAZOLE 40 MG/1
CAPSULE, DELAYED RELEASE ORAL
Qty: 90 CAPSULE | Refills: 1 | Status: SHIPPED | OUTPATIENT
Start: 2025-05-08

## 2025-05-08 RX ORDER — FLUTICASONE PROPIONATE 50 MCG
SPRAY, SUSPENSION (ML) NASAL
Qty: 48 G | Refills: 1 | Status: SHIPPED | OUTPATIENT
Start: 2025-05-08

## 2025-05-09 ENCOUNTER — NURSE TRIAGE (OUTPATIENT)
Dept: RHEUMATOLOGY | Facility: CLINIC | Age: 45
End: 2025-05-09

## 2025-05-09 DIAGNOSIS — M06.00 SERONEGATIVE RHEUMATOID ARTHRITIS (HCC): ICD-10-CM

## 2025-05-09 RX ORDER — HYDROXYCHLOROQUINE SULFATE 200 MG/1
300 TABLET, FILM COATED ORAL DAILY
Qty: 135 TABLET | Refills: 1 | Status: SHIPPED | OUTPATIENT
Start: 2025-05-09 | End: 2025-11-05

## 2025-05-09 RX ORDER — HYDROXYCHLOROQUINE SULFATE 200 MG/1
300 TABLET, FILM COATED ORAL DAILY
Qty: 135 TABLET | Refills: 1 | Status: SHIPPED | OUTPATIENT
Start: 2025-05-09 | End: 2025-05-09 | Stop reason: CLARIF

## 2025-05-09 NOTE — TELEPHONE ENCOUNTER
"Reason for Disposition  • Prescription prescribed recently is not at pharmacy and triager has access to patient's EMR and prescription is recorded in the EMR    Answer Assessment - Initial Assessment Questions  1. NAME of MEDICINE: \"What medicine(s) are you calling about?\"      hydroxychloroquine (PLAQUENIL) 200 mg tablet  2. QUESTION: \"What is your question?\" (e.g., double dose of medicine, side effect)      Sent to wrong pharmacy/not at [facility]. Resent rx to [facility] as requested.  3. PRESCRIBER: \"Who prescribed the medicine?\" Reason: if prescribed by specialist, call should be referred to that group.      Dr. Geller    Protocols used: Medication Question Call-Adult-[place]    "

## 2025-05-09 NOTE — PATIENT COMMUNICATION
Hydroxychloroquine sent to wrong pharmacy, suppose to go to [facility]. Rx sent to [facility] as requested.

## 2025-05-12 ENCOUNTER — SOCIAL WORK (OUTPATIENT)
Dept: BEHAVIORAL/MENTAL HEALTH CLINIC | Facility: CLINIC | Age: 45
End: 2025-05-12
Payer: COMMERCIAL

## 2025-05-12 DIAGNOSIS — F31.60 BIPOLAR 1 DISORDER, MIXED (HCC): Primary | ICD-10-CM

## 2025-05-12 PROCEDURE — 90837 PSYTX W PT 60 MINUTES: CPT | Performed by: COUNSELOR
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
## 2025-05-13 ENCOUNTER — TELEMEDICINE (OUTPATIENT)
Dept: HEMATOLOGY ONCOLOGY | Facility: CLINIC | Age: 45
End: 2025-05-13
Payer: MEDICARE

## 2025-05-13 ENCOUNTER — TELEPHONE (OUTPATIENT)
Dept: HEMATOLOGY ONCOLOGY | Facility: CLINIC | Age: 45
End: 2025-05-13

## 2025-05-13 DIAGNOSIS — K90.0 CELIAC DISEASE: ICD-10-CM

## 2025-05-13 DIAGNOSIS — E61.1 IRON DEFICIENCY: Primary | ICD-10-CM

## 2025-05-13 PROCEDURE — 99212 OFFICE O/P EST SF 10 MIN: CPT

## 2025-05-13 RX ORDER — SODIUM CHLORIDE 9 MG/ML
20 INJECTION, SOLUTION INTRAVENOUS ONCE
OUTPATIENT
Start: 2025-05-27

## 2025-05-13 NOTE — ASSESSMENT & PLAN NOTE
Orders:    Ambulatory referral to Gastroenterology; Future    CBC and differential; Future    Iron Panel (Includes Ferritin, Iron Sat%, Iron, and TIBC); Future     VM was left informing patient an appointment is needed for further medication refills     ACC- Please assist patient with scheduling apt

## 2025-05-13 NOTE — PROGRESS NOTES
Virtual Regular VisitName: Esha Moreira      : 1980      MRN: [id number]  Encounter Provider: YU Palomares  Encounter Date: 2025   Encounter department: [facility] [place]  :  Assessment & Plan  Iron deficiency    Orders:    Ambulatory referral to Gastroenterology; Future    CBC and differential; Future    Iron Panel (Includes Ferritin, Iron Sat%, Iron, and TIBC); Future    Celiac disease    Orders:    CBC and differential; Future    Iron Panel (Includes Ferritin, Iron Sat%, Iron, and TIBC); Future    4-year-old female with iron deficiency likely secondary to menorrhagia and malabsorption from celiac disease.  Recommend gastroenterology evaluation to rule out GI etiologies of iron deficiency.  Patient is agreeable, referral placed, provided phone number to make an appointment.    At this time, her serum iron, iron saturation and ferritin have drastically dropped.  Patient is symptomatic.  She would likely benefit from another round of IV iron treatment with Venofer 300 mg weekly x 4 doses.  Patient has tolerated these infusions without incident in the past.    We will see patient back in the office in 6 months with labs prior (CBCD, iron panel).  She is agreeable with this plan.  Patient aware to contact us for any additional questions/concerns or worsening symptoms.    History of Present Illness     Esha Moreira is a 43 y.o. female ith past medical history of celiac disease, GERD, vitamin D deficiency, hypertension, hiatal hernia, and abnormal Pap smear.   Patient has been following with hematology since 2023 follow-up multiple providers, Marilee Hernandez PA-C and YU Bee.  She was last seen virtually on 3/20/2024.  Patient is status post IV iron treatments.     Treatment:  2023: Hemoglobin 12.3, MCV 78, platelets 281, WBC 5.84              Ferritin 11, iron saturation 6%, TIBC 427, serum iron 26  IV Venofer 300 mg x4: 2023 to  7/26/2023 9/13/2023: Hemoglobin 13, MCV 88, platelets 251, WBC 5.46              Ferritin 93, iron saturation 24%, TIBC 329, serum iron 80    3/13/2024: Hemoglobin 13.3, MCV 87, platelets 258, WBC 5.97              Ferritin 13, iron saturation 5%, TIBC 368, serum iron 17              B12 969, folate >22.3  IV Venofer 300 mg x 4 doses = 4/3/2024 to 4/25/2024 5/13/2025: Patient presents for follow-up of iron deficiency.  Patient has been taking blood builder supplement daily.  Patient reports a monthly menstrual cycle, last about 5 to 7 days with 2 to 3 days being on the heavier side having to change her pad/tampon every 2 hours patient has intermittent flareups with her celiac depending upon what she eats.    She endorses increased fatigue, off balance and her lips have been turning blue, especially when she is exercising.  Patient reports that her headaches are improving as she has been getting epidural injections for her neck pain.  She endorses shortness of breath on exertion.  Denies any dizziness, lightheadedness, CP, palpitations.  Patient denies abnormal bleeding: epistaxis, gingival bleeding, hematuria, dark tarry stools.    Labs:  5/5/2025: Hgb 12.8, MCV 89, WBC 8.14, platelets 242,000, serum iron 78, iron saturation 21%, ferritin 26    11/1/2024: Hgb 12.9, MCV 90, WBC 4.73, platelets 251,000, folate >22.3, vitamin B12 878, serum iron 94, iron saturation 31%, ferritin 82          Objective   There were no vitals taken for this visit.      Administrative Statements   Encounter provider YU Palomares    The Patient is located at Home and in the following state in which I hold an active license [place].    The patient was identified by name and date of birth. Esha Moreira was informed that this is a telemedicine visit and that the visit is being conducted through the Epic Embedded platform. She agrees to proceed..  My office door was closed. No one else was in the room.  She acknowledged  consent and understanding of privacy and security of the video platform. The patient has agreed to participate and understands they can discontinue the visit at any time.    I have spent a total time of 10 minutes in caring for this patient on the day of the visit/encounter including chart review, counseling, coordination of care, and documentation;  not including the time spent for establishing the audio/video connection.

## 2025-05-28 DIAGNOSIS — K90.0 CELIAC DISEASE: Primary | ICD-10-CM

## 2025-05-28 DIAGNOSIS — E61.1 IRON DEFICIENCY: ICD-10-CM

## 2025-05-30 ENCOUNTER — HOSPITAL ENCOUNTER (OUTPATIENT)
Dept: INFUSION CENTER | Facility: CLINIC | Age: 45
Discharge: HOME | End: 2025-05-30
Attending: INTERNAL MEDICINE
Payer: MEDICARE

## 2025-05-30 VITALS
SYSTOLIC BLOOD PRESSURE: 132 MMHG | RESPIRATION RATE: 18 BRPM | HEART RATE: 67 BPM | OXYGEN SATURATION: 99 % | TEMPERATURE: 97.8 F | DIASTOLIC BLOOD PRESSURE: 84 MMHG

## 2025-05-30 DIAGNOSIS — K90.0 CELIAC DISEASE: ICD-10-CM

## 2025-05-30 DIAGNOSIS — E61.1 IRON DEFICIENCY: Primary | ICD-10-CM

## 2025-05-30 PROCEDURE — 96365 THER/PROPH/DIAG IV INF INIT: CPT

## 2025-05-30 RX ORDER — SODIUM CHLORIDE 9 MG/ML
20 INJECTION, SOLUTION INTRAVENOUS ONCE
Status: CANCELLED | OUTPATIENT
Start: 2025-06-06

## 2025-05-30 RX ORDER — SODIUM CHLORIDE 9 MG/ML
20 INJECTION, SOLUTION INTRAVENOUS ONCE
Status: COMPLETED | OUTPATIENT
Start: 2025-05-30 | End: 2025-05-30

## 2025-05-30 RX ADMIN — IRON SUCROSE 300 MG: 20 INJECTION, SOLUTION INTRAVENOUS at 10:24

## 2025-05-30 RX ADMIN — SODIUM CHLORIDE 20 ML/HR: 0.9 INJECTION, SOLUTION INTRAVENOUS at 10:23

## 2025-05-30 NOTE — PATIENT INSTRUCTIONS
May 2025      [place] Monday Tuesday Wednesday Thursday Friday Saturday                       1     2     3                4     5    Laboratory Walk In   2:25 PM   (5 min.)   AN MOB PHLEB CHAIR 5   [facility]     7     8     9     10                11     12     13    VIRTUAL VISIT  12:15 PM   (30 min.)   YU Palomares   [facility] [place] 14     15     16     17                18     19     20     21     22     23     24                25     26     27     28     29     30    Infusion Therapy Plan  10:30 AM   (150 min.)   AN INF BED 1   [facility] 31            Cycle 1, Treatment 1           Treatment Details         5/30/2025 - Cycle 1, Treatment 1      Supportive Care: APPT 17, ONCBCN NURSE [id number], sodium chloride, iron sucrose (VENOFER), MONITOR SARA, ONCBCN NURSE COMMUNICATION7        June 2025 Sunday Monday Tuesday Wednesday Thursday Friday Saturday   1     2     3     4     5     6    Infusion Therapy Plan   3:00 PM   (150 min.)   AN INF CHAIR 5   [facility] 7            Cycle 1, Treatment 2    8     9    CONSULT  11:05 AM   (20 min.)   Marshall Fletcher MD   [facility] Gastroenterology [place] [address] [address] 10     11     12     13    Infusion Therapy Plan   3:00 PM   (150 min.)   INF FAST TRACK 2   [facility] 14            Cycle 1, Treatment 3    15     16     17     18     19     20    Infusion Therapy Plan   3:00 PM   (150 min.)   AN INF CHAIR 13   [facility] 21            Cycle 1, Treatment 4    22     23     24     25     26     27     28                29     30                                                Treatment Details         6/6/2025 - Cycle 1, Treatment 2      Supportive Care: APPT 17    6/13/2025 - Cycle 1, Treatment 3      Supportive Care: APPT  17    6/20/2025 - Cycle 1, Treatment 4      Supportive Care: APPT 17

## 2025-05-30 NOTE — PROGRESS NOTES
Patient tolerated venofer without incident and was discharged post, no c/o offered. Next dosing confirmed for 6/6/25.

## 2025-06-02 ENCOUNTER — NURSE TRIAGE (OUTPATIENT)
Age: 45
End: 2025-06-02

## 2025-06-02 NOTE — TELEPHONE ENCOUNTER
Left message for pt to call office back for triage.     Regarding: FLAREUP  ----- Message from Kristi RAO sent at 6/2/2025 12:08 PM EDT -----  Patient having a Flare up on the outside of the Right Wrist . Patient stated she never had this pain especially in that area.

## 2025-06-02 NOTE — TELEPHONE ENCOUNTER
Regarding: FLAREUP  ----- Message from Kristi RAO sent at 6/2/2025 12:08 PM EDT -----  Patient having a Flare up on the outside of the Right Wrist . Patient stated she never had this pain especially in that area.

## 2025-06-02 NOTE — TELEPHONE ENCOUNTER
REASON FOR CONVERSATION: Wrist Pain    SYMPTOMS: C/O R wrist pain, past 2 months. Would get better for short time then worse again.  Swelling, stiffness but denies any other S/S. Denies any injury/trauma to wrist.      OTHER HEALTH INFORMATION: N/A    PROTOCOL DISPOSITION: See Within 2 Weeks in Office    CARE ADVICE PROVIDED: Heat therapy, medications, limit use. Call if worsens.     PRACTICE FOLLOW-UP: Patient requesting to be seen earlier than 8/14/2025 OV.  No available appointments.       How long has the current episode been happening? 2 months ago    What joints/areas of the spine are involved? Right wrist    -Are these the typical areas of joint involvement? yes    Please describe the joint symptoms and if they are worse or better with use? How long does it take to loosen up? Heat helps and not using.     Pain  Swelling  Stiffness    Has there been any recent trauma to the affected joint/spine area? No    IF the patient is experiencing spine pain, please ask:    -Is the patient having any numbness or tingling in the extremities?  If yes- is this new? No  -Is the patient having low back pain? No    -Are they having any loss of continence of bowel or bladder? No If yes - please send to ED    -Are they having any numbness of the groin or perianal area?No If yes - Please send to ED    Is the patient having any extraarticular symptoms? No  (eye pain/redness, swelling of areas other than joints [swelling of the entire finger or hand])        - Does the patient have a rash? No If yes, please describe the rash (location, redness, is it raised, is it itchy, is it painful) ask the patient to upload a picture  - Is it a new rash, is this typical for your disease?    Please list current medications patient is taking prescribed by Rheumatology-also include any steroids/NSAIDs prescribed by any provider:    Tylenol-- no relief  Naproxen  HCQ   Diclofenac (older prescription)--limited relief     Reason for Disposition    "Wrist pain is a chronic symptom (recurrent or ongoing AND present > 4 weeks)    Answer Assessment - Initial Assessment Questions  1. ONSET: \"When did the pain start?\"      2 months ago    2. LOCATION: \"Where is the pain located?\"      Right wrist    3. PAIN: \"How bad is the pain?\" (Scale 1-10; or mild, moderate, severe)      At rest 3/10   With use 7/10     4. WORK OR EXERCISE: \"Has there been any recent work or exercise that involved this part (i.e., hand or wrist) of the body?\"      Denies    5. CAUSE: \"What do you think is causing the pain?\"      Unsure    6. AGGRAVATING FACTORS: \"What makes the pain worse?\" (e.g., using computer)      Use, bending, positional with sleep    7. OTHER SYMPTOMS: \"Do you have any other symptoms?\" (e.g., fever, neck pain, numbness or tingling, rash, swelling)      Swelling--Denies fever or other symptoms    8. PREGNANCY: \"Is there any chance you are pregnant?\" \"When was your last menstrual period?\"      Denies LMP beginning of May    Protocols used: Wrist Pain-Adult-[place]    "

## 2025-06-05 DIAGNOSIS — M06.00 SERONEGATIVE RHEUMATOID ARTHRITIS (HCC): Primary | ICD-10-CM

## 2025-06-05 RX ORDER — PREDNISONE 5 MG/1
TABLET ORAL
Qty: 70 TABLET | Refills: 0 | Status: SHIPPED | OUTPATIENT
Start: 2025-06-05

## 2025-06-06 ENCOUNTER — PATIENT MESSAGE (OUTPATIENT)
Age: 45
End: 2025-06-06

## 2025-06-06 ENCOUNTER — HOSPITAL ENCOUNTER (OUTPATIENT)
Dept: INFUSION CENTER | Facility: CLINIC | Age: 45
End: 2025-06-06
Attending: INTERNAL MEDICINE
Payer: MEDICARE

## 2025-06-06 ENCOUNTER — SOCIAL WORK (OUTPATIENT)
Dept: BEHAVIORAL/MENTAL HEALTH CLINIC | Facility: CLINIC | Age: 45
End: 2025-06-06
Payer: COMMERCIAL

## 2025-06-06 VITALS
SYSTOLIC BLOOD PRESSURE: 135 MMHG | HEART RATE: 71 BPM | DIASTOLIC BLOOD PRESSURE: 82 MMHG | RESPIRATION RATE: 15 BRPM | TEMPERATURE: 97.9 F | OXYGEN SATURATION: 98 %

## 2025-06-06 DIAGNOSIS — K90.0 CELIAC DISEASE: Primary | ICD-10-CM

## 2025-06-06 DIAGNOSIS — F31.60 BIPOLAR 1 DISORDER, MIXED (HCC): Primary | ICD-10-CM

## 2025-06-06 DIAGNOSIS — E61.1 IRON DEFICIENCY: ICD-10-CM

## 2025-06-06 PROCEDURE — 96365 THER/PROPH/DIAG IV INF INIT: CPT

## 2025-06-06 PROCEDURE — 90837 PSYTX W PT 60 MINUTES: CPT | Performed by: COUNSELOR

## 2025-06-06 RX ORDER — SODIUM CHLORIDE 9 MG/ML
20 INJECTION, SOLUTION INTRAVENOUS ONCE
Status: CANCELLED | OUTPATIENT
Start: 2025-06-13

## 2025-06-06 RX ORDER — SODIUM CHLORIDE 9 MG/ML
20 INJECTION, SOLUTION INTRAVENOUS ONCE
Status: COMPLETED | OUTPATIENT
Start: 2025-06-06 | End: 2025-06-06

## 2025-06-06 RX ADMIN — SODIUM CHLORIDE 20 ML/HR: 0.9 INJECTION, SOLUTION INTRAVENOUS at 15:24

## 2025-06-06 RX ADMIN — IRON SUCROSE 300 MG: 20 INJECTION, SOLUTION INTRAVENOUS at 15:29

## 2025-06-06 NOTE — PROGRESS NOTES
Patient tolerated her treatment without any adverse reactions. Next appointment confirmed 6/13/25 at 1500 at [facility]. Patient declined avs

## 2025-06-06 NOTE — PSYCH
"Behavioral Health Psychotherapy Progress Note    Psychotherapy Provided: Individual Psychotherapy     1. Bipolar 1 disorder, mixed (HCC)            Goals addressed in session: Goal 1 and Goal 2     DATA: Clinician and client explored client's relationship with her ex- (EH) and how her current  (CH) manages it. Client explained that she was recently at  backyard, as she refuses to enter his home, however it was for a BBQ that her son was hosting who still lives with his father. CH struggled with this and was \"mad\" at client, client attempted to explain that she will always be connected to  as they share children. Client explained that ANASTACIA also has a child, however, they live in [place] and will be coming to the states soon and \"maybe he will understand how you sacrifice for your kids when his daughter is here\". Discussed how client managed her time at  home, her conversation with CH and what coping skills she used throughout.   During this session, this clinician used the following therapeutic modalities: Cognitive Behavioral Therapy    Substance Abuse was not addressed during this session. If the client is diagnosed with a co-occurring substance use disorder, please indicate any changes in the frequency or amount of use: N/A. Stage of change for addressing substance use diagnoses: No substance use/Not applicable    ASSESSMENT:  Esha Moreira presents with a Euthymic/ normal mood.     her affect is Normal range and intensity, which is congruent, with her mood and the content of the session. The client has made progress on their goals.  Esha Moreira presents with a none risk of suicide, none risk of self-harm, and none risk of harm to others.    For any risk assessment that surpasses a \"low\" rating, a safety plan must be developed.    A safety plan was indicated: no  If yes, describe in detail N/A    PLAN: Between sessions, Esha Moreira will communicate effectively with CH using I " statements. At the next session, the therapist will use Cognitive Behavioral Therapy to address homework and communication skills.    Behavioral Health Treatment Plan and Discharge Planning: Esha Moreira is aware of and agrees to continue to work on their treatment plan. They have identified and are working toward their discharge goals. yes    Depression Follow-up Plan Completed: Not applicable    Visit start and stop times:    06/06/25  Start Time: 1046  Stop Time: 1147  Total Visit Time: 61 minutes

## 2025-06-06 NOTE — PATIENT COMMUNICATION
Received transfer call from [facility]- pt name, , MRN provided.    Pt returned call.  Reviewed Prednisone dosing. Patient had no further questions and/or concerns at this time.

## 2025-06-09 ENCOUNTER — TELEPHONE (OUTPATIENT)
Dept: GASTROENTEROLOGY | Facility: CLINIC | Age: 45
End: 2025-06-09

## 2025-06-09 ENCOUNTER — CONSULT (OUTPATIENT)
Dept: GASTROENTEROLOGY | Facility: CLINIC | Age: 45
End: 2025-06-09
Payer: MEDICARE

## 2025-06-09 VITALS
HEART RATE: 72 BPM | HEIGHT: 64 IN | DIASTOLIC BLOOD PRESSURE: 83 MMHG | WEIGHT: 162 LBS | BODY MASS INDEX: 27.66 KG/M2 | SYSTOLIC BLOOD PRESSURE: 129 MMHG

## 2025-06-09 DIAGNOSIS — E61.1 IRON DEFICIENCY: Primary | ICD-10-CM

## 2025-06-09 PROCEDURE — 99244 OFF/OP CNSLTJ NEW/EST MOD 40: CPT | Performed by: INTERNAL MEDICINE

## 2025-06-09 RX ORDER — POLYETHYLENE GLYCOL 3350, SODIUM CHLORIDE, SODIUM BICARBONATE, POTASSIUM CHLORIDE 420; 11.2; 5.72; 1.48 G/4L; G/4L; G/4L; G/4L
4000 POWDER, FOR SOLUTION ORAL ONCE
Qty: 4000 ML | Refills: 0 | Status: SHIPPED | OUTPATIENT
Start: 2025-06-09 | End: 2025-06-09

## 2025-06-09 NOTE — PROGRESS NOTES
"Name: Esha Moreira      : 1980      MRN: [id number]  Encounter Provider: Marshall Fletcher MD  Encounter Date: 2025   Encounter department: [facility] GASTROENTEROLOGY [address]  :  Assessment & Plan  Iron deficiency  Will plan further evaluation with EGD and colonoscopy.  Will also repeat evaluation for possible underlying celiac disease given prior discordant results and biopsies performed on a gluten-free diet.  She will consume gluten daily prior to the procedure.  If this is unrevealing, contingency might include capsule endoscopy    Orders:    Celiac Disease Diagnostic Panel; Future    Colonoscopy; Future    EGD; Future    polyethylene glycol-electrolytes (TriLyte) 4000 mL solution; Take 4,000 mL by mouth once for 1 dose Take 4000 mL by mouth once for 1 dose. Use as directed        History of Present Illness   Esha Moreira is a 44 y.o. female who presents for further evaluation of iron deficiency.  Has been seeing a hematologist and receiving iron infusions with normalization of her iron level though no clear explanation.  Does have a prior history of positive TTG IgG with negative IgA and negative anti-gliadin antibodies.  Was on a gluten-free diet for approximately 1 month after this before undergoing EGD with duodenal biopsies which did not show evidence of celiac disease.  Since then she has not been on a strict gluten-free diet.  Has had no bruising or bleeding.  No family history of gastrointestinal malignancy though her grandmother had ulcers.  She takes naproxen daily though started this after her diagnosis of iron deficiency  HPI    Review of Systems A complete review of systems is negative other than that noted above in the HPI.         Objective   /83 (BP Location: Left arm, Patient Position: Sitting, Cuff Size: Standard)   Pulse 72   Ht 5' 4\" (1.626 m)   Wt 73.5 kg (162 lb)   BMI 27.81 kg/m²     Physical Exam  Vitals and nursing note reviewed. "   Constitutional:       General: She is not in acute distress.     Appearance: She is not ill-appearing.   HENT:      Head: Normocephalic and atraumatic.     Eyes:      General: No scleral icterus.     Extraocular Movements: Extraocular movements intact.       Cardiovascular:      Rate and Rhythm: Normal rate.   Pulmonary:      Effort: Pulmonary effort is normal. No respiratory distress.   Abdominal:      General: There is no distension.      Palpations: Abdomen is soft.      Tenderness: There is no abdominal tenderness. There is no rebound.     Skin:     General: Skin is warm and dry.      Coloration: Skin is not cyanotic.      Findings: No erythema.     Neurological:      General: No focal deficit present.      Mental Status: She is alert and oriented to person, place, and time.     Psychiatric:         Mood and Affect: Mood normal.         Behavior: Behavior normal.            Lab Results: I personally reviewed relevant lab results.       Results for orders placed during the hospital encounter of 08/10/20    EGD    Impression  3 cm hiatal hernia  Random biopsies taken from stomach to rule out H pylori  Random biopsies taken from duodenum to rule out celiac    RECOMMENDATION:  Continue with PPI  Consider surgical evaluation for hiatal hernia repair-MIS vs Thoracic Surgery  Follow-up pathology        ATTENDING ATTESTATION:  I was present throughout the entire procedure from insertion to complete withdrawal of the scope. I performed all interventions myself or oversaw the fellow.  Cecil Bonilla MD

## 2025-06-09 NOTE — TELEPHONE ENCOUNTER
Scheduled date of EGD/colonoscopy (as of today): 8/7/25  Physician performing EGD/colonoscopy: Dr Fletcher  Location of EGD/colonoscopy:   Desired bowel prep reviewed with patient: Golytely given at appt   Instructions reviewed with patient by: bhavana  Clearances:  n/a

## 2025-06-09 NOTE — ASSESSMENT & PLAN NOTE
Will plan further evaluation with EGD and colonoscopy.  Will also repeat evaluation for possible underlying celiac disease given prior discordant results and biopsies performed on a gluten-free diet.  She will consume gluten daily prior to the procedure.  If this is unrevealing, contingency might include capsule endoscopy    Orders:    Celiac Disease Diagnostic Panel; Future    Colonoscopy; Future    EGD; Future    polyethylene glycol-electrolytes (TriLyte) 4000 mL solution; Take 4,000 mL by mouth once for 1 dose Take 4000 mL by mouth once for 1 dose. Use as directed

## 2025-06-11 ENCOUNTER — APPOINTMENT (OUTPATIENT)
Dept: LAB | Facility: CLINIC | Age: 45
End: 2025-06-11
Payer: MEDICARE

## 2025-06-11 DIAGNOSIS — E61.1 IRON DEFICIENCY: ICD-10-CM

## 2025-06-11 LAB — IGA SERPL-MCNC: 249 MG/DL (ref 66–433)

## 2025-06-11 PROCEDURE — 86364 TISS TRNSGLTMNASE EA IG CLAS: CPT

## 2025-06-11 PROCEDURE — 86258 DGP ANTIBODY EACH IG CLASS: CPT

## 2025-06-11 PROCEDURE — 36415 COLL VENOUS BLD VENIPUNCTURE: CPT

## 2025-06-11 PROCEDURE — 82784 ASSAY IGA/IGD/IGG/IGM EACH: CPT

## 2025-06-13 ENCOUNTER — HOSPITAL ENCOUNTER (OUTPATIENT)
Dept: INFUSION CENTER | Facility: CLINIC | Age: 45
End: 2025-06-13
Attending: INTERNAL MEDICINE
Payer: MEDICARE

## 2025-06-13 VITALS
HEART RATE: 71 BPM | RESPIRATION RATE: 17 BRPM | DIASTOLIC BLOOD PRESSURE: 79 MMHG | SYSTOLIC BLOOD PRESSURE: 125 MMHG | TEMPERATURE: 98.4 F | OXYGEN SATURATION: 97 %

## 2025-06-13 DIAGNOSIS — K90.0 CELIAC DISEASE: Primary | ICD-10-CM

## 2025-06-13 DIAGNOSIS — E61.1 IRON DEFICIENCY: ICD-10-CM

## 2025-06-13 PROCEDURE — 96365 THER/PROPH/DIAG IV INF INIT: CPT

## 2025-06-13 RX ORDER — SODIUM CHLORIDE 9 MG/ML
20 INJECTION, SOLUTION INTRAVENOUS ONCE
Status: COMPLETED | OUTPATIENT
Start: 2025-06-13 | End: 2025-06-13

## 2025-06-13 RX ORDER — SODIUM CHLORIDE 9 MG/ML
20 INJECTION, SOLUTION INTRAVENOUS ONCE
Status: CANCELLED | OUTPATIENT
Start: 2025-06-20

## 2025-06-13 RX ADMIN — SODIUM CHLORIDE 20 ML/HR: 0.9 INJECTION, SOLUTION INTRAVENOUS at 15:12

## 2025-06-13 RX ADMIN — IRON SUCROSE 300 MG: 20 INJECTION, SOLUTION INTRAVENOUS at 15:12

## 2025-06-13 NOTE — PROGRESS NOTES
Pt here for venofer, tolerated well with no complaints. Next appt 6/20 at 1500 at [facility]. Declined AVS.

## 2025-06-16 LAB
GLIADIN IGG SER IA-ACNC: <1.4 U/ML (ref ?–10)
GLIADIN PEPTIDE IGA SER IA-ACNC: 1.2 U/ML (ref ?–10)
TTG IGA SER IA-ACNC: 0.4 U/ML (ref ?–10)
TTG IGG SER IA-ACNC: <1.7 U/ML (ref ?–10)

## 2025-06-20 ENCOUNTER — HOSPITAL ENCOUNTER (OUTPATIENT)
Dept: INFUSION CENTER | Facility: CLINIC | Age: 45
End: 2025-06-20
Attending: INTERNAL MEDICINE
Payer: MEDICARE

## 2025-06-20 VITALS
TEMPERATURE: 97 F | SYSTOLIC BLOOD PRESSURE: 121 MMHG | RESPIRATION RATE: 16 BRPM | OXYGEN SATURATION: 97 % | DIASTOLIC BLOOD PRESSURE: 81 MMHG | HEART RATE: 71 BPM

## 2025-06-20 DIAGNOSIS — K90.0 CELIAC DISEASE: Primary | ICD-10-CM

## 2025-06-20 DIAGNOSIS — E61.1 IRON DEFICIENCY: ICD-10-CM

## 2025-06-20 PROCEDURE — 96365 THER/PROPH/DIAG IV INF INIT: CPT

## 2025-06-20 RX ORDER — SODIUM CHLORIDE 9 MG/ML
20 INJECTION, SOLUTION INTRAVENOUS ONCE
Status: CANCELLED | OUTPATIENT
Start: 2025-06-27

## 2025-06-20 RX ORDER — SODIUM CHLORIDE 9 MG/ML
20 INJECTION, SOLUTION INTRAVENOUS ONCE
Status: COMPLETED | OUTPATIENT
Start: 2025-06-20 | End: 2025-06-20

## 2025-06-20 RX ADMIN — SODIUM CHLORIDE 20 ML/HR: 0.9 INJECTION, SOLUTION INTRAVENOUS at 15:06

## 2025-06-20 RX ADMIN — IRON SUCROSE 300 MG: 20 INJECTION, SOLUTION INTRAVENOUS at 15:05

## 2025-06-20 NOTE — PROGRESS NOTES
Pt. tolerated treatment without incident, AVS declined.  F/u appt confirmed with ordering provider for 11/11 @ 4785.

## 2025-06-23 ENCOUNTER — SOCIAL WORK (OUTPATIENT)
Dept: BEHAVIORAL/MENTAL HEALTH CLINIC | Facility: CLINIC | Age: 45
End: 2025-06-23
Payer: COMMERCIAL

## 2025-06-23 DIAGNOSIS — F31.60 BIPOLAR 1 DISORDER, MIXED (HCC): Primary | ICD-10-CM

## 2025-06-23 PROCEDURE — 90837 PSYTX W PT 60 MINUTES: CPT | Performed by: COUNSELOR

## 2025-06-23 NOTE — PSYCH
"Behavioral Health Psychotherapy Progress Note    Psychotherapy Provided: Individual Psychotherapy     1. Bipolar 1 disorder, mixed (HCC)            Goals addressed in session: Goal 1 and Goal 2     DATA: Clinician and client reviewed her relationship with her mother. Discussed client's feelings in regard to her mother \"constantly\" praising her sister and helping her sister while not providing the same support or acknowledgment to client. Explored open communication, in-vivo exploration of what client feels the conversation would be, explored through Socratic questioning. Also explored the possibility of this being related  to client's distance (physically) from her family, including her family.   Client informed clinician she is officially a grandmother, discussed client's emotional reaction to this as well as her experience waiting for the call. Client is also no longer paying child-support and celebrated surviving that financial burden. Client also explained that her daughter-in-law is legally coming to the [facility] from [place], explored client's emotions in regard to this as well as expectations. Clinician recommended client speak to her  and explore some ground rules that they will want in their home, as the dynamic of the home will be changed, discussed some possibilities and client will review with . Discussed schedule and concluded.     During this session, this clinician used the following therapeutic modalities: Cognitive Behavioral Therapy    Substance Abuse was not addressed during this session. If the client is diagnosed with a co-occurring substance use disorder, please indicate any changes in the frequency or amount of use: N/A. Stage of change for addressing substance use diagnoses: No substance use/Not applicable    ASSESSMENT:  Esha Moreira presents with a Euthymic/ normal mood.     her affect is Normal range and intensity, which is congruent, with her mood and the content of the " "session. The client has made progress on their goals.    Client continues to show growth and hope for the future. Esha Moreira presents with a none risk of suicide, none risk of self-harm, and none risk of harm to others.    For any risk assessment that surpasses a \"low\" rating, a safety plan must be developed.    A safety plan was indicated: no  If yes, describe in detail N/A    PLAN: Between sessions, Esha Moreira will communicate with her  about ground rules for their home for when client's daughter-in-law moves in. At the next session, the therapist will use Cognitive Behavioral Therapy to address concerns.    Behavioral Health Treatment Plan and Discharge Planning: Esha Moreira is aware of and agrees to continue to work on their treatment plan. They have identified and are working toward their discharge goals. yes    Depression Follow-up Plan Completed: Not applicable    Visit start and stop times:    06/23/25  Start Time: 1600  Stop Time: 1656  Total Visit Time: 56 minutes  "

## 2025-06-30 ENCOUNTER — TELEPHONE (OUTPATIENT)
Dept: PSYCHIATRY | Facility: CLINIC | Age: 45
End: 2025-06-30

## 2025-06-30 NOTE — TELEPHONE ENCOUNTER
LVM to inform  coverage will term as of 7/31/25. PT must call [facility] offices to get this extend so there is no lapse in coverage

## 2025-07-07 ENCOUNTER — SOCIAL WORK (OUTPATIENT)
Dept: BEHAVIORAL/MENTAL HEALTH CLINIC | Facility: CLINIC | Age: 45
End: 2025-07-07
Payer: COMMERCIAL

## 2025-07-07 DIAGNOSIS — F31.60 BIPOLAR 1 DISORDER, MIXED (HCC): Primary | ICD-10-CM

## 2025-07-07 PROCEDURE — 90834 PSYTX W PT 45 MINUTES: CPT | Performed by: COUNSELOR

## 2025-07-07 NOTE — PSYCH
"Behavioral Health Psychotherapy Progress Note    Psychotherapy Provided: Individual Psychotherapy     1. Bipolar 1 disorder, mixed (HCC)            Goals addressed in session: Goal 1 and Goal 2     DATA: Clinician and client discussed client's stressors, including but not limited to her ex-, she explained it ha been going well since she has no longer been mandated to provide child support. Discussed her step-daughter coming from [place] (legally) and if she has spoken to her  about expectations for her step-daughter as well as her 's opinion on client's rights when it comes to \"parenting\" or \"caring\" for his daughter who is 18 years of age. Discussed communication styles and coping skills. Reviewed schedule and concluded session.   During this session, this clinician used the following therapeutic modalities: Cognitive Behavioral Therapy    Substance Abuse was not addressed during this session. If the client is diagnosed with a co-occurring substance use disorder, please indicate any changes in the frequency or amount of use: N/A. Stage of change for addressing substance use diagnoses: No substance use/Not applicable    ASSESSMENT:  Esha Moreira presents with a Euthymic/ normal mood.     her affect is Normal range and intensity, which is congruent, with her mood and the content of the session. The client has made progress on their goals.   Esha Moreira presents with a none risk of suicide, none risk of self-harm, and none risk of harm to others.    For any risk assessment that surpasses a \"low\" rating, a safety plan must be developed.    A safety plan was indicated: no  If yes, describe in detail N/A    PLAN: Between sessions, Esha Moreira will utilize learned skills and speak to her  about expectations. At the next session, the therapist will use Cognitive Behavioral Therapy to address skills.    Behavioral Health Treatment Plan and Discharge Planning: Esha Moreira is " aware of and agrees to continue to work on their treatment plan. They have identified and are working toward their discharge goals. yes    Depression Follow-up Plan Completed: Not applicable    Visit start and stop times:    07/07/25  Start Time: 1601  Stop Time: 1651  Total Visit Time: 50 minutes

## 2025-07-09 ENCOUNTER — APPOINTMENT (OUTPATIENT)
Dept: LAB | Facility: CLINIC | Age: 45
End: 2025-07-09
Attending: INTERNAL MEDICINE
Payer: MEDICARE

## 2025-07-09 DIAGNOSIS — M06.00 SERONEGATIVE RHEUMATOID ARTHRITIS (HCC): ICD-10-CM

## 2025-07-09 LAB
ALBUMIN SERPL BCG-MCNC: 4.1 G/DL (ref 3.5–5)
ALP SERPL-CCNC: 48 U/L (ref 34–104)
ALT SERPL W P-5'-P-CCNC: 18 U/L (ref 7–52)
ANION GAP SERPL CALCULATED.3IONS-SCNC: 5 MMOL/L (ref 4–13)
AST SERPL W P-5'-P-CCNC: 20 U/L (ref 13–39)
BASOPHILS # BLD AUTO: 0.02 THOUSANDS/ÂΜL (ref 0–0.1)
BASOPHILS NFR BLD AUTO: 0 % (ref 0–1)
BILIRUB SERPL-MCNC: 0.48 MG/DL (ref 0.2–1)
BUN SERPL-MCNC: 12 MG/DL (ref 5–25)
CALCIUM SERPL-MCNC: 8.6 MG/DL (ref 8.4–10.2)
CHLORIDE SERPL-SCNC: 104 MMOL/L (ref 96–108)
CO2 SERPL-SCNC: 29 MMOL/L (ref 21–32)
CREAT SERPL-MCNC: 1.02 MG/DL (ref 0.6–1.3)
CRP SERPL QL: 4.4 MG/L
EOSINOPHIL # BLD AUTO: 0.14 THOUSAND/ÂΜL (ref 0–0.61)
EOSINOPHIL NFR BLD AUTO: 3 % (ref 0–6)
ERYTHROCYTE [DISTWIDTH] IN BLOOD BY AUTOMATED COUNT: 11.9 % (ref 11.6–15.1)
ERYTHROCYTE [SEDIMENTATION RATE] IN BLOOD: 5 MM/HOUR (ref 0–19)
GFR SERPL CREATININE-BSD FRML MDRD: 67 ML/MIN/1.73SQ M
GLUCOSE P FAST SERPL-MCNC: 94 MG/DL (ref 65–99)
HCT VFR BLD AUTO: 36.4 % (ref 34.8–46.1)
HGB BLD-MCNC: 12.6 G/DL (ref 11.5–15.4)
IMM GRANULOCYTES # BLD AUTO: 0.02 THOUSAND/UL (ref 0–0.2)
IMM GRANULOCYTES NFR BLD AUTO: 0 % (ref 0–2)
LYMPHOCYTES # BLD AUTO: 1.78 THOUSANDS/ÂΜL (ref 0.6–4.47)
LYMPHOCYTES NFR BLD AUTO: 32 % (ref 14–44)
MCH RBC QN AUTO: 31.1 PG (ref 26.8–34.3)
MCHC RBC AUTO-ENTMCNC: 34.6 G/DL (ref 31.4–37.4)
MCV RBC AUTO: 90 FL (ref 82–98)
MONOCYTES # BLD AUTO: 0.34 THOUSAND/ÂΜL (ref 0.17–1.22)
MONOCYTES NFR BLD AUTO: 6 % (ref 4–12)
NEUTROPHILS # BLD AUTO: 3.19 THOUSANDS/ÂΜL (ref 1.85–7.62)
NEUTS SEG NFR BLD AUTO: 59 % (ref 43–75)
NRBC BLD AUTO-RTO: 0 /100 WBCS
PLATELET # BLD AUTO: 217 THOUSANDS/UL (ref 149–390)
PMV BLD AUTO: 10.7 FL (ref 8.9–12.7)
POTASSIUM SERPL-SCNC: 3.6 MMOL/L (ref 3.5–5.3)
PROT SERPL-MCNC: 6.7 G/DL (ref 6.4–8.4)
RBC # BLD AUTO: 4.05 MILLION/UL (ref 3.81–5.12)
SODIUM SERPL-SCNC: 138 MMOL/L (ref 135–147)
WBC # BLD AUTO: 5.49 THOUSAND/UL (ref 4.31–10.16)

## 2025-07-09 PROCEDURE — 85652 RBC SED RATE AUTOMATED: CPT

## 2025-07-09 PROCEDURE — 36415 COLL VENOUS BLD VENIPUNCTURE: CPT

## 2025-07-09 PROCEDURE — 80053 COMPREHEN METABOLIC PANEL: CPT

## 2025-07-09 PROCEDURE — 86140 C-REACTIVE PROTEIN: CPT

## 2025-07-09 PROCEDURE — 85025 COMPLETE CBC W/AUTO DIFF WBC: CPT

## 2025-07-21 ENCOUNTER — SOCIAL WORK (OUTPATIENT)
Dept: BEHAVIORAL/MENTAL HEALTH CLINIC | Facility: CLINIC | Age: 45
End: 2025-07-21
Payer: COMMERCIAL

## 2025-07-21 DIAGNOSIS — F31.60 BIPOLAR 1 DISORDER, MIXED (HCC): Primary | ICD-10-CM

## 2025-07-21 PROCEDURE — 90834 PSYTX W PT 45 MINUTES: CPT | Performed by: COUNSELOR

## 2025-07-21 NOTE — PSYCH
"Behavioral Health Psychotherapy Progress Note    Psychotherapy Provided: Individual Psychotherapy     1. Bipolar 1 disorder, mixed (HCC)            Goals addressed in session: Goal 1 and Goal 2     DATA: Clinician and client discussed recent stressors, including and not limited to her step-daughter arriving this Thursday. Clinician assisted client in exploring how she feels about the change of lifestyle that will be occurring, client explained she is confident and is also confident in her  taking his place as an active father.   Client also discussed her relationship with her children and how she felt babysitting her first grandchild, clinician provided praise and extended congratulations as clinician stated he knows about client's past and can only imagine how much it meant to her, client agreed, it was very important to her. Discussed self-care and living in the moment as client is attempting not to wait for \"the other shoe to drop\", client laughed and explained she is trying. Discussed scheduling and concluded.   During this session, this clinician used the following therapeutic modalities: Cognitive Behavioral Therapy    Substance Abuse was not addressed during this session. If the client is diagnosed with a co-occurring substance use disorder, please indicate any changes in the frequency or amount of use: N/A. Stage of change for addressing substance use diagnoses: No substance use/Not applicable    ASSESSMENT:  Esha Moreira presents with a Euthymic/ normal mood.     her affect is Normal range and intensity, which is congruent, with her mood and the content of the session. The client has made progress on their goals.    Client is doing well, she has positive relationships with all 4 of her children now, she is a new grandmother and is expecting another grandchild in September. Client continues to attend sessions and appears positive, although she still has concerns in regard to happy things not " "lasting, client would benefit from continuing to work on radical acceptance. Esha Moreira presents with a none risk of suicide, none risk of self-harm, and none risk of harm to others.    For any risk assessment that surpasses a \"low\" rating, a safety plan must be developed.    A safety plan was indicated: no  If yes, describe in detail N/A    PLAN: Between sessions, Esha Moreira will utilize learned skills. At the next session, the therapist will use Cognitive Behavioral Therapy to address goals.    Behavioral Health Treatment Plan and Discharge Planning: Esha Moreira is aware of and agrees to continue to work on their treatment plan. They have identified and are working toward their discharge goals. yes    Depression Follow-up Plan Completed: Not applicable    Visit start and stop times:    07/21/25  Start Time: 1601  Stop Time: 1651  Total Visit Time: 50 minutes  "

## 2025-08-04 ENCOUNTER — SOCIAL WORK (OUTPATIENT)
Dept: BEHAVIORAL/MENTAL HEALTH CLINIC | Facility: CLINIC | Age: 45
End: 2025-08-04
Payer: COMMERCIAL

## 2025-08-04 DIAGNOSIS — F31.60 BIPOLAR 1 DISORDER, MIXED (HCC): Primary | ICD-10-CM

## 2025-08-04 PROCEDURE — 90837 PSYTX W PT 60 MINUTES: CPT | Performed by: COUNSELOR

## 2025-08-07 ENCOUNTER — ANESTHESIA (OUTPATIENT)
Dept: GASTROENTEROLOGY | Facility: HOSPITAL | Age: 45
End: 2025-08-07
Payer: MEDICARE

## 2025-08-07 ENCOUNTER — HOSPITAL ENCOUNTER (OUTPATIENT)
Dept: GASTROENTEROLOGY | Facility: HOSPITAL | Age: 45
Setting detail: OUTPATIENT SURGERY
End: 2025-08-07
Attending: INTERNAL MEDICINE
Payer: MEDICARE

## 2025-08-07 ENCOUNTER — ANESTHESIA EVENT (OUTPATIENT)
Dept: GASTROENTEROLOGY | Facility: HOSPITAL | Age: 45
End: 2025-08-07
Payer: MEDICARE

## 2025-08-07 RX ORDER — LIDOCAINE HYDROCHLORIDE 20 MG/ML
INJECTION, SOLUTION EPIDURAL; INFILTRATION; INTRACAUDAL; PERINEURAL AS NEEDED
Status: DISCONTINUED | OUTPATIENT
Start: 2025-08-07 | End: 2025-08-07

## 2025-08-07 RX ORDER — SODIUM CHLORIDE, SODIUM LACTATE, POTASSIUM CHLORIDE, CALCIUM CHLORIDE 600; 310; 30; 20 MG/100ML; MG/100ML; MG/100ML; MG/100ML
INJECTION, SOLUTION INTRAVENOUS CONTINUOUS PRN
Status: DISCONTINUED | OUTPATIENT
Start: 2025-08-07 | End: 2025-08-07

## 2025-08-07 RX ORDER — PROPOFOL 10 MG/ML
INJECTION, EMULSION INTRAVENOUS AS NEEDED
Status: DISCONTINUED | OUTPATIENT
Start: 2025-08-07 | End: 2025-08-07

## 2025-08-07 RX ADMIN — PROPOFOL 200 MG: 10 INJECTION, EMULSION INTRAVENOUS at 08:13

## 2025-08-07 RX ADMIN — PROPOFOL 50 MG: 10 INJECTION, EMULSION INTRAVENOUS at 08:28

## 2025-08-07 RX ADMIN — PROPOFOL 50 MG: 10 INJECTION, EMULSION INTRAVENOUS at 08:32

## 2025-08-07 RX ADMIN — LIDOCAINE HYDROCHLORIDE 100 MG: 20 INJECTION, SOLUTION EPIDURAL; INFILTRATION; INTRACAUDAL; PERINEURAL at 08:13

## 2025-08-07 RX ADMIN — SODIUM CHLORIDE, SODIUM LACTATE, POTASSIUM CHLORIDE, AND CALCIUM CHLORIDE: .6; .31; .03; .02 INJECTION, SOLUTION INTRAVENOUS at 08:11

## 2025-08-07 RX ADMIN — PROPOFOL 50 MG: 10 INJECTION, EMULSION INTRAVENOUS at 08:19

## 2025-08-07 RX ADMIN — PROPOFOL 30 MG: 10 INJECTION, EMULSION INTRAVENOUS at 08:36

## 2025-08-07 RX ADMIN — PROPOFOL 50 MG: 10 INJECTION, EMULSION INTRAVENOUS at 08:15

## 2025-08-07 RX ADMIN — PROPOFOL 50 MG: 10 INJECTION, EMULSION INTRAVENOUS at 08:25

## 2025-08-07 RX ADMIN — PROPOFOL 50 MG: 10 INJECTION, EMULSION INTRAVENOUS at 08:22

## 2025-08-18 ENCOUNTER — SOCIAL WORK (OUTPATIENT)
Dept: BEHAVIORAL/MENTAL HEALTH CLINIC | Facility: CLINIC | Age: 45
End: 2025-08-18
Payer: COMMERCIAL

## 2025-08-18 DIAGNOSIS — F31.60 BIPOLAR 1 DISORDER, MIXED (HCC): Primary | ICD-10-CM

## 2025-08-18 PROCEDURE — 90834 PSYTX W PT 45 MINUTES: CPT | Performed by: COUNSELOR

## 2025-08-21 ENCOUNTER — TELEPHONE (OUTPATIENT)
Age: 45
End: 2025-08-21

## (undated) DEVICE — ELECTRODE LAP BLADE CRV E-Z CLEAN 33CM -0019

## (undated) DEVICE — CHLORAPREP HI-LITE 26ML ORANGE

## (undated) DEVICE — INTENDED FOR TISSUE SEPARATION, AND OTHER PROCEDURES THAT REQUIRE A SHARP SURGICAL BLADE TO PUNCTURE OR CUT.: Brand: BARD-PARKER SAFETY BLADES SIZE 11, STERILE

## (undated) DEVICE — NEPTUNE E-SEP SMOKE EVACUATION PENCIL, COATED, 70MM BLADE, PUSH BUTTON SWITCH: Brand: NEPTUNE E-SEP

## (undated) DEVICE — OCCLUSIVE GAUZE STRIP,3% BISMUTH TRIBROMOPHENATE IN PETROLATUM BLEND: Brand: XEROFORM

## (undated) DEVICE — ASTOUND STANDARD SURGICAL GOWN, XXL: Brand: CONVERTORS

## (undated) DEVICE — TISSUE RETRIEVAL SYSTEM: Brand: INZII RETRIEVAL SYSTEM

## (undated) DEVICE — GLOVE INDICATOR PI UNDERGLOVE SZ 8 BLUE

## (undated) DEVICE — ANTIBACTERIAL UNDYED BRAIDED (POLYGLACTIN 910), SYNTHETIC ABSORBABLE SUTURE: Brand: COATED VICRYL

## (undated) DEVICE — NEEDLE 25G X 1 1/2

## (undated) DEVICE — 3M™ TEGADERM™ TRANSPARENT FILM DRESSING FRAME STYLE, 1624W, 2-3/8 IN X 2-3/4 IN (6 CM X 7 CM), 100/CT 4CT/CASE: Brand: 3M™ TEGADERM™

## (undated) DEVICE — INTENDED FOR TISSUE SEPARATION, AND OTHER PROCEDURES THAT REQUIRE A SHARP SURGICAL BLADE TO PUNCTURE OR CUT.: Brand: BARD-PARKER SAFETY BLADES SIZE 15, STERILE

## (undated) DEVICE — MEDI-VAC YANK SUCT HNDL W/TPRD BULBOUS TIP: Brand: CARDINAL HEALTH

## (undated) DEVICE — TROCAR: Brand: KII FIOS FIRST ENTRY

## (undated) DEVICE — 5 MM BABCOCKS WITH RATCHET HANDLES: Brand: ENDOPATH

## (undated) DEVICE — ADHESIVE SKIN HIGH VISCOSITY EXOFIN 1ML

## (undated) DEVICE — 3000CC GUARDIAN II: Brand: GUARDIAN

## (undated) DEVICE — GLOVE SRG BIOGEL ECLIPSE 7.5

## (undated) DEVICE — 3M™ STERI-STRIP™ REINFORCED ADHESIVE SKIN CLOSURES, R1547, 1/2 IN X 4 IN (12 MM X 100 MM), 6 STRIPS/ENVELOPE: Brand: 3M™ STERI-STRIP™

## (undated) DEVICE — TRAY FOLEY 16FR URIMETER SURESTEP

## (undated) DEVICE — COBAN 4 IN STERILE

## (undated) DEVICE — PAD GROUNDING ADULT

## (undated) DEVICE — GLOVE SRG BIOGEL 9

## (undated) DEVICE — PDS II VLT 0 107CM AG ST3: Brand: ENDOLOOP

## (undated) DEVICE — TROCAR: Brand: KII® SLEEVE

## (undated) DEVICE — HARMONIC 1100 SHEARS, 36CM SHAFT LENGTH: Brand: HARMONIC

## (undated) DEVICE — 3M™ TEGADERM™ TRANSPARENT FILM DRESSING FRAME STYLE, 1626W, 4 IN X 4-3/4 IN (10 CM X 12 CM), 50/CT 4CT/CASE: Brand: 3M™ TEGADERM™

## (undated) DEVICE — BETHLEHEM UNIVERSAL MINOR GEN: Brand: CARDINAL HEALTH

## (undated) DEVICE — SUT MONOCRYL 4-0 PS-2 18 IN Y496G

## (undated) DEVICE — PACK PBDS LAP CHOLE RF

## (undated) DEVICE — SUT VICRYL 0 UR-6 27 IN J603H

## (undated) DEVICE — PENCIL ELECTROSURG E-Z CLEAN -0035H

## (undated) DEVICE — ENDOPATH PNEUMONEEDLE INSUFFLATION NEEDLES WITH LUER LOCK CONNECTORS 120MM: Brand: ENDOPATH

## (undated) DEVICE — SUT MONOCRYL PLUS 4-0 PS-2 27IN MCP426H

## (undated) DEVICE — GLOVE INDICATOR PI UNDERGLOVE SZ 9 BLUE